# Patient Record
Sex: FEMALE | Race: WHITE | HISPANIC OR LATINO | Employment: OTHER | URBAN - METROPOLITAN AREA
[De-identification: names, ages, dates, MRNs, and addresses within clinical notes are randomized per-mention and may not be internally consistent; named-entity substitution may affect disease eponyms.]

---

## 2017-01-17 ENCOUNTER — OFFICE VISIT (OUTPATIENT)
Dept: OBSTETRICS AND GYNECOLOGY | Facility: CLINIC | Age: 82
End: 2017-01-17
Payer: MEDICARE

## 2017-01-17 VITALS
WEIGHT: 134.06 LBS | BODY MASS INDEX: 26.32 KG/M2 | DIASTOLIC BLOOD PRESSURE: 60 MMHG | HEIGHT: 60 IN | SYSTOLIC BLOOD PRESSURE: 110 MMHG

## 2017-01-17 DIAGNOSIS — R10.2 PELVIC PAIN: Primary | ICD-10-CM

## 2017-01-17 DIAGNOSIS — R10.2 PELVIC PAIN: ICD-10-CM

## 2017-01-17 PROCEDURE — 76830 TRANSVAGINAL US NON-OB: CPT | Mod: S$GLB,,, | Performed by: OBSTETRICS & GYNECOLOGY

## 2017-01-17 PROCEDURE — 1159F MED LIST DOCD IN RCRD: CPT | Mod: S$GLB,,, | Performed by: OBSTETRICS & GYNECOLOGY

## 2017-01-17 PROCEDURE — 99999 PR PBB SHADOW E&M-EST. PATIENT-LVL II: CPT | Mod: PBBFAC,,, | Performed by: OBSTETRICS & GYNECOLOGY

## 2017-01-17 PROCEDURE — 1157F ADVNC CARE PLAN IN RCRD: CPT | Mod: S$GLB,,, | Performed by: OBSTETRICS & GYNECOLOGY

## 2017-01-17 PROCEDURE — 99203 OFFICE O/P NEW LOW 30 MIN: CPT | Mod: S$GLB,,, | Performed by: OBSTETRICS & GYNECOLOGY

## 2017-01-17 PROCEDURE — 1160F RVW MEDS BY RX/DR IN RCRD: CPT | Mod: S$GLB,,, | Performed by: OBSTETRICS & GYNECOLOGY

## 2017-01-17 NOTE — PROGRESS NOTES
"82 yo Hungarian speaking postmenopausal female who presents to discuss management of "bumps on her vagina". Patient reports that bumps were present on the left side a few days ago. They caused itching. But, they are now gone.  She continues to have a little bit of itching in the vulvar area.  Patient is also concerned about pain in her lower abdomen. Seems to think that there is a problem with her ovaries/uterus.  On physical exam, normal appearing external female genitalia with slight discoloration noted on the left labia minora.  On speculum exam, cervix is normal appearing. No masses. No lesions. No vaginal discharge noted.  On bimanual exam, normal size uterus. Ovaries not palpated. No adnexal masses. No rebound. No guarding. No suprapubic pain.    AP:   Lower pelvic pain/discomfort  -urine cx sent; pelvic US ordered    If patient continues to have vulvar itching, may consider clobetasol cream    VI Pardo MD  "

## 2017-01-17 NOTE — LETTER
January 17, 2017      Kodak Beauchamp MD  [address] [facility]  [place] [address]           [place] [facility]  [address]  5th [address] [facility], [address]  [place] [address]  Phone: [phone]          Patient: Ghada Dave   MR Number: [id number]   YOB: 1935   Date of Visit: 1/17/2017       Dear Dr. Kodak Beauchamp:    Thank you for referring Ghada Flanagan to me for evaluation. Attached you will find relevant portions of my assessment and plan of care.    If you have questions, please do not hesitate to call me. I look forward to following Ghada Flanagan along with you.    Sincerely,    Tremayne Pardo MD    Enclosure  CC:  No Recipients    If you would like to receive this communication electronically, please contact [email or web address] or [phone] to request more information on ybuy Link access.    For providers and/or their staff who would like to refer a patient to [facility], please contact us through our one-stop-shop provider referral line, [facility], at [phone].    If you feel you have received this communication in error or would no longer like to receive these types of communications, please e-mail [email or web address]

## 2017-01-19 LAB — BACTERIA UR CULT: NORMAL

## 2017-01-20 ENCOUNTER — OFFICE VISIT (OUTPATIENT)
Dept: FAMILY MEDICINE | Facility: CLINIC | Age: 82
End: 2017-01-20
Payer: MEDICARE

## 2017-01-20 ENCOUNTER — LAB VISIT (OUTPATIENT)
Dept: LAB | Facility: HOSPITAL | Age: 82
End: 2017-01-20
Attending: FAMILY MEDICINE
Payer: MEDICARE

## 2017-01-20 VITALS
WEIGHT: 133.81 LBS | HEIGHT: 60 IN | OXYGEN SATURATION: 96 % | SYSTOLIC BLOOD PRESSURE: 118 MMHG | BODY MASS INDEX: 26.27 KG/M2 | DIASTOLIC BLOOD PRESSURE: 64 MMHG | HEART RATE: 82 BPM

## 2017-01-20 DIAGNOSIS — G89.29 CHRONIC PAIN OF BOTH SHOULDERS: ICD-10-CM

## 2017-01-20 DIAGNOSIS — J06.9 UPPER RESPIRATORY TRACT INFECTION, UNSPECIFIED TYPE: ICD-10-CM

## 2017-01-20 DIAGNOSIS — F41.9 ANXIETY: ICD-10-CM

## 2017-01-20 DIAGNOSIS — M25.511 CHRONIC PAIN OF BOTH SHOULDERS: ICD-10-CM

## 2017-01-20 DIAGNOSIS — M25.512 CHRONIC PAIN OF BOTH SHOULDERS: Primary | ICD-10-CM

## 2017-01-20 DIAGNOSIS — G89.29 CHRONIC PAIN OF BOTH SHOULDERS: Primary | ICD-10-CM

## 2017-01-20 DIAGNOSIS — M25.511 CHRONIC PAIN OF BOTH SHOULDERS: Primary | ICD-10-CM

## 2017-01-20 DIAGNOSIS — M25.512 CHRONIC PAIN OF BOTH SHOULDERS: ICD-10-CM

## 2017-01-20 LAB
BASOPHILS # BLD AUTO: 0.08 K/UL
BASOPHILS NFR BLD: 1.7 %
CRP SERPL-MCNC: 2.4 MG/L
DIFFERENTIAL METHOD: ABNORMAL
EOSINOPHIL # BLD AUTO: 0.3 K/UL
EOSINOPHIL NFR BLD: 6.1 %
ERYTHROCYTE [DISTWIDTH] IN BLOOD BY AUTOMATED COUNT: 14.3 %
ERYTHROCYTE [SEDIMENTATION RATE] IN BLOOD BY WESTERGREN METHOD: 34 MM/HR
HCT VFR BLD AUTO: 35.4 %
HGB BLD-MCNC: 12.2 G/DL
LYMPHOCYTES # BLD AUTO: 1.5 K/UL
LYMPHOCYTES NFR BLD: 32.2 %
MCH RBC QN AUTO: 30.1 PG
MCHC RBC AUTO-ENTMCNC: 34.5 %
MCV RBC AUTO: 87 FL
MONOCYTES # BLD AUTO: 0.4 K/UL
MONOCYTES NFR BLD: 8 %
NEUTROPHILS # BLD AUTO: 2.4 K/UL
NEUTROPHILS NFR BLD: 51.8 %
PLATELET # BLD AUTO: 297 K/UL
PMV BLD AUTO: 10.6 FL
RBC # BLD AUTO: 4.05 M/UL
WBC # BLD AUTO: 4.6 K/UL

## 2017-01-20 PROCEDURE — 1159F MED LIST DOCD IN RCRD: CPT | Mod: S$GLB,,, | Performed by: FAMILY MEDICINE

## 2017-01-20 PROCEDURE — 1157F ADVNC CARE PLAN IN RCRD: CPT | Mod: S$GLB,,, | Performed by: FAMILY MEDICINE

## 2017-01-20 PROCEDURE — 1160F RVW MEDS BY RX/DR IN RCRD: CPT | Mod: S$GLB,,, | Performed by: FAMILY MEDICINE

## 2017-01-20 PROCEDURE — 99214 OFFICE O/P EST MOD 30 MIN: CPT | Mod: S$GLB,,, | Performed by: FAMILY MEDICINE

## 2017-01-20 PROCEDURE — 99999 PR PBB SHADOW E&M-EST. PATIENT-LVL III: CPT | Mod: PBBFAC,,, | Performed by: FAMILY MEDICINE

## 2017-01-20 PROCEDURE — 85025 COMPLETE CBC W/AUTO DIFF WBC: CPT

## 2017-01-20 PROCEDURE — 36415 COLL VENOUS BLD VENIPUNCTURE: CPT | Mod: PO

## 2017-01-20 PROCEDURE — 85651 RBC SED RATE NONAUTOMATED: CPT

## 2017-01-20 PROCEDURE — 86140 C-REACTIVE PROTEIN: CPT

## 2017-01-20 PROCEDURE — 1125F AMNT PAIN NOTED PAIN PRSNT: CPT | Mod: S$GLB,,, | Performed by: FAMILY MEDICINE

## 2017-01-20 RX ORDER — ALPRAZOLAM 1 MG/1
1 TABLET ORAL NIGHTLY
COMMUNITY
End: 2017-01-20 | Stop reason: SDUPTHER

## 2017-01-20 RX ORDER — ALPRAZOLAM 0.5 MG/1
0.5 TABLET ORAL NIGHTLY PRN
Qty: 40 TABLET | Refills: 0 | Status: SHIPPED | OUTPATIENT
Start: 2017-01-20 | End: 2017-03-14

## 2017-01-20 RX ORDER — OMEPRAZOLE 20 MG/1
20 CAPSULE, DELAYED RELEASE ORAL DAILY
COMMUNITY
End: 2017-06-05

## 2017-01-20 RX ORDER — PREDNISONE 20 MG/1
20 TABLET ORAL DAILY
Qty: 7 TABLET | Refills: 0 | Status: SHIPPED | OUTPATIENT
Start: 2017-01-20 | End: 2017-01-27

## 2017-01-20 RX ORDER — MELOXICAM 7.5 MG/1
7.5 TABLET ORAL DAILY
Qty: 30 TABLET | Refills: 0 | Status: SHIPPED | OUTPATIENT
Start: 2017-01-20 | End: 2017-03-14

## 2017-01-20 RX ORDER — AZITHROMYCIN 250 MG/1
TABLET, FILM COATED ORAL
Qty: 6 TABLET | Refills: 0 | Status: SHIPPED | OUTPATIENT
Start: 2017-01-20 | End: 2017-01-25

## 2017-01-20 NOTE — MR AVS SNAPSHOT
[facility]   [facility] [place] [address]  Phone: [phone]  Fax: [phone]                  Ghada Dave   2017 11:20 AM   Office Visit    Descripción:  Female : 1935   Personal Médico:  Kodak Beauchamp MD   Departamento:  [facility]           Razón de la amaury     Shoulder Pain     Sore Throat           Diagnósticos de Esta Visita        Comentarios    Chronic pain of both shoulders    -  Primario     Upper respiratory tract infection, unspecified type         Anxiety                Lista de tareas           Citas próximas        Personal Médico Departamento Tfno del dpto    2017 1:00 PM LAB, [place] [facility] [phone]    2017 10:20 AM Kodak Beauchamp MD [facility] [phone]      Metas (5 Years of Data)     Ninguna      Recetas para recoger        Disp Refills Start End    meloxicam (MOBIC) 7.5 MG tablet 30 tablet 0 2017     Take 1 tablet (7.5 mg total) by mouth once daily. - Oral    [place] [facility] [place]  [address] [place] [address] No. de tlfo: #: [phone]       predniSONE (DELTASONE) 20 MG tablet 7 tablet 0 2017    Take 1 tablet (20 mg total) by mouth once daily. - Oral    [facility] [facility] [place]  [address] [place] [address] No. de tlfo: #: [phone]       alprazolam (XANAX) 0.5 MG tablet 40 tablet 0 2017     Take 1 tablet (0.5 mg total) by mouth nightly as needed for Anxiety. - Oral    [facility] [facility] [place]  [address] [place] [address] No. de tlfo: #: [phone]       azithromycin (Z-RICARDO) 250 MG tablet 6 tablet 0 2017    Take 2 tablets by mouth on day 1; Take 1 tablet by mouth on days 2-5    [facility]: [facility] [place] -  [address] [place] [address] No. de tlfo: #: [phone]         [facility]     [facility] [place] de Enfermeras -  Asistencia 24/7  Enfermeras registradas de [facility] pueden ayudarle a reservar trinh amaury, proveer educación para la maría, asesoría clínica, y otros servicios de asesoramiento.   Llame para randy servicio gratuito a [phone].             Medicamentos           Mensaje sobre Medicamentos     Verificar los cambios y / o adiciones a hart régimen de medicación son los mismos que discutir con hart médico. Si cualquiera de estos cambios o adiciones son incorrectos, por favor notifique a hart proveedor de atención médica.        EMPEZAR a eddy estos medicamentos NUEVOS        Refills    meloxicam (MOBIC) 7.5 MG tablet 0    Sig: Take 1 tablet (7.5 mg total) by mouth once daily.    Categoría: Normal    Vía: Oral    predniSONE (DELTASONE) 20 MG tablet 0    Sig: Take 1 tablet (20 mg total) by mouth once daily.    Categoría: Normal    Vía: Oral    alprazolam (XANAX) 0.5 MG tablet 0    Sig: Take 1 tablet (0.5 mg total) by mouth nightly as needed for Anxiety.    Categoría: Print    Vía: Oral    azithromycin (Z-RICARDO) 250 MG tablet 0    Sig: Take 2 tablets by mouth on day 1; Take 1 tablet by mouth on days 2-5    Categoría: Normal      DEJAR de eddy estos medicamentos     gabapentin (NEURONTIN) 100 MG capsule Take 100 mg by mouth 2 (two) times daily.    mometasone 0.1% (ELOCON) 0.1 % cream Apply topically once daily.    celecoxib (CELEBREX) 200 MG capsule Take 1 capsule (200 mg total) by mouth daily as needed for Pain.           Verifique que la siguiente lista de medicamentos es trinh representación exacta de los medicamentos que está tomando actualmente. Si no hay ningunos reportados, la lista puede estar en goel. Si no es correcta, por favor póngase en contacto con hart proveedor de atención médica. Lleve esta lista con usted en sid de emergencia.           Medicamentos Actuales     alprazolam (XANAX) 0.5 MG tablet Take 1 tablet (0.5 mg total) by mouth nightly as needed for Anxiety.    atorvastatin (LIPITOR) 40 MG tablet Take 1  tablet by mouth once daily.    omeprazole (PRILOSEC) 20 MG capsule Take 20 mg by mouth once daily.    azithromycin (Z-RICARDO) 250 MG tablet Take 2 tablets by mouth on day 1; Take 1 tablet by mouth on days 2-5    meloxicam (MOBIC) 7.5 MG tablet Take 1 tablet (7.5 mg total) by mouth once daily.    predniSONE (DELTASONE) 20 MG tablet Take 1 tablet (20 mg total) by mouth once daily.           Información de referencia clínica           Signos vitales - más recientes  Última actualización: 2017 11:09 AM por Lary Bolanos MA    PS [place] SpO2 BMI ([facility])    118/64 (BP Location: Left arm, Patient Position: Sitting, BP Method: Manual) 82 5' (1.524 m) 60.7 kg (133 lb 13.1 oz) 96% 26.13 kg/m2      Blood Pressure          Most Recent Value    BP  118/64      Alergias     A partir del:  2017        No Known Allergies      Vacunas     Administradas en la fecha de la visita:  2017        None      Orders Placed During Today's Visit     Exámenes/Procedimientos futuros Se espera el Vence    C-reactive protein  2017    CBC auto differential  2017    Sedimentation rate, manual  2017               Ghada Del Zenaida Flanagan   2017 11:20 AM   Office Visit    Description:  Female : 1935   Provider:  Kodak Beauchamp MD   Department:  [facility]           Reason for Visit     Shoulder Pain     Sore Throat           Diagnoses this Visit        Comments    Chronic pain of both shoulders    -  Primary     Upper respiratory tract infection, unspecified type         Anxiety                To Do List           Future Appointments        Provider Department Dept Phone    2017 1:00 PM LAB, [place] [facility] [phone]    2017 10:20 AM Kodak Beauchamp MD [facility] [phone]      Goals     None       These Medications        Disp Refills Start End    meloxicam (MOBIC) 7.5 MG  tablet 30 tablet 0 1/20/2017     Take 1 tablet (7.5 mg total) by mouth once daily. - Oral    [place] [facility] [place] [address] Ph #: [phone]       predniSONE (DELTASONE) 20 MG tablet 7 tablet 0 1/20/2017 1/27/2017    Take 1 tablet (20 mg total) by mouth once daily. - Oral    [place] [facility] [place] [address] Ph #: [phone]       alprazolam (XANAX) 0.5 MG tablet 40 tablet 0 1/20/2017     Take 1 tablet (0.5 mg total) by mouth nightly as needed for Anxiety. - Oral    [place] [facility] [place] [address] Ph #: [phone]       azithromycin (Z-RICARDO) 250 MG tablet 6 tablet 0 1/20/2017 1/25/2017    Take 2 tablets by mouth on day 1; Take 1 tablet by mouth on days 2-5    [place] [facility] [place] [address] Ph #: [phone]         [facility]     [facility] Line - 24/7 Assistance  Registered nurses in the [facility] provide clinical advisement, health education, appointment booking, and other advisory services.  Call for this free service at [phone].             Medications           Message regarding Medications     Verify the changes and/or additions to your medication regime listed below are the same as discussed with your clinician today.  If any of these changes or additions are incorrect, please notify your healthcare provider.        START taking these NEW medications        Refills    meloxicam (MOBIC) 7.5 MG tablet 0    Sig: Take 1 tablet (7.5 mg total) by mouth once daily.    Class: Normal    Route: Oral    predniSONE (DELTASONE) 20 MG tablet 0    Sig: Take 1 tablet (20 mg total) by mouth once daily.    Class: Normal    Route: Oral    alprazolam (XANAX) 0.5 MG tablet 0    Sig: Take 1 tablet (0.5 mg total) by mouth nightly as needed for Anxiety.    Class: Print    Route: Oral    azithromycin (Z-RICARDO) 250 MG tablet 0    Sig:  Take 2 tablets by mouth on day 1; Take 1 tablet by mouth on days 2-5    Class: Normal      STOP taking these medications     gabapentin (NEURONTIN) 100 MG capsule Take 100 mg by mouth 2 (two) times daily.    mometasone 0.1% (ELOCON) 0.1 % cream Apply topically once daily.    celecoxib (CELEBREX) 200 MG capsule Take 1 capsule (200 mg total) by mouth daily as needed for Pain.           Verify that the below list of medications is an accurate representation of the medications you are currently taking.  If none reported, the list may be blank. If incorrect, please contact your healthcare provider. Carry this list with you in case of emergency.           Current Medications     alprazolam (XANAX) 0.5 MG tablet Take 1 tablet (0.5 mg total) by mouth nightly as needed for Anxiety.    atorvastatin (LIPITOR) 40 MG tablet Take 1 tablet by mouth once daily.    omeprazole (PRILOSEC) 20 MG capsule Take 20 mg by mouth once daily.    azithromycin (Z-RICARDO) 250 MG tablet Take 2 tablets by mouth on day 1; Take 1 tablet by mouth on days 2-5    meloxicam (MOBIC) 7.5 MG tablet Take 1 tablet (7.5 mg total) by mouth once daily.    predniSONE (DELTASONE) 20 MG tablet Take 1 tablet (20 mg total) by mouth once daily.           Clinical Reference Information           Vital Signs - Last Recorded  Most recent update: 1/20/2017 11:09 AM by Lary Bolanos MA    BP Pulse Ht Wt SpO2 BMI    118/64 (BP Location: Left arm, Patient Position: Sitting, BP Method: Manual) 82 5' (1.524 m) 60.7 kg (133 lb 13.1 oz) 96% 26.13 kg/m2      Blood Pressure          Most Recent Value    BP  118/64      Allergies as of 1/20/2017     No Known Allergies      Immunizations Administered on Date of Encounter - 1/20/2017     None      Orders Placed During Today's Visit     Future Labs/Procedures Expected by Expires    C-reactive protein  1/20/2017 4/20/2017    CBC auto differential  1/20/2017 1/20/2018    Sedimentation rate, manual  1/20/2017 4/20/2017

## 2017-01-20 NOTE — PROGRESS NOTES
Subjective:       Patient ID: Ghada Dave is a 81 y.o. female.    Chief Complaint: Shoulder Pain and Sore Throat    HPI Comments: 81 years old female who came to the clinic with cough and congestion for the last week.  No fever or chills.  Patient also with significant anxiety and requesting alprazolam to help her for the symptoms.  She is also complaining of bilateral shoulder pain.  The pain is 6 out of 10 of intensity on and off aggravated with activity and better with rest.    Shoulder Pain    Pertinent negatives include no fever.   Sore Throat    Associated symptoms include congestion.     Review of Systems   Constitutional: Negative.  Negative for chills and fever.   HENT: Positive for congestion and sore throat.    Eyes: Negative.    Respiratory: Negative.    Cardiovascular: Negative.    Gastrointestinal: Negative.    Genitourinary: Negative.    Musculoskeletal: Negative.    Skin: Negative.    Neurological: Negative.    Psychiatric/Behavioral: The patient is nervous/anxious.        Objective:      Physical Exam   Constitutional: She is oriented to person, place, and time. She appears well-developed and well-nourished. No distress.   HENT:   Head: Normocephalic and atraumatic.   Right Ear: External ear normal.   Left Ear: External ear normal.   Nose: Rhinorrhea present.   Mouth/Throat: Posterior oropharyngeal erythema present. No oropharyngeal exudate.   Eyes: Conjunctivae and EOM are normal. Pupils are equal, round, and reactive to light. Right eye exhibits no discharge. Left eye exhibits no discharge. No scleral icterus.   Neck: Normal range of motion. Neck supple. No JVD present. No tracheal deviation present. No thyromegaly present.   Cardiovascular: Normal rate, regular rhythm, normal heart sounds and intact distal pulses.  Exam reveals no gallop and no friction rub.    No murmur heard.  Pulmonary/Chest: Effort normal and breath sounds normal. No stridor. No respiratory distress. She has  no wheezes. She has no rales. She exhibits no tenderness.   Abdominal: Soft. Bowel sounds are normal. She exhibits no distension and no mass. There is no tenderness. There is no rebound and no guarding.   Musculoskeletal: Normal range of motion. She exhibits no edema or tenderness.   Lymphadenopathy:     She has no cervical adenopathy.   Neurological: She is alert and oriented to person, place, and time. She has normal reflexes. No cranial nerve deficit. She exhibits normal muscle tone. Coordination normal.   Skin: Skin is warm and dry. No rash noted. She is not diaphoretic. No erythema. No pallor.   Psychiatric: Her behavior is normal. Judgment and thought content normal. Her mood appears anxious. Her affect is not angry, not blunt, not labile and not inappropriate. She does not exhibit a depressed mood.       Assessment:       1. Chronic pain of both shoulders    2. Upper respiratory tract infection, unspecified type    3. Anxiety        Plan:         Ghada was seen today for shoulder pain and sore throat.    Diagnoses and all orders for this visit:    Chronic pain of both shoulders  -     meloxicam (MOBIC) 7.5 MG tablet; Take 1 tablet (7.5 mg total) by mouth once daily.  -     predniSONE (DELTASONE) 20 MG tablet; Take 1 tablet (20 mg total) by mouth once daily.  -     CBC auto differential; Future  -     Sedimentation rate, manual; Future  -     C-reactive protein; Future    Upper respiratory tract infection, unspecified type  -     azithromycin (Z-RICARDO) 250 MG tablet; Take 2 tablets by mouth on day 1; Take 1 tablet by mouth on days 2-5    Anxiety  -     alprazolam (XANAX) 0.5 MG tablet; Take 1 tablet (0.5 mg total) by mouth nightly as needed for Anxiety.

## 2017-01-31 ENCOUNTER — TELEPHONE (OUTPATIENT)
Dept: FAMILY MEDICINE | Facility: CLINIC | Age: 82
End: 2017-01-31

## 2017-01-31 DIAGNOSIS — M15.9 OSTEOARTHRITIS OF MULTIPLE JOINTS, UNSPECIFIED OSTEOARTHRITIS TYPE: Primary | ICD-10-CM

## 2017-01-31 NOTE — TELEPHONE ENCOUNTER
Patient called and states that the medication that was given to her for her arthritis pain Mobic is not helping her and it is causing her to have a rash in her mouth she took it for 6 days she has stopped taking and the rash has gone away. She wants something else to be given to her the pain is still the same in her shoulders. Please advise.

## 2017-01-31 NOTE — TELEPHONE ENCOUNTER
----- Message from Day Alva sent at 1/31/2017 10:46 AM CST -----  Contact: self, [phone]  Patient called in requesting to speak with you regarding Meloxicam medication prescribed. Please advise.

## 2017-02-01 RX ORDER — TRAMADOL HYDROCHLORIDE AND ACETAMINOPHEN 37.5; 325 MG/1; MG/1
1 TABLET, FILM COATED ORAL 3 TIMES DAILY PRN
Qty: 60 TABLET | Refills: 0 | Status: SHIPPED | OUTPATIENT
Start: 2017-02-01 | End: 2017-02-11

## 2017-03-14 ENCOUNTER — HOSPITAL ENCOUNTER (EMERGENCY)
Facility: HOSPITAL | Age: 82
Discharge: HOME | End: 2017-03-14
Attending: EMERGENCY MEDICINE
Payer: MEDICARE

## 2017-03-14 VITALS
SYSTOLIC BLOOD PRESSURE: 130 MMHG | DIASTOLIC BLOOD PRESSURE: 47 MMHG | BODY MASS INDEX: 25.52 KG/M2 | HEIGHT: 60 IN | HEART RATE: 64 BPM | WEIGHT: 130 LBS | RESPIRATION RATE: 13 BRPM | TEMPERATURE: 98 F | OXYGEN SATURATION: 100 %

## 2017-03-14 DIAGNOSIS — R07.9 CHEST PAIN: ICD-10-CM

## 2017-03-14 LAB
ALBUMIN SERPL BCP-MCNC: 4 G/DL
ALP SERPL-CCNC: 61 U/L
ALT SERPL W/O P-5'-P-CCNC: 12 U/L
ANION GAP SERPL CALC-SCNC: 11 MMOL/L
AST SERPL-CCNC: 21 U/L
BASOPHILS # BLD AUTO: 0.06 K/UL
BASOPHILS NFR BLD: 1.4 %
BILIRUB SERPL-MCNC: 0.3 MG/DL
BNP SERPL-MCNC: 42 PG/ML
BUN SERPL-MCNC: 21 MG/DL
CALCIUM SERPL-MCNC: 10 MG/DL
CHLORIDE SERPL-SCNC: 107 MMOL/L
CO2 SERPL-SCNC: 23 MMOL/L
CREAT SERPL-MCNC: 0.9 MG/DL
DIFFERENTIAL METHOD: NORMAL
EOSINOPHIL # BLD AUTO: 0.2 K/UL
EOSINOPHIL NFR BLD: 5.3 %
ERYTHROCYTE [DISTWIDTH] IN BLOOD BY AUTOMATED COUNT: 14.2 %
EST. GFR  (AFRICAN AMERICAN): >60 ML/MIN/1.73 M^2
EST. GFR  (NON AFRICAN AMERICAN): >60 ML/MIN/1.73 M^2
GLUCOSE SERPL-MCNC: 94 MG/DL
HCT VFR BLD AUTO: 37.3 %
HGB BLD-MCNC: 12.8 G/DL
INR PPP: 0.9
LYMPHOCYTES # BLD AUTO: 1.7 K/UL
LYMPHOCYTES NFR BLD: 39.3 %
MCH RBC QN AUTO: 30.5 PG
MCHC RBC AUTO-ENTMCNC: 34.3 %
MCV RBC AUTO: 89 FL
MONOCYTES # BLD AUTO: 0.3 K/UL
MONOCYTES NFR BLD: 7.4 %
NEUTROPHILS # BLD AUTO: 2 K/UL
NEUTROPHILS NFR BLD: 46.4 %
PLATELET # BLD AUTO: 260 K/UL
PMV BLD AUTO: 9.8 FL
POTASSIUM SERPL-SCNC: 4.2 MMOL/L
PROT SERPL-MCNC: 8 G/DL
PROTHROMBIN TIME: 10 SEC
RBC # BLD AUTO: 4.2 M/UL
SODIUM SERPL-SCNC: 141 MMOL/L
TROPONIN I SERPL DL<=0.01 NG/ML-MCNC: <0.006 NG/ML
TROPONIN I SERPL DL<=0.01 NG/ML-MCNC: <0.006 NG/ML
WBC # BLD AUTO: 4.35 K/UL

## 2017-03-14 PROCEDURE — 80053 COMPREHEN METABOLIC PANEL: CPT

## 2017-03-14 PROCEDURE — 85025 COMPLETE CBC W/AUTO DIFF WBC: CPT

## 2017-03-14 PROCEDURE — 85610 PROTHROMBIN TIME: CPT

## 2017-03-14 PROCEDURE — 84484 ASSAY OF TROPONIN QUANT: CPT

## 2017-03-14 PROCEDURE — 25000003 PHARM REV CODE 250: Performed by: EMERGENCY MEDICINE

## 2017-03-14 PROCEDURE — 83880 ASSAY OF NATRIURETIC PEPTIDE: CPT

## 2017-03-14 PROCEDURE — 99284 EMERGENCY DEPT VISIT MOD MDM: CPT

## 2017-03-14 PROCEDURE — 93005 ELECTROCARDIOGRAM TRACING: CPT

## 2017-03-14 RX ORDER — KETOROLAC TROMETHAMINE 30 MG/ML
15 INJECTION, SOLUTION INTRAMUSCULAR; INTRAVENOUS
Status: DISCONTINUED | OUTPATIENT
Start: 2017-03-14 | End: 2017-03-14 | Stop reason: HOSPADM

## 2017-03-14 RX ORDER — KETOROLAC TROMETHAMINE 10 MG/1
10 TABLET, FILM COATED ORAL EVERY 8 HOURS
Qty: 8 TABLET | Refills: 0 | Status: SHIPPED | OUTPATIENT
Start: 2017-03-14 | End: 2017-06-05

## 2017-03-14 RX ORDER — ASPIRIN 325 MG
325 TABLET ORAL
Status: COMPLETED | OUTPATIENT
Start: 2017-03-14 | End: 2017-03-14

## 2017-03-14 RX ADMIN — ASPIRIN 325 MG ORAL TABLET 325 MG: 325 PILL ORAL at 04:03

## 2017-03-14 NOTE — ED TRIAGE NOTES
right chest pain that woke her up this morning.  Off/on since this morning.  Was sent to ED by Dr. Almanzar for evaluation.no other symptoms noted

## 2017-03-14 NOTE — ED PROVIDER NOTES
"Encounter Date: 3/14/2017       History     Chief Complaint   Patient presents with    Chest Pain     right chest pain that woke her up this morning.  Off/on since this morning.  Was sent to ED by Dr. Almanzar for evaluation.     Review of patient's allergies indicates:  No Known Allergies  HPI Comments: 82 Y/O HF PRESENTS WITH RIGHT ANTERIOR CHEST PAIN THAT HAS BEEN INTERMITTENT OCCURRING EVERY 15 MINUTES SINCE 0100 TODAY.  PT REPORTS THE PAIN IS WELL LOCALIZED TO RIGHT ANTERIOR CHEST, NON-RADIATING, AND THROBBING.  NO CHEST PRESSURE, NO PALPITATIONS, NO SHARP/STABBING CHEST PAIN.  PAIN BEGAN WHILE PT WAS LYING ON HER RIGHT SIDE IN HER BED EARLY THIS MORNING.  PT TOOK "2 PAIN PILLS" AROUND 0700 WITH NO RELIEF.  PT THEN DID A FEW ARM EXERCISES AND THE PAIN CONTINUED.  PT DENIES ANY EXACERBATING OR RELIEVING FACTORS.  PT REPORTS SHE DOES UPPER ARM EXERCISES DAILY AND COULD HAVE STRAINED A MUSCLE.  NO TRAUMA.  NO SOB, N/V, DIAPHORESIS, ABD PAIN.  PT CALLED HER PCP THIS AFTERNOON TO SCHEDULE AN OUTPATIENT APPOINTMENT AND WAS TOLD BY THE NURSE TO COME TO ED.      The history is provided by the patient and a relative. A  was used.     Past Medical History:   Diagnosis Date    Abnormal Pap smear 2013    Colon polyps     Frequent urination     Hemorrhoid     High cholesterol     Hypertension     Poor circulation     Varicose vein      Past Surgical History:   Procedure Laterality Date    COLONOSCOPY      POLYPECTOMY       History reviewed. No pertinent family history.  Social History   Substance Use Topics    Smoking status: Never Smoker    Smokeless tobacco: Never Used    Alcohol use No     Review of Systems   Constitutional: Negative for activity change, appetite change, chills, diaphoresis, fatigue and fever.   HENT: Negative for congestion, sore throat and trouble swallowing.    Eyes: Negative for pain and redness.   Respiratory: Negative for cough, chest tightness and shortness of " breath.    Cardiovascular: Positive for chest pain. Negative for palpitations and leg swelling.   Gastrointestinal: Negative for abdominal distention, abdominal pain, diarrhea, nausea and vomiting.   Endocrine: Negative for polydipsia, polyphagia and polyuria.   Genitourinary: Negative for difficulty urinating, dysuria and flank pain.   Musculoskeletal: Negative for back pain and joint swelling.   Skin: Negative for pallor and rash.   Allergic/Immunologic: Negative for immunocompromised state.   Neurological: Negative for dizziness, syncope, weakness, light-headedness, numbness and headaches.   Hematological: Does not bruise/bleed easily.   Psychiatric/Behavioral: Negative for agitation and confusion.   All other systems reviewed and are negative.      Physical Exam   Initial Vitals   BP Pulse Resp Temp SpO2   03/14/17 1516 03/14/17 1516 03/14/17 1516 03/14/17 1516 03/14/17 1516   132/94 70 18 98 °F (36.7 °C) 98 %     Physical Exam    Nursing note and vitals reviewed.  Constitutional: She appears well-developed and well-nourished. She is not diaphoretic. No distress.   HENT:   Head: Normocephalic and atraumatic.   Mouth/Throat: Oropharynx is clear and moist.   Eyes: Conjunctivae and EOM are normal. No scleral icterus.   Neck: Normal range of motion. Neck supple.   Cardiovascular: Normal rate, regular rhythm and normal heart sounds. Exam reveals no gallop and no friction rub.    No murmur heard.  Pulmonary/Chest: Breath sounds normal. No respiratory distress. She has no wheezes. She has no rhonchi. She has no rales. She exhibits tenderness. She exhibits no mass, no crepitus and no swelling.       Abdominal: Soft. Bowel sounds are normal. She exhibits no distension. There is no tenderness. There is no rebound and no guarding.   Musculoskeletal: Normal range of motion. She exhibits no edema.   Lymphadenopathy:     She has no cervical adenopathy.   Neurological: She is alert and oriented to person, place, and time. She  has normal strength.   Skin: Skin is warm and dry. No rash noted. No erythema.   Psychiatric: She has a normal mood and affect. Her behavior is normal. Judgment and thought content normal.         ED Course   Procedures  Labs Reviewed   CBC W/ AUTO DIFFERENTIAL    Narrative:     PLEASE REVIEW ORDER START TIME BEFORE MARKING SPECIMEN  COLLECTED.   COMPREHENSIVE METABOLIC PANEL    Narrative:     PLEASE REVIEW ORDER START TIME BEFORE MARKING SPECIMEN  COLLECTED.   PROTIME-INR    Narrative:     PLEASE REVIEW ORDER START TIME BEFORE MARKING SPECIMEN  COLLECTED.   TROPONIN I    Narrative:     PLEASE REVIEW ORDER START TIME BEFORE MARKING SPECIMEN  COLLECTED.   TROPONIN I    Narrative:     PLEASE REVIEW ORDER START TIME BEFORE MARKING SPECIMEN  COLLECTED.   B-TYPE NATRIURETIC PEPTIDE    Narrative:     PLEASE REVIEW ORDER START TIME BEFORE MARKING SPECIMEN  COLLECTED.     EKG Readings: (Independently Interpreted)   Initial Reading: No STEMI. Previous EKG: Compared with most recent EKG Previous EKG Date: 05/26/15. Heart Rate: 66.          Medical Decision Making:   Initial Assessment:   80 Y/O HF WITH INTERMITTENT CHEST PAIN SINCE 0100 THIS AM.  PT DENIES ANY HX OF HEART DISEASE.  PAIN IS REPRODUCIBLE ON EXAM.  NO ASSOCIATED SYMPTOMS OF DIAPHORESIS, SOB, N/V, CHEST PRESSURE, NECK OR JAW PAIN.   Differential Diagnosis:   DDX:  ACS, ARRHYTHMIA, STEMI, COSTOCHONDRITIS, CHEST WALL STRAIN  Independently Interpreted Test(s):   I have ordered and independently interpreted EKG Reading(s) - see prior notes  Clinical Tests:   Lab Tests: Ordered and Reviewed  The following lab test(s) were unremarkable: CBC, CMP and Troponin  Radiological Study: Ordered and Reviewed  Medical Tests: Ordered and Reviewed  ED Management:  80 Y/O F PRESENTS WITH C/O THROBBING CHEST PAIN THAT HAS BEEN ONGOING SINCE 0100 THIS MORNING AND IS REPRODUCIBLE ON EXAM.  PT HAS A HX OF DOING UPPER ARM EXERCISES AND THIS MOST LIKELY STRAINED HER CHEST WALL CAUSING  HER PAIN.  SHE DENIES ANY ASSOCIATED SYMPTOMS AND HAS NO ACUTE EKG CHANGES.  TROP IS NEG MAKING CARDIAC SOURCE OF PAIN EVEN LESS LIKELY.  I WILL D/C HOME WITH A FEW DOSES OF TORADOL AND HAVE HER F/U WITH PCP.  PT UNDERSTANDS THAT SHE MUST RETURN TO ED IMMEDIATELY IF SYMPTOMS WORSEN IN ANY WAY.  FAMILY MEMBER AT BEDSIDE PRESENT FOR D/C PLANNING.     Pt counseled on their diagnosis and the importance of following up with PCP.  Pt also cautioned on when to return to ED.  Pt verbalizes understanding of discharge plan and will return to ED immediately if symptoms worsen                     ED Course     Clinical Impression:   The encounter diagnosis was Chest pain.    Disposition:   Disposition: Discharged  Condition: Stable       Nuvia Navarrete MD  03/15/17 2869

## 2017-03-14 NOTE — ED NOTES
Pt here with right chest pain -started in the middle of the night(2am) and it is there and is sometimes worse, a little sharp in nature, took 2 pain pills with no relief, denies diabtees or heart history; has history of high cholesterol. Pt dneis fever,vomiting diarrhea or cough this week. Pt denies shanta other s/s with this pain.

## 2017-03-14 NOTE — ED AVS SNAPSHOT
[facility] [address]  [place] [address]               Ghada Del Zenaida Panchito   3/14/2017  4:27 PM   ED    Descripción:  Female : 1935   Departamento:  [facility]           Velazquez Cuidado fue coordinado por:     Provider Role From To    Nuvia Navarrete MD Attending Provider 17 0018 --      Razón de la amaury     Chest Pain           Diagnósticos de Esta Visita        Comentarios    Chest pain           ED Disposition     ED Disposition Condition Comment    Discharge             Lista de tareas           Información de seguimiento     Realice un seguimiento con:  Kodak Beauchamp MD    Cuándo:  3/16/2017    Especialidad:  Family Medicine    Información de contacto:     [facility]  [place] [address]  [phone]        Recetas para recoger        Disp Refills Start End    ketorolac (TORADOL) 10 mg tablet 8 tablet 0 3/14/2017     Take 1 tablet (10 mg total) by mouth every 8 (eight) hours. - Oral    Farmacia: [facility] [place], [place] [address] [place] [address] No. de tlfo: #: [phone]         [facility]     [facility]ermeras - Asistencia   Enfermeras registradas de [facility] pueden ayudarle a reservar trinh amaury, proveer educación para la maría, asesoría clínica, y otros servicios de asesoramiento.   Llame para randy servicio gratuito a [phone].             Medicamentos           Mensaje sobre Medicamentos     Verificar los cambios y / o adiciones a velazquez régimen de medicación son los mismos que discutir con velazquez médico. Si cualquiera de estos cambios o adiciones son incorrectos, por favor notifique a velazquez proveedor de atención médica.        EMPEZAR a eddy estos medicamentos NUEVOS        Refills    ketorolac (TORADOL) 10 mg tablet 0    Sig: Take 1 tablet (10 mg total) by mouth every 8 (eight) hours.    Categoría: Print    Vía: Oral      These medications were administered today         Dose Freq    aspirin tablet 325 mg 325 mg ED 1 Time    Sig: Take 1 tablet (325 mg total) by mouth ED 1 Time.    Categoría: Normal    Vía: Oral    ketorolac injection 15 mg 15 mg ED 1 Time    Sig: Inject 15 mg into the vein ED 1 Time.    Categoría: Normal    Vía: Intravenous      DEJAR de eddy estos medicamentos     alprazolam (XANAX) 0.5 MG tablet Take 1 tablet (0.5 mg total) by mouth nightly as needed for Anxiety.    meloxicam (MOBIC) 7.5 MG tablet Take 1 tablet (7.5 mg total) by mouth once daily.           Verifique que la siguiente lista de medicamentos es trinh representación exacta de los medicamentos que está tomando actualmente. Si no hay ningunos reportados, la lista puede estar en goel. Si no es correcta, por favor póngase en contacto con hart proveedor de atención médica. Lleve esta lista con usted en sid de emergencia.           Medicamentos Actuales     atorvastatin (LIPITOR) 40 MG tablet Take 1 tablet by mouth once daily.    omeprazole (PRILOSEC) 20 MG capsule Take 20 mg by mouth once daily.    ketorolac (TORADOL) 10 mg tablet Take 1 tablet (10 mg total) by mouth every 8 (eight) hours.    ketorolac injection 15 mg Inject 15 mg into the vein ED 1 Time.           Información de referencia clínica           Medina signos vitales anika     PS Pulso Temperatura Resp [place] Peso    130/47 64 98 °F (36.7 °C) (Oral) 13 5' (1.524 m) 59 kg (130 lb)    SpO2 BMI (IMC)                100% 25.39 kg/m2          Alergias     A partir del:  3/14/2017        No Known Allergies      Vacunas     Administradas en la fecha de la visita:  3/14/2017        None      ED Micro, Lab, POCT     Start Ordered       Status Ordering Provider    03/14/17 1629 03/14/17 1629  CBC auto differential  STAT      Final result     03/14/17 1629 03/14/17 1629  Comprehensive metabolic panel  STAT      Final result     03/14/17 1629 03/14/17 1629  Protime-INR  STAT      Final result     03/14/17 1629 03/14/17 1629  Troponin I  Now then every 3  hours     Comments:  PLEASE REVIEW ORDER START TIME BEFORE MARKING SPECIMEN COLLECTED.   Start Status   03/14/17 1629 Final result   03/14/17 1929 Final result       Acknowledged     03/14/17 1629 03/14/17 1629  B-Type natriuretic peptide (BNP)  STAT      Final result       ED Imaging Orders     Start Ordered       Status Ordering Provider    03/14/17 1847 03/14/17 1847  X-Ray Chest PA And Lateral  1 time imaging      Final result     03/14/17 1629 03/14/17 1629  X-Ray Chest AP Portable  1 time imaging      Final result         Instrucciones a randolph de anna         CALL YOUR PRIMARY DOCTOR TO SCHEDULE A FOLLOW UP APPOINTMENT THIS WEEK  RETURN TO ED IMMEDIATELY IF SYMPTOMS WORSEN    Dolor En El Pecho, No Cardíaco [Non-Cardiac Chest Pain]    Según hart visita de hoy, no se pudo determinar exactamente por qué siente dolor en el pecho. Hart afección no parece seria y el dolor que siente no parece provenir del corazón. Sin embargo, en ocasiones, los síntomas de un problema diana tardan más en aparecer. Por lo tanto, es importante que preste atención a las advertencias descritas a continuación.  Cuidados En [place]:  1. Descanse hoy y evite toda actividad agotadora.  2. [place] el medicamento que le hayan recetado según le hayan indicado.  Programe trinh VISITA DE CONTROL con hart médico o randy centro, según le indiquen, si no empieza a sentirse mejor en las próximas 24 horas.  Busque Prontamente Atención Médica  si algo de lo siguiente ocurre:  · Un cambio en el tipo de dolor: se siente diferente, se ha vuelto más adrianna, dura más o comienza a esparcirse al hombro, el brazo, el sruthi, la mandíbula o la espalda.  · Dificultad para respirar o más dolor al respirar.  · Tos con expulsión de esputo (flema [phlegm]) de color oscuro o con juli.  · Debilidad, mareo o desmayo.  · Fiebre de 100.4°F (38°C) o más anna, o rm le haya indicado hart proveedor de atención médica.  · Dolor, enrojecimiento o hinchazón de trinh pierna.  Date Last  Reviewed: 2014  © 2047-4030 [employer]. [address] [place] [address], [place] [address]. Todos los derechos reservados. Esta información no pretende sustituir la atención médica profesional. Sólo hart médico puede diagnosticar y tratar un problema de maría.           [facility] cumple con las leyes federales aplicables de derechos civiles y no discrimina por motivos de jumana, color, origen nacional, edad, discapacidad, o sexo.        Language Assistance Services     ATTENTION: Language assistance services are available, free of charge. Please call [phone].      ATENCIÓN: Si habla español, tiene a hart disposición servicios gratuitos de asistencia lingüística. Llame al [phone].     CHÚ Ý: N?u b?n nói Ti?ng Vi?t, có các d?ch v? h? tr? ngôn ng? mi?n phí dành cho b?n. G?i s? [phone].                      [facility]  [address] [place] [address]  [place] [address]               Ghada Castilloo Panchito   3/14/2017  4:27 PM   ED    Description:  Female : 1935   Department:  [facility]           Your Care was Coordinated By:     Provider Role From To    Nuvia Navarrete MD Attending Provider 17 0424 --      Reason for Visit     Chest Pain           Diagnoses this Visit        Comments    Chest pain           ED Disposition     ED Disposition Condition Comment    Discharge             To Do List           Follow-up Information     Follow up with Kodak Beauchamp MD In 2 days.    Specialty:  Family Medicine    Contact information:     [facility]  [place] [address] [phone]         These Medications        Disp Refills Start End    ketorolac (TORADOL) 10 mg tablet 8 tablet 0 3/14/2017     Take 1 tablet (10 mg total) by mouth every 8 (eight) hours. - Oral    Pharmacy: [facility] [place] [address] [place] [address] #: [phone]         [facility]     [facility] On Call Nurse  Care Line - 24/7 Assistance  Registered nurses in the [facility] provide clinical advisement, health education, appointment booking, and other advisory services.  Call for this free service at [phone].             Medications           Message regarding Medications     Verify the changes and/or additions to your medication regime listed below are the same as discussed with your clinician today.  If any of these changes or additions are incorrect, please notify your healthcare provider.        START taking these NEW medications        Refills    ketorolac (TORADOL) 10 mg tablet 0    Sig: Take 1 tablet (10 mg total) by mouth every 8 (eight) hours.    Class: Print    Route: Oral      These medications were administered today        Dose Freq    aspirin tablet 325 mg 325 mg ED 1 Time    Sig: Take 1 tablet (325 mg total) by mouth ED 1 Time.    Class: Normal    Route: Oral    ketorolac injection 15 mg 15 mg ED 1 Time    Sig: Inject 15 mg into the vein ED 1 Time.    Class: Normal    Route: Intravenous      STOP taking these medications     alprazolam (XANAX) 0.5 MG tablet Take 1 tablet (0.5 mg total) by mouth nightly as needed for Anxiety.    meloxicam (MOBIC) 7.5 MG tablet Take 1 tablet (7.5 mg total) by mouth once daily.           Verify that the below list of medications is an accurate representation of the medications you are currently taking.  If none reported, the list may be blank. If incorrect, please contact your healthcare provider. Carry this list with you in case of emergency.           Current Medications     atorvastatin (LIPITOR) 40 MG tablet Take 1 tablet by mouth once daily.    omeprazole (PRILOSEC) 20 MG capsule Take 20 mg by mouth once daily.    ketorolac (TORADOL) 10 mg tablet Take 1 tablet (10 mg total) by mouth every 8 (eight) hours.    ketorolac injection 15 mg Inject 15 mg into the vein ED 1 Time.           Clinical Reference Information           Your Vitals Were     BP Pulse  Temp Resp Height Weight    130/47 64 98 °F (36.7 °C) (Oral) 13 5' (1.524 m) 59 kg (130 lb)    SpO2 BMI                100% 25.39 kg/m2          Allergies as of 3/14/2017     No Known Allergies      Immunizations Administered on Date of Encounter - 3/14/2017     None      ED Micro, Lab, POCT     Start Ordered       Status Ordering Provider    03/14/17 1629 03/14/17 1629  CBC auto differential  STAT      Final result     03/14/17 1629 03/14/17 1629  Comprehensive metabolic panel  STAT      Final result     03/14/17 1629 03/14/17 1629  Protime-INR  STAT      Final result     03/14/17 1629 03/14/17 1629  Troponin I  Now then every 3 hours     Comments:  PLEASE REVIEW ORDER START TIME BEFORE MARKING SPECIMEN COLLECTED.   Start Status   03/14/17 1629 Final result   03/14/17 1929 Final result       Acknowledged     03/14/17 1629 03/14/17 1629  B-Type natriuretic peptide (BNP)  STAT      Final result       ED Imaging Orders     Start Ordered       Status Ordering Provider    03/14/17 1847 03/14/17 1847  X-Ray Chest PA And Lateral  1 time imaging      Final result     03/14/17 1629 03/14/17 1629  X-Ray Chest AP Portable  1 time imaging      Final result         Discharge Instructions         CALL YOUR PRIMARY DOCTOR TO SCHEDULE A FOLLOW UP APPOINTMENT THIS WEEK  RETURN TO ED IMMEDIATELY IF SYMPTOMS WORSEN    Dolor En El Pecho, No Cardíaco [Non-Cardiac Chest Pain]    Según hart visita de hoy, no se pudo determinar exactamente por qué siente dolor en el pecho. Hart afección no parece seria y el dolor que siente no parece provenir del corazón. Sin embargo, en ocasiones, los síntomas de un problema diana tardan más en aparecer. Por lo tanto, es importante que preste atención a las advertencias descritas a continuación.  Cuidados En [place]:  1. Descanse hoy y evite toda actividad agotadora.  2. [place] el medicamento que le hayan recetado según le hayan indicado.  Programe trinh VISITA DE CONTROL con hart médico o randy centro, según le  indiquen, si no empieza a sentirse mejor en las próximas 24 horas.  Busque Prontamente Atención Médica  si algo de lo siguiente ocurre:  · Un cambio en el tipo de dolor: se siente diferente, se ha vuelto más adrianna, dura más o comienza a esparcirse al hombro, el brazo, el sruthi, la mandíbula o la espalda.  · Dificultad para respirar o más dolor al respirar.  · Tos con expulsión de esputo (flema [phlegm]) de color oscuro o con juli.  · Debilidad, mareo o desmayo.  · Fiebre de 100.4°F (38°C) o más anna, o rm le haya indicado hart proveedor de atención médica.  · Dolor, enrojecimiento o hinchazón de trinh pierna.  Date Last Reviewed: 11/24/2014  © 1611-8234 [employer]. [address] [place] [address]. Todos los derechos reservados. Esta información no pretende sustituir la atención médica profesional. Sólo hart médico puede diagnosticar y tratar un problema de maría.           [facility] complies with applicable Federal civil rights laws and does not discriminate on the basis of race, color, national origin, age, disability, or sex.        Language Assistance Services     ATTENTION: Language assistance services are available, free of charge. Please call [phone].      ATENCIÓN: Si habla español, tiene a hart disposición servicios gratuitos de asistencia lingüística. Llame al [phone].     CHÚ Ý: N?u b?n nói Ti?ng Vi?t, có các d?ch v? h? tr? ngôn ng? mi?n phí dành cho b?n. G?i s? [phone].

## 2017-03-15 NOTE — DISCHARGE INSTRUCTIONS
CALL YOUR PRIMARY DOCTOR TO SCHEDULE A FOLLOW UP APPOINTMENT THIS WEEK  RETURN TO ED IMMEDIATELY IF SYMPTOMS WORSEN    Dolor En El Pecho, No Cardíaco [Non-Cardiac Chest Pain]    Según hart visita de theresa, no se pudo determinar exactamente por qué siente dolor en el pecho. Hart afección no parece seria y el dolor que siente no parece provenir del corazón. Sin embargo, en ocasiones, los síntomas de un problema diana tardan más en aparecer. Por lo tanto, es importante que preste atención a las advertencias descritas a continuación.  Cuidados En [place]:  1. Descanse theresa y evite toda actividad agotadora.  2. [place] el medicamento que le hayan recetado según le hayan indicado.  Programe trinh VISITA DE CONTROL con hart médico o randy centro, según le indiquen, si no empieza a sentirse mejor en las próximas 24 horas.  Busque Prontamente Atención Médica  si algo de lo siguiente ocurre:  · Un cambio en el tipo de dolor: se siente diferente, se ha vuelto más adrianna, dura más o comienza a esparcirse al hombro, el brazo, el sruthi, la mandíbula o la espalda.  · Dificultad para respirar o más dolor al respirar.  · Tos con expulsión de esputo (flema [phlegm]) de color oscuro o con juli.  · Debilidad, mareo o desmayo.  · Fiebre de 100.4°F (38°C) o más anna, o rm le haya indicado hart proveedor de atención médica.  · Dolor, enrojecimiento o hinchazón de trinh pierna.  Date Last Reviewed: 11/24/2014  © 9668-9666 [employer]. [address] [place] [address], [place] [address]. Todos los derechos reservados. Esta información no pretende sustituir la atención médica profesional. Sólo hart médico puede diagnosticar y tratar un problema de maría.

## 2017-03-29 ENCOUNTER — OFFICE VISIT (OUTPATIENT)
Dept: OPTOMETRY | Facility: CLINIC | Age: 82
End: 2017-03-29
Payer: MEDICARE

## 2017-03-29 DIAGNOSIS — Z96.1 PSEUDOPHAKIA OF BOTH EYES: ICD-10-CM

## 2017-03-29 DIAGNOSIS — H26.491 CLOUDY POSTERIOR CAPSULE, RIGHT: Primary | ICD-10-CM

## 2017-03-29 DIAGNOSIS — H04.123 DRY EYES, BILATERAL: ICD-10-CM

## 2017-03-29 DIAGNOSIS — H52.4 PRESBYOPIA OU: ICD-10-CM

## 2017-03-29 DIAGNOSIS — Z13.5 GLAUCOMA SCREENING: ICD-10-CM

## 2017-03-29 PROCEDURE — 92015 DETERMINE REFRACTIVE STATE: CPT | Mod: S$GLB,,, | Performed by: OPTOMETRIST

## 2017-03-29 PROCEDURE — 92004 COMPRE OPH EXAM NEW PT 1/>: CPT | Mod: S$GLB,,, | Performed by: OPTOMETRIST

## 2017-03-29 PROCEDURE — 99999 PR PBB SHADOW E&M-EST. PATIENT-LVL II: CPT | Mod: PBBFAC,,, | Performed by: OPTOMETRIST

## 2017-03-29 RX ORDER — DESOXIMETASONE 2.5 MG/G
OINTMENT TOPICAL
Refills: 2 | COMMUNITY
Start: 2017-01-30 | End: 2017-06-05

## 2017-03-29 NOTE — PROGRESS NOTES
HPI     CALISTA: 2 years ago   Pt states va is stable no noticeable changes   +Dry eyes   +Itching     No gtts        Last edited by Myriam Day on 3/29/2017 12:45 PM.     ROS     Positive for: Cardiovascular    Negative for: Constitutional, Gastrointestinal, Neurological, Skin,   Genitourinary, Musculoskeletal, HENT, Endocrine, Eyes, Respiratory,   Psychiatric, Allergic/Imm, Heme/Lymph    Last edited by Chong Shah, OD on 3/29/2017  1:02 PM. (History)        Assessment /Plan     For exam results, see Encounter Report.    Cloudy posterior capsule, right    Pseudophakia of both eyes    Dry eyes, bilateral    Glaucoma screening    Presbyopia OU      1. Mod PCO OD sp pciol OU/YAG OS  2. Pt wishes new spex Rx  3. NYDIA--advised SYSTANE Ats prn    PLAN:    ophth consult--YAG OD

## 2017-04-17 ENCOUNTER — OFFICE VISIT (OUTPATIENT)
Dept: OPHTHALMOLOGY | Facility: CLINIC | Age: 82
End: 2017-04-17
Payer: MEDICARE

## 2017-04-17 VITALS — HEART RATE: 75 BPM | SYSTOLIC BLOOD PRESSURE: 116 MMHG | DIASTOLIC BLOOD PRESSURE: 66 MMHG

## 2017-04-17 DIAGNOSIS — Z96.1 PSEUDOPHAKIA: ICD-10-CM

## 2017-04-17 DIAGNOSIS — H26.491 PCO (POSTERIOR CAPSULAR OPACIFICATION), RIGHT: Primary | ICD-10-CM

## 2017-04-17 PROCEDURE — 66821 AFTER CATARACT LASER SURGERY: CPT | Mod: RT,S$GLB,, | Performed by: OPHTHALMOLOGY

## 2017-04-17 PROCEDURE — 99999 PR PBB SHADOW E&M-EST. PATIENT-LVL III: CPT | Mod: PBBFAC,,, | Performed by: OPHTHALMOLOGY

## 2017-04-17 PROCEDURE — 92014 COMPRE OPH EXAM EST PT 1/>: CPT | Mod: 57,S$GLB,, | Performed by: OPHTHALMOLOGY

## 2017-04-17 NOTE — PROGRESS NOTES
Subjective:       Patient ID: Ghada Dave is a 81 y.o. female.    Chief Complaint: Laser Treatment (Yag Laser OD )    HPI  Review of Systems    Objective:      Physical Exam    Assessment:       1. PCO (posterior capsular opacification), right    2. Pseudophakia        Plan:       Visually significant  PCO OD- Pt. Wants Laser.           YAG CAP OD today. TE=   37.1 mj, Avg. 0.7mj, 53 pulses.  PF taper OD.  RTC 2 wks.

## 2017-04-17 NOTE — MR AVS SNAPSHOT
[place] [facility]   [facility]  [place] [address]  Phone: [phone]  Fax: [phone]                  Ghada Del Zenaida Flanagan   2017 1:30 PM   Office Visit    Descripción:  Female : 1935   Personal Médico:  Iain Chavez MD   Departamento:  [place] [facility]           Razón de la amaury     Laser Treatment           Diagnósticos de Esta Visita        Comentarios    PCO (posterior capsular opacification), right    -  Primario     Pseudophakia                Lista de tareas           Citas próximas        Personal Médico Departamento Tfno del dpto    2017 1:00 PM Iain Chavez MD [place] [facility] [phone]      [employer] (5 Years of Data)     Ninguna      Follow-Up and Disposition     Return in about 2 weeks (around 2017) for Post-op Right eye.      [facility]     [facility]fermeras - Asistencia   Enfermeras registradas de [facility] pueden ayudarle a reservar trinh amaury, proveer educación para la maría, asesoría clínica, y otros servicios de asesoramiento.   Llame para randy servicio gratuito a [phone].             Medicamentos           Mensaje sobre Medicamentos     Verificar los cambios y / o adiciones a hart régimen de medicación son los mismos que discutir con hart médico. Si cualquiera de estos cambios o adiciones son incorrectos, por favor notifique a hart proveedor de atención médica.             Verifique que la siguiente lista de medicamentos es trinh representación exacta de los medicamentos que está tomando actualmente. Si no hay ningunos reportados, la lista puede estar en goel. Si no es correcta, por favor póngase en contacto con hart proveedor de atención médica. Lleve esta lista con usted en sid de emergencia.           Medicamentos Actuales     atorvastatin (LIPITOR) 40 MG tablet Take 1 tablet by mouth once daily.    desoximetasone 0.25 % ointment     ketorolac (TORADOL) 10 mg tablet  Take 1 tablet (10 mg total) by mouth every 8 (eight) hours.    omeprazole (PRILOSEC) 20 MG capsule Take 20 mg by mouth once daily.           Información de referencia clínica           Medina signos vitales anika     PS Pulso                116/66 (BP Location: Right arm, Patient Position: Sitting, BP Method: Automatic) 75          Blood Pressure          Most Recent Value    BP  116/66      Alergias     A partir del:  2017        No Known Allergies      Vacunas     Administradas en la fecha de la visita:  2017        None      Language Assistance Services     ATTENTION: Language assistance services are available, free of charge. Please call [phone].      ATENCIÓN: Si habla español, tiene a hart disposición servicios gratuitos de asistencia lingüística. Llame al [phone].     CHÚ Ý: N?u b?n nói Ti?ng Vi?t, có các d?ch v? h? tr? ngôn ng? mi?n phí dành cho b?n. G?i s? [phone].         [facility] [facility] cumple con las leyes federales aplicables de derechos civiles y no discrimina por motivos de jumana, color, origen nacional, edad, discapacidad, o sexo.                 Ghada Del Zenaida Panchito   2017 1:30 PM   Office Visit    Description:  Female : 1935   Provider:  Iain Chavez MD   Department:  [facility]           Reason for Visit     Laser Treatment           Diagnoses this Visit        Comments    PCO (posterior capsular opacification), right    -  Primary     Pseudophakia                To Do List           Future Appointments        Provider Department Dept Phone    2017 1:00 PM Iain Chavez MD [facility] [phone]      Goals     None      Follow-Up and Disposition     Return in about 2 weeks (around 2017) for Post-op Right eye.      [facility]     [facility] On Call Nurse Care Line - / Assistance  Unless otherwise directed by your provider, please contact [facility], our nurse care line that is  available for 24/7 assistance.     Registered nurses in the [facility] provide: appointment scheduling, clinical advisement, health education, and other advisory services.  Call: [phone] (toll free)               Medications           Message regarding Medications     Verify the changes and/or additions to your medication regime listed below are the same as discussed with your clinician today.  If any of these changes or additions are incorrect, please notify your healthcare provider.             Verify that the below list of medications is an accurate representation of the medications you are currently taking.  If none reported, the list may be blank. If incorrect, please contact your healthcare provider. Carry this list with you in case of emergency.           Current Medications     atorvastatin (LIPITOR) 40 MG tablet Take 1 tablet by mouth once daily.    desoximetasone 0.25 % ointment     ketorolac (TORADOL) 10 mg tablet Take 1 tablet (10 mg total) by mouth every 8 (eight) hours.    omeprazole (PRILOSEC) 20 MG capsule Take 20 mg by mouth once daily.           Clinical Reference Information           Your Vitals Were     BP Pulse                116/66 (BP Location: Right arm, Patient Position: Sitting, BP Method: Automatic) 75          Blood Pressure          Most Recent Value    BP  116/66      Allergies as of 4/17/2017     No Known Allergies      Immunizations Administered on Date of Encounter - 4/17/2017     None      Language Assistance Services     ATTENTION: Language assistance services are available, free of charge. Please call [phone].      ATENCIÓN: Si habla español, tiene a hart disposición servicios gratuitos de asistencia lingüística. Llame al [phone].     CHÚ Ý: N?u b?n nói Ti?ng Vi?t, có các d?ch v? h? tr? ngôn ng? mi?n phí dành cho b?n. G?i s? [phone].         [facility] complies with applicable Federal civil rights laws and does not discriminate  on the basis of race, color, national origin, age, disability, or sex.

## 2017-05-05 ENCOUNTER — TELEPHONE (OUTPATIENT)
Dept: FAMILY MEDICINE | Facility: CLINIC | Age: 82
End: 2017-05-05

## 2017-05-05 NOTE — TELEPHONE ENCOUNTER
----- Message from Ariela Vo MA sent at 5/5/2017  9:20 AM CDT -----  Contact: [phone]/ self   Please advise that this it the first opening for a new pt. Thanks in advance.   ----- Message -----     From: Kathy Culp     Sent: 5/5/2017   8:56 AM       To: Fernando LUNA Staff    Pt of Dr Corrales wants to change to Dr King . Pt wants to know if she can be accommodated in the schedule for before 06/06/17. Please advise

## 2017-05-05 NOTE — TELEPHONE ENCOUNTER
Spoke with patient about getting an appointment with Dr King.  Patient is waiting for her new ID card with Dr King's name  in June 2017.

## 2017-05-08 ENCOUNTER — OFFICE VISIT (OUTPATIENT)
Dept: OPHTHALMOLOGY | Facility: CLINIC | Age: 82
End: 2017-05-08
Payer: MEDICARE

## 2017-05-08 DIAGNOSIS — Z98.890 POST-OPERATIVE STATE: Primary | ICD-10-CM

## 2017-05-08 DIAGNOSIS — Z96.1 PSEUDOPHAKIA: ICD-10-CM

## 2017-05-08 PROCEDURE — 99999 PR PBB SHADOW E&M-EST. PATIENT-LVL II: CPT | Mod: PBBFAC,,, | Performed by: OPHTHALMOLOGY

## 2017-05-08 PROCEDURE — 99024 POSTOP FOLLOW-UP VISIT: CPT | Mod: S$GLB,,, | Performed by: OPHTHALMOLOGY

## 2017-05-08 NOTE — MR AVS SNAPSHOT
[place] [facility]   [facility]  [place] [address]  Phone: [phone]  Fax: [phone]                  Ghada Del Zenaida Flanagan   2017 1:00 PM   Office Visit    Descripción:  Female : 1935   Personal Médico:  Iain Chavez MD   Departamento:  [place] [facility]           Razón de la amaury     Post-op Evaluation           Diagnósticos de Esta Visita        Comentarios    Post-operative state    -  Primario     Pseudophakia                Lista de tareas           Citas próximas        Personal Médico Departamento Tfno del dpto    2017 2:20 PM Lele King MD [place] [facility] [phone]      Metas (5 Years of Data)     Ninguna      Follow-Up and Disposition     Return in about 6 months (around 2017) for Dr Shah, F/U S/P YAG CAP OD..      [facility]     [facility] - Asistencia   Enfermeras registradas de [facility] pueden ayudarle a reservar trinh amaury, proveer educación para la maría, asesoría clínica, y otros servicios de asesoramiento.   Llame para randy servicio gratuito a [phone].             Medicamentos           Mensaje sobre Medicamentos     Verificar los cambios y / o adiciones a hart régimen de medicación son los mismos que discutir con hart médico. Si cualquiera de estos cambios o adiciones son incorrectos, por favor notifique a hart proveedor de atención médica.             Verifique que la siguiente lista de medicamentos es trinh representación exacta de los medicamentos que está tomando actualmente. Si no hay ningunos reportados, la lista puede estar en goel. Si no es correcta, por favor póngase en contacto con hart proveedor de atención médica. Lleve esta lista con usted en sid de emergencia.           Medicamentos Actuales     atorvastatin (LIPITOR) 40 MG tablet Take 1 tablet by mouth once daily.    desoximetasone 0.25 % ointment     ketorolac (TORADOL) 10 mg tablet  Take 1 tablet (10 mg total) by mouth every 8 (eight) hours.    omeprazole (PRILOSEC) 20 MG capsule Take 20 mg by mouth once daily.           Información de referencia clínica           Alergias     A partir del:  2017        No Known Allergies      Vacunas     Administradas en la fecha de la visita:  2017        None      Language Assistance Services     ATTENTION: Language assistance services are available, free of charge. Please call [phone].      ATENCIÓN: Si habla español, tiene a hart disposición servicios gratuitos de asistencia lingüística. Llame al [phone].     CHÚ Ý: N?u b?n nói Ti?ng Vi?t, có các d?ch v? h? tr? ngôn ng? mi?n phí dành cho b?n. G?i s? [phone].         [place] [facility] cumple con las leyes federales aplicables de derechos civiles y no discrimina por motivos de jumana, color, origen nacional, edad, discapacidad, o sexo.                 Ghada Del Zenaida Panchito   2017 1:00 PM   Office Visit    Description:  Female : 1935   Provider:  Iain Chavez MD   Department:  [place] [facility]           Reason for Visit     Post-op Evaluation           Diagnoses this Visit        Comments    Post-operative state    -  Primary     Pseudophakia                To Do List           Future Appointments        Provider Department Dept Phone    2017 2:20 PM Lele King MD [facility] [phone]      Goals     None      Follow-Up and Disposition     Return in about 6 months (around 2017) for Dr Shah, F/U S/P YAG CAP OD..      [facility]     [facility] Care [facility] - / Assistance  Unless otherwise directed by your provider, please contact [facility], our nurse care line that is available for /7 assistance.     Registered nurses in the [facility] provide: appointment scheduling, clinical advisement, health education, and other advisory services.  Call: [phone] (toll  free)               Medications           Message regarding Medications     Verify the changes and/or additions to your medication regime listed below are the same as discussed with your clinician today.  If any of these changes or additions are incorrect, please notify your healthcare provider.             Verify that the below list of medications is an accurate representation of the medications you are currently taking.  If none reported, the list may be blank. If incorrect, please contact your healthcare provider. Carry this list with you in case of emergency.           Current Medications     atorvastatin (LIPITOR) 40 MG tablet Take 1 tablet by mouth once daily.    desoximetasone 0.25 % ointment     ketorolac (TORADOL) 10 mg tablet Take 1 tablet (10 mg total) by mouth every 8 (eight) hours.    omeprazole (PRILOSEC) 20 MG capsule Take 20 mg by mouth once daily.           Clinical Reference Information           Allergies as of 5/8/2017     No Known Allergies      Immunizations Administered on Date of Encounter - 5/8/2017     None      Language Assistance Services     ATTENTION: Language assistance services are available, free of charge. Please call [phone].      ATENCIÓN: Si jocelynla rupesh, tiene a hart disposición servicios gratuitos de asistencia lingüística. Llame al [phone].     SALOME Ý: N?u b?n nói Ti?ng Vi?t, có các d?ch v? h? tr? ngôn ng? mi?n phí dành cho b?n. G?i s? [phone].         [place] [facility] complies with applicable Federal civil rights laws and does not discriminate on the basis of race, color, national origin, age, disability, or sex.

## 2017-05-08 NOTE — PROGRESS NOTES
Subjective:       Patient ID: Ghada Dave is a 81 y.o. female.    Chief Complaint: Post-op Evaluation (2 week F/U YAG CAP OD)    HPI  Review of Systems    Objective:      Physical Exam    Assessment:       1. Post-operative state    2. Pseudophakia        Plan:       S/p YAG CAP OD-Doing well.        RTC Dr Shah in 6 mos.

## 2017-06-05 PROBLEM — K62.89 ANAL OR RECTAL PAIN: Status: ACTIVE | Noted: 2017-06-05

## 2017-06-06 ENCOUNTER — OFFICE VISIT (OUTPATIENT)
Dept: INTERNAL MEDICINE | Facility: CLINIC | Age: 82
End: 2017-06-06
Payer: MEDICARE

## 2017-06-06 VITALS
SYSTOLIC BLOOD PRESSURE: 122 MMHG | DIASTOLIC BLOOD PRESSURE: 60 MMHG | HEIGHT: 60 IN | OXYGEN SATURATION: 99 % | WEIGHT: 125.69 LBS | BODY MASS INDEX: 24.68 KG/M2 | HEART RATE: 83 BPM

## 2017-06-06 DIAGNOSIS — M25.50 ARTHRALGIA, UNSPECIFIED JOINT: ICD-10-CM

## 2017-06-06 DIAGNOSIS — M15.9 PRIMARY OSTEOARTHRITIS INVOLVING MULTIPLE JOINTS: ICD-10-CM

## 2017-06-06 DIAGNOSIS — E78.5 HYPERLIPIDEMIA, UNSPECIFIED HYPERLIPIDEMIA TYPE: ICD-10-CM

## 2017-06-06 DIAGNOSIS — R07.89 ATYPICAL CHEST PAIN: ICD-10-CM

## 2017-06-06 DIAGNOSIS — G47.00 INSOMNIA, UNSPECIFIED TYPE: Primary | ICD-10-CM

## 2017-06-06 DIAGNOSIS — R94.31 EKG, ABNORMAL: ICD-10-CM

## 2017-06-06 PROCEDURE — 1125F AMNT PAIN NOTED PAIN PRSNT: CPT | Mod: S$GLB,,, | Performed by: INTERNAL MEDICINE

## 2017-06-06 PROCEDURE — 1159F MED LIST DOCD IN RCRD: CPT | Mod: S$GLB,,, | Performed by: INTERNAL MEDICINE

## 2017-06-06 PROCEDURE — 99397 PER PM REEVAL EST PAT 65+ YR: CPT | Mod: 25,S$GLB,, | Performed by: INTERNAL MEDICINE

## 2017-06-06 PROCEDURE — 99999 PR PBB SHADOW E&M-EST. PATIENT-LVL III: CPT | Mod: PBBFAC,,, | Performed by: INTERNAL MEDICINE

## 2017-06-06 PROCEDURE — 99499 UNLISTED E&M SERVICE: CPT | Mod: S$PBB,,, | Performed by: INTERNAL MEDICINE

## 2017-06-06 PROCEDURE — 1157F ADVNC CARE PLAN IN RCRD: CPT | Mod: S$GLB,,, | Performed by: INTERNAL MEDICINE

## 2017-06-06 RX ORDER — TRAZODONE HYDROCHLORIDE 50 MG/1
50 TABLET ORAL NIGHTLY PRN
Qty: 30 TABLET | Refills: 1 | Status: SHIPPED | OUTPATIENT
Start: 2017-06-06 | End: 2017-07-06 | Stop reason: SINTOL

## 2017-06-06 RX ORDER — KETOROLAC TROMETHAMINE 10 MG/1
10 TABLET, FILM COATED ORAL EVERY 8 HOURS
Qty: 8 TABLET | Refills: 0 | Status: CANCELLED | OUTPATIENT
Start: 2017-06-06

## 2017-06-06 RX ORDER — ATORVASTATIN CALCIUM 40 MG/1
40 TABLET, FILM COATED ORAL DAILY
Qty: 90 TABLET | Refills: 1 | Status: SHIPPED | OUTPATIENT
Start: 2017-06-06 | End: 2018-03-28 | Stop reason: SDUPTHER

## 2017-06-06 RX ORDER — CELECOXIB 200 MG/1
200 CAPSULE ORAL DAILY PRN
Qty: 40 CAPSULE | Refills: 1 | Status: SHIPPED | OUTPATIENT
Start: 2017-06-06 | End: 2017-08-16

## 2017-06-06 NOTE — PATIENT INSTRUCTIONS
Recomendaciones para hoy    No recomendamos que continúe la medicación alprazolam para el insomnio. Darya medicamento está restringido por el gobierno federal. No usamos darya medicamento a menos que hayamos probado muchos otros medicamentos eddie. Recomendamos que pruebes trazodone para insomnio ocasional en lugar de alprazolam. La trazodona es trinh medicación alternativa mucho más mendoza.    Si se desarrollan efectos secundarios leves en la trazodona, simplemente continúe tomando la medicación. Los efectos secundarios leves tienden a desaparecer después de eddy el medicamento giovany 14 días. Si los efectos secundarios persisten o empeoran, deje de eddy el medicamento y comuníquese con la clínica.            Trazodone tablets    ¿Qué es darya medicamento?  La TRAZODONA se utiliza para tratar la depresión.  ¿Cómo tawnya utilizar darya medicamento?  [place] darya medicamento por vía oral con un vaso de agua. Siga las instrucciones de la etiqueta del medicamento. [place] darya medicamento poco después de trinh comida o un refrigerio liviano. [place] paco dosis a intervalos regulares. No tome hart medicamento con trinh frecuencia mayor a la indicada. No deje de eddy darya medicamento repentinamente a menos que así indique hart médico. El detener darya medicamento demasiado rápido puede causar efectos secundarios graves o puede empeorar hart condición.   [place] farmacéutico le dará trinh guía del medicamento especial con cada receta y relleno. Asegúrese de leer esta información cada vez cuidadosamente.  Hable con hart pediatra para informarse acerca del uso de darya medicamento en niños. Puede requerir atención especial.    ¿Qué efectos secundarios puedo tener al utilizar adrya medicamento?  Efectos secundarios que debe informar a hart médico o a hart profesional de la maría tan pronto rm sea posible:  · reacciones alérgicas rm erupción cutánea, picazón o urticarias, hinchazón de la moses, labios o lengua  · pulso cardiaco rápido, irregular  · sensación  de desmayos o mareos, caídas  · erecciones dolorosas u otras disfunciones sexuales  · ideas suicidad u otros cambios de humor  · temblores  Efectos secundarios que, por lo general, no requieren atención médica (debe informarlos a hart médico o a hart profesional de la maría si persisten o si son molestos):  · estreñimiento  · dolor de dennis  · molestias o juan j musculares  · náuseas, vómito  · cansancio o debilidad inusual  ¿Qué puede interactuar con randy medicamento?  No tome esta medicina con ninguno de los siguientes medicamentos:  ciertos medicamentos para infecciones micóticas, tales rm fluconazol, quetoconazol, itraconazol, posaconazol, voriconazol cisapride dofetilida dronedarona linezolid IMAOs, tales rm Carbex, Eldepryl, Marplan, Nardil y Parnate mesoridazina bria de metileno (vía intravenosa) pimozida saquinavir tioridazina ziprasidona   Esta medicina también puede interactuar con los siguientes medicamentos:  alcohol medicamentos antivíricos para el VIH o SIDA aspirina o medicamentos tipo aspirina barbitúricos tales rm el fenobarbital ciertos medicamentos para la presión sanguínea, enfermedad cardiaca, pulso cardiaco irregular ciertos medicamentos para la depresión, ansiedad o trastornos psicóticos ciertos medicamentos para las migrañas, tales rm almotriptán, eletriptán, frovatriptán, naratriptán, rizatriptán, sumatriptán, zolmitriptán ciertos medicamentos para convulsiones, tales rm carbamazepina y fenitoína ciertos medicamento para conciliar el sueño ciertos medicamentos que tratan o previenen coágulos sanguíneos, rm dalteparina, enoxaparina, warfarina digoxina fentanilo litio los STEFANIA, medicamentos para el dolor o inflamación, rm ibuprofeno o naproxeno otros medicamentos que prolongan el intervalo QT (causa un ritmo cardiaco anormal) rasagilina medicamentos a base de hierbas que contienen kava kava, hierba de [place] o valeriana tramadol triptófano  ¿Qué sucede si me olvido de trinh  dosis?  Si olvida trinh dosis, tómela lo antes posible. Si es leanne la hora de la próxima dosis, tome sólo michelle dosis. No tome dosis adicionales o dobles.  ¿Dónde twanya guardar mi medicina?  Manténgala fuera del alcance de los niños.  Guárdela a temperatura ambiente, entre 15 y 30 grados C (59 y 86 grados F). Protéjala marcial. Mantenga el envase alex cerrado. Deseche los medicamentos que no haya utilizado, después de la fecha de vencimiento.  ¿Qué le tawnya informar a mi profesional de la maría antes de eddy randy medicamento?  Necesita saber si usted presenta alguno de los siguientes problemas o situaciones:  · intento de suicidio o con ideas suicidas  · trastorno bipolar  · problemas sanguíneos  · glaucoma  · enfermedad cardiaca o ataque cardiaco previo  · latidos cardiacos irregulares  · enfermedad renal o hepática  · niveles bajos de sodio en la juli  · trinh reacción alérgica o inusual a la trazodona, a otros medicamentos, alimentos, colorantes o conservantes  · si está embarazada o buscando quedar embarazada  · si está amamantando a un bebé  ¿A qué tawnya estar atento al usar randy medicamento?  Informe a hart médico si paco síntomas no mejoran o si empeoran. Visite a hart médico o a hart profesional de la maría para chequear hart evolución periódicamente. Debido que puede ser necesario eddy randy medicamento giovany varias semanas para que sea posible observar paco efectos en forma completa, es importante que sigue hart tratamiento rm recetado por hart médico.   Los pacientes y paco familias deben estar atentos si empeora la depresión o ideas suicidas. También esté atento a cambios repentinos o severos de emoción, tales rm el sentirse ansioso, agitado, lleno de pánico, irritable, hostil, agresivo, impulsivo, inquietud severa, demasiado excitado y hiperactivo o dificultad para conciliar el sueño. Si esto ocurre, especialmente al comenzar con el tratamiento o al cambiar de dosis, comuníquese con hart profesional de la  maría.  Puede experimentar somnolencia, mareos o visión borrosa. No conduzca ni utilice maquinaria, ni nataliia nada que le exija permanecer en estado de alerta hasta que sepa cómo le afecta darya medicamento. No se siente ni se ponga de pie con rapidez, especialmente si es un paciente de edad avanzada. Oblong reduce el riesgo de mareos o desmayos. El alcohol puede interferir con el efecto de darya medicamento. Evite consumir bebidas alcohólicas.  Darya medicamento puede provocar sequedad de los ojos y visión borrosa. [place] usa lentes de contacto, puede sentir algunas molestias. Las gotas lubricantes pueden ayudarle. Si el problema no desaparece o es severo, visite a hart médico de ojos.   Darya medicamento puede secarle la boca. El masticar chicle sin azúcar, chupar caramelos duros y eddy agua en abundancia le ayudarán a mantener la boca húmeda. Si el problema no desaparece o es severo, consulte a hart médico.  Date Last Reviewed:   © 7888-0692 [employer]. [address] [place] [address], [place] [address]. Todos los derechos reservados. Esta información no pretende sustituir la atención médica profesional. Sólo hart médico puede diagnosticar y tratar un problema de maría.

## 2017-06-06 NOTE — PROGRESS NOTES
"Portions of this note are generated with voice recognition software. Typographical errors may exist.     SUBJECTIVE:    This is a/an 81 y.o. female here for primary care visit for  Chief Complaint   Patient presents with    Insomnia     Patient states that she has not had recurrence of substernal symptoms that prompted her to go to the emergency room 3 months ago.  She is not sure if symptoms had something to do with anxiety reaction.  She does have problems with anxiety and insomnia chronically and has had worsening problems over the last 3 months.  Patient is fairly sedentary.  However with activities of daily living she doesn't have typical angina symptoms.  She has not followed with cardiologist in the past.  Does not know if she has coronary artery disease.  She has been out of cholesterol medication for several months.    Insomnia and anxiety.  Patient states that off and on for many years she has been prescribed benzodiazepine monotherapy to help with episodic insomnia and anxiety.  Patient has not had in depth evaluation for sleep hygiene or anxiety management.  She is not interested in pursuing counseling.  She is also reluctant to change her benzodiazepine therapy because she states "it's the only thing that ever worked."  Patient with limited insight regarding the long-term dangers of benzodiazepine therapy.  She voices no history of misuse or abuse.  She has not tried alternative medication with close follow-up such as SSRI or SNR I or atypical antidepressives and to help with episodic insomnia.    Osteoarthritis pain.  Patient continues to have difficulty managing recurring osteoarthritis symptoms.  Primary sites include the large joints bilaterally.  States that she needs refill of Celebrex      Medications Reviewed and Updated    Past medical, family, and social histories were reviewed and updated.    Review of Systems negative unless noted otherwise in history of present illness-  General ROS: " negative  Psychological ROS: negative  Respiratory ROS: negative  Cardiovascular ROS: negative  Gastrointestinal ROS: negative  Musculoskeletal ROS: negative    Allergic:  Review of patient's allergies indicates:  No Known Allergies    OBJECTIVE:  BP: 122/60 Pulse: 83    Wt Readings from Last 3 Encounters:   06/06/17 57 kg (125 lb 10.6 oz)   06/05/17 59 kg (130 lb)   03/14/17 59 kg (130 lb)    Body mass index is 24.54 kg/m².  Previous Blood Pressure Readings :   BP Readings from Last 3 Encounters:   06/06/17 122/60   06/05/17 (!) 132/55   04/17/17 116/66       GEN: No apparent distress  HEENT: sclera non-icteric, conjunctiva clear  CV: no peripheral edema.  Grade 3/6 systolic murmur.  Regular rate and rhythm  PULM: breathing non-labored  ABD: non, protuberant abdomen.  PSYCH: Labile mood.  MSK: able to rise from chair without assistance  SKIN: normal skin turgor    Pertinent Labs Reviewed       ASSESSMENT/PLAN:    Insomnia, unspecified type.  Not optimally controlled.  Etiology likely multifactorial.  Benzodiazepine monotherapy not appropriate in this elderly patient.  Detailed counseling on alternatives to benzodiazepine therapy.  Counseling on self-care measures.  Highly recommend patient follow closely to clinic to help with sleep hygiene  -     trazodone (DESYREL) 50 MG tablet; Take 1 tablet (50 mg total) by mouth nightly as needed for Insomnia.  Dispense: 30 tablet; Refill: 1    Atypical chest pain.  New EKG changes.  In light of advanced age further testing warranted to exclude coronary artery disease as contributing to symptoms in the past  -     NM Myocardial Perfusion Spect Multi Exer; Future; Expected date: 06/06/2017  -     NM Multi Study Stress Exer Card Component; Future    Arthralgia, unspecified joint.  Not optimally controlled.  Refill medication.  Plan to follow-up clinically  -     celecoxib (CELEBREX) 200 MG capsule; Take 1 capsule (200 mg total) by mouth daily as needed for Pain.  Dispense: 40  capsule; Refill: 1    Hyperlipidemia, unspecified hyperlipidemia type  -     atorvastatin (LIPITOR) 40 MG tablet; Take 1 tablet (40 mg total) by mouth once daily.  Dispense: 90 tablet; Refill: 1    EKG, abnormal  -     NM Myocardial Perfusion Spect Multi Exer; Future; Expected date: 06/06/2017  -     NM Multi Study Stress Exer Card Component; Future    Other orders  -     Cancel: ketorolac (TORADOL) 10 mg tablet; Take 1 tablet (10 mg total) by mouth every 8 (eight) hours.  Dispense: 8 tablet; Refill: 0          Future Appointments  Date Time Provider Department Center   6/14/2017 8:00 AM [facility] NM2 INJ [facility] NUCLEAR [facility]   6/14/2017 8:30 AM [facility] NM1 [facility] NUCLEAR [facility]   6/14/2017 9:00 AM CARDIOLOGY, STRESS/TILT TABLE/NASIR [facility] CARD [facility]   7/6/2017 2:20 PM Lele King MD [facility]   9/7/2017 1:30 PM Bharath Shaw MD [facility]       Lele King  6/6/2017  3:46 PM

## 2017-06-14 ENCOUNTER — HOSPITAL ENCOUNTER (OUTPATIENT)
Dept: CARDIOLOGY | Facility: HOSPITAL | Age: 82
Discharge: HOME | End: 2017-06-14
Attending: INTERNAL MEDICINE
Payer: MEDICARE

## 2017-06-14 ENCOUNTER — HOSPITAL ENCOUNTER (OUTPATIENT)
Dept: RADIOLOGY | Facility: HOSPITAL | Age: 82
Discharge: HOME | End: 2017-06-14
Attending: INTERNAL MEDICINE
Payer: MEDICARE

## 2017-06-14 DIAGNOSIS — R94.31 EKG, ABNORMAL: ICD-10-CM

## 2017-06-14 DIAGNOSIS — R07.89 ATYPICAL CHEST PAIN: ICD-10-CM

## 2017-06-14 LAB — DIASTOLIC DYSFUNCTION: NO

## 2017-06-14 PROCEDURE — 78452 HT MUSCLE IMAGE SPECT MULT: CPT | Mod: 26,,, | Performed by: RADIOLOGY

## 2017-06-14 PROCEDURE — 93016 CV STRESS TEST SUPVJ ONLY: CPT | Mod: ,,, | Performed by: INTERNAL MEDICINE

## 2017-06-14 PROCEDURE — 78452 HT MUSCLE IMAGE SPECT MULT: CPT | Mod: TC

## 2017-06-14 PROCEDURE — 93018 CV STRESS TEST I&R ONLY: CPT | Mod: ,,, | Performed by: INTERNAL MEDICINE

## 2017-06-16 ENCOUNTER — TELEPHONE (OUTPATIENT)
Dept: INTERNAL MEDICINE | Facility: CLINIC | Age: 82
End: 2017-06-16

## 2017-06-16 NOTE — TELEPHONE ENCOUNTER
----- Message from Lele King MD sent at 6/15/2017  5:23 PM CDT -----  Please contact patient.  Let her know that cardiac stress test does not reveal major coronary artery disease.  We will talk about the results in more detail at her upcoming appointment.  No cardiology referral is necessary at this time.  Based on this result chest pain in the future is unlikely to be cardiac in origin.

## 2017-06-19 DIAGNOSIS — R94.39 ABNORMAL STRESS TEST: Primary | ICD-10-CM

## 2017-06-19 DIAGNOSIS — I25.10 CORONARY ARTERY DISEASE INVOLVING NATIVE CORONARY ARTERY OF NATIVE HEART, ANGINA PRESENCE UNSPECIFIED: ICD-10-CM

## 2017-06-21 ENCOUNTER — TELEPHONE (OUTPATIENT)
Dept: INTERNAL MEDICINE | Facility: CLINIC | Age: 82
End: 2017-06-21

## 2017-06-21 NOTE — TELEPHONE ENCOUNTER
----- Message from Lele King MD sent at 6/19/2017  5:47 PM CDT -----  Received new information from cardiology. On further review they believe the stress test does absolutely need cardiology visit. We sent a message to [facility] staff to see if he can work her in. If he cannot, better to work her into first available provider.

## 2017-06-21 NOTE — TELEPHONE ENCOUNTER
Spoke with SHAILESH Sharma with Dr Escalona to rescheduled her appointment for 06/22/17 at 1 pm with NP Vicente [place].   Patient verbalized understanding.

## 2017-06-22 ENCOUNTER — OFFICE VISIT (OUTPATIENT)
Dept: CARDIOLOGY | Facility: CLINIC | Age: 82
End: 2017-06-22
Payer: MEDICARE

## 2017-06-22 VITALS
SYSTOLIC BLOOD PRESSURE: 114 MMHG | WEIGHT: 129.88 LBS | DIASTOLIC BLOOD PRESSURE: 65 MMHG | OXYGEN SATURATION: 98 % | BODY MASS INDEX: 25.5 KG/M2 | HEIGHT: 60 IN | HEART RATE: 76 BPM

## 2017-06-22 DIAGNOSIS — R94.39 ABNORMAL STRESS TEST: ICD-10-CM

## 2017-06-22 DIAGNOSIS — R07.9 CHEST PAIN, UNSPECIFIED TYPE: Primary | ICD-10-CM

## 2017-06-22 DIAGNOSIS — R94.39 ABNORMAL NUCLEAR STRESS TEST: ICD-10-CM

## 2017-06-22 PROCEDURE — 1125F AMNT PAIN NOTED PAIN PRSNT: CPT | Mod: S$GLB,,, | Performed by: NURSE PRACTITIONER

## 2017-06-22 PROCEDURE — 99999 PR PBB SHADOW E&M-EST. PATIENT-LVL III: CPT | Mod: PBBFAC,,, | Performed by: NURSE PRACTITIONER

## 2017-06-22 PROCEDURE — 99499 UNLISTED E&M SERVICE: CPT | Mod: S$PBB,,, | Performed by: NURSE PRACTITIONER

## 2017-06-22 PROCEDURE — 1157F ADVNC CARE PLAN IN RCRD: CPT | Mod: S$GLB,,, | Performed by: NURSE PRACTITIONER

## 2017-06-22 PROCEDURE — 93000 ELECTROCARDIOGRAM COMPLETE: CPT | Mod: S$GLB,,, | Performed by: INTERNAL MEDICINE

## 2017-06-22 PROCEDURE — 1159F MED LIST DOCD IN RCRD: CPT | Mod: S$GLB,,, | Performed by: NURSE PRACTITIONER

## 2017-06-22 PROCEDURE — 99214 OFFICE O/P EST MOD 30 MIN: CPT | Mod: S$GLB,,, | Performed by: NURSE PRACTITIONER

## 2017-06-22 RX ORDER — NITROGLYCERIN 0.4 MG/1
0.4 TABLET SUBLINGUAL EVERY 5 MIN PRN
Qty: 25 TABLET | Refills: 0 | Status: SHIPPED | OUTPATIENT
Start: 2017-06-22 | End: 2017-07-06

## 2017-06-22 RX ORDER — ASPIRIN 81 MG/1
81 TABLET ORAL DAILY
Refills: 0 | COMMUNITY
Start: 2017-06-22 | End: 2019-04-08

## 2017-06-22 NOTE — PROGRESS NOTES
Subjective:       Patient ID: Ghada Dave is a 81 y.o. female with HLD here for evaluation following abnormal stress test    Chief Complaint: Abnormal ECG (referred by pcp and was sent to ER)     Patient is an 81 y.o. F without previous known Cardiovascular disease who was referred for abnormal stress test. Patient had an episode of atypical chest pain in March that was localized to the right anterior chest that was intermittent occurring q 15 min for several hours. Pain did not radiate, no chest pressure, no palps, no diaphoresis. Was not associated with SOB or activity.  Pain began at rest. She was evaluated in the ED and had serial troponins which were negative and EKG without notation of acute ischemic changes. EKG review from 05/2015 noted NSR without any T wave abnormalities and EKG from 03/2017 noted changes of septal infarct; EKG repeated in office today noting septal infarct as well.   Patient was discharged from the ED without recurrence until last week. She has not followed with cardiologist in the past.  Patient recently changed PCPs given her advanced age a NM stress was ordered.   Peak heart rate of 98 bpm, which is 72% of the age predicted maximum heart rate  At peak infusion, EKG was normal at a maximum heart rate of 98 bpm.   1. The EKG portion of this study is negative for ischemia at a peak heart rate of 98 bpm (72% of predicted).   2. Blood pressure remained stable throughout the protocol  (Presenting BP: 129/59 Peak BP: 133/59).   3. No significant arrhythmias were present.   4. There were no symptoms of chest discomfort or significant dyspnea throughout the protocol  Imaging portion findings:   There is homogeneous perfusion at rest and stress without evidence for infarct or reversible ischemia.    There is stress-induced dilatation of the left ventricle with ESV of 39 mL at rest and 49 mL with stress (TID).  Normal wall motion.    Stress LV ejection fraction of 49%, mildly  "diminished.  Since her stress test she has noted chest pressure without radiation with strenuous activity and mild DANIEL. Occasional palpitations are present as well. She denies any diaphoresis, NV, dizziness. She reports herself as very active climbing stairs and is "always on the move". Her symptoms have not limited her activities and do resolve with rest. She has HLD and is on Lipitor. No HTN, DM, nonsmoker no etoh   Patient is Kyrgyz speaking only and clinical  present throughout visit.        Review of Systems   Constitutional: Negative for diaphoresis.   HENT: Negative.    Eyes: Negative.    Respiratory: Positive for chest tightness and shortness of breath.    Cardiovascular: Positive for chest pain and palpitations. Negative for leg swelling.   Gastrointestinal: Negative.    Endocrine: Negative.    Genitourinary: Negative.    Musculoskeletal: Negative.    Skin: Negative.    Allergic/Immunologic: Negative.    Neurological: Negative.  Negative for dizziness, syncope and light-headedness.   Hematological: Negative.    Psychiatric/Behavioral: The patient is nervous/anxious.        Objective:      Physical Exam   Constitutional: She is oriented to person, place, and time. No distress.   HENT:   Head: Normocephalic and atraumatic.   Eyes: Right eye exhibits no discharge. Left eye exhibits no discharge.   Neck: No JVD present.   Cardiovascular: Normal rate, regular rhythm and intact distal pulses.    No murmur heard.  Pulmonary/Chest: Effort normal and breath sounds normal.   Abdominal: Soft. Bowel sounds are normal.   Musculoskeletal: She exhibits no edema.   Neurological: She is alert and oriented to person, place, and time.   Skin: Skin is warm and dry. She is not diaphoretic.   Psychiatric: She has a normal mood and affect. Her behavior is normal. Judgment and thought content normal.       Assessment:       1. Chest pain, unspecified type    2. Abnormal nuclear stress test        Plan:       Ghada was " seen today for abnormal ecg.    Diagnoses and all orders for this visit:    Chest pain, unspecified type  -     EKG 12-lead    Abnormal nuclear stress test    Other orders  -     nitroGLYCERIN (NITROSTAT) 0.4 MG SL tablet; Place 1 tablet (0.4 mg total) under the tongue every 5 (five) minutes as needed for Chest pain.  -     aspirin (ECOTRIN) 81 MG EC tablet; Take 1 tablet (81 mg total) by mouth once daily.      Vitals:    06/22/17 1309   BP: 114/65   BP Location: Left arm   Patient Position: Sitting   BP Method: Automatic   Pulse: 76   SpO2: 98%   Weight: 58.9 kg (129 lb 14.4 oz)   Height: 5' (1.524 m)     Stress induced dilatation of LV major predictor for MV CAD; patient will undergo LHC on Monday with Dr Perez.  Risks and benefits of LHC explained to patient as well as conservative treatment options for presumed CAD  Patient opted to proceed with LHC.     Start asa 81 mg daily  SL NTG sent to pharmacy  Continue statin  Hold Celebrex for now   Repeat FLP on Monday; most recent on file 2015   Further recs to follow angiogram    06/14/2017  NM stress test:  At peak infusion, EKG was normal at a maximum heart rate of 98 bpm.   1. The EKG portion of this study is negative for ischemia at a peak heart rate of 98 bpm (72% of predicted).   2. Blood pressure remained stable throughout the protocol  (Presenting BP: 129/59 Peak BP: 133/59).   3. No significant arrhythmias were present.   4. There were no symptoms of chest discomfort or significant dyspnea throughout the protocol  Imaging portion findings:   There is homogeneous perfusion at rest and stress without evidence for infarct or reversible ischemia.    There is stress-induced dilatation of the left ventricle with ESV of 39 mL at rest and 49 mL with stress (TID).  Normal wall motion.    Stress LV ejection fraction of 49%, mildly diminished.      2015 lipid panel- most recent   Cholesterol 120 - 199 mg/dL 213    Comments: The National Cholesterol Education Program  (NCEP) has set the   following guidelines (reference ranges) for Cholesterol:   Optimal.....................<200 mg/dL   Borderline High.............200-239 mg/dL   High........................> or = 240 mg/dL    Triglycerides 30 - 150 mg/dL 115   Comments: The National Cholesterol Education Program (NCEP) has set the   following guidelines (reference values) for triglycerides:   Normal......................<150 mg/dL   Borderline High.............150-199 mg/dL   High........................200-499 mg/dL    HDL 40 - 75 mg/dL 58   Comments: The National Cholesterol Education Program (NCEP) has set the   following guidelines (reference values) for HDL Cholesterol:   Low...............<40 mg/dL   Optimal...........>60 mg/dL    LDL Cholesterol 63.0 - 159.0 mg/dL 132.0   Comments: The National Cholesterol Education Program (NCEP) has set the   following guidelines (reference values) for LDL Cholesterol:   Optimal.......................<130 mg/dL   Borderline High...............130-159 mg/dL   High..........................160-189 mg/dL   Very High.....................>190 mg/dL    HDL/Chol Ratio 20.0 - 50.0 % 27.2   Total Cholesterol/HDL Ratio 2.0 - 5.0 3.7   Non-HDL Cholesterol mg/dL 155   Comments: Risk category and Non-HDL cholesterol goals:   Coronary heart disease (CHD)or equivalent (10-year risk of CHD >20%):   Non-HDL cholesterol goal     <130 mg/dL   Two or more CHD risk factors and 10-year risk of CHD <= 20%:   Non-HDL cholesterol goal     <160 mg/dL   0 to 1 CHD risk factor:   Non-HDL cholesterol goal     <190 mg/dL      Current Outpatient Prescriptions on File Prior to Visit   Medication Sig Dispense Refill    atorvastatin (LIPITOR) 40 MG tablet Take 1 tablet (40 mg total) by mouth once daily. 90 tablet 1    celecoxib (CELEBREX) 200 MG capsule Take 1 capsule (200 mg total) by mouth daily as needed for Pain. 40 capsule 1    hydrocortisone 2.5 % cream Apply topically 2 (two) times daily. 28 g 1    trazodone  (DESYREL) 50 MG tablet Take 1 tablet (50 mg total) by mouth nightly as needed for Insomnia. 30 tablet 1     No current facility-administered medications on file prior to visit.

## 2017-06-22 NOTE — LETTER
June 22, 2017      Lele King MD  [address] [place] [address]           [facility] Cardiology  [address], [address]  [place] [address]  Phone: [phone]          Patient: Ghada Dave   MR Number: [id number]   YOB: 1935   Date of Visit: 6/22/2017       Dear Dr. Lele King:    Thank you for referring Ghada Flanagan to me for evaluation. Attached you will find relevant portions of my assessment and plan of care.    If you have questions, please do not hesitate to call me. I look forward to following Ghada Flanagan along with you.    Sincerely,    [place], EBENEZER    Enclosure  CC:  No Recipients    If you would like to receive this communication electronically, please contact [email or web address] or [phone] to request more information on [employer] access.    For providers and/or their staff who would like to refer a patient to [facility], please contact us through our one-stop-shop provider referral line, [facility] , at [phone].    If you feel you have received this communication in error or would no longer like to receive these types of communications, please e-mail [email or web address]

## 2017-06-22 NOTE — PATIENT INSTRUCTIONS
Nothing to eat or drink Monday morning after 05:00  Take main elevator to 2nd floor  Check in Monday 06/26/2017 at 11:00 on 2nd floor surgery/cath lab  Your procedure will be in the afternoon   Ok to take your medications on Monday morning     No puede eddy algo oh comer el lunes en la manana depues de la 5:00 am.  Puede eddy los elevadoras en el medio para el emelia piso.  Cheque el lunes 06/26/2017 a las 11:00 am en el emelia piso de suregia/labatorio cath  Hart procedimiento va ser en la tarde.  Esta alex eddy hart medicamentos normalmente el lunes en la manana.    La angiografía coronaria     El catéter se puede colocar en la rosalie, brazo o la haim.   La angiografía es un tipo especial de radiografía que permite observar y registrar imágenes de las arterias coronarias para saber si los vasos sanguíneos al corazón están obstruidos.  Antes del procedimiento  · Dígale al médico qué medicamentos está tomando y si tiene alguna alergia conocida.  · No coma ni nadeem nada a partir de la medianoche anterior al día de la prueba.  Giovany el procedimiento  · Se introduce un tubo dipti y melendez (catéter) por trinh arteria en la rosalie o el brazo, y se guía hasta el corazón.  · A través del catéter se inyecta un tinte de contraste en los vasos sanguíneos o en las cavidades del corazón.  · Se marcio radiografías para mostrar imágenes claras del interior del corazón y de las arterias coronarias.  Después del procedimiento    · Hart médico o enfermera le dirá cuánto tiempo debe permanecer acostado y mantener inmóvil el lugar de la inserción.  · Si el catéter se introdujo por la rosalie, es posible que deba permanecer acostado y sin  la pierna giovany varias horas.  · Trinh enfermera le revisará la presión arterial y el lugar de la inserción.  · Tenzin vez le pidan que nadeem abundante líquido para facilitar la eliminación del medio de contraste.  · Cuando lo den de anna, pida a un familiar o amigo que lo lleve a casa.  · Es normal que  quede trinh pequeña contusión o abultamiento en el sitio de la inserción. Estos efectos secundarios generalmente desaparecen en pocas semanas.  Llame al médico si tiene alguno de estos síntomas:  · Angina de pecho (dolor en el pecho).  · Dolor, inflamación, enrojecimiento, sangrado o secreción de líquido en el sitio de la inserción.  · Dolor intenso, frío o un color azulado en la pierna o en el brazo por el cual se introdujo el catéter.  · Bobby en la orina, excrementos negros o de aspecto alquitranado, o cualquier otro tipo de sangrado.  · Fiebre superior a 101.0°F.   Date Last Reviewed: 6/1/2016  © 6308-2469 [employer]. [address] [place] [id number]. Todos los derechos reservados. Esta información no pretende sustituir la atención médica profesional. Sólo hart médico puede diagnosticar y tratar un problema de maría.

## 2017-06-26 ENCOUNTER — TELEPHONE (OUTPATIENT)
Dept: CARDIOLOGY | Facility: CLINIC | Age: 82
End: 2017-06-26

## 2017-06-26 ENCOUNTER — HOSPITAL ENCOUNTER (OUTPATIENT)
Facility: HOSPITAL | Age: 82
Discharge: HOME | End: 2017-06-26
Attending: INTERNAL MEDICINE | Admitting: INTERNAL MEDICINE
Payer: MEDICARE

## 2017-06-26 ENCOUNTER — SURGERY (OUTPATIENT)
Age: 82
End: 2017-06-26

## 2017-06-26 VITALS
BODY MASS INDEX: 23.56 KG/M2 | HEIGHT: 60 IN | RESPIRATION RATE: 20 BRPM | HEART RATE: 64 BPM | WEIGHT: 120 LBS | SYSTOLIC BLOOD PRESSURE: 126 MMHG | DIASTOLIC BLOOD PRESSURE: 61 MMHG | TEMPERATURE: 98 F | OXYGEN SATURATION: 97 %

## 2017-06-26 DIAGNOSIS — R07.9 CHEST PAIN, UNSPECIFIED TYPE: ICD-10-CM

## 2017-06-26 DIAGNOSIS — R94.39 ABNORMAL STRESS TEST: ICD-10-CM

## 2017-06-26 DIAGNOSIS — Z01.818 PRE-OP EXAM: ICD-10-CM

## 2017-06-26 LAB
ANION GAP SERPL CALC-SCNC: 6 MMOL/L
BASOPHILS # BLD AUTO: 0.08 K/UL
BASOPHILS NFR BLD: 1.7 %
BUN SERPL-MCNC: 18 MG/DL
CALCIUM SERPL-MCNC: 9.5 MG/DL
CHLORIDE SERPL-SCNC: 108 MMOL/L
CO2 SERPL-SCNC: 27 MMOL/L
CREAT SERPL-MCNC: 0.9 MG/DL
DIFFERENTIAL METHOD: ABNORMAL
EOSINOPHIL # BLD AUTO: 0.2 K/UL
EOSINOPHIL NFR BLD: 3.2 %
ERYTHROCYTE [DISTWIDTH] IN BLOOD BY AUTOMATED COUNT: 14.7 %
EST. GFR  (AFRICAN AMERICAN): >60 ML/MIN/1.73 M^2
EST. GFR  (NON AFRICAN AMERICAN): >60 ML/MIN/1.73 M^2
GLUCOSE SERPL-MCNC: 95 MG/DL
HCT VFR BLD AUTO: 36.3 %
HGB BLD-MCNC: 12.4 G/DL
INR PPP: 1
LYMPHOCYTES # BLD AUTO: 1.8 K/UL
LYMPHOCYTES NFR BLD: 37.2 %
MCH RBC QN AUTO: 29.9 PG
MCHC RBC AUTO-ENTMCNC: 34.2 %
MCV RBC AUTO: 88 FL
MONOCYTES # BLD AUTO: 0.5 K/UL
MONOCYTES NFR BLD: 9.6 %
NEUTROPHILS # BLD AUTO: 2.3 K/UL
NEUTROPHILS NFR BLD: 48.3 %
PLATELET # BLD AUTO: 266 K/UL
PMV BLD AUTO: 9.1 FL
POTASSIUM SERPL-SCNC: 4.3 MMOL/L
PROTHROMBIN TIME: 10.3 SEC
RBC # BLD AUTO: 4.15 M/UL
SODIUM SERPL-SCNC: 141 MMOL/L
WBC # BLD AUTO: 4.71 K/UL

## 2017-06-26 PROCEDURE — 25500020 PHARM REV CODE 255

## 2017-06-26 PROCEDURE — 93005 ELECTROCARDIOGRAM TRACING: CPT

## 2017-06-26 PROCEDURE — 25000003 PHARM REV CODE 250: Performed by: INTERNAL MEDICINE

## 2017-06-26 PROCEDURE — 93010 ELECTROCARDIOGRAM REPORT: CPT | Mod: S$GLB,,, | Performed by: INTERNAL MEDICINE

## 2017-06-26 PROCEDURE — 80048 BASIC METABOLIC PNL TOTAL CA: CPT

## 2017-06-26 PROCEDURE — 63600175 PHARM REV CODE 636 W HCPCS

## 2017-06-26 PROCEDURE — 25000003 PHARM REV CODE 250

## 2017-06-26 PROCEDURE — 99152 MOD SED SAME PHYS/QHP 5/>YRS: CPT | Mod: ,,, | Performed by: INTERNAL MEDICINE

## 2017-06-26 PROCEDURE — 85610 PROTHROMBIN TIME: CPT

## 2017-06-26 PROCEDURE — 93010 ELECTROCARDIOGRAM REPORT: CPT | Mod: 77,,, | Performed by: INTERNAL MEDICINE

## 2017-06-26 PROCEDURE — 93572 IV DOP VEL&/PRESS C FLO EA: CPT | Mod: 26,52,, | Performed by: INTERNAL MEDICINE

## 2017-06-26 PROCEDURE — 36415 COLL VENOUS BLD VENIPUNCTURE: CPT

## 2017-06-26 PROCEDURE — 93571 IV DOP VEL&/PRESS C FLO 1ST: CPT | Mod: 26,,, | Performed by: INTERNAL MEDICINE

## 2017-06-26 PROCEDURE — 93458 L HRT ARTERY/VENTRICLE ANGIO: CPT | Mod: 26,,, | Performed by: INTERNAL MEDICINE

## 2017-06-26 PROCEDURE — 85025 COMPLETE CBC W/AUTO DIFF WBC: CPT

## 2017-06-26 PROCEDURE — C1769 GUIDE WIRE: HCPCS

## 2017-06-26 PROCEDURE — 93572 IV DOP VEL&/PRESS C FLO EA: CPT | Mod: 52

## 2017-06-26 RX ORDER — OXYCODONE HYDROCHLORIDE 5 MG/1
5 TABLET ORAL EVERY 4 HOURS PRN
Status: DISCONTINUED | OUTPATIENT
Start: 2017-06-26 | End: 2017-06-26 | Stop reason: HOSPADM

## 2017-06-26 RX ORDER — ONDANSETRON 2 MG/ML
4 INJECTION INTRAMUSCULAR; INTRAVENOUS EVERY 12 HOURS PRN
Status: DISCONTINUED | OUTPATIENT
Start: 2017-06-26 | End: 2017-06-26 | Stop reason: HOSPADM

## 2017-06-26 RX ORDER — SODIUM CHLORIDE 9 MG/ML
5 INJECTION, SOLUTION INTRAVENOUS CONTINUOUS
Status: DISCONTINUED | OUTPATIENT
Start: 2017-06-26 | End: 2017-06-26 | Stop reason: HOSPADM

## 2017-06-26 RX ADMIN — SODIUM CHLORIDE 5 ML/KG/HR: 0.9 INJECTION, SOLUTION INTRAVENOUS at 02:06

## 2017-06-26 NOTE — NURSING
Right radial vascband removed per protocol with no swelling, no bleeding, no hematoma. Site is WNL and vitals stable and DC instructions reviewed with family/friend by Dr. Perez and to patient by Shannon SANCHEZ. Patient transported to car by RN

## 2017-06-26 NOTE — INTERVAL H&P NOTE
The patient has been examined and the H&P has been reviewed:    I concur with the findings and no changes have occurred since H&P was written.    Anesthesia/Surgery risks, benefits and alternative options discussed and understood by patient/family.          Active Hospital Problems    Diagnosis  POA    Chest pain [R07.9]  Yes      Resolved Hospital Problems    Diagnosis Date Resolved POA   No resolved problems to display.         Arash Perez MD, FACC, CCDS  Interventional Cardiology  [facility]

## 2017-06-26 NOTE — NURSING
3cc air removed from right radial vascband and site WNL as well as all vitals WNL. Will continue to monitor.

## 2017-06-26 NOTE — H&P (VIEW-ONLY)
Subjective:       Patient ID: Ghada Dave is a 81 y.o. female with HLD here for evaluation following abnormal stress test    Chief Complaint: Abnormal ECG (referred by pcp and was sent to ER)     Patient is an 81 y.o. F without previous known Cardiovascular disease who was referred for abnormal stress test. Patient had an episode of atypical chest pain in March that was localized to the right anterior chest that was intermittent occurring q 15 min for several hours. Pain did not radiate, no chest pressure, no palps, no diaphoresis. Was not associated with SOB or activity.  Pain began at rest. She was evaluated in the ED and had serial troponins which were negative and EKG without notation of acute ischemic changes. EKG review from 05/2015 noted NSR without any T wave abnormalities and EKG from 03/2017 noted changes of septal infarct; EKG repeated in office today noting septal infarct as well.   Patient was discharged from the ED without recurrence until last week. She has not followed with cardiologist in the past.  Patient recently changed PCPs given her advanced age a NM stress was ordered.   Peak heart rate of 98 bpm, which is 72% of the age predicted maximum heart rate  At peak infusion, EKG was normal at a maximum heart rate of 98 bpm.   1. The EKG portion of this study is negative for ischemia at a peak heart rate of 98 bpm (72% of predicted).   2. Blood pressure remained stable throughout the protocol  (Presenting BP: 129/59 Peak BP: 133/59).   3. No significant arrhythmias were present.   4. There were no symptoms of chest discomfort or significant dyspnea throughout the protocol  Imaging portion findings:   There is homogeneous perfusion at rest and stress without evidence for infarct or reversible ischemia.    There is stress-induced dilatation of the left ventricle with ESV of 39 mL at rest and 49 mL with stress (TID).  Normal wall motion.    Stress LV ejection fraction of 49%, mildly  "diminished.  Since her stress test she has noted chest pressure without radiation with strenuous activity and mild DANIEL. Occasional palpitations are present as well. She denies any diaphoresis, NV, dizziness. She reports herself as very active climbing stairs and is "always on the move". Her symptoms have not limited her activities and do resolve with rest. She has HLD and is on Lipitor. No HTN, DM, nonsmoker no etoh   Patient is Syrian speaking only and clinical  present throughout visit.        Review of Systems   Constitutional: Negative for diaphoresis.   HENT: Negative.    Eyes: Negative.    Respiratory: Positive for chest tightness and shortness of breath.    Cardiovascular: Positive for chest pain and palpitations. Negative for leg swelling.   Gastrointestinal: Negative.    Endocrine: Negative.    Genitourinary: Negative.    Musculoskeletal: Negative.    Skin: Negative.    Allergic/Immunologic: Negative.    Neurological: Negative.  Negative for dizziness, syncope and light-headedness.   Hematological: Negative.    Psychiatric/Behavioral: The patient is nervous/anxious.        Objective:      Physical Exam   Constitutional: She is oriented to person, place, and time. No distress.   HENT:   Head: Normocephalic and atraumatic.   Eyes: Right eye exhibits no discharge. Left eye exhibits no discharge.   Neck: No JVD present.   Cardiovascular: Normal rate, regular rhythm and intact distal pulses.    No murmur heard.  Pulmonary/Chest: Effort normal and breath sounds normal.   Abdominal: Soft. Bowel sounds are normal.   Musculoskeletal: She exhibits no edema.   Neurological: She is alert and oriented to person, place, and time.   Skin: Skin is warm and dry. She is not diaphoretic.   Psychiatric: She has a normal mood and affect. Her behavior is normal. Judgment and thought content normal.       Assessment:       1. Chest pain, unspecified type    2. Abnormal nuclear stress test        Plan:       Ghada was " seen today for abnormal ecg.    Diagnoses and all orders for this visit:    Chest pain, unspecified type  -     EKG 12-lead    Abnormal nuclear stress test    Other orders  -     nitroGLYCERIN (NITROSTAT) 0.4 MG SL tablet; Place 1 tablet (0.4 mg total) under the tongue every 5 (five) minutes as needed for Chest pain.  -     aspirin (ECOTRIN) 81 MG EC tablet; Take 1 tablet (81 mg total) by mouth once daily.      Vitals:    06/22/17 1309   BP: 114/65   BP Location: Left arm   Patient Position: Sitting   BP Method: Automatic   Pulse: 76   SpO2: 98%   Weight: 58.9 kg (129 lb 14.4 oz)   Height: 5' (1.524 m)     Stress induced dilatation of LV major predictor for MV CAD; patient will undergo LHC on Monday with Dr Perez.  Risks and benefits of LHC explained to patient as well as conservative treatment options for presumed CAD  Patient opted to proceed with LHC.     Start asa 81 mg daily  SL NTG sent to pharmacy  Continue statin  Hold Celebrex for now   Repeat FLP on Monday; most recent on file 2015   Further recs to follow angiogram    06/14/2017  NM stress test:  At peak infusion, EKG was normal at a maximum heart rate of 98 bpm.   1. The EKG portion of this study is negative for ischemia at a peak heart rate of 98 bpm (72% of predicted).   2. Blood pressure remained stable throughout the protocol  (Presenting BP: 129/59 Peak BP: 133/59).   3. No significant arrhythmias were present.   4. There were no symptoms of chest discomfort or significant dyspnea throughout the protocol  Imaging portion findings:   There is homogeneous perfusion at rest and stress without evidence for infarct or reversible ischemia.    There is stress-induced dilatation of the left ventricle with ESV of 39 mL at rest and 49 mL with stress (TID).  Normal wall motion.    Stress LV ejection fraction of 49%, mildly diminished.      2015 lipid panel- most recent   Cholesterol 120 - 199 mg/dL 213    Comments: The National Cholesterol Education Program  (NCEP) has set the   following guidelines (reference ranges) for Cholesterol:   Optimal.....................<200 mg/dL   Borderline High.............200-239 mg/dL   High........................> or = 240 mg/dL    Triglycerides 30 - 150 mg/dL 115   Comments: The National Cholesterol Education Program (NCEP) has set the   following guidelines (reference values) for triglycerides:   Normal......................<150 mg/dL   Borderline High.............150-199 mg/dL   High........................200-499 mg/dL    HDL 40 - 75 mg/dL 58   Comments: The National Cholesterol Education Program (NCEP) has set the   following guidelines (reference values) for HDL Cholesterol:   Low...............<40 mg/dL   Optimal...........>60 mg/dL    LDL Cholesterol 63.0 - 159.0 mg/dL 132.0   Comments: The National Cholesterol Education Program (NCEP) has set the   following guidelines (reference values) for LDL Cholesterol:   Optimal.......................<130 mg/dL   Borderline High...............130-159 mg/dL   High..........................160-189 mg/dL   Very High.....................>190 mg/dL    HDL/Chol Ratio 20.0 - 50.0 % 27.2   Total Cholesterol/HDL Ratio 2.0 - 5.0 3.7   Non-HDL Cholesterol mg/dL 155   Comments: Risk category and Non-HDL cholesterol goals:   Coronary heart disease (CHD)or equivalent (10-year risk of CHD >20%):   Non-HDL cholesterol goal     <130 mg/dL   Two or more CHD risk factors and 10-year risk of CHD <= 20%:   Non-HDL cholesterol goal     <160 mg/dL   0 to 1 CHD risk factor:   Non-HDL cholesterol goal     <190 mg/dL      Current Outpatient Prescriptions on File Prior to Visit   Medication Sig Dispense Refill    atorvastatin (LIPITOR) 40 MG tablet Take 1 tablet (40 mg total) by mouth once daily. 90 tablet 1    celecoxib (CELEBREX) 200 MG capsule Take 1 capsule (200 mg total) by mouth daily as needed for Pain. 40 capsule 1    hydrocortisone 2.5 % cream Apply topically 2 (two) times daily. 28 g 1    trazodone  (DESYREL) 50 MG tablet Take 1 tablet (50 mg total) by mouth nightly as needed for Insomnia. 30 tablet 1     No current facility-administered medications on file prior to visit.

## 2017-06-26 NOTE — OR NURSING
5cc air removed per protocol from right radial armband and site WNL. As well as vitals WNL. Will continue to monitor.

## 2017-06-26 NOTE — DISCHARGE SUMMARY
[facility]  Cardiology  Discharge Summary      Patient Name: Ghada Dave  MRN: [id number]  Admission Date: 6/26/2017  Hospital Length of Stay: 0 days  Discharge Date and Time:  06/26/2017 2:21 PM  Attending Physician: Arash Perez MD  Discharging Provider: Arash Perez MD  Primary Care Physician: Lele King MD    HPI: 81 year-old female referred for elective cardiac cath for high risk findings on stress test.      Procedure(s) (LRB):  HEART CATH-LEFT (N/A)     Indwelling Lines/Drains at time of discharge:  Lines/Drains/Airways          No matching active lines, drains, or airways          Hospital Course (synopsis of major diagnoses, care, treatment, and services provided during the course of the hospital stay): Transradial cardiac cath performed.  iFR of LAD and RCA performed confirmed nonobstructive disease.  Anticipate discharge this afternoon following recovery.    Consults: None    Significant Diagnostic Studies: Labs:   CMP   Recent Labs  Lab 06/26/17  0853      K 4.3      CO2 27   GLU 95   BUN 18   CREATININE 0.9   CALCIUM 9.5   ANIONGAP 6*   ESTGFRAFRICA >60   EGFRNONAA >60   , CBC   Recent Labs  Lab 06/26/17  0853   WBC 4.71   HGB 12.4   HCT 36.3*       and INR   Lab Results   Component Value Date    INR 1.0 06/26/2017    INR 0.9 03/14/2017       Pending Diagnostic Studies:     Procedure Component Value Units Date/Time    EKG 12-lead [[id number]]     Order Status:  Sent Lab Status:  No result           Final Active Diagnoses:    Diagnosis Date Noted POA    Chest pain [R07.9] 06/22/2017 Yes      Problems Resolved During this Admission:    Diagnosis Date Noted Date Resolved POA       Discharged Condition: good    Follow Up:    Patient Instructions:   No discharge procedures on file.  Medications:  Reconciled Home Medications:   Current Discharge Medication List      CONTINUE these medications which have NOT CHANGED    Details    aspirin (ECOTRIN) 81 MG EC tablet Take 1 tablet (81 mg total) by mouth once daily.  Refills: 0      atorvastatin (LIPITOR) 40 MG tablet Take 1 tablet (40 mg total) by mouth once daily.  Qty: 90 tablet, Refills: 1    Associated Diagnoses: Hyperlipidemia, unspecified hyperlipidemia type      celecoxib (CELEBREX) 200 MG capsule Take 1 capsule (200 mg total) by mouth daily as needed for Pain.  Qty: 40 capsule, Refills: 1    Associated Diagnoses: Arthralgia, unspecified joint      hydrocortisone 2.5 % cream Apply topically 2 (two) times daily.  Qty: 28 g, Refills: 1      nitroGLYCERIN (NITROSTAT) 0.4 MG SL tablet Place 1 tablet (0.4 mg total) under the tongue every 5 (five) minutes as needed for Chest pain.  Qty: 25 tablet, Refills: 0      trazodone (DESYREL) 50 MG tablet Take 1 tablet (50 mg total) by mouth nightly as needed for Insomnia.  Qty: 30 tablet, Refills: 1    Associated Diagnoses: Insomnia, unspecified type             Time spent on the discharge of patient: 15 minutes    Arash Perez MD  Cardiology  [facility]

## 2017-06-27 LAB
POC ACTIVATED CLOTTING TIME K: 202 SEC (ref 74–137)
SAMPLE: ABNORMAL

## 2017-06-28 DIAGNOSIS — R07.9 CHEST PAIN: Primary | ICD-10-CM

## 2017-07-01 ENCOUNTER — HOSPITAL ENCOUNTER (EMERGENCY)
Facility: HOSPITAL | Age: 82
Discharge: HOME | End: 2017-07-02
Attending: EMERGENCY MEDICINE
Payer: MEDICARE

## 2017-07-01 VITALS
SYSTOLIC BLOOD PRESSURE: 165 MMHG | DIASTOLIC BLOOD PRESSURE: 95 MMHG | HEIGHT: 61 IN | HEART RATE: 64 BPM | TEMPERATURE: 98 F | RESPIRATION RATE: 19 BRPM | OXYGEN SATURATION: 97 % | BODY MASS INDEX: 24.55 KG/M2 | WEIGHT: 130 LBS

## 2017-07-01 DIAGNOSIS — T63.431A: Primary | ICD-10-CM

## 2017-07-01 DIAGNOSIS — R07.9 CHEST PAIN: ICD-10-CM

## 2017-07-01 PROCEDURE — 93005 ELECTROCARDIOGRAM TRACING: CPT

## 2017-07-01 PROCEDURE — 96372 THER/PROPH/DIAG INJ SC/IM: CPT

## 2017-07-01 PROCEDURE — 93010 ELECTROCARDIOGRAM REPORT: CPT | Mod: S$GLB,,, | Performed by: INTERNAL MEDICINE

## 2017-07-01 PROCEDURE — 99283 EMERGENCY DEPT VISIT LOW MDM: CPT | Mod: 25

## 2017-07-02 PROCEDURE — 25000003 PHARM REV CODE 250: Performed by: EMERGENCY MEDICINE

## 2017-07-02 PROCEDURE — 63600175 PHARM REV CODE 636 W HCPCS: Performed by: EMERGENCY MEDICINE

## 2017-07-02 RX ORDER — MORPHINE SULFATE 2 MG/ML
4 INJECTION, SOLUTION INTRAMUSCULAR; INTRAVENOUS
Status: COMPLETED | OUTPATIENT
Start: 2017-07-02 | End: 2017-07-02

## 2017-07-02 RX ORDER — ONDANSETRON 4 MG/1
4 TABLET, ORALLY DISINTEGRATING ORAL
Status: COMPLETED | OUTPATIENT
Start: 2017-07-02 | End: 2017-07-02

## 2017-07-02 RX ORDER — DIPHENHYDRAMINE HCL 25 MG
25 CAPSULE ORAL
Status: COMPLETED | OUTPATIENT
Start: 2017-07-02 | End: 2017-07-02

## 2017-07-02 RX ORDER — HYDROCODONE BITARTRATE AND ACETAMINOPHEN 5; 325 MG/1; MG/1
0.5 TABLET ORAL EVERY 4 HOURS PRN
Qty: 10 TABLET | Refills: 0 | Status: SHIPPED | OUTPATIENT
Start: 2017-07-02 | End: 2017-08-16

## 2017-07-02 RX ADMIN — ONDANSETRON 4 MG: 4 TABLET, ORALLY DISINTEGRATING ORAL at 12:07

## 2017-07-02 RX ADMIN — MORPHINE SULFATE 4 MG: 2 INJECTION, SOLUTION INTRAMUSCULAR; INTRAVENOUS at 12:07

## 2017-07-02 RX ADMIN — DIPHENHYDRAMINE HYDROCHLORIDE 25 MG: 25 CAPSULE ORAL at 12:07

## 2017-07-02 NOTE — ED TRIAGE NOTES
Patient presents to the ED with reports of having been stung by to the 5th finger on the right hand while trimming her anna garden this afternoon. Patient states she was using large sheers and picking up the debris when the pain started suddenly. States pain radiates from the hand, up the arm and to the chest. Patient also states having recently had a cardiac cath procedure from the right wrist by Dr. Ana wynn 6 days ago. Denies any shortness of breath at this time.     Review of patient's allergies indicates:  No Known Allergies     Patient has verified the spelling of their name and  on armband.   APPEARANCE: Patient is alert, oriented x 4, and is restless in appearance.  SKIN: Skin is normal for race, warm, and dry. Normal skin turgor and mucous membranes moist. Insect bite noted to the 5th digit area with redness and swelling.  CARDIAC: Normal rate and no murmur heard.   RESPIRATORY:Normal rate and effort. Breath sounds clear bilaterally throughout chest. Respirations are equal and unlabored.    GASTRO: Bowel sounds normal, abdomen is soft, no tenderness, and no abdominal distention.   MUSCLE: Full ROM. Tenderness to the 5th digit on the right hand. Pain that radiates up the arm and to the chest.   PERIPHERAL VASCULAR: peripheral pulses present. Normal cap refill. No edema. Warm to touch.  NEURO: 5/5 strength major flexors/extensors bilaterally. Sensory intact to light touch bilaterally. No neurological abnormalities.   MENTAL STATUS: awake, alert and aware of environment.

## 2017-07-02 NOTE — ED PROVIDER NOTES
Encounter Date: 7/1/2017       History     Chief Complaint   Patient presents with    Hand Pain     redness, swelling and tenderness to pinky finger on right hand after working in the garden.     Patient is an 81-year-old female with past medical history of hypertension hyperlipidemia poor circulation who recently underwent a cardiac angiogram via the right radial artery presents to emergency department for evaluation of right fifth finger pain.  The patient was trimming anna bushes and breaking up leaves when she decided to  polys and felt a stinging sensation over the right fifth finger earlier today but is progressively gotten worse with pain radiating upwards towards her right armpit.  The pain radiates of the medial border of her forearm and arm up to the armpit and feels sharp and stinging in nature.  She denies any weakness or numbness in most her pain is over the dorsal surface of the mid phalangeal the fifth finger.  There is a small rash in the area.  She did not see any type of insects including no catepillars.  She has no pain over the radial artery or pain over any other finger on the hand.          Review of patient's allergies indicates:  No Known Allergies  Past Medical History:   Diagnosis Date    Abnormal Pap smear 2013    Cataract     Colon polyps     Frequent urination     Hemorrhoid     High cholesterol     Hypertension     Poor circulation     Varicose vein      Past Surgical History:   Procedure Laterality Date    CATARACT EXTRACTION W/  INTRAOCULAR LENS IMPLANT Bilateral     COLONOSCOPY      POLYPECTOMY      YAG Laser Capsulotomy Right 04/17/2017    Dr. Chavez     Family History   Problem Relation Age of Onset    Glaucoma Neg Hx     Macular degeneration Neg Hx     Strabismus Neg Hx     Retinal detachment Neg Hx     Amblyopia Neg Hx     Blindness Neg Hx     Cataracts Neg Hx      Social History   Substance Use Topics    Smoking status: Never Smoker    Smokeless  tobacco: Never Used    Alcohol use No     Review of Systems   Constitutional: Negative for chills, diaphoresis and fever.   Respiratory: Negative for cough and shortness of breath.    Cardiovascular: Negative for chest pain, palpitations and leg swelling.   Gastrointestinal: Negative for nausea and vomiting.   Genitourinary: Negative for dysuria, flank pain and frequency.   Musculoskeletal: Positive for myalgias.   Skin: Positive for color change (right fifth finger is slightly erythematous) and rash. Negative for pallor and wound.   Neurological: Negative for weakness, numbness and headaches.   Psychiatric/Behavioral: Negative for agitation and confusion.       Physical Exam     Initial Vitals [07/01/17 2240]   BP Pulse Resp Temp SpO2   (!) 165/95 64 19 97.6 °F (36.4 °C) 97 %      MAP       118.33         Physical Exam    Nursing note and vitals reviewed.  Constitutional: She appears well-developed and well-nourished. She appears distressed (appears to be in pain).   HENT:   Head: Normocephalic and atraumatic.   Mouth/Throat: Oropharynx is clear and moist.   Eyes: Conjunctivae and EOM are normal. Pupils are equal, round, and reactive to light.   Neck: Normal range of motion. Neck supple.   Cardiovascular: Normal rate, regular rhythm, normal heart sounds and intact distal pulses.   No aneurysm of the radial artery appreciated     Pulmonary/Chest: Breath sounds normal. No respiratory distress. She has no wheezes. She has no rales.   Abdominal: Soft. There is no tenderness. There is no rebound.   Musculoskeletal: She exhibits tenderness (most ttp over the medial side of the forearm and arm to the armpit.  There is no overlying erythema, swelling, or warmth on the forearm or ar).   Neurological: She is alert and oriented to person, place, and time. She has normal strength. No sensory deficit.   Skin:   Several small erythematous non blanching lesions on the dorsal surface of the right fifth finger mid phalange.     No  hemorragic lesions on the tip of the finger or under the nailbed.  No cyanosis of the finger or coolnesss    Psychiatric: She has a normal mood and affect.         ED Course   Procedures  Labs Reviewed - No data to display          Medical Decision Making:   I think the patient has a caterpillar sting to the fifth finger that is causing radiating pain up the ulnar nerve.  I do not believe this is cardiac in nature.  The patient's EKG is unchanged from prior.  The patient has a gridlike erythematous pattern over the dorsal surface of her right fifth finger which is consistent with a caterpillar sting especially with her history where she was picking up leaves and putting them in a bag when she felt a stinging.  She did not witness or see the insect.  I gave her an injection of morphine here secondary to pain we'll prescribe her short course of opiates given that she is in tears.                   ED Course     Clinical Impression:   The primary encounter diagnosis was Toxic effect of venom of caterpillars, unintentional, initial encounter. A diagnosis of Chest pain was also pertinent to this visit.                           Gavino Roberts MD  07/02/17 0031

## 2017-07-06 ENCOUNTER — OFFICE VISIT (OUTPATIENT)
Dept: INTERNAL MEDICINE | Facility: CLINIC | Age: 82
End: 2017-07-06
Payer: MEDICARE

## 2017-07-06 VITALS
HEIGHT: 60 IN | DIASTOLIC BLOOD PRESSURE: 60 MMHG | BODY MASS INDEX: 24.97 KG/M2 | OXYGEN SATURATION: 97 % | WEIGHT: 127.19 LBS | SYSTOLIC BLOOD PRESSURE: 120 MMHG | HEART RATE: 88 BPM

## 2017-07-06 DIAGNOSIS — R33.9 URINARY RETENTION WITH INCOMPLETE BLADDER EMPTYING: Primary | ICD-10-CM

## 2017-07-06 DIAGNOSIS — M25.511 SUBSCAPULAR PAIN, RIGHT: ICD-10-CM

## 2017-07-06 DIAGNOSIS — F51.04 PSYCHOPHYSIOLOGICAL INSOMNIA: ICD-10-CM

## 2017-07-06 DIAGNOSIS — N39.490 OVERFLOW INCONTINENCE: ICD-10-CM

## 2017-07-06 DIAGNOSIS — M41.9 SCOLIOSIS OF THORACOLUMBAR SPINE, UNSPECIFIED SCOLIOSIS TYPE: ICD-10-CM

## 2017-07-06 DIAGNOSIS — M54.50 CHRONIC MIDLINE LOW BACK PAIN WITHOUT SCIATICA: ICD-10-CM

## 2017-07-06 DIAGNOSIS — L40.9 PSORIASIS: ICD-10-CM

## 2017-07-06 DIAGNOSIS — G89.29 CHRONIC MIDLINE LOW BACK PAIN WITHOUT SCIATICA: ICD-10-CM

## 2017-07-06 PROBLEM — R07.89 NON-CARDIAC CHEST PAIN: Status: ACTIVE | Noted: 2017-06-22

## 2017-07-06 PROBLEM — R94.39 ABNORMAL NUCLEAR STRESS TEST: Status: RESOLVED | Noted: 2017-06-22 | Resolved: 2017-07-06

## 2017-07-06 PROBLEM — I25.10 NON-OCCLUSIVE CORONARY ARTERY DISEASE: Status: ACTIVE | Noted: 2017-07-06

## 2017-07-06 PROBLEM — Z98.890 POST-OPERATIVE STATE: Status: RESOLVED | Noted: 2017-05-08 | Resolved: 2017-07-06

## 2017-07-06 PROCEDURE — 1126F AMNT PAIN NOTED NONE PRSNT: CPT | Mod: S$GLB,,, | Performed by: INTERNAL MEDICINE

## 2017-07-06 PROCEDURE — 99999 PR PBB SHADOW E&M-EST. PATIENT-LVL V: CPT | Mod: PBBFAC,,, | Performed by: INTERNAL MEDICINE

## 2017-07-06 PROCEDURE — 99215 OFFICE O/P EST HI 40 MIN: CPT | Mod: S$GLB,,, | Performed by: INTERNAL MEDICINE

## 2017-07-06 PROCEDURE — 1159F MED LIST DOCD IN RCRD: CPT | Mod: S$GLB,,, | Performed by: INTERNAL MEDICINE

## 2017-07-06 PROCEDURE — 1157F ADVNC CARE PLAN IN RCRD: CPT | Mod: S$GLB,,, | Performed by: INTERNAL MEDICINE

## 2017-07-06 RX ORDER — MOMETASONE FUROATE 1 MG/G
CREAM TOPICAL DAILY PRN
Qty: 50 G | Refills: 1 | Status: SHIPPED | OUTPATIENT
Start: 2017-07-06 | End: 2017-07-06 | Stop reason: SDUPTHER

## 2017-07-06 RX ORDER — MOMETASONE FUROATE 1 MG/G
CREAM TOPICAL DAILY PRN
Qty: 50 G | Refills: 1 | Status: SHIPPED | OUTPATIENT
Start: 2017-07-06 | End: 2017-08-16

## 2017-07-06 NOTE — PROGRESS NOTES
Portions of this note are generated with voice recognition software. Typographical errors may exist.     SUBJECTIVE:    This is a/an 81 y.o. female here for primary care visit for  Chief Complaint   Patient presents with    Follow-up     ED 07/01/17    Insect Bite     Caterpillar     Patient states that she continues to have problems with recurring musculoskeletal pain.  Areas of most significant pain include lumbosacral paraspinal region.  Worsened by sitting and inadequate posture.    Patient states that she has followed traditionally with outside dermatologist and told that chronic pruritic rash at the ankles is related to either eczema or psoriasis.  States that she has had biopsies in the past and told that rash is benign.  States that topical corticosteroid therapy helps to control symptoms but she has frequent relapses.  Unclear if the patient has been evaluated for having psoriatic arthritis.      Patient has problems with nocturia.  Severe.  This has been chronic.  States that she has 5-6 episodes of nocturia.  Endorses inadequate bladder emptying.  10 years ago she was evaluated by urology which confirmed that she had overflow incontinence.  States that she has not brought this to medical attention because she does not feel that there our treatments for her.  Denies dysuria or hematuria.    Insomnia.  Patient states that does not want to continue with trazodone because she had dizziness during frequent episodes to urinate at nighttime.  States that she didn't have dizziness with Ambien and bargains to have Ambien again or xanax    Medications Reviewed and Updated    Past medical, family, and social histories were reviewed and updated.    Review of Systems negative unless otherwise noted in history of present illness-   General ROS: negative  Psychological ROS: negative  ENT ROS: negative  Allergy and Immunology ROS: negative  Cardiovascular ROS: negative  Gastrointestinal ROS: negative  Genito-Urinary  ROS: negative  Musculoskeletal ROS: negative  Neurological ROS: negative  Dermatological ROS: negative      Allergic:  Review of patient's allergies indicates:  No Known Allergies    OBJECTIVE:  BP: 120/60 Pulse: 88    Wt Readings from Last 3 Encounters:   07/06/17 57.7 kg (127 lb 3.3 oz)   07/01/17 59 kg (130 lb)   06/26/17 54.4 kg (120 lb)    Body mass index is 24.84 kg/m².  Previous Blood Pressure Readings :   BP Readings from Last 3 Encounters:   07/06/17 120/60   07/01/17 (!) 165/95   06/26/17 126/61       GEN: No apparent distress  HEENT: sclera non-icteric, conjunctiva clear  CV: no peripheral edema  PULM: breathing non-labored  ABD: non, protuberant abdomen.  PSYCH: appropriate affect  MSK: able to rise from chair without assistance.  Point tenderness subscapular region right side.  Mild lumbo-thoracic scoliosis.  SKIN: normal skin turgor.  Bilateral ankle rash    Pertinent Labs Reviewed       ASSESSMENT/PLAN:    Urinary retention with incomplete bladder emptying..Condition not optimally controlled.  Urodynamic studies recommended Detailed counseling on self care measures. Plan to monitor clinically in addition to plan below.   -     Ambulatory referral to Urology    Overflow incontinence.  Not optimally controlled plan as above  -     Ambulatory referral to Urology    Psychophysiological insomnia.  Not optimally controlled.  Recommend against Ambien.  Optimize nocturia first and then proceed to treat with pharmacotherapy again    Subscapular pain, right.  Not optimally controlled.  If fails to improve rheumatology consult warranted to exclude psoriatic arthritis  -     Ambulatory Referral to Physical/Occupational Therapy    Scoliosis of thoracolumbar spine, unspecified scoliosis type.Condition not optimally controlled. Detailed counseling on self care measures. Plan to monitor clinically in addition to plan below.   -     Ambulatory Referral to Physical/Occupational Therapy    Chronic midline low back pain  without sciatica.Condition not optimally controlled. Detailed counseling on self care measures. Plan to monitor clinically in addition to plan below.   -     Ambulatory Referral to Physical/Occupational Therapy    Psoriasis.Condition not optimally controlled. Detailed counseling on self care measures. Plan to monitor clinically in addition to plan below.   -     mometasone 0.1% (ELOCON) 0.1 % cream; Apply topically daily as needed.  Dispense: 50 g; Refill: 1          Future Appointments  Date Time Provider Department Center   7/20/2017 2:30 PM Olaf Carney MD [facility] UROLOGY [facility]   8/2/2017 11:20 AM Lele King MD [facility]   9/7/2017 1:30 PM Bharath Shaw MD [facility]       Lele King  7/6/2017  2:34 PM

## 2017-07-06 NOTE — PATIENT INSTRUCTIONS
Recomendaciones para hoy    Recomendamos encarecidamente que asista a sesiones con terapia física para ayudar con el dolor de espalda recurrente.    Usted puede eddy Celebrex según sea necesario para ayudar con dolor de espalda. Combinar Celebrex con Tylenol puede ayudar a fortalecer el efecto de Celebrex.    Después de completar 2-3 sesiones de terapia física, si los síntomas no mejoran, comuníquese con mi clínica para recibir instrucciones adicionales.        Cómo aliviar el dolor de espalda  El dolor de espalda es un problema común. Los músculos de la espalda se pueden tensionar si usted levanta demasiado peso o simplemente si hace un mal movimiento. Al estar tensionados pueden molestar, e incluso doler, y demorarse semanas en sanar. Para sentirse mejor y evitar que se vuelvan a tensionar, pruebe estas sugerencias:  Nota importante: No les dé aspirina a los niños ni a los adolescentes.        ? Hielo  El hielo disminuye el dolor y la hinchazón. Es más efectivo giovany las primeras 24 a 48 horas después de la lesión.  · Envuelva trinh bolsa de hielo o de verduras congeladas en trinh toalla. (Nunca coloque el hielo directamente sobre la piel.)  · Coloque el hielo sobre la parte de la espalda que más le duele.  · No se ponga hielo giovany más de 20 minutos cada vez.  · Deberá usar hielo varias veces al día.  ? Medicamentos  Los calmantes del dolor que se venden sin receta rm la aspirina, el acetaminofeno y el ibuprofeno, pueden ayudarle a sentir menos molestias y algunos pueden disminuir la hinchazón.  · Dígale a hart médico qué medicamentos está tomando.  · [place] paco medicamentos solamente rm le indiquen.  ? Calor  Después de las primeras 48 horas, el calor puede relajar los músculos adoloridos y mejorar el flujo de juli.  · Pruebe a bañarse o ducharse con agua tibia. También puede usar trinh almohadilla térmica graduada en bajo. Para evitar quemarse, mantenga un paño entre hart piel y la almohadilla térmica.  · No use  trinh almohadilla térmica giovany más de 15 minutos cada vez y nunca duerma sobre travis.  Date Last Reviewed: 9/1/2015  © 4265-9390 [employer], [facility]. [address] [place] [address], [place] [address]. Todos los derechos reservados. Esta información no pretende sustituir la atención médica profesional. Sólo hart médico puede diagnosticar y tratar un problema de maría.

## 2017-07-12 PROBLEM — L28.0 LICHEN SIMPLEX CHRONICUS: Status: ACTIVE | Noted: 2017-07-12

## 2017-07-20 ENCOUNTER — OFFICE VISIT (OUTPATIENT)
Dept: UROLOGY | Facility: CLINIC | Age: 82
End: 2017-07-20
Payer: MEDICARE

## 2017-07-20 VITALS
HEART RATE: 77 BPM | WEIGHT: 127 LBS | HEIGHT: 60 IN | BODY MASS INDEX: 24.94 KG/M2 | SYSTOLIC BLOOD PRESSURE: 123 MMHG | DIASTOLIC BLOOD PRESSURE: 58 MMHG

## 2017-07-20 DIAGNOSIS — R82.90 ABNORMAL RESULT ON SCREENING URINE TEST: ICD-10-CM

## 2017-07-20 DIAGNOSIS — R30.0 DYSURIA: ICD-10-CM

## 2017-07-20 DIAGNOSIS — N32.81 OAB (OVERACTIVE BLADDER): Primary | ICD-10-CM

## 2017-07-20 LAB
BACTERIA #/AREA URNS AUTO: ABNORMAL /HPF
BILIRUB SERPL-MCNC: ABNORMAL MG/DL
BLOOD URINE, POC: 250
COLOR, POC UA: YELLOW
GLUCOSE UR QL STRIP: ABNORMAL
KETONES UR QL STRIP: ABNORMAL
LEUKOCYTE ESTERASE URINE, POC: ABNORMAL
MICROSCOPIC COMMENT: ABNORMAL
NITRITE, POC UA: ABNORMAL
PH, POC UA: ABNORMAL
PROTEIN, POC: ABNORMAL
RBC #/AREA URNS AUTO: 4 /HPF (ref 0–4)
SPECIFIC GRAVITY, POC UA: 1.01
SQUAMOUS #/AREA URNS AUTO: 0 /HPF
UROBILINOGEN, POC UA: ABNORMAL
WBC #/AREA URNS AUTO: 6 /HPF (ref 0–5)

## 2017-07-20 PROCEDURE — 1126F AMNT PAIN NOTED NONE PRSNT: CPT | Mod: S$GLB,,, | Performed by: UROLOGY

## 2017-07-20 PROCEDURE — 81001 URINALYSIS AUTO W/SCOPE: CPT | Mod: S$GLB,,, | Performed by: UROLOGY

## 2017-07-20 PROCEDURE — 99204 OFFICE O/P NEW MOD 45 MIN: CPT | Mod: 25,S$GLB,, | Performed by: UROLOGY

## 2017-07-20 PROCEDURE — 81001 URINALYSIS AUTO W/SCOPE: CPT

## 2017-07-20 PROCEDURE — 87086 URINE CULTURE/COLONY COUNT: CPT

## 2017-07-20 PROCEDURE — 99999 PR PBB SHADOW E&M-EST. PATIENT-LVL III: CPT | Mod: PBBFAC,,, | Performed by: UROLOGY

## 2017-07-20 PROCEDURE — 1157F ADVNC CARE PLAN IN RCRD: CPT | Mod: S$GLB,,, | Performed by: UROLOGY

## 2017-07-20 PROCEDURE — 1159F MED LIST DOCD IN RCRD: CPT | Mod: S$GLB,,, | Performed by: UROLOGY

## 2017-07-20 NOTE — PROGRESS NOTES
HPI:  Ghada Dave is a 81 y.o. year old female that  presents with No chief complaint on file.  .  This patient referred by her PCP for overflow incontinence and urinary retention.    The patient speaks no English and communication is done with the help of her friend Carina, who translates.  Patient's chief complaint is nocturia ×6-7.  She is not sure that she fully empties her bladder.  She has occasional dysuria.  She has no gross hematuria.  Patient is straight catheter under sterile technique in the office and there is approximately 100 cc postvoid residual which is within normal limits.  No evidence of urinary retention and overflow incontinence.    Patient is been seen by [facility] urology in 2014.  She had urodynamics for voiding symptoms.  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  Urine showed normal capacity normal sensation and bladder neck not opening during voiding.  Patient is also had negative workup for microhematuria in 2013.    Further chart review shows the note from her PCP from this month showing nocturia.  GFR last month was greater than 60 and urine culture in January of this year was negative.  CT scan in April of last year showed normal kidneys.  This is reviewed by me and I agree with that her kidneys are normal.      Past Medical History:   Diagnosis Date    Abnormal Pap smear 2013    Cataract     Colon polyps     Frequent urination     Hemorrhoid     High cholesterol     Hypertension     Poor circulation     Varicose vein      Social History     Social History    Marital status: Single     Spouse name: N/A    Number of children: N/A    Years of education: N/A     Occupational History    Not on file.     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 Chavez     Family History   Problem Relation Age of Onset    Glaucoma Neg Hx     Macular degeneration Neg Hx     Strabismus Neg Hx     Retinal detachment Neg Hx     Amblyopia Neg Hx     Blindness Neg Hx     Cataracts Neg Hx     Prostate cancer Neg Hx     Kidney disease Neg Hx            Review of Systems  The patient has no chest pains.  The patient has no shortness of breath  Patient wears        glasses.  All other review of systems are negative.      Physical Exam:  BP (!) 123/58   Pulse 77   Ht 5' (1.524 m)   Wt 57.6 kg (127 lb)   BMI 24.80 kg/m²   General appearance: alert, cooperative, no distress  Constitutional:Oriented to person, place, and time.appears well-developed and well-nourished.   HEENT: Normocephalic, atraumatic, neck symmetrical, no nasal discharge   Eyes: conjunctivae/corneas clear, PERRL, EOM's intact  Lungs: clear to auscultation bilaterally, no dullness to percussion bilaterally  Heart: regular rate and rhythm without rub; no displacement of the PMI   Abdomen: soft, non-tender; bowel sounds normoactive; no organomegaly  : Urethral meatus without lesion, no urethral masses noted, bladder nontender /nondistended, vagina normal appearing,      no      cystocele, anus and perineum without lesions, no adnexal or uterine masses noted.  Extremities: extremities symmetric; no clubbing, cyanosis, or edema  Integument: Skin color, texture, turgor normal; no rashes; hair distrubution normal  Neurologic: Alert and oriented X 3, normal strength, normal coordination and gait  Psychiatric: no pressured speech; normal affect; no evidence of impaired cognition      LABS:    Complete Blood Count  Lab Results   Component Value Date    RBC 4.15 06/26/2017    HGB 12.4 06/26/2017    HCT 36.3 (L) 06/26/2017    MCV 88 06/26/2017    MCH 29.9 06/26/2017    MCHC 34.2 06/26/2017    RDW 14.7 (H) 06/26/2017     06/26/2017    MPV 9.1 (L) 06/26/2017    GRAN 2.3 06/26/2017    GRAN 48.3 06/26/2017    LYMPH 1.8 06/26/2017    LYMPH 37.2 06/26/2017    MONO 0.5 06/26/2017    MONO 9.6 06/26/2017    EOS 0.2 06/26/2017    BASO 0.08 06/26/2017    EOSINOPHIL 3.2 06/26/2017    BASOPHIL 1.7 06/26/2017    DIFFMETHOD Automated 06/26/2017       Comprehensive Metabolic Panel  Lab Results   Component Value Date    GLU 95 06/26/2017    BUN 18 06/26/2017    CREATININE 0.9 06/26/2017     06/26/2017    K 4.3 06/26/2017     06/26/2017    PROT 8.0 03/14/2017    ALBUMIN 4.0 03/14/2017    BILITOT 0.3 03/14/2017    AST 21 03/14/2017    ALKPHOS 61 03/14/2017    CO2 27 06/26/2017    ALT 12 03/14/2017    ANIONGAP 6 (L) 06/26/2017    EGFRNONAA >60 06/26/2017    ESTGFRAFRICA >60 06/26/2017       PSA  No results found for: PSA      Assessment:    ICD-10-CM ICD-9-CM    1. OAB (overactive bladder) N32.81 596.51    2. Abnormal result on screening urine test R82.90 791.9 Urinalysis Microscopic      Urine culture      POCT urinalysis, dipstick or tablet reag   3. Dysuria R30.0 788.1      The primary encounter diagnosis was OAB (overactive bladder). Diagnoses of Abnormal result on screening urine test and Dysuria were also pertinent to this visit.      Plan: 1.  Overactive bladder.  Plan.  I discussed risks and benefits of anticholinergic therapy.  Patient does not want anticholinergic therapy.  I further discussed urodynamic finding from 2014 and offered referral back to  for possible Botox injection.  Patient does not want Botox injection.    #2.    Dip urine positive for blood.  I discussed with the patient that the dip urine test has a high false positive result for blood.  We will send the  patient's urine for microscopic analysis and contact the patient if in fact significant blood is present and if evaluation is indicated.  Patient is artery had negative workup for microhematuria in the past    #3.  Dysuria, occasional.Patient's urine will be cultured and once results are available ,we will contact the patient if antibiotics are indicated.    At this point follow-up on an as-needed basis as discussed above  Orders Placed This Encounter   Procedures    Urine culture    Urinalysis Microscopic    POCT urinalysis, dipstick or tablet reag           Olaf Carney MD    PLEASE NOTE:  Please be advised that portions of this note were dictated using voice recognition software and may contain dictation related errors in spelling/grammar/appropriate pronouns/syntax or other errors that might have not been found and or corrected on text review.

## 2017-07-20 NOTE — LETTER
July 20, 2017      Lele King MD  [address]  [place] [address]           [place] [facility]  [address] [facility] [address] [facility]  [place] [address]  Phone: [phone]          Patient: Ghada Dave   MR Number: [id number]   YOB: 1935   Date of Visit: 7/20/2017       Dear Dr. Lele King:    Thank you for referring Ghada Flanagan to me for evaluation. Attached you will find relevant portions of my assessment and plan of care.    If you have questions, please do not hesitate to call me. I look forward to following Ghada Flanagan along with you.    Sincerely,    Olaf Carney MD    Enclosure  CC:  No Recipients    If you would like to receive this communication electronically, please contact [email or web address] or [phone] to request more information on [facility] Link access.    For providers and/or their staff who would like to refer a patient to [facility], please contact us through our one-stop-shop provider referral line, [facility], at [phone].    If you feel you have received this communication in error or would no longer like to receive these types of communications, please e-mail [email or web address]

## 2017-07-22 LAB — BACTERIA UR CULT: NO GROWTH

## 2017-08-02 ENCOUNTER — LAB VISIT (OUTPATIENT)
Dept: LAB | Facility: HOSPITAL | Age: 82
End: 2017-08-02
Attending: INTERNAL MEDICINE
Payer: MEDICARE

## 2017-08-02 ENCOUNTER — OFFICE VISIT (OUTPATIENT)
Dept: INTERNAL MEDICINE | Facility: CLINIC | Age: 82
End: 2017-08-02
Payer: MEDICARE

## 2017-08-02 VITALS
SYSTOLIC BLOOD PRESSURE: 124 MMHG | OXYGEN SATURATION: 97 % | WEIGHT: 131.19 LBS | HEIGHT: 60 IN | DIASTOLIC BLOOD PRESSURE: 60 MMHG | BODY MASS INDEX: 25.76 KG/M2 | HEART RATE: 69 BPM

## 2017-08-02 DIAGNOSIS — N39.41 URGE INCONTINENCE: Primary | ICD-10-CM

## 2017-08-02 DIAGNOSIS — G47.00 INSOMNIA, UNSPECIFIED TYPE: ICD-10-CM

## 2017-08-02 DIAGNOSIS — E78.5 HYPERLIPIDEMIA, UNSPECIFIED HYPERLIPIDEMIA TYPE: ICD-10-CM

## 2017-08-02 DIAGNOSIS — L30.9 ECZEMA, UNSPECIFIED TYPE: ICD-10-CM

## 2017-08-02 LAB
CHOLEST/HDLC SERPL: 2.6 {RATIO}
HDL/CHOLESTEROL RATIO: 38.8 %
HDLC SERPL-MCNC: 170 MG/DL
HDLC SERPL-MCNC: 66 MG/DL
LDLC SERPL CALC-MCNC: 86.2 MG/DL
NONHDLC SERPL-MCNC: 104 MG/DL
TRIGL SERPL-MCNC: 89 MG/DL
TSH SERPL DL<=0.005 MIU/L-ACNC: 1.44 UIU/ML

## 2017-08-02 PROCEDURE — 99999 PR PBB SHADOW E&M-EST. PATIENT-LVL III: CPT | Mod: PBBFAC,,, | Performed by: INTERNAL MEDICINE

## 2017-08-02 PROCEDURE — 99215 OFFICE O/P EST HI 40 MIN: CPT | Mod: S$GLB,,, | Performed by: INTERNAL MEDICINE

## 2017-08-02 PROCEDURE — 80061 LIPID PANEL: CPT

## 2017-08-02 PROCEDURE — 84443 ASSAY THYROID STIM HORMONE: CPT

## 2017-08-02 PROCEDURE — 1159F MED LIST DOCD IN RCRD: CPT | Mod: S$GLB,,, | Performed by: INTERNAL MEDICINE

## 2017-08-02 PROCEDURE — 99499 UNLISTED E&M SERVICE: CPT | Mod: S$PBB,,, | Performed by: INTERNAL MEDICINE

## 2017-08-02 PROCEDURE — 1157F ADVNC CARE PLAN IN RCRD: CPT | Mod: S$GLB,,, | Performed by: INTERNAL MEDICINE

## 2017-08-02 PROCEDURE — 3008F BODY MASS INDEX DOCD: CPT | Mod: S$GLB,,, | Performed by: INTERNAL MEDICINE

## 2017-08-02 PROCEDURE — 36415 COLL VENOUS BLD VENIPUNCTURE: CPT | Mod: PO

## 2017-08-02 PROCEDURE — 1126F AMNT PAIN NOTED NONE PRSNT: CPT | Mod: S$GLB,,, | Performed by: INTERNAL MEDICINE

## 2017-08-02 RX ORDER — FLUOCINONIDE 0.5 MG/G
CREAM TOPICAL 2 TIMES DAILY
Qty: 120 G | Refills: 0 | Status: SHIPPED | OUTPATIENT
Start: 2017-08-02 | End: 2018-03-28 | Stop reason: SDUPTHER

## 2017-08-02 NOTE — PROGRESS NOTES
Portions of this note are generated with voice recognition software. Typographical errors may exist.     SUBJECTIVE:    This is a/an 81 y.o. female here for primary care visit for  Chief Complaint   Patient presents with    Follow-up     1 mo    Heartburn    Psoriasis     Patient is satisfied with conclusions in communication at urology appointment.  States that the urologist told her that nothing is wrong despite clear documentation that this was not what was communicated.  Patient states that she does not want to try anti-spasmodic medication because she chronically has problems with dry mouth.  At the same time she has not begun Kegel exercise.  She is ambivalent about this because she doesn't think it will work.  States that she has self-efficacy and thinks she can do it.    Patient complains of vaginal dryness and occasional burning with urination.  She is frustrated because she did not receive information about urine culture.  Provider discussed negative results today.  Patient unclear whether topical estrogen therapy in the past helped with symptoms like this.  She is not interested to resume this therapy lump a get together with multiple topical therapies in the past but never work.    States that she had modest improvement in pruritus with hydrocortisone but states that it wasn't sufficient to induce remission of plaques on the heels bilaterally.  States that she is very frustrated with previous treatments and topical therapies and wants to take corticosteroid tablets like before to help with symptoms.  Patient has limited insight regarding potency of steroids and considers them all to be the same.  Patient is worried about cost of corticosteroid cream.  States that right now she is not interested to see more specialists.        Medications Reviewed and Updated    Past medical, family, and social histories were reviewed and updated.    Review of Systems negative unless otherwise noted in history of  present illness-   General ROS: negative  Psychological ROS: negative  ENT ROS: negative  Allergy and Immunology ROS: negative  Cardiovascular ROS: negative  Gastrointestinal ROS: negative  Genito-Urinary ROS: negative  Musculoskeletal ROS: negative  Neurological ROS: negative      Allergic:  Review of patient's allergies indicates:  No Known Allergies    OBJECTIVE:  BP: 124/60 Pulse: 69    Wt Readings from Last 3 Encounters:   08/02/17 59.5 kg (131 lb 2.8 oz)   07/20/17 57.6 kg (127 lb)   07/06/17 57.7 kg (127 lb 3.3 oz)    Body mass index is 25.62 kg/m².  Previous Blood Pressure Readings :   BP Readings from Last 3 Encounters:   08/02/17 124/60   07/20/17 (!) 123/58   07/06/17 120/60       GEN: No apparent distress  HEENT: sclera non-icteric, conjunctiva clear  CV: no peripheral edema  PULM: breathing non-labored  ABD: Obese, protuberant abdomen.  PSYCH: appropriate affect  MSK: able to rise from chair without assistance  SKIN: normal skin turgor    Pertinent Labs Reviewed       ASSESSMENT/PLAN:    Urge incontinence    Eczema, unspecified type  -     fluocinonide 0.05% (LIDEX) 0.05 % cream; Apply topically 2 (two) times daily.  Dispense: 120 g; Refill: 0    Hyperlipidemia, unspecified hyperlipidemia type  -     Lipid panel; Future; Expected date: 08/02/2017    Insomnia, unspecified type  -     TSH; Future; Expected date: 08/02/2017      Specifically reviewed results of urology appointment.  Patient states that she did not Communication about results.    Patient is not satisfied with care plan today.  Patient expecting that she will get corticosteroid tablet to help with itching because this is what she has gotten in the past.  Offered patient explicit instructions to discuss with clinic if care plan recommended by this provider does not work.  Patient voicing dissatisfaction and not getting exactly what she wanted with regard to pharmacotherapy today.    Multiple modes of communication with this office explained  to this patient.  Encouraged patient to communicate closely with us to maintain the best possible outcome for her many chronic medical conditions.      Future Appointments  Date Time Provider Department Center   8/2/2017 1:30 PM LAB, [facility]   8/3/2017 1:00 PM Jennifer Garcia, PT [facility]   8/16/2017 9:00 AM Bertram Myers MD [facility]   9/7/2017 10:40 AM Lele King MD [facility] [place]   9/7/2017 1:30 PM Bharath Shaw MD [facility]       Lele King  8/2/2017  11:59 AM

## 2017-08-03 ENCOUNTER — CLINICAL SUPPORT (OUTPATIENT)
Dept: REHABILITATION | Facility: HOSPITAL | Age: 82
End: 2017-08-03
Attending: INTERNAL MEDICINE
Payer: MEDICARE

## 2017-08-03 DIAGNOSIS — M54.9 CHRONIC RIGHT-SIDED BACK PAIN, UNSPECIFIED BACK LOCATION: ICD-10-CM

## 2017-08-03 DIAGNOSIS — R53.1 DECREASED STRENGTH: ICD-10-CM

## 2017-08-03 DIAGNOSIS — R26.89 DECREASED FUNCTIONAL MOBILITY: ICD-10-CM

## 2017-08-03 DIAGNOSIS — G89.29 CHRONIC RIGHT-SIDED BACK PAIN, UNSPECIFIED BACK LOCATION: ICD-10-CM

## 2017-08-03 PROCEDURE — G8979 MOBILITY GOAL STATUS: HCPCS | Mod: CK,PN

## 2017-08-03 PROCEDURE — G8978 MOBILITY CURRENT STATUS: HCPCS | Mod: CK,PN

## 2017-08-03 PROCEDURE — 97110 THERAPEUTIC EXERCISES: CPT | Mod: PN

## 2017-08-03 PROCEDURE — 97161 PT EVAL LOW COMPLEX 20 MIN: CPT | Mod: PN

## 2017-08-03 NOTE — PLAN OF CARE
"  TIME RECORD    Date: 08/03/2017    Start Time:  1210  Stop Time:  1255    PROCEDURES:    TIMED  Procedure Min.   TE 10                     UNTIMED  Procedure Min.   IE 35         Total Timed Minutes:  10  Total Timed Units:  1  Total Untimed Units:  1  Charges Billed/# of units:  2 (LCE-1, TE-1)    OUTPATIENT PHYSICAL THERAPY   PATIENT EVALUATION  Onset Date: Chronic  Primary Diagnosis:   1. Chronic right-sided back pain, unspecified back location     2. Decreased strength     3. Decreased functional mobility       Treatment Diagnosis: back pain, decreased strength, poor posture  Past Medical History:   Diagnosis Date    Abnormal Pap smear 2013    Cataract     Colon polyps     Frequent urination     Hemorrhoid     High cholesterol     Hypertension     Poor circulation     Varicose vein      Precautions: HTN  Prior Therapy: No  Medications: Ghada Dave has a current medication list which includes the following prescription(s): aspirin, atorvastatin, celecoxib, fluocinonide 0.05%, hydrocodone-acetaminophen 5-325mg, hydrocortisone, and mometasone 0.1%.  Nutrition:  Overweight  History of Present Illness: Chronic back pain > 1 year  Prior Level of Function: Independent  Social History: lives alone  Place of Residence (Steps/Adaptations): lives in apartment complex and has to climb stairs every day  Functional Deficits Leading to Referral/Nature of Injury: difficulty sitting and walking for long periods  Patient Therapy Goals: decrease pain, "feel good"     Subjective     Ghada Dave states she feels like she is leaning to one side more than the other. Sitting causes the most pain. Has pain in mid-lower back. Has had pain in her back for "years." Wear a back brace that helps her pain.    Pain:  Location: back   Description: Aching  Activities Which Increase Pain: Sitting and Walking  Activities Which Decrease Pain: ibuprofen  Pain Scale: 1-2/10 at best 3-4/10 now  8/10 at " worst    Objective     Posture: sitting: slumped with forward head and rounded shoulders; L lateral trunk shift with R lateral trunk lean  Palpation: +TTP R rhomboids, middle trap, lower trap, thoracic and lumbar paraspinals  Sensation: B UE/LE intact to light touch    Range of Motion/Strength:      AROM:   Cervical: min decreased flex/ext; mod decreased B side-bending/rotation  Shoulder: WFL  Elbow: WFL    MMT:  Shoulder: grossly 4+/5  Elbow: grossly 4+/5      Lumbar AROM Pain/Dysfunction with movement   Flexion WFL    Extension WFL    R Side Bending WFL    L Side Bending WFL    R rotation  Mod loss with pain in R infrascapular area   L rotation  Mod loss     Hip  Right   Left  Pain/Dysfunction with Movement    AROM PROM MMT AROM PROM MMT    Flexion Limited WFL 4+/5 Limited WFL 4+/5    Extension WFL NT 4+/5 WFL NT 4+/5    Abduction WFL NT 4+/5 WFL NT 4+/5       Knee Right  Left  Pain/Dysfunction with Movement    AROM MMT AROM MMT    Flexion WFL 5/5 WFL 5/5    Extension WFL 5/5 WFL 5/5      Ankle Right  Left  Pain/Dysfunction with Movement    AROM MMT AROM MMT    Plantarflexion WFL 5/5 WFL 5/5    Dorsiflexion WFL 5/5 WFL 5/5        Flexibility: min decreased hamstring flexibility; mod decreased pec facundo   Gait: Without AD  Analysis: Pt ambulated with forward trunk lean, and trendelenburg gait  Bed Mobility:Independent  Transfers: Independent    Other: Functional Limitation Reports: G codes  Tool: FOTO Lumbar SpineSURVEY  Category: Mobility ()  Limitation: 54%  Current: CK 40%<60%  Goal: CK 40%<60%    Treatment: Treatment to consist of manual therapy including STM/MFR thoracic/lumbar paraspinals; therapeutic exercise including LE strengthening/stretching, core strengthening/stabilization, postural education, balance and gait training, and modalities prn. Gave pt HEP consisting of UT and levator scap stretches, scap retraction, pec corner str and HSS. Pt demo understanding by performing exercises x 10'. Discussed  POC with pt and pt verbalized agreement.      Assessment     History  Co-morbidities and personal factors that may impact the plan of care Examination  Body Structures and Functions, activity limitations and participation restrictions that may impact the plan of care Clinical Presentation   Decision Making/ Complexity Score   Co-morbidities:   - Arthritis, Back pain, Gastrointestinal Disease, Heart Attack, Osteoporosis    Personal Factors:     high Body Regions: head, neck, UE, trunk, back, LE    Body Systems: musculoskeletal - posture, ROM, strength; neuromuscular - sensation, balance, gait      Activity limitations: sitting      Participation Restrictions:     high     FOTO Lumbar Spine Survey: 54% limitation    moderate   low       Initial Assessment (Pertinent finding, problem list and factors affecting outcome): Pt is a 80 yo female presenting to PT with pain, decreased cervical/lumbar ROM, decreased B hip ROM, decreased strength, poor posture, impaired balance and gait, and functional deficits with sitting. Pt would benefit from skilled PT consisting of manual therapy including STM/MFR thoracic/lumbar paraspinals; therapeutic exercise including LE strengthening/stretching, core strengthening/stabilization, postural education, balance and gait training, and modalities prn to address limitations and increase functional mobility.    Rehab Potiential: good    Short Term Goals = Long Term Goals (8 Weeks): 8/3/17  1. Pt will be independent with HEP.  2. Pt will improve cervical and lumbar ROM to min loss all directions for improved spinal mobility  3. Pt will increase B UE/LE strength to grossly 5/5  4. Pt will sit >/= 20' without pain as evidence of improved function  5. Pt will report </= 41% on the FOTO Lumbar Spine Survey placing the patient in the 40%<60% impaired, limited, or restricted category indicating increased functional mobility.    Plan     Certification Period: 8/3/17 to 10/3/17  Recommended Treatment  Plan: 2 times per week for 8 weeks: Electrical Stimulation IFC, Gait Training, Group Therapy, Locomotor Training, Manual Therapy, Moist Heat/ Ice, Neuromuscular Re-ed, Patient Education, Therapeutic Activites and Therapeutic Exercise  Other Recommendations: modalities prn, ASTYM prn, kinesiotape prn, Functional Dry Needling prn       Therapist: Jennifer Garcia, PT    I CERTIFY THE NEED FOR THESE SERVICES FURNISHED UNDER THIS PLAN OF TREATMENT AND WHILE UNDER MY CARE    Physician's comments: ________________________________________________________________________________________________________________________________________________      Physician's Name: ___________________________________

## 2017-08-04 ENCOUNTER — CLINICAL SUPPORT (OUTPATIENT)
Dept: REHABILITATION | Facility: HOSPITAL | Age: 82
End: 2017-08-04
Attending: INTERNAL MEDICINE
Payer: MEDICARE

## 2017-08-04 DIAGNOSIS — M54.9 CHRONIC RIGHT-SIDED BACK PAIN, UNSPECIFIED BACK LOCATION: ICD-10-CM

## 2017-08-04 DIAGNOSIS — G89.29 CHRONIC RIGHT-SIDED BACK PAIN, UNSPECIFIED BACK LOCATION: ICD-10-CM

## 2017-08-04 DIAGNOSIS — R53.1 DECREASED STRENGTH: ICD-10-CM

## 2017-08-04 DIAGNOSIS — R26.89 DECREASED FUNCTIONAL MOBILITY: ICD-10-CM

## 2017-08-04 PROCEDURE — 97110 THERAPEUTIC EXERCISES: CPT | Mod: PN

## 2017-08-04 PROCEDURE — 97140 MANUAL THERAPY 1/> REGIONS: CPT | Mod: PN

## 2017-08-04 RX ORDER — ALPRAZOLAM 1 MG/1
TABLET ORAL
Qty: 30 TABLET | OUTPATIENT
Start: 2017-08-04

## 2017-08-04 NOTE — PROGRESS NOTES
"TIME RECORD    Date:  08/04/2017    Start Time:  1255  Stop Time:  145    PROCEDURES:    TIMED  Procedure Min.   MT 20   TE 25                 UNTIMED  Procedure Min.             Total Timed Minutes:  45  Total Timed Units:  3  Total Untimed Units:  0  Charges Billed/# of units:  1 MT, 2 TE      Progress/Current Status    Subjective:     Patient ID: Ghada Dave is a 81 y.o. female.  Diagnosis:   1. Chronic right-sided back pain, unspecified back location     2. Decreased strength     3. Decreased functional mobility       Pain:  Patient reports she has been performing HEP.     Objective:   Patient received MT consisting of STM/MFR to R thoracic  paraspinals, R lateral rib cage, R QL and R iliac crest, B HS stretches x 20 minutes.  Patient then instructed in and performed therex 1:1 with PTA  as outlined per log x 25 minutes with Luisa as .      Date 8/4/17   Visit  10/3/17 2 of 12   POC 10/3/17     Gcode 2/10   FOTO 2/5   Cap visit  Cap total 93.82  169.42   MHP *   MT 20   Stretches MT   Supine:    TAs 5"x10   LTR 5"x10 B   March 2x10   Bug *   Bridge 2x10   Clams SL 2x10 B   SLR 2x10   Prone:    Alt LE **   Alt LE/UE **   Seated:    TAs **   March **   LAQs **   Lats **   Rows **   UBE **   Leg Press **     Initials CS 1/6         Assessment:     Patient reported areas of pain after MT initiated.    Patient Education/Response:     Continue with HEP.    Plans and Goals:   Short Term Goals = Long Term Goals (8 Weeks): 8/3/17  1. Pt will be independent with HEP.  2. Pt will improve cervical and lumbar ROM to min loss all directions for improved spinal mobility  3. Pt will increase B UE/LE strength to grossly 5/5  4. Pt will sit >/= 20' without pain as evidence of improved function  5. Pt will report </= 41% on the FOTO Lumbar Spine Survey placing the patient in the 40%<60% impaired, limited, or restricted category indicating increased functional mobility.         "

## 2017-08-07 ENCOUNTER — TELEPHONE (OUTPATIENT)
Dept: FAMILY MEDICINE | Facility: CLINIC | Age: 82
End: 2017-08-07

## 2017-08-07 DIAGNOSIS — T78.40XA ALLERGIC REACTION, INITIAL ENCOUNTER: ICD-10-CM

## 2017-08-07 NOTE — TELEPHONE ENCOUNTER
----- Message from Ariela Vo MA sent at 8/5/2017 10:16 AM CDT -----  Please call pt to inform of Dr. King's recommendations. Thank in advance.   ----- Message -----  From: Lele King MD  Sent: 8/4/2017   4:05 PM  To: Fernando LUNA Staff    Patient contacting office to have Xanax refill.  I have told her that I do not prescribe Xanax or Ambien in elderly patients until they have exhausted all other options.  We have not exhausted all other options.  If she is dissatisfied with this conclusion she may need to  schedule an appointment with a different provider.  On the other hand if she is willing to try something different schedule her for an appointment so we can focus on this one topic.  When she came to the appointment she never prioritized insomnia medication and instead focused on several other topics.  When she comes back if this is a priority then she needs to focus on this.  I cannot be expected to recommend a new medication over the telephone.

## 2017-08-07 NOTE — TELEPHONE ENCOUNTER
Spoke with patient about medications.  patient states she KNOW that Dr King will not given her Xanax and Ambien.  Patient will like a refill on Zantac.  Patient verbalized understanding

## 2017-08-11 ENCOUNTER — CLINICAL SUPPORT (OUTPATIENT)
Dept: REHABILITATION | Facility: HOSPITAL | Age: 82
End: 2017-08-11
Attending: INTERNAL MEDICINE
Payer: MEDICARE

## 2017-08-11 DIAGNOSIS — M54.9 CHRONIC RIGHT-SIDED BACK PAIN, UNSPECIFIED BACK LOCATION: ICD-10-CM

## 2017-08-11 DIAGNOSIS — R26.89 DECREASED FUNCTIONAL MOBILITY: ICD-10-CM

## 2017-08-11 DIAGNOSIS — G89.29 CHRONIC RIGHT-SIDED BACK PAIN, UNSPECIFIED BACK LOCATION: ICD-10-CM

## 2017-08-11 DIAGNOSIS — R53.1 DECREASED STRENGTH: ICD-10-CM

## 2017-08-11 PROCEDURE — 97110 THERAPEUTIC EXERCISES: CPT | Mod: PN

## 2017-08-11 PROCEDURE — 97140 MANUAL THERAPY 1/> REGIONS: CPT | Mod: PN

## 2017-08-11 NOTE — PROGRESS NOTES
"TIME RECORD    Date:  08/11/2017    Start Time:  1040   Stop Time:  1140    PROCEDURES:    TIMED  Procedure Min.   MT 15   TE 17   TE Supervised 28 NC             UNTIMED  Procedure Min.             Total Timed Minutes:  32  Total Timed Units:  2  Total Untimed Units:  0  Charges Billed/# of units:  2 (MT-1, TE-1)      Progress/Current Status    Subjective:     Patient ID: Ghada Dave is a 81 y.o. female.  Diagnosis:   1. Chronic right-sided back pain, unspecified back location     2. Decreased strength     3. Decreased functional mobility       Pain:  Patient reports she has been performing HEP.     Objective:     Pt with certified  for session. Pt initiated treatment with supervised TE x 10' TE 1:1 with PT per log x 17' f/b MT consisting of STM/MFR to B thoracic  paraspinals, B lateral rib cage, B QL and B QL str x 15' f/b f/b supervised TE x 18'    Date 8/11/17 8/4/17   Visit  10/3/17 3 of 12 2 of 12   POC 10/3/17      Gcode 3/10 2/10   FOTO 3/5 2/5   Cap visit  Cap total 61.84  231.26 93.82  169.42   MHP  *   MT 15' 20   Stretches self MT   HSS 3x30" B    DKTC 3x30" B    Supine:     TAs 15x5" 5"x10   LTR 15x5" 5"x10 B   Open book 10x10" B    March 2x20 2x10   Bug  *   Bridge 2x10 2x10   Clams SL 2x10 B 2x10 B   SLR 2x10 2x10   Prone:     Alt LE  **   Alt LE/UE  **   Seated:     TAs  **   March  **   LAQs  **   Lats 2x10  **   Rows 2x10  **   UBE  **   Leg Press  **     Initials KV CS 1/6         Assessment:     Pt reported decreased pain at the end of the treatment session. PT noted soft tissue restrictions in thoracic paraspinals that decreased with MT.    Patient Education/Response:     Cont with HEP.    Plans and Goals:     Short Term Goals = Long Term Goals (8 Weeks): 8/3/17  1. Pt will be independent with HEP.  2. Pt will improve cervical and lumbar ROM to min loss all directions for improved spinal mobility  3. Pt will increase B UE/LE strength to grossly 5/5  4. Pt will sit >/= " 20' without pain as evidence of improved function  5. Pt will report </= 41% on the FOTO Lumbar Spine Survey placing the patient in the 40%<60% impaired, limited, or restricted category indicating increased functional mobility.

## 2017-08-14 ENCOUNTER — TELEPHONE (OUTPATIENT)
Dept: REHABILITATION | Facility: HOSPITAL | Age: 82
End: 2017-08-14

## 2017-08-14 DIAGNOSIS — R26.89 DECREASED FUNCTIONAL MOBILITY: ICD-10-CM

## 2017-08-14 DIAGNOSIS — R53.1 DECREASED STRENGTH: ICD-10-CM

## 2017-08-14 DIAGNOSIS — M54.9 CHRONIC RIGHT-SIDED BACK PAIN, UNSPECIFIED BACK LOCATION: ICD-10-CM

## 2017-08-14 DIAGNOSIS — G89.29 CHRONIC RIGHT-SIDED BACK PAIN, UNSPECIFIED BACK LOCATION: ICD-10-CM

## 2017-08-16 ENCOUNTER — OFFICE VISIT (OUTPATIENT)
Dept: NEUROLOGY | Facility: HOSPITAL | Age: 82
End: 2017-08-16
Attending: INTERNAL MEDICINE
Payer: MEDICARE

## 2017-08-16 VITALS
RESPIRATION RATE: 18 BRPM | HEART RATE: 72 BPM | BODY MASS INDEX: 24.55 KG/M2 | HEIGHT: 61 IN | TEMPERATURE: 98 F | WEIGHT: 130 LBS | SYSTOLIC BLOOD PRESSURE: 148 MMHG | DIASTOLIC BLOOD PRESSURE: 62 MMHG

## 2017-08-16 DIAGNOSIS — K62.89 RECTAL PAIN: Primary | ICD-10-CM

## 2017-08-16 PROCEDURE — 99214 OFFICE O/P EST MOD 30 MIN: CPT | Performed by: INTERNAL MEDICINE

## 2017-08-16 RX ORDER — VITAMIN B COMPLEX
1 CAPSULE ORAL DAILY
COMMUNITY

## 2017-08-16 RX ORDER — POLYETHYLENE GLYCOL 3350, SODIUM SULFATE ANHYDROUS, SODIUM BICARBONATE, SODIUM CHLORIDE, POTASSIUM CHLORIDE 236; 22.74; 6.74; 5.86; 2.97 G/4L; G/4L; G/4L; G/4L; G/4L
4 POWDER, FOR SOLUTION ORAL ONCE
Qty: 4000 ML | Refills: 0 | Status: SHIPPED | OUTPATIENT
Start: 2017-08-16 | End: 2017-08-16

## 2017-08-16 RX ORDER — OMEPRAZOLE 20 MG/1
20 CAPSULE, DELAYED RELEASE ORAL DAILY
COMMUNITY
End: 2017-09-15

## 2017-08-16 NOTE — PROGRESS NOTES
"[facility]    CC: rectal pain    HPI 81 y.o. 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  She occassionally has intermittent constipation for which she takes herbal remedies which relieves her pain.  She recently had a rectal exam with palpation of a nodule by Dr. Shaw and she was referred for further evaluation.    Collateral information obtained from the     Records reviewed and summarized above.      Past Medical History:   Diagnosis Date    Abnormal Pap smear 2013    Cataract     Colon polyps     Frequent urination     Hemorrhoid     High cholesterol     Hypertension     Poor circulation     Varicose vein        Family History  No family history of colon cancer    Review of Systems  General ROS: negative for chills, fever or weight loss  Respiratory ROS: no cough, shortness of breath, or wheezing  Cardiovascular ROS: no chest pain or dyspnea on exertion  Gastrointestinal ROS: n change in bowel habits, or black/ bloody stools.  Positive for rectal pain and mild cramping      Physical Examination  BP (!) 148/62   Pulse 72   Temp 97.7 °F (36.5 °C) (Oral)   Resp 18   Ht 5' 1" (1.549 m)   Wt 59 kg (130 lb)   BMI 24.56 kg/m²   General appearance: alert, cooperative, no distress  HENT: Normocephalic, atraumatic, neck symmetrical, no nasal discharge   Eyes: conjunctivae/corneas clear, PERRL, EOM's intact  Lungs: clear to auscultation bilaterally, no dullness to percussion bilaterally  Heart: regular rate and rhythm without rub; no displacement of the PMI   Abdomen: soft, non-tender; bowel sounds normoactive; no organomegaly  Extremities: extremities symmetric; no clubbing, cyanosis, or edema    Labs:  H/H " 12.4/36.3  Plt 266    Imaging: Xray chest 3/14/17 Clear lung fields bilaterally    Independently reviewed.    Assessment: The patient is an 80 yo female with past medical history of anal condyloma which was removed in 2015 with path showing AIN I and II who presents today with rectal pain and a nodule palpated on rectal exam. Patient referred by Dr. Shaw     Plan:   Diagnostic colonoscopy scheduled for Aug 28, 2017 to further evaluate for a source of her rectal pain and to look for a lesion which was palpated on rectal exam.   Reinterview at next visit     Bertram Myers MD   [address], [address]   [place] [address] [phone]

## 2017-08-16 NOTE — PATIENT INSTRUCTIONS
Nulytely Colonoscopy Prep Instructions    [facility] [place]   [address], [place] [address]  [phone]      PATIENT NAME:Ghada Flanagan              PROCEDURE DAY: Monday, August 28, 2017    *Your procedure time many change.  The hospital will contact you the day prior to   the procedure to confirm your procedure time and arrival.       CLEAR LIQUID DIET (START THE DAY BEFORE PROCEDURE): Sunday, August 27, 2017      Clear Liquid Diet means any liquid from the list below that is not red or purple in color:   Gatorade, Ramses-Aid, Lemonade (Yellow ONLY)-Gatorade is the preferred liquid   Tea (no milk or dairy)   Carbonated beverages (soft drink), regular or diet   Apple juice, white grape juice, white cranberry juice   Jell-O (orange, lemon, or lime flavors ONLY)   Clear, fat-free, beef or chicken broth   Bouillon, clear consommé   Snowball, Popsicles (NOT red or purple)  * No Solid Food or Alcohol     ITEMS TO BE PURCHASED FOR PREP (Nu-Lytely requires a prescription):         Nu-Lytely preparation solution.          Gas tablets (Gas-X, Mylanta Gas, Simethicone)    BOWEL PREP INSTRUCTIONS THE DAY BEFORE THE EXAM: Sunday, August 27, 2017  1. Drink only clear liquids (see the above diet) all day. Gatorade is the best liquid.   Drink an extra 8 ounces of clear liquid every hour from 11am to 5pm.         2.   At 6pm, mix Nu-Lytely powder according to the directions on the container and               drink 8 ounces of solution every 10 minutes until about half of the solution is                consumed. Place the remainder of the solution in the refrigerator.         3.   At 9pm, take two gas tablets with 8 ounces of clear liquid.        4.   At 10pm, take two gas tablets with 8 ounces of clear liquid.      THE DAY OF THE EXAM: Monday, August 28, 2017        1.  Beginning 5 hours before your procedure time, drink the remaining half of              Nu-Lytely solution. Drink 8 ounces of  solution every 10 minutes until the solution              is gone.              *If your procedure is scheduled for the early morning, you will need to get up in               the middle of the night to take this dose of preparation. The correct timing of this               dose is essential to an effective preparation. If you do not take this dose, your               exam may be incomplete and need to be repeated.         2.  Have nothing to eat or drink for 3 hours before the procedure.         3.  Take your morning medications, if any, with a small sip of water.         4.  Bring someone to drive you home (you should not drive for 12 hrs after the exam)        5.  Report to Admitting, 1st floor hospital entrance 2 hours before procedure time.       If you are diabetic, do not take insulin or oral medications the morning of the procedure. Take only a half dose of insulin the day before your procedure. Do not take your diabetic pills the day of your procedure               Colonoscopy is used to view the inside of your lower digestive tract (colon and rectum). It can help screen for colon cancer and can also help find the source of abdominal pain, bleeding, and changes in bowel habits. The test is usually done in the hospital on an outpatient basis. During the exam, the doctor can remove a small tissue sample ( a biopsy) for testing. Small growths, such as polyps, may also be removed during colonoscopy.     A camera attached to a flexible tube with a viewing lens is used to take video pictures.    Getting Ready    Be sure to tell your doctor about any medications you take. Also tell your doctor about any health conditions you may have.   Discuss the risks of the test with your doctor. These include bleeding and bowel puncture.   Your rectum and colon must be empty for the test. So be sure to follow the diet and bowel prep instructions exactly. If you dont, the test may need to be rescheduled.   You will need  to have a friend or family member prepared to drive you home after the test.     Colonoscopy provides an inside view of the entire colon.    During the Test    You are given sedating (relaxing) medication through an IV line. You may be drowsy or completely asleep.   The procedure takes 30 minutes or longer.   The doctor performs a digital rectal exam to check for anal and rectal problems. The rectum is lubricated and the scope inserted.   If you are awake, you may have a feeling similar to needing to have a bowel movement. You may also feel pressure as air is pumped into the colon. Its okay to pass gas during the procedure.  After the Test    You may discuss the results with your doctor right away or at a future visit.   Try to pass all the gas right after the test to help prevent bloating and cramping.   After the test, you can go back to your normal eating and other activities.  Risks and Possible Complications Include:   Bleeding  A puncture or tear in the colon  Risks of anesthesia

## 2017-08-18 ENCOUNTER — CLINICAL SUPPORT (OUTPATIENT)
Dept: REHABILITATION | Facility: HOSPITAL | Age: 82
End: 2017-08-18
Attending: INTERNAL MEDICINE
Payer: MEDICARE

## 2017-08-18 DIAGNOSIS — R26.89 DECREASED FUNCTIONAL MOBILITY: ICD-10-CM

## 2017-08-18 DIAGNOSIS — R53.1 DECREASED STRENGTH: ICD-10-CM

## 2017-08-18 DIAGNOSIS — G89.29 CHRONIC RIGHT-SIDED BACK PAIN, UNSPECIFIED BACK LOCATION: ICD-10-CM

## 2017-08-18 DIAGNOSIS — M54.9 CHRONIC RIGHT-SIDED BACK PAIN, UNSPECIFIED BACK LOCATION: ICD-10-CM

## 2017-08-18 PROCEDURE — 97140 MANUAL THERAPY 1/> REGIONS: CPT | Mod: PN

## 2017-08-18 PROCEDURE — 97110 THERAPEUTIC EXERCISES: CPT | Mod: PN

## 2017-08-18 NOTE — PROGRESS NOTES
"TIME RECORD    Date:  08/18/2017    Start Time:  1:00  Stop Time:  2:00    PROCEDURES:    TIMED  Procedure Min.   MT 20   TE 10                 UNTIMED  Procedure Min.             Total Timed Minutes:  30  Total Timed Units:  2  Total Untimed Units:  0  Charges Billed/# of units:  2 (MT-1, TE-1)      Progress/Current Status    Subjective:     Patient ID: Ghada Dave is a 82 y.o. female.  Diagnosis:   1. Chronic right-sided back pain, unspecified back location     2. Decreased strength     3. Decreased functional mobility       Pain:  Patient reports she has been having difficulty sleeping because she " hurts all over". Pt agreeable to PT session.   Objective:    Pt initiated treatment with Manual Therapy consisting of STM/MFR to B thoracic  paraspinals, B lateral rib cage, B QL and B QL str x 20'. Pt then completed therex as per logged below 1;1 w/ PTA x 10 min then supervised TE x 20 min.    pDate 8/18/17 8/11/17 8/4/17   Visit  10/3/17 4 of 12 3 of 12 2 of 12   POC 10/3/17       Gcode 4/10 3/10 2/10   FOTO 4/5 3/5 2/5   Cap visit  Cap total 61.84  293.10 61.84  231.26 93.82  169.42   MHP   *   MT 20 15' 20   Stretches self self MT   HSS 30"x3 B 3x30" B    DKTC 30"x3 B 3x30" B    Supine:      TAs 15x5" 15x5" 5"x10   LTR 15x5" 15x5" 5"x10 B   Open book 10"X10 10x10" B    March 2x20 B 2x20 2x10   Bug   *   Bridge 2x10  2x10 2x10   Clams SL 2x10 B 2x10 B 2x10 B   SLR 2x10 B 2x10 2x10   Prone:      Alt LE   **   Alt LE/UE   **   Seated:      TAs   **   March   **   LAQs   **   Lats YTB 2x10 2x10  **   Rows YTB 2x10 2x10  **   UBE   **   Leg Press   **     Initials JA 1/6 KV CS 1/6         Assessment:     Pt able to complete todays session with no reports of increased pain Mid thoracic back/ spine today.     Patient Education/Response:     Cont with HEP.    Plans and Goals:   Cont to advance PT as per POC.   Short Term Goals = Long Term Goals (8 Weeks): 8/3/17  1. Pt will be independent with HEP.  2. Pt " will improve cervical and lumbar ROM to min loss all directions for improved spinal mobility  3. Pt will increase B UE/LE strength to grossly 5/5  4. Pt will sit >/= 20' without pain as evidence of improved function  5. Pt will report </= 41% on the FOTO Lumbar Spine Survey placing the patient in the 40%<60% impaired, limited, or restricted category indicating increased functional mobility.

## 2017-08-24 ENCOUNTER — CLINICAL SUPPORT (OUTPATIENT)
Dept: REHABILITATION | Facility: HOSPITAL | Age: 82
End: 2017-08-24
Attending: INTERNAL MEDICINE
Payer: MEDICARE

## 2017-08-24 DIAGNOSIS — M54.9 CHRONIC RIGHT-SIDED BACK PAIN, UNSPECIFIED BACK LOCATION: ICD-10-CM

## 2017-08-24 DIAGNOSIS — R53.1 DECREASED STRENGTH: ICD-10-CM

## 2017-08-24 DIAGNOSIS — R26.89 DECREASED FUNCTIONAL MOBILITY: ICD-10-CM

## 2017-08-24 DIAGNOSIS — G89.29 CHRONIC RIGHT-SIDED BACK PAIN, UNSPECIFIED BACK LOCATION: ICD-10-CM

## 2017-08-24 PROCEDURE — 97140 MANUAL THERAPY 1/> REGIONS: CPT | Mod: PN

## 2017-08-24 PROCEDURE — 97110 THERAPEUTIC EXERCISES: CPT | Mod: PN

## 2017-08-24 NOTE — PROGRESS NOTES
"TIME RECORD    Date:  08/24/2017    Start Time:  1:00  Stop Time:  2:00    PROCEDURES:    TIMED  Procedure Min.   MT 20   TE 10                 UNTIMED  Procedure Min.             Total Timed Minutes:  30  Total Timed Units:  2  Total Untimed Units:  0  Charges Billed/# of units:  2 (MT-1, TE-1)      Progress/Current Status    Subjective:     Patient ID: Ghada Dave is a 82 y.o. female.  Diagnosis:   1. Chronic right-sided back pain, unspecified back location     2. Decreased strength     3. Decreased functional mobility       Pain:  Patient reports she has been walking "too much" due not owning a vehicle. She declined Hamstring / LE stretching today.   Objective:    Pt initiated treatment with Manual Therapy consisting of STM/MFR to B thoracic  paraspinals, B lateral rib cage, B QL and B QL str x 20'. Pt then completed therex as per logged below 1;1 w/ PTA x 10 min then supervised TE x 20 min.    pDate 8/24/17 8/18/17 8/11/17 8/4/17   Visit  10/3/17 5 /12 4 of 12 3 of 12 2 of 12   POC 10/3/17          Gcode 5/10 4/10 3/10 2/10   FOTO 5/5 4/5 3/5 2/5   Cap visit  Cap total 61.84  354.94 61.84  293.10 61.84  231.26 93.82  169.42   MHP    *   MT 20 20 15' 20   Stretches decline self self MT   HSS decline 30"x3 B 3x30" B    DKTC decline 30"x3 B 3x30" B    Supine:       TAs 15x5" 15x5" 15x5" 5"x10   LTR 15x5 15x5" 15x5" 5"x10 B   Open book 10"x10 10"X10 10x10" B    March 1' w/ TA 2x20 B 2x20 2x10   Bug    *   Bridge 2x10 B 2x10  2x10 2x10   Clams SL 2x10 B 2x10 B 2x10 B 2x10 B   SLR 2x10 B 2x10 B 2x10 2x10   Prone:       Alt LE    **   Alt LE/UE    **   Seated:       TAs    **   March    **   LAQs    **   Lats YTB 2x10 YTB 2x10 2x10  **   Rows YTB 2x10 YTB 2x10 2x10  **   UBE    **   Leg Press    **     Initials JA 2/6 JA 1/6 KV CS 1/6         Assessment:     Pt able to complete therex with no reports of increased mid lowerback and B hip pain.     Patient Education/Response:     Cont with HEP.    Plans " and Goals:   Cont to advance PT as per POC.   Short Term Goals = Long Term Goals (8 Weeks): 8/3/17  1. Pt will be independent with HEP.  2. Pt will improve cervical and lumbar ROM to min loss all directions for improved spinal mobility  3. Pt will increase B UE/LE strength to grossly 5/5  4. Pt will sit >/= 20' without pain as evidence of improved function  5. Pt will report </= 41% on the FOTO Lumbar Spine Survey placing the patient in the 40%<60% impaired, limited, or restricted category indicating increased functional mobility.

## 2017-08-25 ENCOUNTER — TELEPHONE (OUTPATIENT)
Dept: NEUROLOGY | Facility: HOSPITAL | Age: 82
End: 2017-08-25

## 2017-08-25 NOTE — TELEPHONE ENCOUNTER
Pt arrived in clinic requesting prep instructions in Nepalese. Nulytely instructions printed in Nepalese and given to pt.  Pt still did not understand instructions.  Translation service used via telephone system.  Per , pt did not know how to mix solution.  Complete nulytely instructions interpreted to pt and with this nurse.  Pt acknowledged understanding through interpretor.

## 2017-08-28 ENCOUNTER — ANESTHESIA (OUTPATIENT)
Dept: ENDOSCOPY | Facility: HOSPITAL | Age: 82
End: 2017-08-28
Payer: MEDICARE

## 2017-08-28 ENCOUNTER — HOSPITAL ENCOUNTER (OUTPATIENT)
Facility: HOSPITAL | Age: 82
Discharge: HOME | End: 2017-08-28
Attending: INTERNAL MEDICINE | Admitting: INTERNAL MEDICINE
Payer: MEDICARE

## 2017-08-28 ENCOUNTER — TELEPHONE (OUTPATIENT)
Dept: REHABILITATION | Facility: HOSPITAL | Age: 82
End: 2017-08-28

## 2017-08-28 ENCOUNTER — ANESTHESIA EVENT (OUTPATIENT)
Dept: ENDOSCOPY | Facility: HOSPITAL | Age: 82
End: 2017-08-28
Payer: MEDICARE

## 2017-08-28 ENCOUNTER — SURGERY (OUTPATIENT)
Age: 82
End: 2017-08-28

## 2017-08-28 DIAGNOSIS — K62.1 RECTAL POLYP: ICD-10-CM

## 2017-08-28 DIAGNOSIS — K62.89 RECTAL PAIN: ICD-10-CM

## 2017-08-28 DIAGNOSIS — K62.89 ANAL OR RECTAL PAIN: Primary | ICD-10-CM

## 2017-08-28 PROCEDURE — 45380 COLONOSCOPY AND BIOPSY: CPT | Performed by: INTERNAL MEDICINE

## 2017-08-28 PROCEDURE — 37000008 HC ANESTHESIA 1ST 15 MINUTES: Performed by: INTERNAL MEDICINE

## 2017-08-28 PROCEDURE — 27201012 HC FORCEPS, HOT/COLD, DISP: Performed by: INTERNAL MEDICINE

## 2017-08-28 PROCEDURE — 37000009 HC ANESTHESIA EA ADD 15 MINS: Performed by: INTERNAL MEDICINE

## 2017-08-28 PROCEDURE — 88305 TISSUE EXAM BY PATHOLOGIST: CPT | Mod: 26,,, | Performed by: PATHOLOGY

## 2017-08-28 PROCEDURE — 88305 TISSUE EXAM BY PATHOLOGIST: CPT | Performed by: PATHOLOGY

## 2017-08-28 PROCEDURE — 25000003 PHARM REV CODE 250: Performed by: INTERNAL MEDICINE

## 2017-08-28 PROCEDURE — 63600175 PHARM REV CODE 636 W HCPCS: Performed by: NURSE ANESTHETIST, CERTIFIED REGISTERED

## 2017-08-28 RX ORDER — LIDOCAINE HCL/PF 100 MG/5ML
SYRINGE (ML) INTRAVENOUS
Status: DISCONTINUED | OUTPATIENT
Start: 2017-08-28 | End: 2017-08-28

## 2017-08-28 RX ORDER — PROPOFOL 10 MG/ML
VIAL (ML) INTRAVENOUS
Status: DISCONTINUED | OUTPATIENT
Start: 2017-08-28 | End: 2017-08-28

## 2017-08-28 RX ORDER — SODIUM CHLORIDE 9 MG/ML
INJECTION, SOLUTION INTRAVENOUS CONTINUOUS
Status: DISCONTINUED | OUTPATIENT
Start: 2017-08-28 | End: 2017-08-28 | Stop reason: HOSPADM

## 2017-08-28 RX ORDER — PROPOFOL 10 MG/ML
VIAL (ML) INTRAVENOUS CONTINUOUS PRN
Status: DISCONTINUED | OUTPATIENT
Start: 2017-08-28 | End: 2017-08-28

## 2017-08-28 RX ADMIN — PROPOFOL 50 MG: 10 INJECTION, EMULSION INTRAVENOUS at 09:08

## 2017-08-28 RX ADMIN — LIDOCAINE HYDROCHLORIDE 100 MG: 20 INJECTION, SOLUTION INTRAVENOUS at 09:08

## 2017-08-28 RX ADMIN — SODIUM CHLORIDE: 0.9 INJECTION, SOLUTION INTRAVENOUS at 08:08

## 2017-08-28 RX ADMIN — PROPOFOL 150 MCG/KG/MIN: 10 INJECTION, EMULSION INTRAVENOUS at 09:08

## 2017-08-28 NOTE — TRANSFER OF CARE
Anesthesia Transfer of Care Note    Patient: Ghada Dave    Procedure(s) Performed: Procedure(s) (LRB):  COLONOSCOPY (N/A)    Patient location: GI    Anesthesia Type: MAC    Transport from OR: Transported from OR on room air with adequate spontaneous ventilation    Post pain: adequate analgesia    Post assessment: no apparent anesthetic complications and tolerated procedure well    Post vital signs: stable    Level of consciousness: alert, awake and oriented    Nausea/Vomiting: no nausea/vomiting    Complications: none    Transfer of care protocol was followed      Last vitals:   Visit Vitals  /87 (BP Location: Right arm, Patient Position: Lying)   Pulse 79   Temp 36.5 °C (97.7 °F) (Oral)   Resp 18   Ht 5' (1.524 m)   Wt 59 kg (130 lb)   SpO2 96%   Breastfeeding? No   BMI 25.39 kg/m²

## 2017-08-28 NOTE — ANESTHESIA PREPROCEDURE EVALUATION
08/28/2017  Ghada Dave is a 82 y.o., female w hemnorrhoids for colonoscopy.    PRIOR ANES (in Epic)   2015-06-11 Anal_Fulguration GA   [fent 100, prop 150 =->80]   [sevo, phenyleph 300, ephed 20]   [easy mask vent; ETT 7, #2, Grade I]  2015-05-12 Colonoscopy MAC   prop 150...75 mcg/kg/ min VSS NAAC  Other cases in [facility]    ANES-RELATED MED/SURG  Patient Active Problem List   Diagnosis    Urge incontinence    Ureteral stone    Diverticulosis    Heartburn    Post herpetic neuralgia    Leukopenia    Personal history of colonic polyps    Left lower quadrant pain    Pseudophakia    Right posterior capsular opacification    Anal or rectal pain    Non-cardiac chest pain    Non-occlusive coronary artery disease    Psoriasis    Chronic midline low back pain without sciatica    Scoliosis of thoracolumbar spine    Right subscapular pain    Psychophysiological insomnia    Overflow incontinence    Urinary retention with incomplete bladder emptying    Lichen simplex chronicus of the heel    Chronic right-sided back pain    Decreased strength    Decreased functional mobility    Rectal pain     Past Medical History:   Diagnosis Date    Abnormal Pap smear 2013    Cataract     Colon polyps     Frequent urination     Hemorrhoid     High cholesterol     Hypertension     Poor circulation     Varicose vein      Past Surgical History:   Procedure Laterality Date    CATARACT EXTRACTION W/  INTRAOCULAR LENS IMPLANT Bilateral     COLONOSCOPY      POLYPECTOMY      YAG Laser Capsulotomy Right 04/17/2017    Dr. Chavez     ALLERGIES  Review of patient's allergies indicates:  No Known Allergies    ANES-RELATED HOME Rx  ASA-81 omeprazole  atorvastatin    Pre-op Assessment      I have reviewed the Medications.     Review of Systems  Anesthesia Hx:  No problems with previous  Anesthesia  History of prior surgery of interest to airway management or planning: Denies Family Hx of Anesthesia complications.   Denies Personal Hx of Anesthesia complications.   Social:  Non-Smoker    Hematology/Oncology:  Hematology Normal   Oncology Normal     EENT/Dental:EENT/Dental Normal   Cardiovascular:   Exercise tolerance: good Hypertension CAD      Pulmonary:  Pulmonary Normal    Renal/:  Renal/ Normal     Hepatic/GI:  Hepatic/GI Normal    Musculoskeletal:  Musculoskeletal Normal    Neurological:  Neurology Normal    Endocrine:  Endocrine Normal    Dermatological:  Skin Normal    Psych:  Psychiatric Normal         Wt Readings from Last 1 Encounters:   08/28/17 59 kg (130 lb)     Temp Readings from Last 1 Encounters:   08/28/17 36.5 °C (97.7 °F) (Oral)     BP Readings from Last 1 Encounters:   08/28/17 132/87     Pulse Readings from Last 1 Encounters:   08/28/17 79     SpO2 Readings from Last 1 Encounters:   08/28/17 96%       Physical Exam  General:  Well nourished    Airway/Jaw/Neck:  Airway Findings: Mouth Opening: Normal Tongue: Normal  General Airway Assessment: Adult  Mallampati: I  Improves to I with phonation.  TM Distance: Normal, at least 6 cm  Jaw/Neck Findings:  Neck ROM: Normal ROM       Chest/Lungs:  Chest/Lungs Clear    Heart/Vascular:  Heart Findings: Rate: Normal  Rhythm: Regular Rhythm           Lab Results   Component Value Date    WBC 4.71 06/26/2017    HGB 12.4 06/26/2017    HCT 36.3 (L) 06/26/2017    MCV 88 06/26/2017     06/26/2017       Chemistry        Component Value Date/Time     06/26/2017 0853    K 4.3 06/26/2017 0853     06/26/2017 0853    CO2 27 06/26/2017 0853    BUN 18 06/26/2017 0853    CREATININE 0.9 06/26/2017 0853    GLU 95 06/26/2017 0853        Component Value Date/Time    CALCIUM 9.5 06/26/2017 0853    ALKPHOS 61 03/14/2017 1701    AST 21 03/14/2017 1701    ALT 12 03/14/2017 1701    BILITOT 0.3 03/14/2017 1701    ESTGFRAFRICA >60 06/26/2017  0853    EGFRNONAA >60 06/26/2017 0853            Lab Results   Component Value Date    ALBUMIN 4.0 03/14/2017      Lab Results   Component Value Date    TSH 1.442 08/02/2017       CXR      EKG      ECHO      Anesthesia Plan  Type of Anesthesia, risks & benefits discussed:  Anesthesia Type:  MAC  Patient's Preference: gen  Intra-op Monitoring Plan:   Intra-op Monitoring Plan Comments:   Post Op Pain Control Plan:   Post Op Pain Control Plan Comments: Iv>po  Induction:   IV  Beta Blocker:         Informed Consent: Patient understands risks and agrees with Anesthesia plan.  Questions answered. Anesthesia consent signed with patient.  ASA Score: 2     Day of Surgery Review of History & Physical:            Ready For Surgery From Anesthesia Perspective.

## 2017-08-28 NOTE — ANESTHESIA POSTPROCEDURE EVALUATION
Anesthesia Post Evaluation    Patient: Ghada Dave    Procedure(s) Performed: Procedure(s) (LRB):  COLONOSCOPY (N/A)    Final Anesthesia Type: MAC  Patient location during evaluation: GI PACU  Patient participation: Yes- Able to Participate  Level of consciousness: awake and alert and oriented  Post-procedure vital signs: reviewed and stable  Pain management: adequate  Airway patency: patent  PONV status at discharge: No PONV  Anesthetic complications: no      Cardiovascular status: blood pressure returned to baseline, hemodynamically stable and stable  Respiratory status: unassisted, spontaneous ventilation and room air  Hydration status: euvolemic  Follow-up not needed.        Visit Vitals  /87 (BP Location: Right arm, Patient Position: Lying)   Pulse 79   Temp 36.5 °C (97.7 °F) (Oral)   Resp 18   Ht 5' (1.524 m)   Wt 59 kg (130 lb)   SpO2 96%   Breastfeeding? No   BMI 25.39 kg/m²       Pain/Erin Score: Pain Assessment Performed: Yes (8/28/2017  8:23 AM)  Presence of Pain: denies (8/28/2017  8:23 AM)

## 2017-08-28 NOTE — H&P
"[facility]     CC: rectal pain     HPI 81 y.o. 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  She occassionally has intermittent constipation for which she takes herbal remedies which relieves her pain.  She recently had a rectal exam with palpation of a nodule by Dr. Shaw and she was referred for further evaluation.     Collateral information obtained from the      Records reviewed and summarized above.             Past Medical History:   Diagnosis Date    Abnormal Pap smear 2013    Cataract      Colon polyps      Frequent urination      Hemorrhoid      High cholesterol      Hypertension      Poor circulation      Varicose vein           Family History  No family history of colon cancer     Review of Systems  General ROS: negative for chills, fever or weight loss  Respiratory ROS: no cough, shortness of breath, or wheezing  Cardiovascular ROS: no chest pain or dyspnea on exertion  Gastrointestinal ROS: n change in bowel habits, or black/ bloody stools.  Positive for rectal pain and mild cramping        Physical Examination  BP (!) 148/62   Pulse 72   Temp 97.7 °F (36.5 °C) (Oral)   Resp 18   Ht 5' 1" (1.549 m)   Wt 59 kg (130 lb)   BMI 24.56 kg/m²   General appearance: alert, cooperative, no distress  HENT: Normocephalic, atraumatic, neck symmetrical, no nasal discharge   Eyes: conjunctivae/corneas clear, PERRL, EOM's intact  Lungs: clear to auscultation bilaterally, no dullness to percussion bilaterally  Heart: regular rate and rhythm without rub; no displacement of the PMI   Abdomen: soft, non-tender; bowel sounds normoactive; no organomegaly  Extremities: extremities symmetric; no clubbing, cyanosis, " or edema     Labs:  H/H 12.4/36.3  Plt 266     Imaging: Xray chest 3/14/17 Clear lung fields bilaterally     Independently reviewed.     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 Patient referred by Dr. Shaw      Plan:   Diagnostic colonoscopy scheduled for Aug 28, 2017 to further evaluate for a source of her rectal pain and to look for a lesion which was palpated on rectal exam.   Reinterview at next visit      Bertram Myers MD   [address], [address]   [place] [address] [phone]

## 2017-08-31 VITALS
RESPIRATION RATE: 18 BRPM | DIASTOLIC BLOOD PRESSURE: 60 MMHG | OXYGEN SATURATION: 99 % | HEIGHT: 60 IN | SYSTOLIC BLOOD PRESSURE: 122 MMHG | TEMPERATURE: 98 F | BODY MASS INDEX: 25.52 KG/M2 | WEIGHT: 130 LBS | HEART RATE: 58 BPM

## 2017-09-05 ENCOUNTER — DOCUMENTATION ONLY (OUTPATIENT)
Dept: REHABILITATION | Facility: HOSPITAL | Age: 82
End: 2017-09-05

## 2017-09-05 DIAGNOSIS — M54.9 CHRONIC RIGHT-SIDED BACK PAIN, UNSPECIFIED BACK LOCATION: ICD-10-CM

## 2017-09-05 DIAGNOSIS — R53.1 DECREASED STRENGTH: ICD-10-CM

## 2017-09-05 DIAGNOSIS — R26.89 DECREASED FUNCTIONAL MOBILITY: ICD-10-CM

## 2017-09-05 DIAGNOSIS — G89.29 CHRONIC RIGHT-SIDED BACK PAIN, UNSPECIFIED BACK LOCATION: ICD-10-CM

## 2017-09-05 NOTE — PROGRESS NOTES
"Pt arrived to clinic 8/31/17 in am to state that she was not going to be at her appointment at 1pm and that she did not want to continue with therapy. Pt refused measurements for d/c. Pt stated that she "feels better" and does not want to attend therapy. Pt will be d/c'd at this time.  "

## 2017-09-07 ENCOUNTER — TELEPHONE (OUTPATIENT)
Dept: INTERNAL MEDICINE | Facility: CLINIC | Age: 82
End: 2017-09-07

## 2017-09-07 ENCOUNTER — LAB VISIT (OUTPATIENT)
Dept: LAB | Facility: HOSPITAL | Age: 82
End: 2017-09-07
Attending: INTERNAL MEDICINE
Payer: MEDICARE

## 2017-09-07 ENCOUNTER — OFFICE VISIT (OUTPATIENT)
Dept: INTERNAL MEDICINE | Facility: CLINIC | Age: 82
End: 2017-09-07
Payer: MEDICARE

## 2017-09-07 ENCOUNTER — OFFICE VISIT (OUTPATIENT)
Dept: URGENT CARE | Facility: CLINIC | Age: 82
End: 2017-09-07
Payer: MEDICARE

## 2017-09-07 ENCOUNTER — HOSPITAL ENCOUNTER (OUTPATIENT)
Dept: RADIOLOGY | Facility: HOSPITAL | Age: 82
Discharge: HOME | End: 2017-09-07
Attending: INTERNAL MEDICINE
Payer: MEDICARE

## 2017-09-07 VITALS
DIASTOLIC BLOOD PRESSURE: 57 MMHG | HEIGHT: 64 IN | OXYGEN SATURATION: 100 % | WEIGHT: 130 LBS | SYSTOLIC BLOOD PRESSURE: 112 MMHG | TEMPERATURE: 98 F | BODY MASS INDEX: 22.2 KG/M2 | RESPIRATION RATE: 18 BRPM | HEART RATE: 80 BPM

## 2017-09-07 VITALS
WEIGHT: 130.06 LBS | SYSTOLIC BLOOD PRESSURE: 124 MMHG | DIASTOLIC BLOOD PRESSURE: 48 MMHG | HEIGHT: 60 IN | OXYGEN SATURATION: 98 % | BODY MASS INDEX: 25.53 KG/M2 | HEART RATE: 60 BPM

## 2017-09-07 DIAGNOSIS — R09.89 BRUIT OF LEFT CAROTID ARTERY: ICD-10-CM

## 2017-09-07 DIAGNOSIS — L60.0 INGROWN RIGHT BIG TOENAIL: Primary | ICD-10-CM

## 2017-09-07 DIAGNOSIS — L40.9 PSORIASIS: ICD-10-CM

## 2017-09-07 DIAGNOSIS — R42 DIZZY SPELLS: ICD-10-CM

## 2017-09-07 DIAGNOSIS — R42 DIZZY SPELLS: Primary | ICD-10-CM

## 2017-09-07 LAB
CREAT SERPL-MCNC: 0.8 MG/DL
EST. GFR  (AFRICAN AMERICAN): >60 ML/MIN/1.73 M^2
EST. GFR  (NON AFRICAN AMERICAN): >60 ML/MIN/1.73 M^2

## 2017-09-07 PROCEDURE — 99499 UNLISTED E&M SERVICE: CPT | Mod: S$PBB,,, | Performed by: INTERNAL MEDICINE

## 2017-09-07 PROCEDURE — 1157F ADVNC CARE PLAN IN RCRD: CPT | Mod: S$GLB,,, | Performed by: FAMILY MEDICINE

## 2017-09-07 PROCEDURE — 1126F AMNT PAIN NOTED NONE PRSNT: CPT | Mod: S$GLB,,, | Performed by: INTERNAL MEDICINE

## 2017-09-07 PROCEDURE — 1159F MED LIST DOCD IN RCRD: CPT | Mod: S$GLB,,, | Performed by: INTERNAL MEDICINE

## 2017-09-07 PROCEDURE — 70498 CT ANGIOGRAPHY NECK: CPT | Mod: 26,,, | Performed by: RADIOLOGY

## 2017-09-07 PROCEDURE — 99214 OFFICE O/P EST MOD 30 MIN: CPT | Mod: S$GLB,,, | Performed by: FAMILY MEDICINE

## 2017-09-07 PROCEDURE — 99999 PR PBB SHADOW E&M-EST. PATIENT-LVL III: CPT | Mod: PBBFAC,,, | Performed by: INTERNAL MEDICINE

## 2017-09-07 PROCEDURE — 3008F BODY MASS INDEX DOCD: CPT | Mod: S$GLB,,, | Performed by: FAMILY MEDICINE

## 2017-09-07 PROCEDURE — 70498 CT ANGIOGRAPHY NECK: CPT | Mod: TC

## 2017-09-07 PROCEDURE — 36415 COLL VENOUS BLD VENIPUNCTURE: CPT | Mod: PO

## 2017-09-07 PROCEDURE — 82565 ASSAY OF CREATININE: CPT

## 2017-09-07 PROCEDURE — 1159F MED LIST DOCD IN RCRD: CPT | Mod: S$GLB,,, | Performed by: FAMILY MEDICINE

## 2017-09-07 PROCEDURE — 99215 OFFICE O/P EST HI 40 MIN: CPT | Mod: S$GLB,,, | Performed by: INTERNAL MEDICINE

## 2017-09-07 PROCEDURE — 3008F BODY MASS INDEX DOCD: CPT | Mod: S$GLB,,, | Performed by: INTERNAL MEDICINE

## 2017-09-07 PROCEDURE — 25500020 PHARM REV CODE 255: Performed by: INTERNAL MEDICINE

## 2017-09-07 PROCEDURE — 1157F ADVNC CARE PLAN IN RCRD: CPT | Mod: S$GLB,,, | Performed by: INTERNAL MEDICINE

## 2017-09-07 RX ORDER — CEPHALEXIN 500 MG/1
500 TABLET ORAL EVERY 8 HOURS
Qty: 15 TABLET | Refills: 0 | Status: SHIPPED | OUTPATIENT
Start: 2017-09-07 | End: 2017-09-12

## 2017-09-07 RX ADMIN — IOHEXOL 100 ML: 350 INJECTION, SOLUTION INTRAVENOUS at 02:09

## 2017-09-07 NOTE — TELEPHONE ENCOUNTER
----- Message from Lele King MD sent at 9/7/2017 12:16 PM CDT -----  Contact: Haylee/ [phone]  That is ok so long as it is not delayed more than 7 days.     ----- Message -----  From: Dina Paredes  Sent: 9/7/2017  12:07 PM  To: Fernando Leach would like to speak with you about the CTA not being approved for today. Please advise.

## 2017-09-07 NOTE — PROGRESS NOTES
"Subjective:       Patient ID: Ghada Dave is a 82 y.o. female.    Vitals:  height is 5' 4" (1.626 m) and weight is 59 kg (130 lb). Her temperature is 98 °F (36.7 °C). Her blood pressure is 112/57 (abnormal) and her pulse is 80. Her respiration is 18 and oxygen saturation is 100%.     Chief Complaint: Toe Pain (rt great toe)    Toe Pain    The incident occurred 12 to 24 hours ago. The incident occurred at home. The injury mechanism is unknown. The pain is at a severity of 5/10. The pain is mild. She reports no foreign bodies present.     Review of Systems   Constitution: Negative for chills and fever.   HENT: Negative for sore throat.    Eyes: Negative for blurred vision.   Cardiovascular: Negative for chest pain.   Respiratory: Negative for shortness of breath.    Skin: Positive for poor wound healing. Negative for rash.   Musculoskeletal: Positive for stiffness. Negative for back pain and joint pain.   Gastrointestinal: Negative for abdominal pain, diarrhea, nausea and vomiting.   Neurological: Negative for headaches.   Psychiatric/Behavioral: The patient is not nervous/anxious.        Objective:      Physical Exam   Constitutional: She is oriented to person, place, and time. She appears well-developed and well-nourished.   HENT:   Head: Normocephalic and atraumatic. Head is without abrasion, without contusion and without laceration.   Right Ear: External ear normal.   Left Ear: External ear normal.   Nose: Nose normal.   Mouth/Throat: Oropharynx is clear and moist.   Eyes: Conjunctivae, EOM and lids are normal. Pupils are equal, round, and reactive to light.   Neck: Trachea normal, full passive range of motion without pain and phonation normal. Neck supple.   Cardiovascular: Normal rate, regular rhythm and normal heart sounds.    Pulmonary/Chest: Effort normal and breath sounds normal. No stridor. No respiratory distress.   Musculoskeletal: Normal range of motion.   Neurological: She is alert and " oriented to person, place, and time.   Skin: Skin is warm, dry and intact. Capillary refill takes less than 2 seconds. No abrasion, no bruising, no burn, no ecchymosis, no laceration, no lesion and no rash noted. No erythema.   Right big toenail erythematous mildly swollen nail growing under skin no odor    Psychiatric: She has a normal mood and affect. Her speech is normal and behavior is normal. Judgment and thought content normal. Cognition and memory are normal.   Nursing note and vitals reviewed.      Assessment:       1. Ingrown right big toenail        Plan:         Ingrown right big toenail  -     Nail Removal    Other orders  -     cephalexin (KEFTAB) 500 mg tablet; Take 1 tablet (500 mg total) by mouth every 8 (eight) hours.  Dispense: 15 tablet; Refill: 0        Follow up with PCP/Specialist if not any better as discussed.   To ER of [facility] for any worsening of symptoms.  Patient/Guardian voiced understanding and agreement.

## 2017-09-07 NOTE — PROCEDURES
Nail Removal  Date/Time: 9/7/2017 3:41 PM  Performed by: ISAIAH AL  Authorized by: ISAIAH AL     Consent Done?:  Yes (Verbal)    Location:  Right foot  Anesthesia:  Local infiltration  Local anesthetic: lidocaine 2% without epinephrine  Anesthetic total (ml):  3  Preparation:  Skin prepped with alcohol, skin prepped with Betadine and sterile field established    Amount removed:  Partial  Wedge excision of skin of nail fold: No    Nail bed sutured?: No    Nail matrix removed:  None  Removed nail replaced and anchored: No    Dressing applied:  Antibiotic ointment and dressing applied  Patient tolerance:  Patient tolerated the procedure well with no immediate complications

## 2017-09-07 NOTE — PATIENT INSTRUCTIONS
Se recomienda completar 8-12 contracciones lentas de los músculos pélvicos y mantener la contracción giovany 6-8 segundos. Usted debe repetir esto 3 veces por día. Usted debe completar esto por lo menos 4 veces por semana. Si continúa esta rutina, la impresión máxima

## 2017-09-07 NOTE — PATIENT INSTRUCTIONS
Monitor for signs of infection. Return for re-check or go to ER for any severe signs of infection as discussed.

## 2017-09-07 NOTE — TELEPHONE ENCOUNTER
Left detailed message for Haylee Hamm to inform that the doctor has finished the notes in the pt's chart so pt can get CT of the neck within the 7 days. Haylee was informed to call the clinic or send a [employer] message if anything else is needed.

## 2017-09-07 NOTE — PROGRESS NOTES
Portions of this note are generated with voice recognition software. Typographical errors may exist.     SUBJECTIVE:    This is a/an 82 y.o. female here for primary care visit for  Chief Complaint   Patient presents with    Urinary Incontinence     1 month follow up     Patient states that she had a significant episode of dizziness on Sunday.  Started suddenly after drinking coffee in the morning.  Lasted several minutes.  States that she may have had some weakness on the left side.  States that it subsided and has not recurred.  This is the first time she has had symptoms like this before.  Associated symptoms included headache for several hours in the frontal region.  Not severe and eventually subsiding.  No associated medication effect.  The patient is not on other prescription medication other than what is listed on her profile.  States that she has never been evaluated for carotid artery stenosis.      Medications Reviewed and Updated    Past medical, family, and social histories were reviewed and updated.    Review of Systems negative unless otherwise noted in history of present illness-  ROS    General ROS: negative  Psychological ROS: negative  ENT ROS: negative  Allergy and Immunology ROS: negative  Cardiovascular ROS: negative  Gastrointestinal ROS: negative  Genito-Urinary ROS: negative  Musculoskeletal ROS: negative  Neurological ROS: negative  Dermatological ROS: negative        Allergic:  Review of patient's allergies indicates:  No Known Allergies    OBJECTIVE:  BP: (!) 124/48 Pulse: 60    Wt Readings from Last 3 Encounters:   09/07/17 59 kg (130 lb 1.1 oz)   08/28/17 59 kg (130 lb)   08/16/17 59 kg (130 lb)    Body mass index is 25.4 kg/m².  Previous Blood Pressure Readings :   BP Readings from Last 3 Encounters:   09/07/17 (!) 124/48   08/28/17 122/60   08/16/17 (!) 148/62       Physical Exam    GEN: No apparent distress  HEENT: sclera non-icteric, conjunctiva clear  CV: no peripheral edema.   Regular rate and rhythm.  Carotid murmur heard at the bifurcation LEFT side  PULM: breathing non-labored  ABD: non, protuberant abdomen.  PSYCH: appropriate affect  MSK: able to rise from chair without assistance  Neurologic: Cranial nerves II through XII grossly intact.  Normal gait.  Normal strength all 4 extremities.  SKIN: normal skin turgor    Pertinent Labs Reviewed       ASSESSMENT/PLAN:    Dizzy spells.  Etiology unclear.  In this patient with advanced age TIA and non-possibility.  Further evaluation warranted.  -     CTA Neck; Future; Expected date: 09/07/2017  -     Creatinine, serum; Future; Expected date: 09/07/2017    Bruit of left carotid artery.  Possible symptomatic carotid atherosclerosis.  Bruit noted on the left side however radiologically this does not have the concerning lesion.  Patient or marking that she had some instability favoring the left side.  Uncertain if this meets criteria for symptomatic disease given that ipsilateral lesion is not the one of concern.  Right-sided lesion needs additional correlation. Patient with radiologic evidence of either pseudoaneurysm or irregular plaque.  LDL close to target on current statin therapy.  Patient tolerating aspirin.  In light of recent findings in the lower gastrointestinal system of possible neoplastic lesion, upgrading antiplatelets therapy to clopidogrel is of uncertain risk.  Recommend patient continue current aspirin therapy.  Plan to complete consultation with neurovascular specialist regarding the need for surgical intervention.   -     CTA Neck; Future; Expected date: 09/07/2017  -     Creatinine, serum; Future; Expected date: 09/07/2017    Psoriasis.Condition not optimally controlled. Detailed counseling on self care measures. Plan to monitor clinically in addition to plan below.   - We'll get second opinion from dermatology        Future Appointments  Date Time Provider Department Center   9/7/2017 1:30 PM MD KANE Dean  [facility]   9/7/2017 2:00 PM Jennifer Garcia, PT [facility] [place]   9/7/2017 3:00 PM [facility] CT1 LIMIT 400 LBS [facility] CT SCAN [facility]   9/11/2017 2:00 PM Micki Foster, PTA [facility] [place] [facility]   9/14/2017 2:00 PM Jennifer Garcia, PT [facility] [place] [facility]   9/18/2017 2:00 PM Micki Foster, PTA [facility] [place] [facility]   9/21/2017 2:00 PM Jennifer Garcia, PT [facility] [place] [facility]   9/25/2017 2:00 PM Micki Foster, PTA [facility]   9/28/2017 2:00 PM Jennifer Garcia, PT [facility]   11/13/2017 11:20 AM Lele King MD \[facility]\"" [place]       Lele King  9/7/2017  1:13 PM

## 2017-09-08 ENCOUNTER — TELEPHONE (OUTPATIENT)
Dept: INTERNAL MEDICINE | Facility: CLINIC | Age: 82
End: 2017-09-08

## 2017-09-08 NOTE — TELEPHONE ENCOUNTER
After talking to Dr. Javed Tellez, neurovascular specialist his interpretation of the CTA is that ICA abnormality does not look compatible with ulcerated plaque or pseudoaneurysm. Instead believes it is just tortuous variant.     Informed patient of results.  States that she still feels a little disequilibrium but nothing like the event on Sunday.  States that she is taking multiple supplements and is unable to name them in addition to statin and aspirin medication.  Denies taking any sleeping medications or anti-histamine medications or any over-the-counter medications other than herbal supplements.  This provider recommends that she suspend supplements to avoid unnecessary interactions with statin medication.  Patient voiced understanding.  Emergency room precautions indicated regarding danger symptoms should they occur.

## 2017-09-10 ENCOUNTER — TELEPHONE (OUTPATIENT)
Dept: URGENT CARE | Facility: CLINIC | Age: 82
End: 2017-09-10

## 2017-09-15 ENCOUNTER — TELEPHONE (OUTPATIENT)
Dept: INTERNAL MEDICINE | Facility: CLINIC | Age: 82
End: 2017-09-15

## 2017-09-15 ENCOUNTER — HOSPITAL ENCOUNTER (OUTPATIENT)
Dept: PREADMISSION TESTING | Facility: HOSPITAL | Age: 82
Discharge: HOME | End: 2017-09-15
Attending: SURGERY
Payer: MEDICARE

## 2017-09-15 ENCOUNTER — ANESTHESIA EVENT (OUTPATIENT)
Dept: SURGERY | Facility: HOSPITAL | Age: 82
End: 2017-09-15
Payer: MEDICARE

## 2017-09-15 ENCOUNTER — DOCUMENTATION ONLY (OUTPATIENT)
Dept: INTERNAL MEDICINE | Facility: CLINIC | Age: 82
End: 2017-09-15

## 2017-09-15 DIAGNOSIS — I25.10 CORONARY ARTERY DISEASE WITHOUT ANGINA PECTORIS, UNSPECIFIED VESSEL OR LESION TYPE, UNSPECIFIED WHETHER NATIVE OR TRANSPLANTED HEART: ICD-10-CM

## 2017-09-15 RX ORDER — LIDOCAINE HYDROCHLORIDE 10 MG/ML
1 INJECTION, SOLUTION EPIDURAL; INFILTRATION; INTRACAUDAL; PERINEURAL ONCE
Status: CANCELLED | OUTPATIENT
Start: 2017-09-15 | End: 2017-09-15

## 2017-09-15 RX ORDER — SODIUM CHLORIDE, SODIUM LACTATE, POTASSIUM CHLORIDE, CALCIUM CHLORIDE 600; 310; 30; 20 MG/100ML; MG/100ML; MG/100ML; MG/100ML
INJECTION, SOLUTION INTRAVENOUS CONTINUOUS
Status: CANCELLED | OUTPATIENT
Start: 2017-09-15

## 2017-09-15 NOTE — ANESTHESIA PREPROCEDURE EVALUATION
09/15/2017  Ghada Dave is a 82 y.o., female is scheduled for proctoscopy with polypectomy under GETA on 9/19/2017.     Pt is Pashto speaking only;   present during entire visit.    PCP H&P and clearance with risk stratification in [facility] on 9/15/2017.        Past Surgical History:   Procedure Laterality Date    CATARACT EXTRACTION W/  INTRAOCULAR LENS IMPLANT Bilateral     COLONOSCOPY      COLONOSCOPY N/A 8/28/2017    Procedure: COLONOSCOPY;  Surgeon: Bertram Myers MD;  Location: [facility];  Service: Endoscopy;  Laterality: N/A;    POLYPECTOMY      YAG Laser Capsulotomy Right 04/17/2017    Dr. Chavez      Anesthesia Evaluation    I have reviewed the Patient Summary Reports.    I have reviewed the Nursing Notes.   I have reviewed the Medications.     Review of Systems  Anesthesia Hx:  No problems with previous Anesthesia  History of prior surgery of interest to airway management or planning: Previous anesthesia: General, MAC   Procedure performed at an [facility]. Denies Family Hx of Anesthesia complications.   Denies Personal Hx of Anesthesia complications.   Social:  Non-Smoker, No Alcohol Use    Hematology/Oncology:  Hematology Normal        EENT/Dental:EENT/Dental Normal   Cardiovascular:   Exercise tolerance: good Hypertension, well controlled CAD (CTA Neck 9/2017 with R ICA abnormality; pt refered to Neurovasc for further eval)   Denies Dysrhythmias.   Denies Angina.         ECG has been reviewed.    Pulmonary:   Denies Shortness of breath.    Renal/:  Renal/ Normal     Hepatic/GI:   GERD, well controlled Denies any bowel symptoms   Musculoskeletal:  Musculoskeletal Normal    Neurological:  Neuro Symptoms of dizziness   Endocrine:  Endocrine Normal    Dermatological:  Skin Normal    Psych:  Psychiatric Normal             Physical Exam  General:  Well nourished  "   Airway/Jaw/Neck:  Airway Findings: Mouth Opening: Normal Tongue: Normal  General Airway Assessment: Adult  Mallampati: I  Improves to I with phonation.  TM Distance: Normal, at least 6 cm        Eyes/Ears/Nose:  EYES/EARS/NOSE FINDINGS: Normal    Chest/Lungs:  Chest/Lungs Clear    Heart/Vascular:  Heart Findings: Normal Heart murmur: negative    Abdomen:  Abdomen Findings: Normal      Mental Status:  Mental Status Findings: Normal         EKG 7/01/2017  Normal sinus rhythm  Nonspecific T wave abnormality  Abnormal ECG  No previous ECGs available  Confirmed by Ana FREDERICK, Arash GRIGSG ([id number]) on 7/3/2017 10:49:45 AM     NM Stress Test 6/2014  EKG Conclusions:  1. The EKG portion of this study is negative for ischemia at a peak heart rate of 98 bpm (72% of predicted).   2. Blood pressure remained stable throughout the protocol  (Presenting BP: 129/59 Peak BP: 133/59).   3. No significant arrhythmias were present.   4. There were no symptoms of chest discomfort or significant dyspnea throughout the protocol.   5. Nuclear portion of this study will be reported seperately.  This document has been electronically    SIGNED BY: Yakelin Huizar MD On: 06/14/2017 11:24     Coronary Angio [facility] 6/2017   iFR of LAD and RCA performed confirmed nonobstructive disease    CTA Neck 9/2017  Per medical record/CTA report:   ICA abnormality does not look compatible with ulcerated plaque or pseudoaneurysm. Per Dr. King report, Neurovasc reports may be  "tortuous variant"      Anesthesia Plan  Type of Anesthesia, risks & benefits discussed:  Anesthesia Type:  general  Patient's Preference:    Intra-op Monitoring Plan:   Intra-op Monitoring Plan Comments:   Post Op Pain Control Plan:   Post Op Pain Control Plan Comments:   Induction:   IV  Beta Blocker:  Patient is not currently on a Beta-Blocker (No further documentation required).       Informed Consent: Patient understands risks and agrees with Anesthesia plan.  Questions answered. "   ASA Score: 3     Day of Surgery Review of History & Physical:    H&P update referred to the surgeon.     Anesthesia Plan Notes:     PCP H&P and clearance with risk stratification in [facility] on 9/15/2017.    Pt will require .        Ready For Surgery From Anesthesia Perspective.

## 2017-09-15 NOTE — TELEPHONE ENCOUNTER
----- Message from Dina Paredes sent at 9/15/2017 10:50 AM CDT -----  Contact: (Sienna)Friend/[phone]  Sienna would like to speak with you about getting the clearance for surgery and put it in EPIC that patient can go under anesthesia . Please advise.

## 2017-09-15 NOTE — PROGRESS NOTES
Cardiac Evaluation  - RCRI: 1 for known CAD. Non-obstructive    - Cardiac Dysrhythmia: no  - Angina Symptoms or Equivalent: no  - Other Pertinent CVD History: no   - Recent CTA negative for significant cerebrovascular disease.    Pulmonary Evaluation    Definite Risk Factors   - Age > 50: yes  - COPD: no  - Poor Overall Functional Status: no  - LAURIE: no  - Pulmonary HTN: no  - Oxygen dependent: no  - Serum Albumin < 3.5: no  - Surgery lasting > 3 hours: no    Probable Risk Factors   - Cigarette Use Last 8 Weeks: no  - Abnormal Pulm Exam / CXR: no  - Recent URTI: no      Procedure Specific Assessment   · Procedure would be considered non-elective.   · Anticipated procedure with general anesthesia.  · Overall there is no clear indication to delay or postpone this surgery    Cardiac Recommendations  · Overall Risk for Cardiac Event: low to moderate   · Cardiac issues are optimized  · Perioperative beta blocker not indicated.   · NM-ST cardiac testing not indicated.   · CTA to exclude significant cerebrovascular disease is not indicated.     Pulmonary Recommendations  · Overall Risk for Pulmonary Event: low  · Pulmonary issues are  optimized    Specific Medication Recommendations  · Anticoagulation: defer to cardiology  · VTE prophylaxis: defer to cardiology

## 2017-09-15 NOTE — DISCHARGE INSTRUCTIONS
Instrucciones para el Paciente:     Por favor presentarse el 9/19/17 a las 8:30am   Reportarse en el emelia piso Procedural Check In Room.   No coma ni nadeem nada después de medianoche (12:00 AM) la noche antes de hart cirugía.   Por favor dejar todas paco joyas y objetos de valor en casa   Use ropa floja y cómoda    No podrá manejar después de hart procedimiento. Por favor nataliia arreglos de transportación para que la traigan y lleven a casa.   Puede bañarse la noche anterior y por la mañana el día de hart procedimiento. (Puede usar jabón antimicrobial o el jabón Dial amarillo.    No use cremas humectantes, perfumes o talcos.   No tome Aspirina, Ibuoprofin, Advil, Motrin, Aleve, Nuprin, o Naprosyn 3-5 días antes de hart procedimiento. Puede eddy Tylenol o Acetaminofen para el dolor.    Puede llamar al [phone] si tiene alguna pregunta.          Anestesia: Anestesia general     Estás vimos continuamente giovany el procedimiento por el proveedor de la anestesia.     Usted tiene programada trinh fecha para trinh operación quirúrgica. Giovany la operación, le administrarán un medicamento anestésico (llamado también anestesia) para que esté cómodo y sin dolor. Hart cirujano le administrará anestesia general. En esta hoja se da más información sobre randy tipo de anestesia.  ¿En qué consiste la anestesia general?  Con la anestesia general usted estará profundamente dormido. La anestesia general se administra en la juli (anestesia intravenosa), en los pulmones (anestesia con gas), o de ambas formas. Usted no sentirá nada giovany la operación, y tampoco la recordará. El anestesista vigila hart estado y monitoriza hart frecuencia y ritmo cardíaco, hart presión arterial y hart nivel de oxígeno en la juli giovany todo el procedimiento.  · Anestesia intravenosa, La anestesia intravenosa se administra mediante trinh sonda intravenosa en el brazo, y suele darse eddie para que el paciente ya esté dormido cuando le administren la  anestesia con gas. Algunos tipos de anestesia intravenosa alivian el dolor, mientras que otros producen relajación. Hart médico decidirá qué tipo de anestesia es más adecuado para hart sid.  · Anestesia con gas. La anestesia con gas se administra en los pulmones por inhalación y suele usarse para mantener al paciente dormido después de recibir anestesia intravenosa. Puede administrarse a través de trinh máscara facial, un tubo endotraqueal o trinh máscara laríngea.  ¨ Si tiene trinh máscara facial, hart anestesista se la colocará sobre la nariz y la boca, probablemente mientras usted todavía está despierto. Usted inhalará oxígeno mientras le inician la anestesia intravenosa, y luego puede añadirse anestesia a través de la máscara.  ¨ Si se usa un tubo endotraqueal o trinh máscara laríngea, se los colocarán en la garganta trinh vez que se haya dormido.  Medicamentos e instrumentos de anestesia  Probablemente tendrá:  · Anestesia intravenosa administrada directamente en la juli mediante tirnh sonda intravenosa.  · Anestesia con gas administrada en los pulmones, desde los cuales pasa a la juli.  · Un pulsioxímetro colocado en el extremo de un dedo para medir el nivel de oxígeno en la juli.  · Electrodos de electrocardiografía para registrar la frecuencia y el ritmo cardíacos.  · Un manguito (esfigmomanómetro) para medir la presión arterial.  Riesgos y posibles complicaciones  La anestesia general tiene ciertos riesgos, entre los cuales se encuentran los siguientes:  · Problemas de la respiración  · Náuseas y vómito  · Garganta irritada o ronquera  · Reacción alérgica a la anestesia  · Persistencia de la insensibilidad en la lenin anestesiada (poco frecuente)  · Ritmo cardíaco irregular (en casos muy poco frecuentes)  · Paro cardíaco (en casos muy poco frecuentes)   Medidas de seguridad relacionadas con la anestesia  · Siga todas las instrucciones que le hayan dado respecto al tiempo que debe pasar sin comer ni beber antes de la  operación.  · Asegúrese de informar a hart médico de todos los medicamentos que esté tomando, incluyendo también los se adquieren sin receta, las hierbas medicinales y los suplementos alimenticios.  · Póngase de acuerdo con un familiar o amigo adulto para que lo lleve a hart casa después de la operación.  · Marci las primeras 24 horas después de la operación:  ¨ No maneje automóviles ni maquinaria o herramientas pesadas.  ¨ No tome decisiones importantes ni firme ningún documento.  ¨ Evite el alcohol.  ¨ De ser posible, consiga a alguien que se quede con usted para ayudarlo a mantenerse fuera de peligro en sid de que surja algún problema.  Date Last Reviewed: 10/16/2014  © 5591-2268 [employer]. [address] [place] [id number]. Todos los derechos reservados. Esta información no pretende sustituir la atención médica profesional. Sólo hart médico puede diagnosticar y tratar un problema de maría.

## 2017-09-18 NOTE — TELEPHONE ENCOUNTER
Spoke with patient's caregiver Mrs El (friend) about clearance for Colonoscopy.  Mrs El was given ER advised.

## 2017-09-19 ENCOUNTER — ANESTHESIA (OUTPATIENT)
Dept: SURGERY | Facility: HOSPITAL | Age: 82
End: 2017-09-19
Payer: MEDICARE

## 2017-09-19 ENCOUNTER — TELEPHONE (OUTPATIENT)
Dept: INTERNAL MEDICINE | Facility: CLINIC | Age: 82
End: 2017-09-19

## 2017-09-19 ENCOUNTER — HOSPITAL ENCOUNTER (OUTPATIENT)
Facility: HOSPITAL | Age: 82
Discharge: HOME | End: 2017-09-19
Attending: SURGERY | Admitting: SURGERY
Payer: MEDICARE

## 2017-09-19 ENCOUNTER — SURGERY (OUTPATIENT)
Age: 82
End: 2017-09-19

## 2017-09-19 VITALS
WEIGHT: 130 LBS | TEMPERATURE: 98 F | BODY MASS INDEX: 23.92 KG/M2 | SYSTOLIC BLOOD PRESSURE: 115 MMHG | HEIGHT: 62 IN | RESPIRATION RATE: 20 BRPM | HEART RATE: 62 BPM | DIASTOLIC BLOOD PRESSURE: 63 MMHG | OXYGEN SATURATION: 96 %

## 2017-09-19 DIAGNOSIS — K57.90 DIVERTICULOSIS OF INTESTINE WITHOUT BLEEDING, UNSPECIFIED INTESTINAL TRACT LOCATION: Primary | ICD-10-CM

## 2017-09-19 DIAGNOSIS — K62.1 RECTAL POLYP: ICD-10-CM

## 2017-09-19 DIAGNOSIS — Z86.010 PERSONAL HISTORY OF COLONIC POLYPS: ICD-10-CM

## 2017-09-19 DIAGNOSIS — K62.89 ANAL OR RECTAL PAIN: ICD-10-CM

## 2017-09-19 DIAGNOSIS — M54.50 CHRONIC MIDLINE LOW BACK PAIN WITHOUT SCIATICA: ICD-10-CM

## 2017-09-19 DIAGNOSIS — G89.29 CHRONIC MIDLINE LOW BACK PAIN WITHOUT SCIATICA: ICD-10-CM

## 2017-09-19 PROCEDURE — 25000003 PHARM REV CODE 250: Performed by: SURGERY

## 2017-09-19 PROCEDURE — 63600175 PHARM REV CODE 636 W HCPCS: Performed by: NURSE ANESTHETIST, CERTIFIED REGISTERED

## 2017-09-19 PROCEDURE — 71000016 HC POSTOP RECOV ADDL HR: Performed by: SURGERY

## 2017-09-19 PROCEDURE — 36000706: Performed by: SURGERY

## 2017-09-19 PROCEDURE — 88305 TISSUE EXAM BY PATHOLOGIST: CPT | Mod: 26,,, | Performed by: PATHOLOGY

## 2017-09-19 PROCEDURE — 71000033 HC RECOVERY, INTIAL HOUR: Performed by: SURGERY

## 2017-09-19 PROCEDURE — 25000003 PHARM REV CODE 250: Performed by: NURSE ANESTHETIST, CERTIFIED REGISTERED

## 2017-09-19 PROCEDURE — 25000003 PHARM REV CODE 250: Performed by: NURSE PRACTITIONER

## 2017-09-19 PROCEDURE — 36000707: Performed by: SURGERY

## 2017-09-19 PROCEDURE — 88305 TISSUE EXAM BY PATHOLOGIST: CPT | Performed by: PATHOLOGY

## 2017-09-19 PROCEDURE — 37000009 HC ANESTHESIA EA ADD 15 MINS: Performed by: SURGERY

## 2017-09-19 PROCEDURE — 71000015 HC POSTOP RECOV 1ST HR: Performed by: SURGERY

## 2017-09-19 PROCEDURE — 37000008 HC ANESTHESIA 1ST 15 MINUTES: Performed by: SURGERY

## 2017-09-19 PROCEDURE — S0077 INJECTION, CLINDAMYCIN PHOSP: HCPCS | Performed by: SURGERY

## 2017-09-19 RX ORDER — SUCCINYLCHOLINE CHLORIDE 20 MG/ML
INJECTION INTRAMUSCULAR; INTRAVENOUS
Status: DISCONTINUED | OUTPATIENT
Start: 2017-09-19 | End: 2017-09-19

## 2017-09-19 RX ORDER — SODIUM CHLORIDE, SODIUM LACTATE, POTASSIUM CHLORIDE, CALCIUM CHLORIDE 600; 310; 30; 20 MG/100ML; MG/100ML; MG/100ML; MG/100ML
INJECTION, SOLUTION INTRAVENOUS CONTINUOUS
Status: DISCONTINUED | OUTPATIENT
Start: 2017-09-19 | End: 2017-09-19 | Stop reason: HOSPADM

## 2017-09-19 RX ORDER — LIDOCAINE HCL/PF 100 MG/5ML
SYRINGE (ML) INTRAVENOUS
Status: DISCONTINUED | OUTPATIENT
Start: 2017-09-19 | End: 2017-09-19

## 2017-09-19 RX ORDER — MIDAZOLAM HYDROCHLORIDE 1 MG/ML
INJECTION, SOLUTION INTRAMUSCULAR; INTRAVENOUS
Status: DISCONTINUED | OUTPATIENT
Start: 2017-09-19 | End: 2017-09-19

## 2017-09-19 RX ORDER — FENTANYL CITRATE 50 UG/ML
INJECTION, SOLUTION INTRAMUSCULAR; INTRAVENOUS
Status: DISCONTINUED | OUTPATIENT
Start: 2017-09-19 | End: 2017-09-19

## 2017-09-19 RX ORDER — LIDOCAINE HYDROCHLORIDE 20 MG/ML
JELLY TOPICAL
Status: DISCONTINUED | OUTPATIENT
Start: 2017-09-19 | End: 2017-09-19 | Stop reason: HOSPADM

## 2017-09-19 RX ORDER — LIDOCAINE HYDROCHLORIDE 10 MG/ML
1 INJECTION, SOLUTION EPIDURAL; INFILTRATION; INTRACAUDAL; PERINEURAL ONCE
Status: DISCONTINUED | OUTPATIENT
Start: 2017-09-19 | End: 2017-09-19 | Stop reason: HOSPADM

## 2017-09-19 RX ORDER — BACITRACIN 50000 [IU]/1
INJECTION, POWDER, FOR SOLUTION INTRAMUSCULAR
Status: DISCONTINUED | OUTPATIENT
Start: 2017-09-19 | End: 2017-09-19 | Stop reason: HOSPADM

## 2017-09-19 RX ORDER — GLYCOPYRROLATE 0.2 MG/ML
INJECTION INTRAMUSCULAR; INTRAVENOUS
Status: DISCONTINUED | OUTPATIENT
Start: 2017-09-19 | End: 2017-09-19

## 2017-09-19 RX ORDER — SODIUM CHLORIDE 9 MG/ML
INJECTION, SOLUTION INTRAVENOUS CONTINUOUS
Status: DISCONTINUED | OUTPATIENT
Start: 2017-09-19 | End: 2017-09-19 | Stop reason: HOSPADM

## 2017-09-19 RX ORDER — OMEPRAZOLE 20 MG/1
20 CAPSULE, DELAYED RELEASE ORAL DAILY
Qty: 30 CAPSULE | Refills: 11 | Status: SHIPPED | OUTPATIENT
Start: 2017-09-19 | End: 2018-01-30

## 2017-09-19 RX ORDER — ACETAMINOPHEN 10 MG/ML
INJECTION, SOLUTION INTRAVENOUS
Status: DISCONTINUED | OUTPATIENT
Start: 2017-09-19 | End: 2017-09-19

## 2017-09-19 RX ORDER — ROCURONIUM BROMIDE 10 MG/ML
INJECTION, SOLUTION INTRAVENOUS
Status: DISCONTINUED | OUTPATIENT
Start: 2017-09-19 | End: 2017-09-19

## 2017-09-19 RX ORDER — HYDROCODONE BITARTRATE AND ACETAMINOPHEN 5; 325 MG/1; MG/1
1 TABLET ORAL EVERY 4 HOURS PRN
Status: DISCONTINUED | OUTPATIENT
Start: 2017-09-19 | End: 2017-09-19 | Stop reason: HOSPADM

## 2017-09-19 RX ORDER — CLINDAMYCIN PHOSPHATE 600 MG/50ML
600 INJECTION, SOLUTION INTRAVENOUS
Status: DISCONTINUED | OUTPATIENT
Start: 2017-09-19 | End: 2017-09-19 | Stop reason: HOSPADM

## 2017-09-19 RX ORDER — ONDANSETRON HYDROCHLORIDE 2 MG/ML
INJECTION, SOLUTION INTRAMUSCULAR; INTRAVENOUS
Status: DISCONTINUED | OUTPATIENT
Start: 2017-09-19 | End: 2017-09-19

## 2017-09-19 RX ORDER — KETOROLAC TROMETHAMINE 30 MG/ML
INJECTION, SOLUTION INTRAMUSCULAR; INTRAVENOUS
Status: DISCONTINUED | OUTPATIENT
Start: 2017-09-19 | End: 2017-09-19

## 2017-09-19 RX ORDER — HYDROCODONE BITARTRATE AND ACETAMINOPHEN 5; 325 MG/1; MG/1
1 TABLET ORAL EVERY 6 HOURS PRN
Qty: 24 TABLET | Refills: 0 | Status: SHIPPED | OUTPATIENT
Start: 2017-09-19 | End: 2018-01-30

## 2017-09-19 RX ORDER — PROPOFOL 10 MG/ML
VIAL (ML) INTRAVENOUS
Status: DISCONTINUED | OUTPATIENT
Start: 2017-09-19 | End: 2017-09-19

## 2017-09-19 RX ORDER — CEFAZOLIN SODIUM 2 G/50ML
2 SOLUTION INTRAVENOUS
Status: DISCONTINUED | OUTPATIENT
Start: 2017-09-19 | End: 2017-09-19

## 2017-09-19 RX ORDER — DEXAMETHASONE SODIUM PHOSPHATE 4 MG/ML
INJECTION, SOLUTION INTRA-ARTICULAR; INTRALESIONAL; INTRAMUSCULAR; INTRAVENOUS; SOFT TISSUE
Status: DISCONTINUED | OUTPATIENT
Start: 2017-09-19 | End: 2017-09-19

## 2017-09-19 RX ADMIN — SUCCINYLCHOLINE CHLORIDE 120 MG: 20 INJECTION, SOLUTION INTRAMUSCULAR; INTRAVENOUS at 09:09

## 2017-09-19 RX ADMIN — SODIUM CHLORIDE, SODIUM LACTATE, POTASSIUM CHLORIDE, AND CALCIUM CHLORIDE: 600; 310; 30; 20 INJECTION, SOLUTION INTRAVENOUS at 07:09

## 2017-09-19 RX ADMIN — HYDROCODONE BITARTRATE AND ACETAMINOPHEN 1 TABLET: 5; 325 TABLET ORAL at 12:09

## 2017-09-19 RX ADMIN — FENTANYL CITRATE 25 MCG: 50 INJECTION, SOLUTION INTRAMUSCULAR; INTRAVENOUS at 09:09

## 2017-09-19 RX ADMIN — KETOROLAC TROMETHAMINE 15 MG: 30 INJECTION, SOLUTION INTRAMUSCULAR; INTRAVENOUS at 10:09

## 2017-09-19 RX ADMIN — GELATIN ABSORBABLE SPONGE 12-7 MM 1 EACH: 12-7 MISC at 10:09

## 2017-09-19 RX ADMIN — MIDAZOLAM 0.5 MG: 1 INJECTION INTRAMUSCULAR; INTRAVENOUS at 09:09

## 2017-09-19 RX ADMIN — EPHEDRINE SULFATE 5 MG: 50 INJECTION, SOLUTION INTRAMUSCULAR; INTRAVENOUS; SUBCUTANEOUS at 09:09

## 2017-09-19 RX ADMIN — SODIUM CHLORIDE: 0.9 INJECTION, SOLUTION INTRAVENOUS at 07:09

## 2017-09-19 RX ADMIN — GLYCOPYRROLATE 0.2 MG: 0.2 INJECTION, SOLUTION INTRAMUSCULAR; INTRAVENOUS at 09:09

## 2017-09-19 RX ADMIN — BACITRACIN 50000 UNITS: 5000 INJECTION, POWDER, FOR SOLUTION INTRAMUSCULAR at 09:09

## 2017-09-19 RX ADMIN — CLINDAMYCIN PHOSPHATE 600 MG: 12 INJECTION, SOLUTION INTRAVENOUS at 09:09

## 2017-09-19 RX ADMIN — LIDOCAINE HYDROCHLORIDE 5 ML: 20 JELLY TOPICAL at 10:09

## 2017-09-19 RX ADMIN — ACETAMINOPHEN 1000 MG: 10 INJECTION, SOLUTION INTRAVENOUS at 09:09

## 2017-09-19 RX ADMIN — LIDOCAINE HYDROCHLORIDE 60 MG: 20 INJECTION, SOLUTION INTRAVENOUS at 09:09

## 2017-09-19 RX ADMIN — FENTANYL CITRATE 50 MCG: 50 INJECTION, SOLUTION INTRAMUSCULAR; INTRAVENOUS at 09:09

## 2017-09-19 RX ADMIN — ROCURONIUM BROMIDE 5 MG: 10 INJECTION, SOLUTION INTRAVENOUS at 09:09

## 2017-09-19 RX ADMIN — PROPOFOL 40 MG: 10 INJECTION, EMULSION INTRAVENOUS at 09:09

## 2017-09-19 RX ADMIN — ONDANSETRON 4 MG: 2 INJECTION, SOLUTION INTRAMUSCULAR; INTRAVENOUS at 09:09

## 2017-09-19 RX ADMIN — PROPOFOL 100 MG: 10 INJECTION, EMULSION INTRAVENOUS at 09:09

## 2017-09-19 RX ADMIN — DEXAMETHASONE SODIUM PHOSPHATE 4 MG: 4 INJECTION, SOLUTION INTRAMUSCULAR; INTRAVENOUS at 09:09

## 2017-09-19 NOTE — TELEPHONE ENCOUNTER
----- Message from Ewelina Howe sent at 9/19/2017 12:49 PM CDT -----  Contact: Self [phone]  Calling to talk to nurse concerning questions on patient medication. Please advice

## 2017-09-19 NOTE — H&P
History of Present Illness:  Patient is a 82 y.o. female presents with            Chief Complaint   Patient presents with    Results       pt had colonoscopy done on 8/28/17; pt c/o rectal pain and swelling    Diverticulosis       3 mos F/u for abd pain in LLQ and IBS          Review of patient's allergies indicates:  No Known Allergies     Current Medications          Current Outpatient Prescriptions   Medication Sig Dispense Refill    aspirin (ECOTRIN) 81 MG EC tablet Take 1 tablet (81 mg total) by mouth once daily.   0    atorvastatin (LIPITOR) 40 MG tablet Take 1 tablet (40 mg total) by mouth once daily. 90 tablet 1    b complex vitamins capsule Take 1 capsule by mouth once daily.        fluocinonide 0.05% (LIDEX) 0.05 % cream Apply topically 2 (two) times daily. 120 g 0    multivitamin capsule Take 1 capsule by mouth once daily.        omega-3 fatty acids-vitamin E (FISH OIL) 1,000 mg Cap Take 1 capsule by mouth.        omeprazole (PRILOSEC) 20 MG capsule Take 20 mg by mouth once daily.          No current facility-administered medications for this visit.                  Past Medical History:   Diagnosis Date    Abnormal Pap smear 2013    Cataract      Colon polyps      Frequent urination      Hemorrhoid      High cholesterol      Hypertension      Poor circulation      Varicose vein              Past Surgical History:   Procedure Laterality Date    CATARACT EXTRACTION W/  INTRAOCULAR LENS IMPLANT Bilateral      COLONOSCOPY        COLONOSCOPY N/A 8/28/2017     Procedure: COLONOSCOPY;  Surgeon: Bertram Myers MD;  Location: [facility];  Service: Endoscopy;  Laterality: N/A;    POLYPECTOMY        YAG Laser Capsulotomy Right 04/17/2017     Dr. Chavez            Family History   Problem Relation Age of Onset    Glaucoma Neg Hx      Macular degeneration Neg Hx      Strabismus Neg Hx      Retinal detachment Neg Hx      Amblyopia Neg Hx      Blindness Neg Hx      Cataracts Neg Hx       Prostate cancer Neg Hx      Kidney disease Neg Hx             Social History   Substance Use Topics    Smoking status: Never Smoker    Smokeless tobacco: Never Used    Alcohol use No         Review of Systems:     Review of Systems   Constitutional: Negative.    HENT: Negative.    Eyes: Negative.    Respiratory: Negative.    Cardiovascular: Negative.    Gastrointestinal: Positive for anal bleeding and rectal pain.   Genitourinary: Negative.    Musculoskeletal: Negative.    Skin: Negative.    Neurological: Negative.    Psychiatric/Behavioral: Negative.          OBJECTIVE:      Vital Signs (Most Recent)  Pulse: 81 (09/07/17 1332)  Resp: 16 (09/07/17 1332)  BP: (!) 116/51 (09/07/17 1332)  5' (1.524 m)  59 kg (130 lb 1.1 oz)      Physical Exam:     Physical Exam   Constitutional: She is oriented to person, place, and time. She appears well-developed and well-nourished.   HENT:   Head: Normocephalic.   Eyes: Conjunctivae and EOM are normal. Pupils are equal, round, and reactive to light.   Neck: Normal range of motion. Neck supple.   Cardiovascular: Normal rate, regular rhythm, normal heart sounds and intact distal pulses.    Pulmonary/Chest: Effort normal and breath sounds normal.   Abdominal: Soft. Bowel sounds are normal.   Musculoskeletal: Normal range of motion.   Neurological: She is alert and oriented to person, place, and time. She has normal reflexes.   Skin: Skin is warm and dry.         Laboratory        Diagnostic Results:        ASSESSMENT/PLAN:         Rectal polyp  Rectal pain  PLAN:Plan    Proctoscopy with excision of rectal polyp

## 2017-09-19 NOTE — DISCHARGE INSTRUCTIONS
Dieta:Resuma hart dieta normal, a menos que tenga nausea : tome bastante liquidos y coma comida blandas.    Actividades:  Si usted recibio sedantes o anestesia, usted se sentira con sueno por varias horas.Gradualmente resuma paco actividades normales.    Medicinas: Medicamento para dolor tomeselo cuando lo necesite y a rm se lo recetaron. Si le recetaron antibioticos,tomese todo el medicamento.    No maneje hart automovil, tome productos alcoholicos,o firme documentos legales por las siguiente 24 horas o mientra randy tomando medicamento para dolor.    LLAME A HART MEDICO:    Si comienza a sangrar ( CIERTA JULI SECA ES NORMAL).      Si la herida esta rosa maria,inflamada,le empieza a supurar, o fiebre mas de101.   Si comienza a toser,escupir juli,dolor de pecho. . Si la piernas de abajo de la rodillas le duelen, se sienten caliente, o se le inflaman .   Si tiene dolor o nausea/vomito persistente.    SI USTED TIENE CUALQUIER PROBLEMA O DIFFICULTADES MEDICA, POR FAVOR LLAME A HART MEDICO. SI USTED NO SE COMUNICA CON HART MEDICO RENATA SIGE TENIENDO SIMPTOMAS QUE REQUERIEN ASSISTENCIA MEDICA, POR FAVOR REGRESE AL DEPARTAMENTO DE EMERGENCIA MAS CERCANO.

## 2017-09-19 NOTE — ANESTHESIA POSTPROCEDURE EVALUATION
"Anesthesia Post Evaluation    Patient: Ghada Dave    Procedure(s) Performed: Procedure(s) (LRB):  PROCTOSCOPY WITH POLYPECTOMY (N/A)    Final Anesthesia Type: general  Patient location during evaluation: PACU  Patient participation: Yes- Able to Participate  Level of consciousness: awake and alert  Post-procedure vital signs: reviewed and stable  Pain management: adequate  Airway patency: patent  PONV status at discharge: No PONV  Anesthetic complications: no      Cardiovascular status: blood pressure returned to baseline  Respiratory status: unassisted  Hydration status: euvolemic  Follow-up not needed.        Visit Vitals  BP (!) 100/51   Pulse 82   Temp 36.2 °C (97.2 °F) (Skin)   Resp 18   Ht 5' 2" (1.575 m)   Wt 59 kg (130 lb)   SpO2 100%   Breastfeeding? No   BMI 23.78 kg/m²       Pain/Erin Score: Pain Assessment Performed: Yes (9/19/2017  7:15 AM)  Presence of Pain: denies (9/19/2017 10:20 AM)  Presence of Pain: denies (9/19/2017 10:20 AM)  Erin Score: 8 (9/19/2017 10:20 AM)      "

## 2017-09-19 NOTE — OP NOTE
DATE OF PROCEDURE:  09/19/2017    PREOPERATIVE DIAGNOSIS:  Rectal polyp.    POSTOPERATIVE DIAGNOSIS:  Rectal polyp.    OPERATION:  Proctosigmoidoscopy and excision of rectal polyp.    SURGEON:  Bharath Shaw M.D.    ANESTHESIA:  General.    PROCEDURE IN DETAIL:  After satisfactory general anesthesia, the patient in   supine position in lithotomy, area was prepped and draped in normal sterile   manner using Betadine solution.  Proctosigmoidoscopy was performed up to 21 cm   using a rigid scope.  No other abnormality was detected except the finding of   the rectal polyp at the dentate line.  The scope was removed and the anoscope   was then introduced.  The polyp was then grasped with the help of an Allis   clamp.  The base was treated with [facility] clamps, polyp was excised.  The area   was then oversewn using running 2-0 chromic catgut.  Hemostasis satisfactorily   maintained at a small area where polyps were cauterized using electrocautery.    Hemostasis was satisfactorily maintained.  The area was dressed using Gelfoam   with lidocaine.  Sterile gauze dressing was applied.  The patient tolerated   well, was sent to the Recovery Room in stable condition.  Estimated blood loss   was 20 mL.  Specimen removed was rectal polyp.          /laila [id number] blank(s)        MS/HN  dd: 09/19/2017 10:23:06 (CDT)  td: 09/19/2017 11:02:27 (CDT)  Doc ID   #[id number]  Job ID #[id number]    CC:     [id number]

## 2017-09-19 NOTE — TRANSFER OF CARE
"Anesthesia Transfer of Care Note    Patient: Ghada Dave    Procedure(s) Performed: Procedure(s) (LRB):  PROCTOSCOPY WITH POLYPECTOMY (N/A)    Patient location: PACU    Anesthesia Type: general    Transport from OR: Transported from OR on 6-10 L/min O2 by face mask with adequate spontaneous ventilation    Post pain: adequate analgesia    Post assessment: no apparent anesthetic complications    Post vital signs: stable    Level of consciousness: sedated    Nausea/Vomiting: no nausea/vomiting    Complications: none    Transfer of care protocol was followed      Last vitals:   Visit Vitals  /60 (BP Location: Left arm, Patient Position: Lying)   Pulse 67   Temp 36.7 °C (98.1 °F) (Oral)   Resp 20   Ht 5' 2" (1.575 m)   Wt 59 kg (130 lb)   SpO2 96%   Breastfeeding? No   BMI 23.78 kg/m²     "

## 2017-09-19 NOTE — BRIEF OP NOTE
Operative Note       Surgery Date: 9/19/2017     Surgeon(s) and Role:     * Bharath Shaw MD - Primary    Pre-op Diagnosis:  Anal or rectal pain [K62.89]  Rectal polyp [K62.1]  Chronic midline low back pain without sciatica [M54.5, G89.29]    Post-op Diagnosis: Post-Op Diagnosis Codes:     * Anal or rectal pain [K62.89]     * Rectal polyp [K62.1]     * Chronic midline low back pain without sciatica [M54.5, G89.29]    Procedure(s) (LRB):  PROCTOSCOPY WITH POLYPECTOMY (N/A)  proctosigmoidoscopy  Anesthesia: General    Procedure in Detail/Findings:  dictated    Estimated Blood Loss: 20  cc         Specimens     Start     Ordered    09/19/17 0952  Specimen to Pathology - Surgery  Once     Rectal polyp 09/19/17 0951        Implants: * No implants in log *           Disposition: PACU - hemodynamically stable.           Condition: Good    Attestation:  I performed the procedure.           Discharge Note    Admit Date: 9/19/2017    Attending Physician: Bharath Shaw MD     Discharge Physician: Bharath Shaw MD    Final Diagnosis: Post-Op Diagnosis Codes:     * Anal or rectal pain [K62.89]     * Rectal polyp [K62.1]     * Chronic midline low back pain without sciatica [M54.5, G89.29]    Disposition: Home or Self Care    Patient Instructions:   Current Discharge Medication List      START taking these medications    Details   hydrocodone-acetaminophen 5-325mg (NORCO) 5-325 mg per tablet Take 1 tablet by mouth every 6 (six) hours as needed for Pain.  Qty: 24 tablet, Refills: 0         CONTINUE these medications which have NOT CHANGED    Details   fluocinonide 0.05% (LIDEX) 0.05 % cream Apply topically 2 (two) times daily.  Qty: 120 g, Refills: 0    Associated Diagnoses: Eczema, unspecified type      aspirin (ECOTRIN) 81 MG EC tablet Take 1 tablet (81 mg total) by mouth once daily.  Refills: 0      atorvastatin (LIPITOR) 40 MG tablet Take 1 tablet (40 mg total) by mouth once daily.  Qty: 90 tablet, Refills:  1    Associated Diagnoses: Hyperlipidemia, unspecified hyperlipidemia type      b complex vitamins capsule Take 1 capsule by mouth once daily.      multivitamin capsule Take 1 capsule by mouth once daily.      omega-3 fatty acids-vitamin E (FISH OIL) 1,000 mg Cap Take 1 capsule by mouth.             Discharge Procedure Orders (must include Diet, Follow-up, Activity)    Discharge Procedure Orders (must include Diet, Follow-up, Activity)  Diet general     Activity as tolerated     Keep surgical extremity elevated     Lifting restrictions     Call MD for:  temperature >100.4     Call MD for:  persistent nausea and vomiting     Call MD for:  severe uncontrolled pain     Call MD for:  redness, tenderness, or signs of infection (pain, swelling, redness, odor or green/yellow discharge around incision site)     Leave dressing on - Keep it clean, dry, and intact until clinic visit          Discharge Date: 9/19/2017

## 2017-09-19 NOTE — PLAN OF CARE
vss nad  Inquired about pain and discomfort and sob and all denies in [place]ic. Will go to ops soon

## 2017-09-19 NOTE — PLAN OF CARE
Discharge criteria met,voicing desire to go home. Discharge instructions given to patient & friend; verbalized understanding. Discharge home via wheelchair in care of friend.

## 2017-09-22 ENCOUNTER — HOSPITAL ENCOUNTER (EMERGENCY)
Facility: HOSPITAL | Age: 82
Discharge: HOME | End: 2017-09-22
Attending: EMERGENCY MEDICINE
Payer: MEDICARE

## 2017-09-22 VITALS
DIASTOLIC BLOOD PRESSURE: 86 MMHG | BODY MASS INDEX: 26.43 KG/M2 | RESPIRATION RATE: 18 BRPM | WEIGHT: 140 LBS | OXYGEN SATURATION: 97 % | SYSTOLIC BLOOD PRESSURE: 137 MMHG | HEART RATE: 69 BPM | HEIGHT: 61 IN | TEMPERATURE: 98 F

## 2017-09-22 DIAGNOSIS — K59.00 CONSTIPATION, UNSPECIFIED CONSTIPATION TYPE: ICD-10-CM

## 2017-09-22 DIAGNOSIS — R11.10 VOMITING: ICD-10-CM

## 2017-09-22 DIAGNOSIS — R10.84 GENERALIZED ABDOMINAL PAIN: Primary | ICD-10-CM

## 2017-09-22 LAB
ALBUMIN SERPL BCP-MCNC: 3.9 G/DL
ALP SERPL-CCNC: 70 U/L
ALT SERPL W/O P-5'-P-CCNC: 14 U/L
ANION GAP SERPL CALC-SCNC: 14 MMOL/L
AST SERPL-CCNC: 26 U/L
BACTERIA #/AREA URNS HPF: ABNORMAL /HPF
BASOPHILS # BLD AUTO: 0.02 K/UL
BASOPHILS NFR BLD: 0.2 %
BILIRUB SERPL-MCNC: 0.5 MG/DL
BILIRUB UR QL STRIP: NEGATIVE
BUN SERPL-MCNC: 15 MG/DL
CALCIUM SERPL-MCNC: 9.5 MG/DL
CHLORIDE SERPL-SCNC: 102 MMOL/L
CLARITY UR: CLEAR
CO2 SERPL-SCNC: 21 MMOL/L
COLOR UR: YELLOW
CREAT SERPL-MCNC: 0.9 MG/DL
DIFFERENTIAL METHOD: ABNORMAL
EOSINOPHIL # BLD AUTO: 0 K/UL
EOSINOPHIL NFR BLD: 0.1 %
ERYTHROCYTE [DISTWIDTH] IN BLOOD BY AUTOMATED COUNT: 14.2 %
EST. GFR  (AFRICAN AMERICAN): >60 ML/MIN/1.73 M^2
EST. GFR  (NON AFRICAN AMERICAN): >60 ML/MIN/1.73 M^2
FLUAV AG SPEC QL IA: NEGATIVE
FLUBV AG SPEC QL IA: NEGATIVE
GLUCOSE SERPL-MCNC: 153 MG/DL
GLUCOSE UR QL STRIP: NEGATIVE
HCT VFR BLD AUTO: 36.6 %
HGB BLD-MCNC: 12.5 G/DL
HGB UR QL STRIP: ABNORMAL
KETONES UR QL STRIP: NEGATIVE
LEUKOCYTE ESTERASE UR QL STRIP: ABNORMAL
LIPASE SERPL-CCNC: 20 U/L
LYMPHOCYTES # BLD AUTO: 0.6 K/UL
LYMPHOCYTES NFR BLD: 5.5 %
MCH RBC QN AUTO: 29.3 PG
MCHC RBC AUTO-ENTMCNC: 34.2 G/DL
MCV RBC AUTO: 86 FL
MICROSCOPIC COMMENT: ABNORMAL
MONOCYTES # BLD AUTO: 0.5 K/UL
MONOCYTES NFR BLD: 4.8 %
NEUTROPHILS # BLD AUTO: 9.2 K/UL
NEUTROPHILS NFR BLD: 89.1 %
NITRITE UR QL STRIP: NEGATIVE
PH UR STRIP: 7 [PH] (ref 5–8)
PLATELET # BLD AUTO: 254 K/UL
PMV BLD AUTO: 9.2 FL
POTASSIUM SERPL-SCNC: 4 MMOL/L
PROT SERPL-MCNC: 7.6 G/DL
PROT UR QL STRIP: ABNORMAL
RBC # BLD AUTO: 4.26 M/UL
RBC #/AREA URNS HPF: 6 /HPF (ref 0–4)
SODIUM SERPL-SCNC: 137 MMOL/L
SP GR UR STRIP: 1.01 (ref 1–1.03)
SPECIMEN SOURCE: NORMAL
SQUAMOUS #/AREA URNS HPF: ABNORMAL /HPF
TROPONIN I SERPL DL<=0.01 NG/ML-MCNC: <0.006 NG/ML
URN SPEC COLLECT METH UR: ABNORMAL
UROBILINOGEN UR STRIP-ACNC: NEGATIVE EU/DL
WBC # BLD AUTO: 10.37 K/UL
WBC #/AREA URNS HPF: 10 /HPF (ref 0–5)

## 2017-09-22 PROCEDURE — 87400 INFLUENZA A/B EACH AG IA: CPT

## 2017-09-22 PROCEDURE — 25000003 PHARM REV CODE 250: Performed by: EMERGENCY MEDICINE

## 2017-09-22 PROCEDURE — 84484 ASSAY OF TROPONIN QUANT: CPT

## 2017-09-22 PROCEDURE — 81000 URINALYSIS NONAUTO W/SCOPE: CPT

## 2017-09-22 PROCEDURE — 99285 EMERGENCY DEPT VISIT HI MDM: CPT | Mod: 25

## 2017-09-22 PROCEDURE — 85025 COMPLETE CBC W/AUTO DIFF WBC: CPT

## 2017-09-22 PROCEDURE — 93010 ELECTROCARDIOGRAM REPORT: CPT | Mod: ,,, | Performed by: INTERNAL MEDICINE

## 2017-09-22 PROCEDURE — 96374 THER/PROPH/DIAG INJ IV PUSH: CPT

## 2017-09-22 PROCEDURE — 80053 COMPREHEN METABOLIC PANEL: CPT

## 2017-09-22 PROCEDURE — 96361 HYDRATE IV INFUSION ADD-ON: CPT

## 2017-09-22 PROCEDURE — 93005 ELECTROCARDIOGRAM TRACING: CPT

## 2017-09-22 PROCEDURE — 83690 ASSAY OF LIPASE: CPT

## 2017-09-22 PROCEDURE — 25500020 PHARM REV CODE 255: Performed by: EMERGENCY MEDICINE

## 2017-09-22 PROCEDURE — 96375 TX/PRO/DX INJ NEW DRUG ADDON: CPT

## 2017-09-22 PROCEDURE — 63600175 PHARM REV CODE 636 W HCPCS: Performed by: EMERGENCY MEDICINE

## 2017-09-22 RX ORDER — ONDANSETRON 2 MG/ML
4 INJECTION INTRAMUSCULAR; INTRAVENOUS
Status: COMPLETED | OUTPATIENT
Start: 2017-09-22 | End: 2017-09-22

## 2017-09-22 RX ORDER — DOCUSATE SODIUM 100 MG/1
100 CAPSULE, LIQUID FILLED ORAL 2 TIMES DAILY
Qty: 6 CAPSULE | Refills: 0 | Status: SHIPPED | OUTPATIENT
Start: 2017-09-22 | End: 2018-01-30

## 2017-09-22 RX ORDER — SYRING-NEEDL,DISP,INSUL,0.3 ML 29 G X1/2"
300 SYRINGE, EMPTY DISPOSABLE MISCELLANEOUS
Status: COMPLETED | OUTPATIENT
Start: 2017-09-22 | End: 2017-09-22

## 2017-09-22 RX ORDER — PSEUDOEPHEDRINE/ACETAMINOPHEN 30MG-500MG
100 TABLET ORAL
Status: COMPLETED | OUTPATIENT
Start: 2017-09-22 | End: 2017-09-22

## 2017-09-22 RX ORDER — MORPHINE SULFATE 2 MG/ML
4 INJECTION, SOLUTION INTRAMUSCULAR; INTRAVENOUS
Status: COMPLETED | OUTPATIENT
Start: 2017-09-22 | End: 2017-09-22

## 2017-09-22 RX ORDER — DEXTROMETHORPHAN POLISTIREX 30 MG/5 ML
1 SUSPENSION, EXTENDED RELEASE 12 HR ORAL ONCE
Qty: 1 ENEMA | Refills: 0 | Status: SHIPPED | OUTPATIENT
Start: 2017-09-22 | End: 2017-09-22

## 2017-09-22 RX ADMIN — Medication 100 ML: at 09:09

## 2017-09-22 RX ADMIN — MAGNESIUM CITRATE 300 ML: 1.75 LIQUID ORAL at 09:09

## 2017-09-22 RX ADMIN — ONDANSETRON 4 MG: 2 INJECTION INTRAMUSCULAR; INTRAVENOUS at 06:09

## 2017-09-22 RX ADMIN — IOHEXOL 75 ML: 350 INJECTION, SOLUTION INTRAVENOUS at 08:09

## 2017-09-22 RX ADMIN — SODIUM CHLORIDE 1000 ML: 0.9 INJECTION, SOLUTION INTRAVENOUS at 06:09

## 2017-09-22 RX ADMIN — SODIUM CHLORIDE 500 ML: 0.9 INJECTION, SOLUTION INTRAVENOUS at 09:09

## 2017-09-22 RX ADMIN — MORPHINE SULFATE 4 MG: 2 INJECTION, SOLUTION INTRAMUSCULAR; INTRAVENOUS at 06:09

## 2017-09-22 NOTE — ED NOTES
APPEARANCE: Alert, oriented and in no acute distress.  CARDIAC: Normal rate and rhythm, no murmur heard.   PERIPHERAL VASCULAR: peripheral pulses present. Normal cap refill. No edema. Warm to touch.    RESPIRATORY:Normal rate and effort, breath sounds clear bilaterally throughout chest. Respirations are equal and unlabored no obvious signs of distress.  MUSC: Full ROM. No bony tenderness or soft tissue tenderness. No obvious deformity.  SKIN: Skin is warm and dry, normal skin turgor, mucous membranes moist.  NEURO: 5/5 strength major flexors/extensors bilaterally. Sensory intact to light touch bilaterally. Elma coma scale: eyes open spontaneously-4, oriented & converses-5, obeys commands-6. No neurological abnormalities.   MENTAL STATUS: awake, alert and aware of environment.  EYE: PERRL, both eyes: pupils brisk and reactive to light. Normal size.  ENT: EARS: no obvious drainage. NOSE: no active bleeding.

## 2017-09-22 NOTE — ED NOTES
82 year old female presents to ed cc of abdominal pain/ emesis. Patient reports history through use of language line states colonoscopy performed Tuesday has had no bm before that and has not since colonoscopy. Patient tried to force bm by taking 5 laxative pills. Has had episodes of emesis denies fever/ chils

## 2017-09-23 NOTE — ED PROVIDER NOTES
Encounter Date: 9/22/2017       History     Chief Complaint   Patient presents with    Abdominal Pain     pt had rectal polyp removed tuesday, no bm for 2 days, + lower abd pain with vomiting. Dr Grewal did surgery    Vomiting     Patient is an 82-year-old female who presents today complaining of sudden onset lower abdominal cramping.  She recently had a colonoscopy performed 9/19/17 by Dr. Shaw.  States that she has not had BM since prior to procedure.  Pain does not radiate.  Nothing makes pain better or worse.  She has also had 3 episodes of vomiting today.  Nonbloody nonbilious.  She took 5 doses of Metamucil today in attempts to have a bowel movement but was unsuccessful.  Denies any fever, chills, hematuria, dysuria, vaginal symptoms.          Review of patient's allergies indicates:   Allergen Reactions    Keflex [cephalexin] Rash     Past Medical History:   Diagnosis Date    Abnormal Pap smear 2013    CAD (coronary artery disease) 06/2017     iFR of LAD and RCA performed confirmed nonobstructive disease.    Cataract     Colon polyps     Frequent urination     Hemorrhoid     High cholesterol     Hypertension     Poor circulation     Psoriasis     Varicose vein      Past Surgical History:   Procedure Laterality Date    CARDIAC CATHETERIZATION  06/2017     iFR of LAD and RCA performed confirmed nonobstructive disease.    CATARACT EXTRACTION W/  INTRAOCULAR LENS IMPLANT Bilateral     COLONOSCOPY N/A 8/28/2017    Procedure: COLONOSCOPY;  Surgeon: Bertram Myers MD;  Location: [facility];  Service: Endoscopy;  Laterality: N/A;    POLYPECTOMY      YAG Laser Capsulotomy Right 04/17/2017    Dr. Chavez     Family History   Problem Relation Age of Onset    Glaucoma Neg Hx     Macular degeneration Neg Hx     Strabismus Neg Hx     Retinal detachment Neg Hx     Amblyopia Neg Hx     Blindness Neg Hx     Cataracts Neg Hx     Prostate cancer Neg Hx     Kidney disease Neg Hx      Social  History   Substance Use Topics    Smoking status: Never Smoker    Smokeless tobacco: Never Used    Alcohol use No     Review of Systems   Constitutional: Negative for chills and fever.   HENT: Negative for ear pain, rhinorrhea and sore throat.    Eyes: Negative for photophobia and visual disturbance.   Respiratory: Negative for shortness of breath and wheezing.    Cardiovascular: Negative for chest pain and palpitations.   Gastrointestinal: Positive for abdominal pain, constipation, nausea and vomiting. Negative for diarrhea.   Genitourinary: Negative for dysuria, flank pain, hematuria, vaginal bleeding and vaginal discharge.   Musculoskeletal: Negative for back pain, joint swelling, myalgias and neck stiffness.   Skin: Negative for rash.   Neurological: Negative for dizziness, syncope, weakness and numbness.   Hematological: Does not bruise/bleed easily.   Psychiatric/Behavioral: Negative for confusion.       Physical Exam     Initial Vitals [09/22/17 1800]   BP Pulse Resp Temp SpO2   (!) 140/65 73 18 97.8 °F (36.6 °C) 99 %      MAP       90         Physical Exam    Nursing note and vitals reviewed.  Constitutional: She appears well-developed and well-nourished. She is not diaphoretic. She appears distressed.   Appears uncomfortable, writhing in pain on bed.    HENT:   Head: Normocephalic and atraumatic.   Nose: Nose normal.   Eyes: EOM are normal. Right eye exhibits no discharge. Left eye exhibits no discharge.   Neck: Normal range of motion. Neck supple.   Cardiovascular: Normal rate, regular rhythm, normal heart sounds and intact distal pulses. Exam reveals no gallop and no friction rub.    No murmur heard.  Pulmonary/Chest: Breath sounds normal. No respiratory distress. She has no wheezes. She has no rhonchi. She has no rales. She exhibits no tenderness.   Abdominal: Soft. Bowel sounds are normal. She exhibits no distension and no mass. There is tenderness. There is guarding. There is no rebound. No hernia.    + Exquisite tenderness and bilateral lower quadrants of abdomen with some voluntary guarding.   Musculoskeletal: Normal range of motion. She exhibits no tenderness.   Neurological: She is alert and oriented to person, place, and time. No cranial nerve deficit or sensory deficit.   Skin: Skin is warm and dry. Capillary refill takes less than 2 seconds.   Psychiatric: She has a normal mood and affect. Her behavior is normal. Thought content normal.         ED Course   Procedures  Labs Reviewed   CBC W/ AUTO DIFFERENTIAL - Abnormal; Notable for the following:        Result Value    Hematocrit 36.6 (*)     Gran # 9.2 (*)     Lymph # 0.6 (*)     Gran% 89.1 (*)     Lymph% 5.5 (*)     All other components within normal limits   TROPONIN I   COMPREHENSIVE METABOLIC PANEL   LIPASE   URINALYSIS     EKG Readings: (Independently Interpreted)   Initial Reading: No STEMI. Rhythm: Normal Sinus Rhythm. Heart Rate: 62. Ectopy: No Ectopy. ST Segments: Normal ST Segments. Other Impression: nonspecific T wave changes          Medical Decision Making:   Initial Assessment:   81 y/o female with abdominal pain after recent colonscopy.  Differential Diagnosis:   Bowel perforation, SBO, gastroenteritis, diverticulitis, constipation  Clinical Tests:   Lab Tests: Ordered and Reviewed  The following lab test(s) were unremarkable: CBC, CMP, Urinalysis and Troponin  Radiological Study: Ordered and Reviewed  Medical Tests: Ordered and Reviewed  ED Management:  CT shows constipation.  Patient given enemas in ED    Has had multiple BM  With some relief.  Discussed return precautions and recommendations to follow up with surgeon.   Patient understands and agrees.            Imaging Results          CT Abdomen Pelvis With Contrast (Final result)  Result time 09/22/17 20:17:36    Final result by Kanu Andres MD (09/22/17 20:17:36)                 Impression:      1.  Large amount of stool within the distal large bowel, proximal to which there  is extensive liquid stool, findings may reflect sequela of constipation, or related to diarrheal illness/enteritis as there are several fluid-filled nondilated small bowel loops proximally.  Correlation recommended.    2.  Large hiatal hernia.    3.  Sigmoid colonic diverticulosis without diverticulitis.    4.  Several additional stable findings above.      Electronically signed by: CAROLINA ROUSSEAU MD  Date:     09/22/17  Time:    20:17              Narrative:    CT abdomen and pelvis with contrast    History: Abdominal pain    Comparison: 4/28/2016    Technique:  Axial images of the abdomen and pelvis were obtained at 5 mm intervals following administration of 75 cc Omni 350 IV contrast.  Coronal, sagittal, and delayed images were reviewed.    Findings:  Images of the lower thorax are remarkable for bilateral dependent atelectasis/scarring.  There is a large hiatal hernia.    There are at least 3 subcentimeter low attenuating lesions within the left and right hepatic lobes, too small to characterize lesion.  A low attenuating lesion within the posterior aspect of the inferior right hepatic lobe measures approximately 3.7 cm, attenuation of which is most compatible with a cyst, and similar in appearance to the previous exam.  The spleen, pancreas and adrenal glands are grossly unremarkable noting pancreatic atrophy.  The gallbladder is mildly distended without wall thickening or intrasinus extrahepatic biliary ductal dilation.  No filling defect appreciated within the common duct.  The portal vein, splenic vein, SMV, celiac axis and SMA are patent.  Scattered shotty periaortic and paracaval lymph nodes are noted.  There is atherosclerotic calcification of the aorta and its branches.    The kidneys enhance symmetrically and excrete contrast appropriately noting ptotic positioning of the right kidney.  No nephrolithiasis.  There are right parapelvic cysts, without ronda right hydronephrosis.  There is a punctate low  attenuating lesion within the interpolar region of the left kidney versus scarring, too small for characterization.  The bilateral ureters cannot be followed in their entirety to the urinary bladder however no definite calculi along their visualized courses and no secondary signs of obstructive uropathy.  The urinary bladder is unremarkable.  The uterus and adnexa are grossly unremarkable.  No significant free fluid in the pelvis.    There is moderate stool in the colon.  There is extensive diverticulosis of the sigmoid colon primarily without inflammation or wall thickening.  There is primarily liquid stool within the ascending colon, transverse colon, descending colon, and cecum concerning for diarrheal illness or possibly as sequela of constipation noting fecal content within the terminal ileum.  The distal small bowel is decompressed.  There are several fluid-filled nondilated small bowel loops proximally.  The appendix is unremarkable.  No significant inguinal lymphadenopathy.    There is osteopenia.  Degenerative changes are noted of the sacroiliac joints and lower lumbar spine without focal osseous destructive process.    There is grade 1 anterolisthesis of L1 on T12.                             X-Ray Abdomen Flat And Erect (Final result)  Result time 09/22/17 19:12:55    Final result by Hadley Tobar Jr., MD (09/22/17 19:12:55)                 Impression:     See above      Electronically signed by: HADLEY OTBAR MD  Date:     09/22/17  Time:    19:12              Narrative:    Flat and erect of the abdomen.  There is scattered bowel gas in both small and large bowel.  Significant scoliosis with degenerative changes in the spine.  Air-fluid levels in the right upper quadrant more likely within the colon.                                       ED Course as of Sep 22 2215   Fri Sep 22, 2017   1849 BP: (!) 140/65 [LD]   1850 Temp: 97.8 °F (36.6 °C) [LD]   1850 Pulse: 73 [LD]   1850 Resp: 18 [LD]   1850 SpO2: 99  % [LD]   1952 Patient much more comfortable.  States that pain has decreased.  [LD]   2031 Patient states that pain is still present.  Repeat abdominal exam patient still has some mild TTP.   [LD]   2033 Dr Shaw recommends mineral oil enema.  [LD]   2202 Patient has had multiple BMs in ED    She appears much more comfortable.  Tolerating  PO well  [LD]      ED Course User Index  [LD] Fanta Jensen MD     Clinical Impression:   The primary encounter diagnosis was Generalized abdominal pain. Diagnoses of Vomiting and Constipation, unspecified constipation type were also pertinent to this visit.                           Fanta Jensen MD  09/22/17 [id number]

## 2017-09-23 NOTE — ED NOTES
Pt. wheeled to ED restroom for urine specimen with verbal instructions. Pt. verbalizes understanding. Pt. Unable to urinate at this time.

## 2017-09-23 NOTE — ED NOTES
at bedside for translation. Bedside commode placed in room. Informed pt of enema. Pt. and daughter verbalize understanding.

## 2017-10-24 ENCOUNTER — OFFICE VISIT (OUTPATIENT)
Dept: INTERNAL MEDICINE | Facility: CLINIC | Age: 82
End: 2017-10-24
Payer: MEDICARE

## 2017-10-24 ENCOUNTER — TELEPHONE (OUTPATIENT)
Dept: INTERNAL MEDICINE | Facility: CLINIC | Age: 82
End: 2017-10-24

## 2017-10-24 ENCOUNTER — HOSPITAL ENCOUNTER (OUTPATIENT)
Dept: RADIOLOGY | Facility: HOSPITAL | Age: 82
Discharge: HOME | End: 2017-10-24
Attending: INTERNAL MEDICINE
Payer: MEDICARE

## 2017-10-24 VITALS
BODY MASS INDEX: 24.52 KG/M2 | OXYGEN SATURATION: 97 % | SYSTOLIC BLOOD PRESSURE: 126 MMHG | HEIGHT: 61 IN | DIASTOLIC BLOOD PRESSURE: 60 MMHG | WEIGHT: 129.88 LBS | HEART RATE: 62 BPM

## 2017-10-24 DIAGNOSIS — S09.93XA INJURY OF MANDIBLE, INITIAL ENCOUNTER: ICD-10-CM

## 2017-10-24 DIAGNOSIS — C21.0 ANAL CANCER: Primary | ICD-10-CM

## 2017-10-24 PROCEDURE — 70100 X-RAY EXAM OF JAW <4VIEWS: CPT | Mod: TC,PO

## 2017-10-24 PROCEDURE — 99214 OFFICE O/P EST MOD 30 MIN: CPT | Mod: S$GLB,,, | Performed by: INTERNAL MEDICINE

## 2017-10-24 PROCEDURE — 99999 PR PBB SHADOW E&M-EST. PATIENT-LVL III: CPT | Mod: PBBFAC,,, | Performed by: INTERNAL MEDICINE

## 2017-10-24 PROCEDURE — 70100 X-RAY EXAM OF JAW <4VIEWS: CPT | Mod: 26,,, | Performed by: RADIOLOGY

## 2017-10-24 PROCEDURE — 99499 UNLISTED E&M SERVICE: CPT | Mod: S$PBB,,, | Performed by: INTERNAL MEDICINE

## 2017-10-24 NOTE — TELEPHONE ENCOUNTER
----- Message from Jacob Sims MD sent at 10/24/2017  2:41 PM CDT -----  I'll present her for tumor board discussion this Friday. Based on the discussion I can decide the next best step. Will keep you posted.  Thanks, Jacob    ----- Message -----  From: Lele King MD  Sent: 10/24/2017  12:09 PM  To: Jacob Sims MD    Patient follows in primary care clinic with me.  She had rectal polypectomy revealing neoplastic changes.  Not sure if the patient needs more urgent follow-up.  Pathology report indicates that margin may not have been entirely clear.  Wondering if you can review pathology and determine what might be the best next step or if you need to see the patient sooner than what was scheduled today.

## 2017-10-24 NOTE — PROGRESS NOTES
Portions of this note are generated with voice recognition software. Typographical errors may exist.     SUBJECTIVE:    This is a/an 82 y.o. female here for primary care visit for  Chief Complaint   Patient presents with    Fall     10/20/17 in the street    Pain     generalized     Patient states that she had a ground-level fall due to mechanical reasons last Friday.  She was walking on the sidewalk after nightfall.  States that many places in the sidewalk were broken and uneven and this is the reason that she had a fall.  Fall was not high impact.  Areas of impact included outstretched hands and bilateral knees and the right portion of her face.  States that she had bruising and pain afterward but this was not severe.  Because symptoms were not severe and she had no change in vision or neurologic symptoms she went along her way.  Today she states that the areas of lingering tenderness include her chin.  Otherwise symptoms are getting much better.    States that she had abdominal and rectal pain following biopsy but states that these symptoms resolved completely.  Denies ongoing problems with constipation or painful bowel movements.    States that she expected to receive communication from surgeon office regarding the results of her pathology.  States that she has not received communication about the results.  She is worried about what next steps she needs to take.    Medications Reviewed and Updated    Past medical, family, and social histories were reviewed and updated.    Review of Systems negative unless otherwise noted in history of present illness-  ROS    General ROS: negative  Psychological ROS: negative  ENT ROS: negative  Allergy and Immunology ROS: negative  Gastrointestinal ROS: negative  Genito-Urinary ROS: negative  Musculoskeletal ROS: negative  Neurological ROS: negative  Dermatological ROS: negative         Allergic:    Review of patient's allergies indicates:   Allergen Reactions    Keflex  [cephalexin] Rash       OBJECTIVE:  BP: 126/60 Pulse: 62    Wt Readings from Last 3 Encounters:   10/24/17 58.9 kg (129 lb 13.6 oz)   09/22/17 63.5 kg (140 lb)   09/19/17 59 kg (130 lb)    Body mass index is 24.54 kg/m².  Previous Blood Pressure Readings :   BP Readings from Last 3 Encounters:   10/24/17 126/60   09/22/17 137/86   09/19/17 115/63       Physical Exam    GEN: No apparent distress  HEENT: sclera non-icteric, conjunctiva clear  CV: no peripheral edema  PULM: breathing non-labored  ABD: non, protuberant abdomen.  PSYCH: appropriate affect  MSK: able to rise from chair without assistance.  Resolving ecchymosis along the jaw line anteriorly  SKIN: normal skin turgor.  Areas of mild bruising bilateral knees.  Resolving.    Pertinent Labs Reviewed       ASSESSMENT/PLAN:    Anal cancer.Condition not optimally controlled.  Recommend prompt oncologic evaluation.  Patient voices understanding.  -     Ambulatory referral to Hematology / Oncology    Injury of mandible, initial encounter.Condition stable.  Counseling on self-care measures. Plan to monitor clinically. Continue current medical plan.   -     X-Ray Mandible Less Than 4 Views; Future; Expected date: 10/24/2017          Future Appointments  Date Time Provider Department Center   11/29/2017 2:30 PM Jacob Sims MD [facility] HEM ONC [facility]   2/22/2018 1:00 PM Lele King MD [facility]  10/24/2017  12:34 PM

## 2017-10-24 NOTE — TELEPHONE ENCOUNTER
----- Message from Bharath Shaw MD sent at 10/24/2017  9:20 AM CDT -----  She has squamous intraepithelial dysplasia, I am planning to sent her to oncologist . Thanks for your refferal.    Colin  ----- Message -----  From: Lele King MD  Sent: 10/24/2017   8:11 AM  To: Bharath Shaw MD    Thanks for seeing my patient and performing polypectomy. I am not sure if your follow up on the pathology will appear in the [facility] EPIC record. Can you please update me on the next steps that have been taken since her pathology came back about a month ago? Patient often has trouble understanding medical information due to cultural and language barriers and often in my office because we are fluent in [place] we work to reassure this patient so she is more engaged in care.

## 2017-10-27 ENCOUNTER — DOCUMENTATION ONLY (OUTPATIENT)
Dept: HEMATOLOGY/ONCOLOGY | Facility: CLINIC | Age: 82
End: 2017-10-27

## 2017-10-27 NOTE — TREATMENT PLAN
[facility]  MULTIDISCIPLINARY GENERAL TUMOR BOARD   10/27/2017    ATTENDEES:   Surgery - REBECA Whitehead MD & David Heath MD  Interventional Radiology - not present  Pathology - Nita Verde MD  Oncology - Jacob Sims MD  Gastroenterology - not present  Research - Tiffany Mcclure MS    PRESENTER:   Jacob Sims MD    REASON FOR PRESENTATION:  Pathology Review    PATIENT STATUS:  New Patient    TUMOR SITE (Primary & Mets):  Rectum    PATIENT SUMMARY:  Past Medical History:   Diagnosis Date    Abnormal Pap smear 2013    CAD (coronary artery disease) 06/2017     iFR of LAD and RCA performed confirmed nonobstructive disease.    Cataract     Colon polyps     Frequent urination     Hemorrhoid     High cholesterol     Hypertension     Poor circulation     Psoriasis     Varicose vein        Past Surgical History:   Procedure Laterality Date    CARDIAC CATHETERIZATION  06/2017     iFR of LAD and RCA performed confirmed nonobstructive disease.    CATARACT EXTRACTION W/  INTRAOCULAR LENS IMPLANT Bilateral     COLONOSCOPY N/A 8/28/2017    Procedure: COLONOSCOPY;  Surgeon: Bertram Myers MD;  Location: [facility];  Service: Endoscopy;  Laterality: N/A;    POLYPECTOMY      YAG Laser Capsulotomy Right 04/17/2017    Dr. Chavez     ________________________________________________________________    DISCUSSION:  Per Dr. Verde, low grade dysplasia close to the inked margin but no high grade dysplasia. Discussed options for observation.       BOARD RECOMMENDATIONS:   1. Follow up in 3 months with Dr. hSaw and repeat proctoscopy or high res anoscopy

## 2017-10-30 ENCOUNTER — TELEPHONE (OUTPATIENT)
Dept: INTERNAL MEDICINE | Facility: CLINIC | Age: 82
End: 2017-10-30

## 2017-11-02 ENCOUNTER — INITIAL CONSULT (OUTPATIENT)
Dept: HEMATOLOGY/ONCOLOGY | Facility: CLINIC | Age: 82
End: 2017-11-02
Payer: MEDICARE

## 2017-11-02 VITALS
WEIGHT: 129.88 LBS | DIASTOLIC BLOOD PRESSURE: 65 MMHG | HEART RATE: 82 BPM | HEIGHT: 60 IN | BODY MASS INDEX: 25.5 KG/M2 | TEMPERATURE: 98 F | OXYGEN SATURATION: 97 % | SYSTOLIC BLOOD PRESSURE: 127 MMHG

## 2017-11-02 DIAGNOSIS — K62.89 ANAL OR RECTAL PAIN: Primary | ICD-10-CM

## 2017-11-02 DIAGNOSIS — K62.82: ICD-10-CM

## 2017-11-02 PROCEDURE — 99215 OFFICE O/P EST HI 40 MIN: CPT | Mod: S$GLB,,, | Performed by: INTERNAL MEDICINE

## 2017-11-02 PROCEDURE — 99999 PR PBB SHADOW E&M-EST. PATIENT-LVL III: CPT | Mod: PBBFAC,,, | Performed by: INTERNAL MEDICINE

## 2017-11-02 NOTE — PROGRESS NOTES
Subjective:       Patient ID: Ghada Dave is a 82 y.o. female.    Chief Complaint: No chief complaint on file.    HPI This is a pleasant 82-year-old English-speaking female referred by Dr. King for dysplasia involving an anal polyp.    She has had anal warts for a few years now and has been undergoing regular procedure.  She saw Dr. Pisano in the past.  Then she saw Dr. Myers and underwent a colonoscopy on 08/28/2017 that revealed a polyp at the dentate line.  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  So she saw Dr. Shaw and underwent a proctoscopy with polypectomy on 09/19/2017 that revealed a high-grade squamous intraepithelial lesion and an anal intraepithelial lesion-1 that is present and focally approaching the inked margin.  She is hence referred here for further evaluation.    She speaks English.  Ms. Goldman is our  and she is available to interpret the conversation between me and the patient.    She has been having this chronic anal discomfort and goes to the bathroom 4-5 times a day for a bowel movement and it is accompanied occasionally by a blood-tinged bowel movement.    Review of Systems   Constitutional: Negative for appetite change, fatigue, fever and unexpected weight change.   HENT: Negative for facial swelling and nosebleeds.    Eyes: Negative for photophobia and pain.   Respiratory: Negative for cough and shortness of breath.    Cardiovascular: Negative for chest pain and leg swelling.   Gastrointestinal: Positive for anal bleeding and rectal pain. Negative for abdominal pain, blood in stool and nausea.   Genitourinary: Negative for dysuria and hematuria.   Musculoskeletal: Positive for arthralgias.   Skin: Negative for color change and rash.   Neurological: Negative for seizures, weakness and headaches.   Hematological: Negative  for adenopathy. Does not bruise/bleed easily.   All other systems reviewed and are negative.        Objective:      Physical Exam   Constitutional: She is oriented to person, place, and time. She appears well-developed and well-nourished. No distress.   HENT:   Head: Normocephalic and atraumatic.   Right Ear: Tympanic membrane, external ear and ear canal normal.   Left Ear: Tympanic membrane, external ear and ear canal normal.   Nose: Nose normal. No mucosal edema, sinus tenderness, septal deviation or nasal septal hematoma. No epistaxis. Right sinus exhibits no maxillary sinus tenderness and no frontal sinus tenderness. Left sinus exhibits no maxillary sinus tenderness and no frontal sinus tenderness.   Mouth/Throat: Oropharynx is clear and moist and mucous membranes are normal. Mucous membranes are not cyanotic. No oral lesions. No dental caries. No oropharyngeal exudate.   Hard and soft palate, tongue and posterior pharyngeal wall unremarkable   Eyes: Conjunctivae and lids are normal. No scleral icterus.   Neck: Trachea normal. Neck supple. No tracheal tenderness present. No tracheal deviation present. No thyroid mass (thyroid is non-tender) present.   Cardiovascular: Normal rate, regular rhythm, normal heart sounds, intact distal pulses and normal pulses.  Exam reveals no gallop.    No murmur heard.  No edema   Pulmonary/Chest: Effort normal and breath sounds normal. No accessory muscle usage or stridor. No respiratory distress. She has no decreased breath sounds. She has no wheezes. She has no rhonchi. She has no rales.   Abdominal: Soft. Bowel sounds are normal. She exhibits no distension and no mass. There is no hepatosplenomegaly, splenomegaly or hepatomegaly. There is no tenderness.   Musculoskeletal: She exhibits no edema or tenderness.   Lymphadenopathy:        Head (right side): No submental and no submandibular adenopathy present.        Head (left side): No submental and no submandibular adenopathy  present.     She has no cervical adenopathy.     She has no axillary adenopathy.        Right: No supraclavicular adenopathy present.        Left: No supraclavicular adenopathy present.   Neurological: She is alert and oriented to person, place, and time. She has normal strength. She is not disoriented. No cranial nerve deficit or sensory deficit.   Skin: Skin is warm and intact. No petechiae and no rash noted. She is not diaphoretic. No cyanosis. No pallor. Nails show no clubbing.   Psychiatric: She has a normal mood and affect. Her speech is normal and behavior is normal. Judgment and thought content normal. Her mood appears not anxious. She expresses no homicidal and no suicidal ideation.         Assessment:       1. Anal or rectal pain    2. 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    Discussed with the Pathology, Dr. Verde.  We also presented her case at the Multidisciplinary Tumor Board this past Friday.    The high-grade squamous intraepithelial lesion AIN-2 is in the center of the polyp and AIN-1 is present at the periphery and approaching the inked margins, but the margins were negative and hence at this point she does not require a reexcision.  This was the consensus of the tumor conference.  The recommendation is to repeat an anoscopy in a couple of months, which would be sometime in January and depending on the findings on anoscopy, further surveillance can be done.    I explained all these aspects to her in detail through Ms. Goldman.  I told her she does not have cancer.  I told her she has a premalignant lesion that has been excised.  I recommend she follow up with Dr. Shaw in January for a repeat proctoscopic examination and evaluation.    I answered all her questions.  Return to clinic as needed.

## 2017-12-07 ENCOUNTER — OFFICE VISIT (OUTPATIENT)
Dept: OPTOMETRY | Facility: CLINIC | Age: 82
End: 2017-12-07
Payer: MEDICARE

## 2017-12-07 DIAGNOSIS — Z96.1 PSEUDOPHAKIA OF BOTH EYES: Primary | ICD-10-CM

## 2017-12-07 DIAGNOSIS — Z13.5 GLAUCOMA SCREENING: ICD-10-CM

## 2017-12-07 DIAGNOSIS — H04.123 DRY EYES, BILATERAL: ICD-10-CM

## 2017-12-07 PROCEDURE — 92014 COMPRE OPH EXAM EST PT 1/>: CPT | Mod: S$GLB,,, | Performed by: OPTOMETRIST

## 2017-12-07 PROCEDURE — 99999 PR PBB SHADOW E&M-EST. PATIENT-LVL II: CPT | Mod: PBBFAC,,, | Performed by: OPTOMETRIST

## 2017-12-07 NOTE — PROGRESS NOTES
HPI     DLS: 5/8/2017 with Dr. Chavez  6 month post yag od   Pt states va is stable. Denies f/f  +Itching- drops does not help  Pt states she has used Refresh when eye is itching but doesn't last long.     Sp PC IOL OU/ YAG OU--last YAG 6 months ago    Last edited by Chong Shah, OD on 12/7/2017  3:22 PM. (History)        ROS     Positive for: Eyes (cat surgery/YAG OU)    Negative for: Constitutional, Gastrointestinal, Neurological, Skin,   Genitourinary, Musculoskeletal, HENT, Endocrine, Cardiovascular,   Respiratory, Psychiatric, Allergic/Imm, Heme/Lymph    Last edited by Chong Shah, OD on 12/7/2017  3:22 PM. (History)        Assessment /Plan     For exam results, see Encounter Report.    Pseudophakia of both eyes    Dry eyes, bilateral    Glaucoma screening    ESL pt--had  present      1. Sp pciol/YAG OU.  spex 6 months old--pt happy  2. Dry eye sx--needs to use ATs QID OU    PLAN:    rtc 1 yr

## 2017-12-14 ENCOUNTER — OFFICE VISIT (OUTPATIENT)
Dept: INTERNAL MEDICINE | Facility: CLINIC | Age: 82
End: 2017-12-14
Payer: MEDICARE

## 2017-12-14 VITALS
SYSTOLIC BLOOD PRESSURE: 124 MMHG | HEIGHT: 60 IN | WEIGHT: 129.44 LBS | TEMPERATURE: 98 F | BODY MASS INDEX: 25.41 KG/M2 | DIASTOLIC BLOOD PRESSURE: 60 MMHG | OXYGEN SATURATION: 97 % | HEART RATE: 75 BPM

## 2017-12-14 DIAGNOSIS — J01.90 ACUTE BACTERIAL RHINOSINUSITIS: Primary | ICD-10-CM

## 2017-12-14 DIAGNOSIS — B96.89 ACUTE BACTERIAL RHINOSINUSITIS: Primary | ICD-10-CM

## 2017-12-14 DIAGNOSIS — R30.0 DYSURIA: ICD-10-CM

## 2017-12-14 DIAGNOSIS — F41.9 INSOMNIA SECONDARY TO ANXIETY: ICD-10-CM

## 2017-12-14 DIAGNOSIS — F51.05 INSOMNIA SECONDARY TO ANXIETY: ICD-10-CM

## 2017-12-14 PROCEDURE — 99999 PR PBB SHADOW E&M-EST. PATIENT-LVL III: CPT | Mod: PBBFAC,,, | Performed by: INTERNAL MEDICINE

## 2017-12-14 PROCEDURE — 87086 URINE CULTURE/COLONY COUNT: CPT

## 2017-12-14 PROCEDURE — 99213 OFFICE O/P EST LOW 20 MIN: CPT | Mod: S$GLB,,, | Performed by: INTERNAL MEDICINE

## 2017-12-14 RX ORDER — FLUTICASONE PROPIONATE 50 MCG
2 SPRAY, SUSPENSION (ML) NASAL 2 TIMES DAILY
Qty: 16 G | Refills: 0 | Status: SHIPPED | OUTPATIENT
Start: 2017-12-14 | End: 2018-01-04

## 2017-12-14 RX ORDER — AZITHROMYCIN 250 MG/1
TABLET, FILM COATED ORAL
Qty: 6 TABLET | Refills: 0 | Status: SHIPPED | OUTPATIENT
Start: 2017-12-14 | End: 2017-12-19

## 2017-12-14 NOTE — PATIENT INSTRUCTIONS
Si los síntomas de sinusitis no mejoraron en los siguientes 4-5 días a pesar de usar Flonase dos veces al día y completar el antibiótico, comuníquese con la clínica para recibir recomendaciones adicionales.        Rinosinusitis bacteriana aguda    La rinosinusitis bacteriana aguda (ABRS, por paco siglas en inglés) es trinh infección de la cavidad nasal y los senos paranasales. Es causada por bacterias. Aguda significa que ha tenido síntomas por menos de 12 semanas.  ¿Qué son los senos paranasales?  La cavidad nasal es el espacio brittney lleno de aire que se encuentra detrás de hart nariz. Los senos paranasales son un sirisha de espacios formados por los huesos de hart moses. Se conectan con hart cavidad nasal. La rinosinusitis bacteriana aguda hace que el tejido que recubre esos espacios se inflame. Es posible que la mucosidad no pueda drenar con normalidad. Eso provoca dolor en la moses y otros síntomas.  ¿Cuáles son las causas de la rinosinusitis bacteriana aguda?  La rinosinusitis bacteriana aguda suele aparecer después de trinh infección respiratoria superior provocada por un virus. Luego, las bacterias infectan el recubrimiento de hart cavidad nasal y paco senos paranasales. Graciela también puede tener rinosinusitis bacteriana aguda si tiene:  · Alergias nasales  · Inflamación y congestión nasal de dipti plazo no provocada por alergias  · Bloqueo en la nariz  Síntomas de la rinosinusitis bacteriana aguda  Los síntomas de la rinosinusitis bacteriana aguda pueden ser diferentes para cada persona, y pueden incluir los siguientes:  · Congestión nasal  · Goteo nasal  · Líquido que va desde la nariz hacia la garganta (goteo nasal posterior)  · Dolor de dennis  · Tos  · Dolor en los senos paranasales  · Líquido espeso y de color que sale de la nariz (mucosidad)  · Fiebre  Diagnóstico de la rinosinusitis bacteriana aguda  Pueden diagnosticarle rinosinusitis bacteriana aguda si tuvo trinh infección respiratoria superior, rm un resfriado, y  tos por más de entre 10 y 14 días. Hart proveedor de atención médica le preguntará sobre paco síntomas y hart historia clínica. El proveedor revisará paco signos vitales, incluida hart temperatura. Le harán un examen físico. Hart proveedor de atención médica le revisará los oídos, la nariz y la garganta. Probablemente, no necesitará que le ivana ninguna prueba. Si la rinosinusitis bacteriana aguda regresa, puede que le ivana un cultivo u otras pruebas o análisis.  Tratamiento de la rinosinusitis bacteriana aguda  El tratamiento puede incluir lo siguiente:  · Medicamentos antibióticos. Para síntomas que billy al menos entre 10 y 14 días.  · Medicamentos corticosteroides nasales. Gotas o espray para la nariz para aliviar la inflamación y la congestión.  · Analgésicos (medicamentos para aliviar el dolor) de venta noe. Para aliviar el dolor y la presión en los senos paranasales.  · Medicamentos descongestivos nasales. Espray o gotas que pueden ayudar a aliviar la congestión. No los use más que unos pocos días.  · Lavado con sal (irrigación salina). [facility] puede ayudar a aflojar la mucosidad.  Posibles complicaciones de la rinosinusitis bacteriana aguda  La rinosinusitis bacteriana aguda puede regresar o convertirse en trinh afección de dipti plazo (crónica).  En algunos casos raros, la rinosinusitis bacteriana aguda puede causar complicaciones, tales rm las siguientes:  · Tejido inflamado alrededor del cerebro y la médula lagos (meningitis)  · Tejido inflamado alrededor de los ojos (celulitis orbitaria)  · Huesos inflamados alrededor de los senos paranasales (osteítis)  Es posible que estos problemas deban tratarse en el hospital con un medicamento antibiótico intravenoso (IV) o con cirugía.     Cuándo debe llamar a hart proveedor de atención médica  Llame a hart proveedor de atención médica si le sucede cualquiera de las siguientes situaciones:  · Los síntomas no mejoran, o empeoran  · Los síntomas no mejoran después de entre alex  y wendi días de antibióticos  · Dificultades para skyler  · Inflamación alrededor de los ojos  · Confusión o dificultades para permanecer despierto   Date Last Reviewed: 3/3/2015  © 3139-8538 [employer]. [address] [place] [id number]. All rights reserved. This information is not intended as a substitute for professional medical care. Always follow your healthcare professional's instructions.

## 2017-12-14 NOTE — PROGRESS NOTES
"Portions of this note are generated with voice recognition software. Typographical errors may exist.     SUBJECTIVE:    This is a/an 82 y.o. female here for primary care visit for  Chief Complaint   Patient presents with    Cough    Sinus Problem       Patient states that she wants a new PCP because current PCP will not prescribe her xanax when "all her previous doctors gave this to me." patient voices that trazodone 50 mg was too sedating and she felt "drugged out." Patient understands that this medication is not recommend for elderly patients as first line therapy for chronic insomnia unless all other alternatives have been tried and have failed and the patient has not tried much in terms of alternative therapies. Patient is unwilling to try 1/2 tablet of 50 mg trazodone to see if this addresses her concern of excessive sedation. Patient is unwilling to try other medicine that is less sedating and not habit forming. Based on this the patient wants to seek care elsewhere.     Patient states that she's also been having sinusitis symptoms for more than 7 days.  States that congestion is worsening and she started to have significant sinus headaches.  No constitutional symptoms.  Mucus is still thick and purulent.  Self-care measures have been minimal.  No recent antibiotics.  No history of C. Difficile    Patient also states that she's been having some suprapubic discomfort and urinary frequency going on for the last couple days.  Denies any abnormal vaginal discharge.    Medications Reviewed and Updated    Past medical, family, and social histories were reviewed and updated.    Review of Systems negative unless otherwise noted in history of present illness-  ROS    General ROS: negative  Psychological ROS: negative  ENT ROS: negative  Allergy and Immunology ROS: negative  Gastrointestinal ROS: negative  Musculoskeletal ROS: negative  Neurological ROS: negative        Allergic:    Review of patient's allergies " indicates:   Allergen Reactions    Keflex [cephalexin] Rash       OBJECTIVE:  BP: 124/60 Pulse: 75 Temp: 98.2 °F (36.8 °C)  Wt Readings from Last 3 Encounters:   12/14/17 58.7 kg (129 lb 6.6 oz)   11/02/17 58.9 kg (129 lb 13.6 oz)   10/30/17 59.4 kg (131 lb)    Body mass index is 25.27 kg/m².  Previous Blood Pressure Readings :   BP Readings from Last 3 Encounters:   12/14/17 124/60   11/02/17 127/65   10/30/17 (!) 119/55       Physical Exam    GEN: No apparent distress  HEENT: sclera non-icteric, conjunctiva clear sinus pain on palpation bilateral frontal sinuses.  Purulent nasal discharge.  Edematous turbinates.  CV: no peripheral edema  PULM: breathing non-labored  ABD: non, protuberant abdomen.  PSYCH: anxious affect  MSK: able to rise from chair without assistance  SKIN: normal skin turgor    Pertinent Labs Reviewed       ASSESSMENT/PLAN:    Acute bacterial rhinosinusitis.Condition not optimally controlled. Detailed counseling on self care measures. Plan to monitor clinically in addition to plan below.   -     azithromycin (Z-RICARDO) 250 MG tablet; Take 2 tablets by mouth on day 1; Take 1 tablet by mouth on days 2-5  Dispense: 6 tablet; Refill: 0  -     fluticasone (FLONASE) 50 mcg/actuation nasal spray; 2 sprays by Each Nare route 2 (two) times daily.  Dispense: 16 g; Refill: 0    Dysuria.Further evaluation warranted.  Recommendations as below.  -     Urine culture  -     URINALYSIS    Insomnia secondary to anxiety.Condition not optimally controlled.  Patient declines definitive treatment plan.  Plan to readdress at every clinic visit.    Future Appointments  Date Time Provider Department Center   1/30/2018 2:00 PM Bharath Shaw MD [facility]   2/22/2018 1:00 PM Lele King MD [facility]       Lele King  12/14/2017  9:24 AM

## 2017-12-15 LAB — BACTERIA UR CULT: NORMAL

## 2018-01-16 ENCOUNTER — TELEPHONE (OUTPATIENT)
Dept: FAMILY MEDICINE | Facility: CLINIC | Age: 83
End: 2018-01-16

## 2018-01-16 NOTE — TELEPHONE ENCOUNTER
----- Message from Day Alva sent at 1/15/2018  3:50 PM CST -----  Contact: self, [phone]  Patient requests to establish care with Dr. Corrales and requests to be seen as soon as possible due to needing an x-ray exam done. Please advise.

## 2018-01-16 NOTE — TELEPHONE ENCOUNTER
Spoke with pt inform her no new pt appt is available until end of feb and med march. Pt refuses to wait till than. Pt hang the phone no further adice can be giving.

## 2018-01-23 ENCOUNTER — HOSPITAL ENCOUNTER (EMERGENCY)
Facility: HOSPITAL | Age: 83
Discharge: HOME | End: 2018-01-23
Attending: EMERGENCY MEDICINE
Payer: MEDICARE

## 2018-01-23 VITALS
RESPIRATION RATE: 16 BRPM | OXYGEN SATURATION: 98 % | HEIGHT: 60 IN | WEIGHT: 126 LBS | DIASTOLIC BLOOD PRESSURE: 72 MMHG | BODY MASS INDEX: 24.74 KG/M2 | HEART RATE: 89 BPM | SYSTOLIC BLOOD PRESSURE: 132 MMHG | TEMPERATURE: 98 F

## 2018-01-23 DIAGNOSIS — M25.561 RIGHT KNEE PAIN: ICD-10-CM

## 2018-01-23 PROCEDURE — 99284 EMERGENCY DEPT VISIT MOD MDM: CPT

## 2018-01-23 RX ORDER — MELOXICAM 7.5 MG/1
7.5 TABLET ORAL DAILY
Qty: 30 TABLET | Refills: 0 | Status: SHIPPED | OUTPATIENT
Start: 2018-01-23 | End: 2018-01-30

## 2018-01-23 NOTE — ED PROVIDER NOTES
Encounter Date: 1/23/2018    SCRIBE #1 NOTE: I, Gavino Rodrigez, am scribing for, and in the presence of,  Gloria bradford NP. I have scribed the entire note.       History     Chief Complaint   Patient presents with    Knee Pain     right  knee pain and intermittent swelling x1 month     Time patient was seen by the provider: 2:13 PM      The patient is a 82 y.o. female with co-morbidities including: CAD, HTN, HLD,  who presents to the ED with a complaint of right knee pain. She reports the knee is swollen and inflamed with exacerbation by ambulation. She staes that about 1 month ago her leg turned inward with swelling immediately after. She has been trying warm compresses but this has not helped much. It is no longer swollen but she experiences acute exacerbation with going up stairs. She has been trying tylenol and ibuprofen for pain without improvement.         The history is provided by the patient. The history is limited by a language barrier. A  was used.     Review of patient's allergies indicates:   Allergen Reactions    Keflex [cephalexin] Rash     Past Medical History:   Diagnosis Date    Abnormal Pap smear 2013    CAD (coronary artery disease) 06/2017     iFR of LAD and RCA performed confirmed nonobstructive disease.    Cataract     Colon polyps     Frequent urination     Hemorrhoid     High cholesterol     Hypertension     Poor circulation     Psoriasis     Varicose vein      Past Surgical History:   Procedure Laterality Date    CARDIAC CATHETERIZATION  06/2017     iFR of LAD and RCA performed confirmed nonobstructive disease.    CATARACT EXTRACTION W/  INTRAOCULAR LENS IMPLANT Bilateral     COLONOSCOPY N/A 8/28/2017    Procedure: COLONOSCOPY;  Surgeon: Bertram Myres MD;  Location: [facility];  Service: Endoscopy;  Laterality: N/A;    POLYPECTOMY      YAG Laser Capsulotomy Right 04/17/2017    Dr. Chavez     Family History   Problem Relation Age of Onset     Glaucoma Neg Hx     Macular degeneration Neg Hx     Strabismus Neg Hx     Retinal detachment Neg Hx     Amblyopia Neg Hx     Blindness Neg Hx     Cataracts Neg Hx     Prostate cancer Neg Hx     Kidney disease Neg Hx      Social History   Substance Use Topics    Smoking status: Never Smoker    Smokeless tobacco: Never Used    Alcohol use No     Review of Systems   Constitutional: Negative for fever.   HENT: Negative for sore throat.    Respiratory: Negative for cough and shortness of breath.    Cardiovascular: Negative for chest pain.   Gastrointestinal: Negative for nausea.   Genitourinary: Negative for dysuria.   Musculoskeletal: Positive for arthralgias and joint swelling (resolved ). Negative for back pain.   Skin: Negative for rash.   Neurological: Negative for weakness.   Hematological: Does not bruise/bleed easily.   All other systems reviewed and are negative.      Physical Exam     Initial Vitals [01/23/18 1303]   BP Pulse Resp Temp SpO2   130/73 99 20 98.2 °F (36.8 °C) 96 %      MAP       92         Physical Exam    Nursing note and vitals reviewed.  Constitutional: She appears well-developed.   HENT:   Head: Normocephalic and atraumatic.   Right Ear: External ear normal.   Left Ear: External ear normal.   Mouth/Throat: Oropharynx is clear and moist.   Eyes: Conjunctivae are normal.   Neck: Neck supple.   Cardiovascular: Normal rate, regular rhythm, normal heart sounds and intact distal pulses. Exam reveals no gallop and no friction rub.    No murmur heard.  Pulmonary/Chest: Breath sounds normal. She has no wheezes. She has no rhonchi. She has no rales.   Abdominal: Soft. She exhibits no distension. There is no tenderness.   Musculoskeletal:   Pain with active ROM, full passive ROM, negative varus/valgus laxity, negative Lachman's negative mc Resendiz's, no effusion,   Neurological: She is alert and oriented to person, place, and time.   Skin: No rash noted. No erythema.   Psychiatric: She has a  normal mood and affect.         ED Course   Procedures  Labs Reviewed - No data to display     Imaging Results          X-Ray Knee 3 View Right (Final result)  Result time 01/23/18 15:27:19    Final result by Dixon Nguyen MD (01/23/18 15:27:19)                 Impression:        1. Soft tissue swelling.  2. No acute fractures.      Electronically signed by: DIXON NGUYEN MD  Date:     01/23/18  Time:    15:27              Narrative:    History: Right knee pain.    Procedure: Right knee 3 views    Findings:    There are no acute fractures or dislocations or joint effusions. No osteoblastic or lytic lesions. Minimal soft tissue swelling on the medial aspect of the knee joint.                                 Medical Decision Making:   Initial Assessment:   Knee pain ×1 month  Differential Diagnosis:   Strain, sprain, arthritis patient presents to ED in NAD, afebrile and nontoxic.  She has point tenderness to lateral collateral ligament with no evidence of effusion.  X-ray is mild tissue swelling with no acute fractures.  Ace wrap was applied and patient was instructed on and RICE protocol.  Instructed to follow up with PCP for further evaluation including possible MRI.  Strict return precautions given and patient verbalized understanding.      Clinical Tests:   Radiological Study: Ordered and Reviewed                   ED Course      Clinical Impression:     1. Right knee pain                                 Gloria Mckee PA-C  01/23/18 [id number]

## 2018-01-23 NOTE — ED NOTES
Inner aspect of lateral right knee after twisting it a month ago.  Denies falling or trauma.  Denies calf pain.  Gait steady.  No swelling or redness noted.

## 2018-01-23 NOTE — ED NOTES
Gloria Mckee at bedside with Cat .  Pt has personal ace wrap in place to right knee.  Additional ace wrap given

## 2018-02-01 ENCOUNTER — ANESTHESIA EVENT (OUTPATIENT)
Dept: SURGERY | Facility: HOSPITAL | Age: 83
End: 2018-02-01
Payer: MEDICARE

## 2018-02-01 ENCOUNTER — HOSPITAL ENCOUNTER (OUTPATIENT)
Dept: PREADMISSION TESTING | Facility: HOSPITAL | Age: 83
Discharge: HOME | End: 2018-02-01
Attending: SURGERY
Payer: MEDICARE

## 2018-02-01 DIAGNOSIS — Z01.818 PRE-OP TESTING: Primary | ICD-10-CM

## 2018-02-01 PROBLEM — K62.89 RECTAL PAIN: Status: RESOLVED | Noted: 2017-08-28 | Resolved: 2018-02-01

## 2018-02-01 PROCEDURE — 93005 ELECTROCARDIOGRAM TRACING: CPT

## 2018-02-01 RX ORDER — SODIUM CHLORIDE, SODIUM LACTATE, POTASSIUM CHLORIDE, CALCIUM CHLORIDE 600; 310; 30; 20 MG/100ML; MG/100ML; MG/100ML; MG/100ML
INJECTION, SOLUTION INTRAVENOUS CONTINUOUS
Status: CANCELLED | OUTPATIENT
Start: 2018-02-01

## 2018-02-01 RX ORDER — LIDOCAINE HYDROCHLORIDE 10 MG/ML
1 INJECTION, SOLUTION EPIDURAL; INFILTRATION; INTRACAUDAL; PERINEURAL ONCE
Status: CANCELLED | OUTPATIENT
Start: 2018-02-01 | End: 2018-02-01

## 2018-02-01 NOTE — ANESTHESIA PREPROCEDURE EVALUATION
02/01/2018  Ghada Dave is a 82 y.o., female is scheduled for proctoscopy with polypectomy under GETA on 2/05/2018.     **Divehi speaking**    PRIOR ANES (in Epic)   [id number] Procto_Polypectomy GA   [fent 50, prop 100; ->80]   [sevo, fent 25] NAAC   [easy mask; ETT 7.0, #2, Grade II]  [id number] Colonoscopy MAC   prop 50 -> 150 mcg/kg/min VSS NAAC  [id number] Fulguration_Condlomata GA   [fent 100, prop 150; ->80]   [sevo, phenyleph 300] NAAC   [easy mask; ETT 7.0, #2, Grade I]  other cases in [facility]  ANES-RELATED MED/SURG  Patient Active Problem List   Diagnosis    Urge incontinence    Rectal polyp    Ureteral stone    Diverticulosis    Heartburn    Post herpetic neuralgia    Leukopenia    Personal history of colonic polyps    Left lower quadrant pain    Pseudophakia    Right posterior capsular opacification    Anal or rectal pain    Non-cardiac chest pain    Non-occlusive coronary artery disease    Psoriasis    Chronic midline low back pain without sciatica    Scoliosis of thoracolumbar spine    Right subscapular pain    Psychophysiological insomnia    Overflow incontinence    Urinary retention with incomplete bladder emptying    Lichen simplex chronicus of the heel    CAD (coronary artery disease)    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    Cataract     Colon polyps     Frequent urination     Hemorrhoid     High cholesterol     Hypertension     Poor circulation     Psoriasis     Varicose vein      Past Surgical History:   Procedure Laterality Date    CARDIAC CATHETERIZATION  06/2017     iFR of LAD and RCA performed confirmed  nonobstructive disease.    CATARACT EXTRACTION W/  INTRAOCULAR LENS IMPLANT Bilateral     COLONOSCOPY N/A 8/28/2017    Procedure: COLONOSCOPY;  Surgeon: Bertram Myers MD;  Location: [facility];  Service: Endoscopy;  Laterality: N/A;    POLYPECTOMY      YAG Laser Capsulotomy Right 04/17/2017    Dr. Chavez     ALLERGIES  Review of patient's allergies indicates:   Allergen Reactions    Keflex [cephalexin] Rash     ANES-RELATED HOME Rx [id number]  ASA-81  Atorvastatin     Anesthesia Evaluation      I have reviewed the Medications.     Review of Systems  Anesthesia Hx:  No problems with previous Anesthesia History of prior surgery of interest to airway management or planning: Previous anesthesia: General, MAC  Procedure performed at an [facility].   Procedure performed at an [facility]. Denies Family Hx of Anesthesia complications.   Denies Personal Hx of Anesthesia complications.   Social:  Non-Smoker, No Alcohol Use    Hematology/Oncology:  Hematology Normal      Hematology Comments: Not currently on ASA   EENT/Dental:EENT/Dental Normal   Cardiovascular:   Exercise tolerance: good Hypertension, well controlled CAD (CTA Neck 9/2017 with R ICA abnormality; pt refered to [facility] for further eval)   Denies Dysrhythmias.   Denies Angina.     hyperlipidemia     ECG has been reviewed.    Pulmonary:   Denies Shortness of breath.    Renal/:  Renal/ Normal     Hepatic/GI:   GERD, well controlled Denies any bowel symptoms   Musculoskeletal:  Musculoskeletal Normal    Neurological:  Neuro Symptoms of dizziness   Endocrine:  Endocrine Normal    Dermatological:  Skin Normal    Psych:  Psychiatric Normal         Wt Readings from Last 1 Encounters:   01/30/18 57.2 kg (126 lb)     Temp Readings from Last 1 Encounters:   01/23/18 36.8 °C (98.2 °F) (Oral)     BP Readings from Last 1 Encounters:   01/30/18 133/61     Pulse Readings from Last 1 Encounters:   01/30/18 80     SpO2 Readings from Last 1 Encounters:    01/23/18 98%       Physical Exam  General:  Well nourished    Airway/Jaw/Neck:  Airway Findings: Mouth Opening: Normal Tongue: Normal  General Airway Assessment: Adult  Mallampati: I  Improves to I with phonation.  TM Distance: Normal, at least 6 cm        Eyes/Ears/Nose:  EYES/EARS/NOSE FINDINGS: Normal   Dental:  Dental Findings: In tact, Periodontal disease, Mild   Chest/Lungs:  Chest/Lungs Clear    Heart/Vascular:  Heart Findings: Normal Heart murmur: negative    Abdomen:  Abdomen Findings: Normal      Mental Status:  Mental Status Findings: Normal      Lab Results   Component Value Date    WBC 10.37 09/22/2017    HGB 12.5 09/22/2017    HCT 36.6 (L) 09/22/2017    MCV 86 09/22/2017     09/22/2017       Chemistry        Component Value Date/Time     02/01/2018 1450    K 4.5 02/01/2018 1450     02/01/2018 1450    CO2 24 02/01/2018 1450    BUN 19 02/01/2018 1450    CREATININE 0.8 02/01/2018 1450    GLU 89 02/01/2018 1450        Component Value Date/Time    CALCIUM 9.6 02/01/2018 1450    ALKPHOS 70 09/22/2017 1851    AST 26 09/22/2017 1851    ALT 14 09/22/2017 1851    BILITOT 0.5 09/22/2017 1851    ESTGFRAFRICA >60 02/01/2018 1450    EGFRNONAA >60 02/01/2018 1450          Lab Results   Component Value Date    ALBUMIN 3.9 09/22/2017      Lab Results   Component Value Date    TSH 1.442 08/02/2017       CXR [id number]  No acute findings in the chest.  Retrocardiac hiatal hernia similar to prior.    EKG [id number]  Normal sinus rhythm  Normal ECG  No previous ECGs available  Unconfirmed    NM Stress Test 6/2014  EKG Conclusions:  1. The EKG portion of this study is negative for ischemia at a peak heart rate of 98 bpm (72% of predicted).   2. Blood pressure remained stable throughout the protocol  (Presenting BP: 129/59 Peak BP: 133/59).   3. No significant arrhythmias were present.   4. There were no symptoms of chest discomfort or significant dyspnea throughout the protocol.   5. Nuclear portion of  "this study will be reported seperately.  This document has been electronically    SIGNED BY: Yakelin Huizar MD On: 06/14/2017 11:24     [id number] LEXISCAN MYOCARDIAL PERFUSION SPECT SCAN  1.  Homogeneous perfusion at stress and rest.  2.  Stress-induced dilatation of the left ventricle (TID).  3.  Mildly diminished ejection fraction of 49%.    Coronary Angio [facility] 6/2017   iFR of LAD and RCA performed confirmed nonobstructive disease    CTA Neck 9/2017  Per medical record/CTA report:   ICA abnormality does not look compatible with ulcerated plaque or pseudoaneurysm. Per Dr. King report, Neuro[facility] reports may be  "tortuous variant"      Anesthesia Plan  Type of Anesthesia, risks & benefits discussed:  Anesthesia Type:  general  Patient's Preference:    Intra-op Monitoring Plan:   Intra-op Monitoring Plan Comments:   Post Op Pain Control Plan:   Post Op Pain Control Plan Comments:   Induction:   IV  Beta Blocker:  Patient is not currently on a Beta-Blocker (No further documentation required).       Informed Consent: Patient understands risks and agrees with Anesthesia plan.  Questions answered.   ASA Score: 3     Day of Surgery Review of History & Physical:        Anesthesia Plan Notes: Pt will require .  [id number]   - false teeth are in        Ready For Surgery From Anesthesia Perspective.       "

## 2018-02-01 NOTE — DISCHARGE INSTRUCTIONS
Instrucciones para el Paciente:     Por favor presentarse el 2/5/18 a las 7am   Reportarse en el emelia piso Procedural Check In Room.   No coma ni nadeem nada después de medianoche (12:00 AM) la noche antes de hart cirugía.   Por favor dejar todas paco joyas y objetos de valor en casa   Use ropa floja y cómoda    No podrá manejar después de hart procedimiento. Por favor nataliia arreglos de transportación para que la traigan y lleven a casa.   No use cremas humectantes, perfumes o talcos.   No tome Aspirina, Ibuoprofin, Advil, Motrin, Aleve, Nuprin, o Naprosyn 3-5 días antes de hart procedimiento. Puede eddy Tylenol o Acetaminofen para el dolor.    Puede llamar al [phone] si tiene alguna pregunta.      La endoscopia de la parte inferior del aparato gastrointestinal     Giovany la endoscopia se introduce un tubo dipti y flexible para skyler el interior de la parte baja del tracto gastrointestinal.     La endoscopia de la parte inferior del aparato gastrointestinal permite que hart proveedor de atención médica pueda skyler esta parte del organismo. Se puede examinar todo el colon y el recto (colonoscopia), o solo el recto y el colon sigmoide (sigmoidoscopia).  Antes de la prueba  Siga estas instrucciones y otras que le den antes de la endoscopia. Si no sigue las instrucciones del proveedor de atención médica al pie de la letra, podrían tener que cancelar la prueba o repetirla.  · Para hacerle trinh colonoscopia, podrían indicarle que no coma nada y que solo nadeem líquidos akhil benjamin o dos días antes de la prueba. Por lo general se hace dieta de líquidos akhil giovany un día y algunas veces otros cambios en la alimentación, según lo que recomiende hart proveedor de atención médica.  · [place] los laxantes que le hayan recetado. Es posible que también le receten un lavativo.  · Si va a estar sedado (muy relajado por efecto de los medicamentos) pida que alguien lo lleve a casa después de la prueba.  · Asegúrese de informar al  proveedor de atención médica de todos los medicamentos, vitaminas y suplementos que leopoldo, así rm de los problemas de maría que tenga. Hay ciertos medicamentos, rm los anticoagulantes, que quizás deba dejar de eddy trinh semana antes de la prueba.  · Discuta las posibles alternativas a randy procedimiento y paco riesgos con hart proveedor de atención médica.  Cuándo llamar al proveedor de atención médica  Si nota cualquiera de estas cosas después de la prueba, llame a hart proveedor de atención médica:  · Dolor en el estómago  · Fiebre  · Sangrado por el recto   El procedimiento  · La colonoscopia puede eddy 30 minutos o más. La sigmoidoscopia normalmente dura unos 20 minutos. La duración del procedimiento depende depende en mucha parte de qué tan limpios estén paco intestinos. el motivo por el que se hace el procedimiento y qué tratamientos deban hacerse.  · Usted estará acostado sobre hart lado adri en trinh bambi o trinh cama.  · Para la colonoscopia le darán sedantes (medicamentos para relajarse) a través de trinh sonda intravenosa (IV). La sigmoidoscopia normalmente no requiere sedación.  · Se introduce un endoscopio en el recto. Puede sentir algo de presión y retortijones. Si siente dolor, dígaselo al proveedor de atención médica o al personal de enfermería. Le podrían randolph más sedación, que incluye medicamentos para calmar el dolor y la ansiedad.  · Con la endoscopia, se proyectan imágenes de hart colon en un monitor de video. Estas imágenes podrían imprimirse para tener un registro de la prueba.  · Pueden hacerse biopsias (muestras de tejido), remoción de pólipos u otros tratamientos.  · Trinh vez acabado el procedimiento, usted descansa por un rato. Es posible que sienta molestias inmediatamente después de la prueba debido al aire atrapado. Puede aliviar esas molestias si cambia de posición y lalitha salir el aire. Si le hamilton dado sedantes, un adulto debería llevarlo a casa.  Date Last Reviewed: 3/30/2014  © 9818-3213  [employer]. [address] [place] [address], [place] [address]. Todos los derechos reservados. Esta información no pretende sustituir la atención médica profesional. Sólo hart médico puede diagnosticar y tratar un problema de maría.

## 2018-02-01 NOTE — PRE-PROCEDURE INSTRUCTIONS
Car[facility] - [phone]    Allergies, medical, surgical, family and psychosocial histories reviewed with patient. Periop plan of care reviewed. Preop instructions given, including medications to take and to hold. Time allotted for questions to be addressed.  Patient verbalized understanding.

## 2018-02-05 ENCOUNTER — ANESTHESIA (OUTPATIENT)
Dept: SURGERY | Facility: HOSPITAL | Age: 83
End: 2018-02-05
Payer: MEDICARE

## 2018-02-05 ENCOUNTER — SURGERY (OUTPATIENT)
Age: 83
End: 2018-02-05

## 2018-02-05 ENCOUNTER — HOSPITAL ENCOUNTER (OUTPATIENT)
Facility: HOSPITAL | Age: 83
Discharge: HOME | End: 2018-02-05
Attending: SURGERY | Admitting: SURGERY
Payer: MEDICARE

## 2018-02-05 VITALS
SYSTOLIC BLOOD PRESSURE: 121 MMHG | HEART RATE: 74 BPM | DIASTOLIC BLOOD PRESSURE: 57 MMHG | HEIGHT: 60 IN | BODY MASS INDEX: 24.74 KG/M2 | RESPIRATION RATE: 17 BRPM | OXYGEN SATURATION: 95 % | TEMPERATURE: 98 F | WEIGHT: 126 LBS

## 2018-02-05 DIAGNOSIS — K62.1 RECTAL POLYP: ICD-10-CM

## 2018-02-05 DIAGNOSIS — R10.32 LEFT LOWER QUADRANT PAIN: ICD-10-CM

## 2018-02-05 DIAGNOSIS — K62.82: Primary | ICD-10-CM

## 2018-02-05 DIAGNOSIS — F51.04 PSYCHOPHYSIOLOGICAL INSOMNIA: ICD-10-CM

## 2018-02-05 DIAGNOSIS — K62.89 ANAL OR RECTAL PAIN: ICD-10-CM

## 2018-02-05 DIAGNOSIS — K57.90 DIVERTICULOSIS OF INTESTINE WITHOUT BLEEDING, UNSPECIFIED INTESTINAL TRACT LOCATION: ICD-10-CM

## 2018-02-05 DIAGNOSIS — I25.10 CORONARY ARTERY DISEASE WITHOUT ANGINA PECTORIS, UNSPECIFIED VESSEL OR LESION TYPE, UNSPECIFIED WHETHER NATIVE OR TRANSPLANTED HEART: ICD-10-CM

## 2018-02-05 PROCEDURE — 71000033 HC RECOVERY, INTIAL HOUR: Performed by: SURGERY

## 2018-02-05 PROCEDURE — 25000003 PHARM REV CODE 250: Performed by: NURSE ANESTHETIST, CERTIFIED REGISTERED

## 2018-02-05 PROCEDURE — 63600175 PHARM REV CODE 636 W HCPCS: Performed by: NURSE ANESTHETIST, CERTIFIED REGISTERED

## 2018-02-05 PROCEDURE — 88305 TISSUE EXAM BY PATHOLOGIST: CPT | Performed by: PATHOLOGY

## 2018-02-05 PROCEDURE — S0020 INJECTION, BUPIVICAINE HYDRO: HCPCS | Performed by: SURGERY

## 2018-02-05 PROCEDURE — 25000003 PHARM REV CODE 250: Performed by: SURGERY

## 2018-02-05 PROCEDURE — 25000003 PHARM REV CODE 250: Performed by: NURSE PRACTITIONER

## 2018-02-05 PROCEDURE — 37000008 HC ANESTHESIA 1ST 15 MINUTES: Performed by: SURGERY

## 2018-02-05 PROCEDURE — 36000707: Performed by: SURGERY

## 2018-02-05 PROCEDURE — S0077 INJECTION, CLINDAMYCIN PHOSP: HCPCS | Performed by: SURGERY

## 2018-02-05 PROCEDURE — 37000009 HC ANESTHESIA EA ADD 15 MINS: Performed by: SURGERY

## 2018-02-05 PROCEDURE — 36000706: Performed by: SURGERY

## 2018-02-05 PROCEDURE — 88305 TISSUE EXAM BY PATHOLOGIST: CPT | Mod: 26,,, | Performed by: PATHOLOGY

## 2018-02-05 PROCEDURE — 71000016 HC POSTOP RECOV ADDL HR: Performed by: SURGERY

## 2018-02-05 PROCEDURE — 71000015 HC POSTOP RECOV 1ST HR: Performed by: SURGERY

## 2018-02-05 RX ORDER — ROCURONIUM BROMIDE 10 MG/ML
INJECTION, SOLUTION INTRAVENOUS
Status: DISCONTINUED | OUTPATIENT
Start: 2018-02-05 | End: 2018-02-05

## 2018-02-05 RX ORDER — LIDOCAINE HCL/PF 100 MG/5ML
SYRINGE (ML) INTRAVENOUS
Status: DISCONTINUED | OUTPATIENT
Start: 2018-02-05 | End: 2018-02-05

## 2018-02-05 RX ORDER — SODIUM CHLORIDE 0.9 % (FLUSH) 0.9 %
3 SYRINGE (ML) INJECTION
Status: DISCONTINUED | OUTPATIENT
Start: 2018-02-05 | End: 2018-02-05 | Stop reason: HOSPADM

## 2018-02-05 RX ORDER — HYDROMORPHONE HYDROCHLORIDE 2 MG/ML
0.2 INJECTION, SOLUTION INTRAMUSCULAR; INTRAVENOUS; SUBCUTANEOUS EVERY 5 MIN PRN
Status: ACTIVE | OUTPATIENT
Start: 2018-02-05 | End: 2018-02-05

## 2018-02-05 RX ORDER — HYDROCODONE BITARTRATE AND ACETAMINOPHEN 5; 325 MG/1; MG/1
1 TABLET ORAL EVERY 4 HOURS PRN
Status: DISCONTINUED | OUTPATIENT
Start: 2018-02-05 | End: 2018-02-05 | Stop reason: HOSPADM

## 2018-02-05 RX ORDER — SODIUM CHLORIDE, SODIUM LACTATE, POTASSIUM CHLORIDE, CALCIUM CHLORIDE 600; 310; 30; 20 MG/100ML; MG/100ML; MG/100ML; MG/100ML
INJECTION, SOLUTION INTRAVENOUS CONTINUOUS
Status: DISCONTINUED | OUTPATIENT
Start: 2018-02-05 | End: 2018-02-05 | Stop reason: HOSPADM

## 2018-02-05 RX ORDER — PROPOFOL 10 MG/ML
VIAL (ML) INTRAVENOUS
Status: DISCONTINUED | OUTPATIENT
Start: 2018-02-05 | End: 2018-02-05

## 2018-02-05 RX ORDER — HYDROCODONE BITARTRATE AND ACETAMINOPHEN 5; 325 MG/1; MG/1
1 TABLET ORAL EVERY 6 HOURS PRN
Qty: 24 TABLET | Refills: 0 | Status: SHIPPED | OUTPATIENT
Start: 2018-02-05 | End: 2018-03-01

## 2018-02-05 RX ORDER — SUCCINYLCHOLINE CHLORIDE 20 MG/ML
INJECTION INTRAMUSCULAR; INTRAVENOUS
Status: DISCONTINUED | OUTPATIENT
Start: 2018-02-05 | End: 2018-02-05

## 2018-02-05 RX ORDER — SODIUM CHLORIDE 0.9 % (FLUSH) 0.9 %
3 SYRINGE (ML) INJECTION EVERY 8 HOURS
Status: DISCONTINUED | OUTPATIENT
Start: 2018-02-05 | End: 2018-02-05 | Stop reason: HOSPADM

## 2018-02-05 RX ORDER — SODIUM CHLORIDE 9 MG/ML
INJECTION, SOLUTION INTRAVENOUS CONTINUOUS
Status: DISCONTINUED | OUTPATIENT
Start: 2018-02-05 | End: 2018-02-05 | Stop reason: HOSPADM

## 2018-02-05 RX ORDER — CLINDAMYCIN PHOSPHATE 900 MG/50ML
900 INJECTION, SOLUTION INTRAVENOUS
Status: COMPLETED | OUTPATIENT
Start: 2018-02-05 | End: 2018-02-05

## 2018-02-05 RX ORDER — BUPIVACAINE HYDROCHLORIDE 5 MG/ML
INJECTION, SOLUTION EPIDURAL; INTRACAUDAL
Status: DISCONTINUED | OUTPATIENT
Start: 2018-02-05 | End: 2018-02-05 | Stop reason: HOSPADM

## 2018-02-05 RX ORDER — BACITRACIN 50000 [IU]/1
INJECTION, POWDER, FOR SOLUTION INTRAMUSCULAR
Status: DISCONTINUED | OUTPATIENT
Start: 2018-02-05 | End: 2018-02-05 | Stop reason: HOSPADM

## 2018-02-05 RX ORDER — FENTANYL CITRATE 50 UG/ML
INJECTION, SOLUTION INTRAMUSCULAR; INTRAVENOUS
Status: DISCONTINUED | OUTPATIENT
Start: 2018-02-05 | End: 2018-02-05

## 2018-02-05 RX ORDER — HYDROMORPHONE HYDROCHLORIDE 2 MG/ML
0.4 INJECTION, SOLUTION INTRAMUSCULAR; INTRAVENOUS; SUBCUTANEOUS EVERY 5 MIN PRN
Status: ACTIVE | OUTPATIENT
Start: 2018-02-05 | End: 2018-02-05

## 2018-02-05 RX ORDER — ONDANSETRON 2 MG/ML
INJECTION INTRAMUSCULAR; INTRAVENOUS
Status: DISCONTINUED | OUTPATIENT
Start: 2018-02-05 | End: 2018-02-05

## 2018-02-05 RX ORDER — LIDOCAINE HYDROCHLORIDE 10 MG/ML
1 INJECTION, SOLUTION EPIDURAL; INFILTRATION; INTRACAUDAL; PERINEURAL ONCE
Status: DISCONTINUED | OUTPATIENT
Start: 2018-02-05 | End: 2018-02-05 | Stop reason: HOSPADM

## 2018-02-05 RX ADMIN — SUCCINYLCHOLINE CHLORIDE 100 MG: 20 INJECTION, SOLUTION INTRAMUSCULAR; INTRAVENOUS at 09:02

## 2018-02-05 RX ADMIN — BUPIVACAINE HYDROCHLORIDE 30 ML: 5 INJECTION, SOLUTION EPIDURAL; INTRACAUDAL; PERINEURAL at 10:02

## 2018-02-05 RX ADMIN — GELATIN ABSORBABLE SPONGE 12-7 MM 1 EACH: 12-7 MISC at 10:02

## 2018-02-05 RX ADMIN — FENTANYL CITRATE 50 MCG: 50 INJECTION, SOLUTION INTRAMUSCULAR; INTRAVENOUS at 09:02

## 2018-02-05 RX ADMIN — SODIUM CHLORIDE, SODIUM LACTATE, POTASSIUM CHLORIDE, AND CALCIUM CHLORIDE: .6; .31; .03; .02 INJECTION, SOLUTION INTRAVENOUS at 07:02

## 2018-02-05 RX ADMIN — PROPOFOL 100 MG: 10 INJECTION, EMULSION INTRAVENOUS at 09:02

## 2018-02-05 RX ADMIN — LIDOCAINE HYDROCHLORIDE 50 MG: 20 INJECTION, SOLUTION INTRAVENOUS at 09:02

## 2018-02-05 RX ADMIN — BACITRACIN 50000 UNITS: 5000 INJECTION, POWDER, FOR SOLUTION INTRAMUSCULAR at 09:02

## 2018-02-05 RX ADMIN — FENTANYL CITRATE 25 MCG: 50 INJECTION, SOLUTION INTRAMUSCULAR; INTRAVENOUS at 09:02

## 2018-02-05 RX ADMIN — CLINDAMYCIN PHOSPHATE 900 MG: 18 INJECTION, SOLUTION INTRAVENOUS at 09:02

## 2018-02-05 RX ADMIN — ONDANSETRON 4 MG: 2 INJECTION, SOLUTION INTRAMUSCULAR; INTRAVENOUS at 10:02

## 2018-02-05 RX ADMIN — HYDROCODONE BITARTRATE AND ACETAMINOPHEN 1 TABLET: 5; 325 TABLET ORAL at 11:02

## 2018-02-05 RX ADMIN — ROCURONIUM BROMIDE 5 MG: 10 INJECTION, SOLUTION INTRAVENOUS at 09:02

## 2018-02-05 RX ADMIN — EPHEDRINE SULFATE 15 MG: 50 INJECTION, SOLUTION INTRAMUSCULAR; INTRAVENOUS; SUBCUTANEOUS at 09:02

## 2018-02-05 NOTE — ANESTHESIA POSTPROCEDURE EVALUATION
Anesthesia Post Evaluation    Patient: Ghada Dave    Procedure(s) Performed: Procedure(s) (LRB):  PROCTOSCOPY WITH POLYPECTOMY (N/A)    Final Anesthesia Type: general  Patient location during evaluation: PACU  Patient participation: Yes- Able to Participate  Level of consciousness: awake  Post-procedure vital signs: reviewed and stable  Pain management: adequate  Airway patency: patent  PONV status at discharge: No PONV  Anesthetic complications: no      Cardiovascular status: stable  Respiratory status: unassisted, spontaneous ventilation and room air  Hydration status: euvolemic  Follow-up needed         Visit Vitals  BP (!) 128/58   Pulse 70   Temp 36.3 °C (97.3 °F) (Skin)   Resp 17   Ht 5' (1.524 m)   Wt 57.2 kg (126 lb)   SpO2 96%   BMI 24.61 kg/m²       Pain/Erin Score: Pain Assessment Performed: Yes (2/5/2018 11:02 AM)  Presence of Pain: denies (2/5/2018 11:02 AM)  Erin Score: 10 (2/5/2018 11:02 AM)  Modified Erin Score: 20 (2/5/2018 11:02 AM)

## 2018-02-05 NOTE — OP NOTE
DATE OF PROCEDURE:  02/05/2018    PREOPERATIVE DIAGNOSES:  Rectal pain, itching mass.    POSTOPERATIVE DIAGNOSES:  Rectal pain, itching mass.    OPERATION:  Proctosigmoidoscopy and excision of anal canal rectal polyp, size 1   x 2 cm.    SURGEON:  Bharath Shaw M.D.    ANESTHESIA:  General.    PROCEDURE IN DETAIL:  After satisfactory general anesthesia and the patient in   supine position in lithotomy, area was prepped and draped in normal sterile   manner using Betadine scrub solution.  Digital exam was performed.  The patient   was found to have a palpable abnormality at the 6 o'clock position.  The   proctosigmoidoscopy was performed up to 21 cm.  No other abnormality was   detected.  The scope was removed.  A smaller proctoscope was placed.  Area of   the abnormality was then grasped with Allis clamp and completely excised into   the deep tissues.  Wound was cauterized using electrocautery.  The area was then   closed using a 2-0 chromic catgut in a running fashion.  Hemostasis   satisfactorily maintained.  Wound was infiltrated using 0.25% Marcaine solution.    Sterile gauze dressing was applied.  The instrument count, sponge count, and   needle count was correct.  The patient tolerated it well.  Estimated blood loss   was 20 mL.  Specimen removed was rectal polyp.      MS/IN  dd: 02/05/2018 10:34:17 (CST)  td: 02/05/2018 15:05:01 (CST)  Doc ID   #[id number]  Job ID #[id number]    CC:

## 2018-02-05 NOTE — DISCHARGE INSTRUCTIONS
Anestesia: Anestesia general     Estás vimos continuamente giovany el procedimiento por el proveedor de la anestesia.     Usted tiene programada trinh fecha para trinh operación quirúrgica. Giovany la operación, le administrarán un medicamento anestésico (llamado también anestesia) para que esté cómodo y sin dolor. Hart cirujano le administrará anestesia general. En esta hoja se da más información sobre randy tipo de anestesia.  ¿En qué consiste la anestesia general?  Con la anestesia general usted estará profundamente dormido. La anestesia general se administra en la juli (anestesia intravenosa), en los pulmones (anestesia con gas), o de ambas formas. Usted no sentirá nada giovany la operación, y tampoco la recordará. El anestesista vigila hart estado y monitoriza hart frecuencia y ritmo cardíaco, hart presión arterial y hart nivel de oxígeno en la juli giovany todo el procedimiento.  · Anestesia intravenosa, La anestesia intravenosa se administra mediante trinh sonda intravenosa en el brazo, y suele darse eddie para que el paciente ya esté dormido cuando le administren la anestesia con gas. Algunos tipos de anestesia intravenosa alivian el dolor, mientras que otros producen relajación. Hart médico decidirá qué tipo de anestesia es más adecuado para hart sid.  · Anestesia con gas. La anestesia con gas se administra en los pulmones por inhalación y suele usarse para mantener al paciente dormido después de recibir anestesia intravenosa. Puede administrarse a través de trinh máscara facial, un tubo endotraqueal o trinh máscara laríngea.  ¨ Si tiene trinh máscara facial, hart anestesista se la colocará sobre la nariz y la boca, probablemente mientras usted todavía está despierto. Usted inhalará oxígeno mientras le inician la anestesia intravenosa, y luego puede añadirse anestesia a través de la máscara.  ¨ Si se usa un tubo endotraqueal o trinh máscara laríngea, se los colocarán en la garganta trinh vez que se haya dormido.  Medicamentos e  instrumentos de anestesia  Probablemente tendrá:  · Anestesia intravenosa administrada directamente en la juli mediante tirnh sonda intravenosa.  · Anestesia con gas administrada en los pulmones, desde los cuales pasa a la juli.  · Un pulsioxímetro colocado en el extremo de un dedo para medir el nivel de oxígeno en la juli.  · Electrodos de electrocardiografía para registrar la frecuencia y el ritmo cardíacos.  · Un manguito (esfigmomanómetro) para medir la presión arterial.  Riesgos y posibles complicaciones  La anestesia general tiene ciertos riesgos, entre los cuales se encuentran los siguientes:  · Problemas de la respiración  · Náuseas y vómito  · Garganta irritada o ronquera  · Reacción alérgica a la anestesia  · Persistencia de la insensibilidad en la lenin anestesiada (poco frecuente)  · Ritmo cardíaco irregular (en casos muy poco frecuentes)  · Paro cardíaco (en casos muy poco frecuentes)   Medidas de seguridad relacionadas con la anestesia  · Siga todas las instrucciones que le hayan dado respecto al tiempo que debe pasar sin comer ni beber antes de la operación.  · Asegúrese de informar a hart médico de todos los medicamentos que esté tomando, incluyendo también los se adquieren sin receta, las hierbas medicinales y los suplementos alimenticios.  · Póngase de acuerdo con un familiar o amigo adulto para que lo lleve a hart casa después de la operación.  · Marci las primeras 24 horas después de la operación:  ¨ No maneje automóviles ni maquinaria o herramientas pesadas.  ¨ No tome decisiones importantes ni firme ningún documento.  ¨ Evite el alcohol.  ¨ De ser posible, consiga a alguien que se quede con usted para ayudarlo a mantenerse fuera de peligro en sid de que surja algún problema.  Date Last Reviewed: 10/16/2014  © 4549-3594 [employer]. [address] [place] [id number]. Todos los derechos reservados. Esta información no pretende sustituir la atención médica profesional. Sólo  hart médico puede diagnosticar y tratar un problema de maría.    Anestesia: después de hart operación     Cumpla el cronograma de paco medicamentos.   Usted acaba de hacerse trinh operación. Marci la cirugía, le administraron un tipo de medicamento llamado anestesia para que estuviera cómodo y sin dolor. Después de la cirugía, es posible que tenga un poco de dolor o náuseas. [place] es común. Los siguientes consejos le ayudarán a sentirse mejor y recuperarse después de la cirugía.  El regreso a casa  Hart médico o enfermera le indicará cómo cuidarse trinh vez que haya regresado a casa y responderá a las preguntas que usted tenga. Asegúrese de que un familiar o amigo adulto le lleve de regreso a casa. Marci las primeras 24 horas después de la cirugía:  · No conduzca ni utilice maquinaria pesada.  · No tome decisiones importantes ni firme documentos.  · Evite las bebidas alcohólicas.  · Si lo necesita, pida que alguien se quede con usted. Esta persona vigilará cualquier problema que se le pueda presentar y le ayudará a permanecer seguro.  No olvide acudir a todas las citas de control con hart médico, y repose después de la cirugía todo el tiempo que le indique hart médico.  Cómo lidiar con el dolor  Si usted tiene dolor después de la operación, los medicamentos contra el dolor (analgésicos) le ayudarán a sentirse mejor. [place] estos medicamentos según le hayan indicado antes de que el dolor empeore. Asimismo, pregunte a hart médico o farmacéutico acerca de otras formas de controlar el dolor, rm la aplicación de calor o hielo, y la relajación. Siga también todas las demás instrucciones que le haya dado hart cirujano o enfermera.  Consejos para eddy medicamentos contra el dolor  Para obtener el mejor alivio posible, recuerde lo siguiente:  · Los medicamentos contra el dolor pueden crearle trastornos de estómago. Puede ser mejor tomarlos con un poco de comida.  · La mayor parte de los medicamentos contra el dolor que se marcio por vía  "oral (por la boca) tardan rm mínimo 20-30 minutos en surtir efecto.  · Sammy los medicamentos siguiendo un horario fijo puede ayudarle a acordarse de tomarlos. Trate de sammy los medicamentos antes del comienzo de trinh actividad rm vestirse, salir a caminar o sentarse a la curry para cenar.  · El estreñimiento es un efecto secundario común de los medicamentos contra el dolor. Consulte con hart médico antes de sammy cualquier tipo de medicación rm laxantes o ablandadores de materia fecal para ayudar a aliviar el estreñimiento. También consulte sobre las restricciones alimentarias, ya que sammy mucho líquido, comer frutas y verduras con alto contenido de fibra también puede serle útil. Recuerde no sammy laxantes a menos que hart cirujano se los haya recetado.  · Mezclar el alcohol con los medicamentos contra el dolor puede provocar mareo y reducir la frecuencia respiratoria; incluso puede causar la muerte. No nadeem alcohol mientras esté tomando medicamentos contra el dolor.  · Los medicamentos contra el dolor pueden reducir paco reflejos. No maneje automóviles ni maquinaria mientras esté tomando randy tipo de medicamentos.  Si hart proveedor de atención médica le aconseja sammy paracetamol (acetaminofén) para ayudar a aliviar hart dolor, pregúntele cuánto puede sammy por día. (Paracetamol o acetaminofén es el nombre genérico del Tylenol y otras marcas de analgésicos). El paracetamol y otros analgésicos pueden tener reacciones cruzadas con otros medicamentos recetados o de venta noe ("OTC", por paco siglas en inglés). Algunos medicamentos recetados incluyen paracetamol junto con otros ingredientes activos. Usar al mismo tiempo un medicamento recetado y paracetamol de venta noe para aliviar el dolor podría causarle trinh sobredosis. La Administración de Alimentos y Medicamentos de Estados Unidos ("FDA", por paco siglas en inglés) recomienda que usted judie atentamente las etiquetas de los medicamentos de venta noe. [place] le " ayudará a comprender claramente la lista de ingredientes activos, las indicaciones sobre las dosis y las precauciones que debe eddy. También puede ayudarle a evitar eddy demasiado paracetamol. Si tiene preguntas o no comprende la información, pídale a hart farmacéutico o proveedor de atención médica que se la explique antes de eddy medicamentos de venta noe.  Cómo manejar las náuseas  Algunas personas tienen trastornos estomacales después de la cirugía. [place] suele ser consecuencia de la anestesia, el dolor, los medicamentos contra el dolor o el estrés por la cirugía. Los siguientes consejos le ayudarán a controlar las náuseas y obtener trinh buena nutrición mientras se recupera. Si usted estaba siguiendo trinh dieta especial antes de la cirugía, pregunte a hart médico si debe continuarla giovany la recuperación. Los siguientes consejos pueden serle útiles:  · No se fuerce a comer. Hart cuerpo le dirá lo que debe comer y cuándo.  · Comience con líquidos akhil y sopas, ya que son más fáciles de digerir.  · Vaya introduciendo gradualmente alimentos blandos o semisólidos (puré de sae, puré de manzana y gelatina) en hart dieta a medida que se vaya sintiendo listo.  · Aumente lentamente a alimentos sólidos. No coma alimentos grasos, empalagosos ni picantes al principio.  · No se fuerce a comer alex comidas grandes al día. Coma cantidades más pequeñas más a menudo.  · [place] los medicamentos contra el dolor con pequeñas cantidades de comida sólida rm galletas saladas o pan kay.    Llame a hart cirujano si:  · Todavía tiene dolor al cabo de trinh hora después de ish tomado los medicamentos (es posible que no mary suficientemente aníbal).  · Se siente demasiado adormilado, mareado o aturdido (es posible que los medicamentos mary demasiado aníbal).  · Tiene efectos secundarios rm náuseas, vómitos o cambios en la piel (erupción cutánea, comezón o urticaria).    Date Last Reviewed: 10/16/2014  © 5039-4459 [employer]. [address] [place] [id number]. Todos los derechos reservados. Esta información no pretende sustituir la atención médica profesional. Sólo hart médico puede diagnosticar y tratar un problema de maría.    Acetaminophen; Hydrocodone tablets or capsules  ¿Qué es randy medicamento?  ACETAMINOFENO; HIDROCODONA es un analgésico. Se utiliza para tratar los juan j moderados a severos.  ¿Cómo tawnya utilizar randy medicamento?  [place] randy medicamento por vía oral. Ingiéralo con un vaso lleno de agua. Siga las instrucciones de la etiqueta del medicamento. Si el medicamento le produce malestar estomacal, tómelo con alimentos o con leche. No tome hart medicamento con trinh frecuencia mayor que la indicada.  Hable con hart pediatra para informarse acerca del uso de randy medicamento en niños. Randy medicamento no está aprobado para uso en niños.  Los pacientes mayores de 65 años de edad pueden presentar reacciones más aníbal y necesitar dosis menores.  ¿Qué efectos secundarios puedo tener al utilizar randy medicamento?  Efectos secundarios que debe informar a hart médico o a hart profesional de la maría tan pronto rm sea posible:  · reacciones alérgicas rm erupción cutánea, picazón o urticarias, hinchazón de la moses, labios o lengua  · problemas respiratorios  · confusión  · sensación de desmayos o mareos, caídas  · dolor de estómago  · color amarillento de los ojos o la piel  Efectos secundarios que, por lo general, no requieren atención médica (debe informarlos a hart médico o a hart profesional de la maría si persisten o si son molestos):  · náuseas, vómitos  · malestar estomacal  ¿Qué puede interactuar con randy medicamento?  · alcohol  · antihistamínicos  · isoniazida  · medicamentos para la depresión, ansiedad u trastornos psicóticos  · medicamentos para dormir  · relajantes musculares  · naltrexona  · medicamentos narcóticos (opiáceos) para el dolor  · fenobarbital  · ritonavir  · tramadol  ¿Qué sucede si me olvido  de trinh dosis?  Si olvida trinh dosis, tómela lo antes posible. Si es leanne la hora de la próxima dosis, tome sólo michelle dosis. No tome dosis adicionales o dobles.  ¿Dónde tawnya guardar mi medicina?  [place] fuera del alcance de los niños. Randy medicamento puede ser abusado. Mantenga hart medicamento en un lugar seguro para protegerlo contra robos. No comparta randy medicamento con nadie. Es peligroso  o ceder randy medicamento y está prohibido por la mirna.  [place] a temperatura ambiente, entre 15 y 30 grados C (59 y 86 grados F). Protéjalo marcial. Mantenga el envase alex cerrado.   Deseche todo el medicamento que no haya utilizado, después de la fecha de vencimiento. Deseche los medicamentos sin utilizar y los envases utilizados con cuidado. Los niños y las mascotas pueden ser dañados si encuentran los envases usados o perdidos.  ¿Qué le tawnya informar a mi profesional de la maría antes de eddy randy medicamento?  Necesita saber si usted presenta alguno de los siguientes problemas o situaciones:  · tumor cerebral  · enfermedad de [place], enfermedad intestinal inflamatoria o colitis ulcerativa  · abuso de drogas o drogadicción  · lesión de la dennis  · problemas cardiacos o circulatorios  · si consume alcohol con frecuencia  · enfermedad renal o problemas al orinar  · enfermedad hepática  · enfermedad pulmonar, asma o dificultades al respirar  · trinh reacción alérgica o inusual al acetaminofeno, a la hidrocodona, a otros analgésicos opiáceos, a otros medicamentos, alimentos, colorantes o conservantes  · si está embarazada o buscando quedar embarazada  · si está amamantando a un bebé  ¿A qué tawnya estar atento al usar randy medicamento?  Si el dolor no desaparece, si empeora o si experimenta un dolor nuevo o de tipo diferente, consulte a hart médico o a hart profesional de la maría. Usted puede desarrollar tolerancia al medicamento. La tolerancia significa que necesitará trinh dosis más anna para aliviar el dolor. Tolerancia  es normal y esperada cuando esté tomando randy medicamento por un dipti período de tiempo.  No suspenda el uso de hart medicamento repentinamente debido a que puede desarrollar trinh reacción severa. Hart cuerpo se acostumbra a randy medicamento. [place] NO significa que sea adicto. La adicción es un comportamiento que hace referencia a la obtención y utilización de un medicamento con fines que no son médicos. Si tiene dolor, existe trinh razón médica para que usted tome un analgésico. Hart médico le indicará la cantidad de medicamento que necesitará eddy. Si hart médico desea que pare el tratamiento, la dosis será reducida gradualmente para evitar efectos secudarios.  Puede experimentar somnolencia o mareos al comenzar con el medicamento o al cambiar de dosis. No conduzca ni utilice maquinaria ni nataliia nada que sea peligroso hasta que sepa cómo le afecta randy medicamento. Siéntese y póngase de pie lentamente.  Hay distintos tipos de medicamentos narcóticos (opiáceos) para el dolor. Si usted leopoldo más que un tipo a la misma vez, podrá tener más efectos secundarios. [place] a hart proveedor de atención medica trinh lista de todos los medicamentos que usted usa. Hart médico le informará la cantidad de medicamento que necesita eddy. No tome más medicamento que lo indicado. Comuníquese con emergencia para ayuda si tiene problemas para respirar.  Randy medicamento causará estreñimiento. Trate de evacuar los intestinos al menos cada 2 ó 3 días. Si no evacua los intestinos giovany 3 días, comuníquese con hart médico o con hart profesional de la maría.  Tomando mucho acetaminofeno puede ser muy peligroso. No tome Tylenol (acetaminofeno) u otros medicamentos que contienen acetaminofeno con randy medicamento. Muchos medicamentos de venta noe contienen acetaminofeno. [place] siempre las etiquetas cuidadosamente.  © 2903-5256 [employer] [address] [place] [address], [place] [address]. Todos los derechos reservados. Esta información no pretende  sustituir la atención médica profesional. Sólo hart médico puede diagnosticar y tratar un problema de maría.

## 2018-02-05 NOTE — H&P
Patient ID: Ghada Dave is a 82 y.o. female.     Chief Complaint: Follow-up ( 3 mos F/u for anal polyp excision)     F/u for rectal polyp excision , c/o burning on moving bowels     Review of Systems   Constitutional: Negative.    HENT: Negative.    Eyes: Negative.    Respiratory: Negative.    Cardiovascular: Negative.    Gastrointestinal: Positive for rectal pain. Negative for anal bleeding.   Genitourinary: Negative.    Musculoskeletal: Negative.    Skin: Negative.    Neurological: Negative.    Psychiatric/Behavioral: Negative.        Objective:   Physical Exam   Constitutional: She is oriented to person, place, and time. She appears well-developed and well-nourished.   HENT:   Head: Normocephalic.   Eyes: Conjunctivae and EOM are normal. Pupils are equal, round, and reactive to light.   Neck: Normal range of motion. Neck supple.   Cardiovascular: Normal rate, regular rhythm, normal heart sounds and intact distal pulses.    Pulmonary/Chest: Effort normal and breath sounds normal.   Abdominal: Soft. Bowel sounds are normal.   Musculoskeletal: Normal range of motion.   Neurological: She is alert and oriented to person, place, and time. She has normal reflexes.   Skin: Skin is warm and dry.       Assessment:       1. Psychophysiological insomnia    2. Anal or rectal pain    3. Diverticulosis of intestine without bleeding, unspecified intestinal tract location    4. Left lower quadrant pain    5. Coronary artery disease without angina pectoris, unspecified vessel or lesion type, unspecified whether native or transplanted heart        Plan:        proctoscopy and excision polyp on Tuesday next week

## 2018-02-05 NOTE — ANESTHESIA RELEASE NOTE
Anesthesia Release from PACU Note    Patient: Ghada Dave    Procedure(s) Performed: Procedure(s) (LRB):  PROCTOSCOPY WITH POLYPECTOMY (N/A)    Anesthesia type: general    Post pain: Adequate analgesia    Post assessment: no apparent anesthetic complications    Last Vitals:   Visit Vitals  BP (!) 128/58   Pulse 70   Temp 36.3 °C (97.3 °F) (Skin)   Resp 17   Ht 5' (1.524 m)   Wt 57.2 kg (126 lb)   SpO2 96%   BMI 24.61 kg/m²       Post vital signs: stable    Level of consciousness: awake and oriented    Nausea/Vomiting: no nausea/no vomiting    Complications: none    Airway Patency: patent    Respiratory: unassisted    Cardiovascular: stable    Hydration: euvolemic

## 2018-02-05 NOTE — DISCHARGE SUMMARY
Discharge Summary/Operative Note       Surgery Date: 2/5/2018     Surgeon(s) and Role:     * Bharath Shaw MD - Primary    Pre-op Diagnosis:  Anal or rectal pain [K62.89]  Left lower quadrant pain [R10.32]  Psychophysiological insomnia [F51.04]  Diverticulosis of intestine without bleeding, unspecified intestinal tract location [K57.90]  Coronary artery disease without angina pectoris, unspecified vessel or lesion type, unspecified whether native or transplanted heart [I25.10]    Post-op Diagnosis: Post-Op Diagnosis Codes:     * Anal or rectal pain [K62.89]     * Left lower quadrant pain [R10.32]     * Psychophysiological insomnia [F51.04]     * Diverticulosis of intestine without bleeding, unspecified intestinal tract location [K57.90]     * Coronary artery disease without angina pectoris, unspecified vessel or lesion type, unspecified whether native or transplanted heart [I25.10]    Procedure(s) (LRB):  PROCTOSCOPY WITH POLYPECTOMY (N/A)    Anesthesia: General    Procedure in Detail/Findings:  dictated    Estimated Blood Loss: 20 cc           Specimens     Start     Ordered    02/05/18 1001  Specimen to Pathology - Surgery  Once     Poly anal 02/05/18 1001        Implants: * No implants in log *           Disposition: PACU - hemodynamically stable.           Condition: Good    Attestation:  I performed the procedure.           Discharge Note    Admit Date: 2/5/2018    Attending Physician: Bharath Shaw MD     Discharge Physician: Bharath Shaw MD    Final Diagnosis: Post-Op Diagnosis Codes:     * Anal or rectal pain [K62.89]     * Left lower quadrant pain [R10.32]     * Psychophysiological insomnia [F51.04]     * Diverticulosis of intestine without bleeding, unspecified intestinal tract location [K57.90]     * Coronary artery disease without angina pectoris, unspecified vessel or lesion type, unspecified whether native or transplanted heart [I25.10]    Disposition: Home or Self Care    Patient  Instructions:   Current Discharge Medication List      CONTINUE these medications which have NOT CHANGED    Details   aspirin (ECOTRIN) 81 MG EC tablet Take 1 tablet (81 mg total) by mouth once daily.  Refills: 0      atorvastatin (LIPITOR) 40 MG tablet Take 1 tablet (40 mg total) by mouth once daily.  Qty: 90 tablet, Refills: 1    Associated Diagnoses: Hyperlipidemia, unspecified hyperlipidemia type      b complex vitamins capsule Take 1 capsule by mouth once daily.      mometasone 0.1% (ELOCON) 0.1 % cream       multivitamin capsule Take 1 capsule by mouth once daily.      omega-3 fatty acids-vitamin E (FISH OIL) 1,000 mg Cap Take 1 capsule by mouth.      fluocinonide 0.05% (LIDEX) 0.05 % cream Apply topically 2 (two) times daily.  Qty: 120 g, Refills: 0    Associated Diagnoses: Eczema, unspecified type         regular diet , no heavy lifting , norco for pain, return to office one week.      Patient tolerated procedure well and was snet to recovery room in stable condition.

## 2018-02-07 ENCOUNTER — TELEPHONE (OUTPATIENT)
Dept: FAMILY MEDICINE | Facility: CLINIC | Age: 83
End: 2018-02-07

## 2018-02-07 NOTE — TELEPHONE ENCOUNTER
Patient requesting a sooner appointment for Dr Corrales because she will like to get order for ortho for her pain.

## 2018-02-07 NOTE — TELEPHONE ENCOUNTER
----- Message from Cinthya Harris MA sent at 2/7/2018  1:20 PM CST -----  Contact: self/[phone]      ----- Message -----  From: Joe Chu  Sent: 2/7/2018  12:37 PM  To: Shaheed Glass Staff ()    Patient would like to be seen for a sooner appointment due to she needs an Ortho referral from her primary care doctor.      Please call and advise.

## 2018-03-01 ENCOUNTER — INITIAL CONSULT (OUTPATIENT)
Dept: HEMATOLOGY/ONCOLOGY | Facility: CLINIC | Age: 83
End: 2018-03-01
Payer: MEDICARE

## 2018-03-01 VITALS — WEIGHT: 127.63 LBS | BODY MASS INDEX: 24.1 KG/M2 | HEIGHT: 61 IN

## 2018-03-01 DIAGNOSIS — K62.89 ANAL OR RECTAL PAIN: ICD-10-CM

## 2018-03-01 DIAGNOSIS — K62.82: Primary | ICD-10-CM

## 2018-03-01 PROCEDURE — 99214 OFFICE O/P EST MOD 30 MIN: CPT | Mod: S$GLB,,, | Performed by: INTERNAL MEDICINE

## 2018-03-01 PROCEDURE — 99999 PR PBB SHADOW E&M-EST. PATIENT-LVL I: CPT | Mod: PBBFAC,,, | Performed by: INTERNAL MEDICINE

## 2018-03-01 NOTE — LETTER
March 1, 2018      Bharath Shaw MD  [address]  [address]  [facility] [address]           [facility]  [address] [facility] [address]  Phone: [phone]          Patient: Ghada Dave   MR Number: [id number]   YOB: 1935   Date of Visit: 3/1/2018       Dear Dr. Bharath Shaw:    Thank you for referring Ghada Flanagan to me for evaluation. Attached you will find relevant portions of my assessment and plan of care.    If you have questions, please do not hesitate to call me. I look forward to following Ghada Flanagan along with you.    Sincerely,    Jacob Sims MD    Enclosure  CC:  No Recipients    If you would like to receive this communication electronically, please contact [email or web address] or [phone] to request more information on [employer] access.    For providers and/or their staff who would like to refer a patient to [facility], please contact us through our one-stop-shop provider referral line, [facility], at [phone].    If you feel you have received this communication in error or would no longer like to receive these types of communications, please e-mail [email or web address]

## 2018-03-01 NOTE — PROGRESS NOTES
Subjective:       Patient ID: Ghada Dave is a 82 y.o. female.    Chief Complaint: No chief complaint on file.    HPI This is a pleasant 82-year-old Romanian-speaking female here for f/u of dysplasia involving an anal polyp.    PCP -     She has had anal warts for a few years now and has been undergoing regular procedure.  She saw Dr. Pisano in the past.  Then she saw Dr. Myers and underwent a colonoscopy on 08/28/2017 that revealed a polyp at the dentate line.  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  So she saw Dr. Shaw and underwent a proctoscopy with polypectomy on 09/19/2017 that revealed a high-grade squamous intraepithelial lesion and an anal intraepithelial lesion-1 that is present and focally approaching the inked margin.      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  Margins were positive.    She speaks Romanian.  She is accompanied by her friend, Ms. Lim.  Her friend is interpreting for her.    Patient continues to have some burning sensation and discomfort in the anal area.  She has tenesmus.  Denies bloody bowel movements.    Review of Systems   Constitutional: Negative for appetite change, fatigue, fever and unexpected weight change.   HENT: Negative for facial swelling and nosebleeds.    Eyes: Negative for photophobia and pain.   Respiratory: Negative for cough and shortness of breath.    Cardiovascular: Negative for chest pain and leg swelling.   Gastrointestinal: Positive for rectal pain. Negative for abdominal pain, anal bleeding, blood in stool and nausea.   Genitourinary: Negative for dysuria and hematuria.   Musculoskeletal: Positive for arthralgias.    Skin: Negative for color change and rash.   Neurological: Negative for seizures, weakness and headaches.   Hematological: Negative for adenopathy. Does not bruise/bleed easily.   All other systems reviewed and are negative.        Objective:      Physical Exam   Constitutional: She is oriented to person, place, and time.  Non-toxic appearance. No distress.   HENT:   Mouth/Throat: No oropharyngeal exudate.   Eyes: No scleral icterus.   Neck: Neck supple. No thyroid mass and no thyromegaly present.   Cardiovascular: Normal rate, regular rhythm, S1 normal and normal heart sounds.    Pulmonary/Chest: Effort normal and breath sounds normal. No accessory muscle usage. No respiratory distress. She has no wheezes. She has no rales.   Abdominal: Soft. She exhibits no ascites and no mass. There is no hepatosplenomegaly. There is no tenderness.   Musculoskeletal: She exhibits no edema.   Lymphadenopathy:     She has no cervical adenopathy.     She has no axillary adenopathy.   Neurological: She is alert and oriented to person, place, and time. She has normal strength. Gait normal.   Skin: No bruising, no petechiae and no rash noted. She is not diaphoretic.   Psychiatric: Her behavior is normal. Cognition and memory are normal.   Vitals reviewed.        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 Anal or rectal pain        Plan:   A few weeks ago she had reexcision/polypectomy at the time a proctoscopy.  High-grade squamous intraepithelial lesion was noted.  There was no invasive component.  Based on the pathology report the excision is not complete.    I reviewed the results with her in extensive detail.    Will refer to colorectal surgery for further evaluation.  She saw Dr. Pisano in the past.    Answered all her questions to the best of my ability.

## 2018-03-05 ENCOUNTER — OFFICE VISIT (OUTPATIENT)
Dept: INTERNAL MEDICINE | Facility: CLINIC | Age: 83
End: 2018-03-05
Payer: MEDICARE

## 2018-03-05 ENCOUNTER — TELEPHONE (OUTPATIENT)
Dept: FAMILY MEDICINE | Facility: CLINIC | Age: 83
End: 2018-03-05

## 2018-03-05 VITALS
HEART RATE: 66 BPM | WEIGHT: 126.56 LBS | HEIGHT: 60 IN | BODY MASS INDEX: 24.85 KG/M2 | DIASTOLIC BLOOD PRESSURE: 62 MMHG | OXYGEN SATURATION: 95 % | SYSTOLIC BLOOD PRESSURE: 145 MMHG

## 2018-03-05 DIAGNOSIS — I10 HYPERTENSION, UNSPECIFIED TYPE: ICD-10-CM

## 2018-03-05 DIAGNOSIS — K62.82: ICD-10-CM

## 2018-03-05 DIAGNOSIS — H61.23 CERUMEN DEBRIS ON TYMPANIC MEMBRANE OF BOTH EARS: ICD-10-CM

## 2018-03-05 DIAGNOSIS — R11.0 NAUSEA: ICD-10-CM

## 2018-03-05 DIAGNOSIS — I65.29 STENOSIS OF CAROTID ARTERY, UNSPECIFIED LATERALITY: ICD-10-CM

## 2018-03-05 DIAGNOSIS — R42 DIZZINESS: Primary | ICD-10-CM

## 2018-03-05 PROCEDURE — 99214 OFFICE O/P EST MOD 30 MIN: CPT | Mod: S$GLB,,, | Performed by: INTERNAL MEDICINE

## 2018-03-05 PROCEDURE — 99999 PR PBB SHADOW E&M-EST. PATIENT-LVL III: CPT | Mod: PBBFAC,,, | Performed by: INTERNAL MEDICINE

## 2018-03-05 PROCEDURE — 93005 ELECTROCARDIOGRAM TRACING: CPT | Mod: S$GLB,,, | Performed by: INTERNAL MEDICINE

## 2018-03-05 PROCEDURE — 3077F SYST BP >= 140 MM HG: CPT | Mod: S$GLB,,, | Performed by: INTERNAL MEDICINE

## 2018-03-05 PROCEDURE — 93010 ELECTROCARDIOGRAM REPORT: CPT | Mod: S$GLB,,, | Performed by: INTERNAL MEDICINE

## 2018-03-05 PROCEDURE — 3078F DIAST BP <80 MM HG: CPT | Mod: S$GLB,,, | Performed by: INTERNAL MEDICINE

## 2018-03-05 RX ORDER — MECLIZINE HYDROCHLORIDE 25 MG/1
25 TABLET ORAL 2 TIMES DAILY PRN
Qty: 30 TABLET | Refills: 0 | Status: SHIPPED | OUTPATIENT
Start: 2018-03-05 | End: 2018-03-29

## 2018-03-05 RX ORDER — LISINOPRIL 5 MG/1
TABLET ORAL
Qty: 90 TABLET | Refills: 0 | Status: SHIPPED | OUTPATIENT
Start: 2018-03-05 | End: 2018-03-29

## 2018-03-05 RX ORDER — MECLIZINE HYDROCHLORIDE 25 MG/1
25 TABLET ORAL 2 TIMES DAILY PRN
Qty: 30 TABLET | Refills: 0 | Status: SHIPPED | OUTPATIENT
Start: 2018-03-05 | End: 2018-03-05 | Stop reason: SDUPTHER

## 2018-03-05 RX ORDER — LISINOPRIL 5 MG/1
5 TABLET ORAL DAILY
Qty: 30 TABLET | Refills: 1 | Status: SHIPPED | OUTPATIENT
Start: 2018-03-05 | End: 2018-03-05 | Stop reason: SDUPTHER

## 2018-03-05 NOTE — TELEPHONE ENCOUNTER
Spoke with patient to set an urgent care appointment for nausea and vomiting with Dr Hummel today at 1 pm.  ED precautions was given.  Patient verbalized understanding on instruction given.

## 2018-03-05 NOTE — TELEPHONE ENCOUNTER
----- Message from Kathy Culp sent at 3/5/2018  7:05 AM CST -----  Contact: (Mauritian only)  [phone]/ self   Pt its requesting an appointment for today with Dr Corrales , states she only want to see Dr Corrales . Pt its very upset because every time she calls at 7 am every morning she can never get an appointment . Pt also states that every time she gets a call from the nurse she doesn't try to help her. Pt doesn't like the way she is being treated as a person and its very upset . Please advise

## 2018-03-05 NOTE — PROGRESS NOTES
Pt. ID: Ghada Dave is a 82 y.o. female      Chief complaint:   Chief Complaint   Patient presents with    Nausea     x 1 wk    Dizziness       HPI: Pt. Here for nausea and dizziness for past 2 weeks; Anjana CASH, translates for pt.; she denies stomach complaints; dizziness is intermittent; symptoms are absent when sitting or lying; she gets dizzy for few minutes when standing; BP is borderline elevated; of note, she has hx of rectal Ca and is seeing oncology and surgery; CTA dated 9/7/17 showed < 50 % ICA stenosis; ulcerated plaques vs pseudoaneurysm was also noted; as per PCP, Dr. King's note dated 9/8/17, neurovascular felt finding was tortuous variant; BP is borderline elevated; I reviewed BMP dated 2/1/18 which was WNL; EKG dated 2/1/18 showed no significant abnormality    Review of Systems   Constitutional: Negative for chills and fever.   HENT: Negative for congestion and sore throat.    Respiratory: Negative for cough and shortness of breath.    Cardiovascular: Negative for chest pain.   Gastrointestinal: Positive for nausea and vomiting. Negative for abdominal pain and diarrhea.   Genitourinary: Negative for dysuria.   Neurological: Positive for dizziness.         Objective:    Physical Exam   Constitutional: She is oriented to person, place, and time.   Eyes: EOM are normal.   Neck: Normal range of motion.   Cardiovascular: Normal rate, regular rhythm and normal heart sounds.    Pulmonary/Chest: Effort normal and breath sounds normal.   Abdominal: Soft. There is no tenderness. There is no rebound and no guarding.   Musculoskeletal: Normal range of motion.   Neurological: She is alert and oriented to person, place, and time. Coordination normal.   Motor strength normal all extremities   Skin: No rash noted.         Health Maintenance   Topic Date Due    DEXA SCAN  10/31/2017    Lipid Panel  08/02/2022    TETANUS VACCINE  10/28/2026    Zoster Vaccine  Completed    Pneumococcal (65+)   Completed    Influenza Vaccine  Completed         Assessment:     1. Dizziness Active   2. Hypertension, unspecified type Sub-optimally controlled   3. Nausea Active   4. Stenosis of carotid artery, unspecified laterality Active   5. Cerumen debris on tympanic membrane of both ears Active   6. 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 to rise slowly when seated; get EKG  Orders:  -     Discontinue: meclizine (ANTIVERT) 25 mg tablet; Take 1 tablet (25 mg total) by mouth 2 (two) times daily as needed for Dizziness or Nausea.  Dispense: 30 tablet; Refill: 0  -     IN OFFICE EKG 12-LEAD (to [place])  -     meclizine (ANTIVERT) 25 mg tablet; Take 1 tablet (25 mg total) by mouth 2 (two) times daily as needed for Dizziness or Nausea.  Dispense: 30 tablet; Refill: 0    Hypertension, unspecified type  Comments:  start lisinopril 5 mg QD and encouraged low Na diet   Orders:  -     lisinopril (PRINIVIL,ZESTRIL) 5 MG tablet; Take 1 tablet (5 mg total) by mouth once daily.  Dispense: 30 tablet; Refill: 1    Nausea  Comments:  with dizziness; start antivert  Orders:  -     Discontinue: meclizine (ANTIVERT) 25 mg tablet; Take 1 tablet (25 mg total) by mouth 2 (two) times daily as needed for Dizziness or Nausea.  Dispense: 30 tablet; Refill: 0  -     meclizine (ANTIVERT) 25 mg tablet; Take 1 tablet (25 mg total) by mouth 2 (two) times daily as needed for Dizziness or Nausea.  Dispense: 30 tablet; Refill: 0    Stenosis of carotid artery, unspecified laterality  Comments:  continue statin and asa QD    Cerumen debris on tympanic membrane of both ears  Comments:  asked pt. 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 to rise slowly when seated; get EKG         Relevant Medications    meclizine (ANTIVERT) 25 mg tablet    Other Relevant Orders    IN OFFICE EKG 12-LEAD (to [place])

## 2018-03-28 ENCOUNTER — OFFICE VISIT (OUTPATIENT)
Dept: FAMILY MEDICINE | Facility: CLINIC | Age: 83
End: 2018-03-28
Payer: MEDICARE

## 2018-03-28 VITALS
HEART RATE: 61 BPM | WEIGHT: 125.69 LBS | DIASTOLIC BLOOD PRESSURE: 60 MMHG | HEIGHT: 60 IN | SYSTOLIC BLOOD PRESSURE: 130 MMHG | BODY MASS INDEX: 24.68 KG/M2

## 2018-03-28 DIAGNOSIS — K44.9 HIATAL HERNIA: ICD-10-CM

## 2018-03-28 DIAGNOSIS — Z78.0 ASYMPTOMATIC MENOPAUSE: ICD-10-CM

## 2018-03-28 DIAGNOSIS — K57.30 DIVERTICULOSIS OF LARGE INTESTINE WITHOUT HEMORRHAGE: ICD-10-CM

## 2018-03-28 DIAGNOSIS — L30.9 ECZEMA, UNSPECIFIED TYPE: ICD-10-CM

## 2018-03-28 DIAGNOSIS — E78.5 HYPERLIPIDEMIA, UNSPECIFIED HYPERLIPIDEMIA TYPE: ICD-10-CM

## 2018-03-28 DIAGNOSIS — Z00.00 ANNUAL PHYSICAL EXAM: Primary | ICD-10-CM

## 2018-03-28 DIAGNOSIS — L30.9 DERMATITIS: ICD-10-CM

## 2018-03-28 DIAGNOSIS — R10.32 LEFT LOWER QUADRANT PAIN: ICD-10-CM

## 2018-03-28 DIAGNOSIS — F51.01 PRIMARY INSOMNIA: ICD-10-CM

## 2018-03-28 PROCEDURE — 99499 UNLISTED E&M SERVICE: CPT | Mod: S$GLB,,, | Performed by: FAMILY MEDICINE

## 2018-03-28 PROCEDURE — 99397 PER PM REEVAL EST PAT 65+ YR: CPT | Mod: S$GLB,,, | Performed by: FAMILY MEDICINE

## 2018-03-28 PROCEDURE — 99999 PR PBB SHADOW E&M-EST. PATIENT-LVL III: CPT | Mod: PBBFAC,,, | Performed by: FAMILY MEDICINE

## 2018-03-28 RX ORDER — FLUOCINONIDE 0.5 MG/G
CREAM TOPICAL 2 TIMES DAILY
Qty: 120 G | Refills: 0 | Status: SHIPPED | OUTPATIENT
Start: 2018-03-28 | End: 2018-04-11 | Stop reason: CLARIF

## 2018-03-28 RX ORDER — ATORVASTATIN CALCIUM 20 MG/1
20 TABLET, FILM COATED ORAL NIGHTLY
Qty: 90 TABLET | Refills: 3 | Status: SHIPPED | OUTPATIENT
Start: 2018-03-28 | End: 2018-10-18 | Stop reason: SDUPTHER

## 2018-03-28 RX ORDER — HYDROXYZINE PAMOATE 50 MG/1
50 CAPSULE ORAL NIGHTLY PRN
Qty: 30 CAPSULE | Refills: 0 | Status: SHIPPED | OUTPATIENT
Start: 2018-03-28 | End: 2018-04-11 | Stop reason: CLARIF

## 2018-03-28 NOTE — PROGRESS NOTES
Subjective:       Patient ID: Ghada Dave is a 82 y.o. female.    Chief Complaint: Annual Exam    82 years old female who came to the clinic for her physical examination.  Patient came with multiple complaints.  Patient with left lower quadrant abdominal pain for the last year.  The pain is 3 out of 10 of intensity on and off aggravated with activity and better with rest.  Last CT scan with evidence of diverticulosis and hiatal hernia.  Patient with follow-up with colorectal for anal cancer.  Patient with both lower extremities rash associated with itching for the last year.  She was using the cream with significant improvement.  Patient was not able to sleep pretty well requesting a medicine to help her to sleep.      Review of Systems   Constitutional: Negative.    HENT: Negative.    Eyes: Negative.    Respiratory: Negative.    Cardiovascular: Negative.  Negative for chest pain, palpitations and leg swelling.   Gastrointestinal: Positive for abdominal pain.   Genitourinary: Negative.    Musculoskeletal: Negative.    Skin: Positive for rash.   Neurological: Negative.    Psychiatric/Behavioral: Negative.        Objective:      Physical Exam   Constitutional: She is oriented to person, place, and time. She appears well-developed and well-nourished. No distress.   HENT:   Head: Normocephalic and atraumatic.   Right Ear: External ear normal.   Left Ear: External ear normal.   Nose: Nose normal.   Mouth/Throat: Oropharynx is clear and moist. No oropharyngeal exudate.   Eyes: Conjunctivae and EOM are normal. Pupils are equal, round, and reactive to light. Right eye exhibits no discharge. Left eye exhibits no discharge. No scleral icterus.   Neck: Normal range of motion. Neck supple. No JVD present. No tracheal deviation present. No thyromegaly present.   Cardiovascular: Normal rate, regular rhythm, normal heart sounds and intact distal pulses.  Exam reveals no gallop and no friction rub.    No murmur  heard.  Pulmonary/Chest: Effort normal and breath sounds normal. No stridor. No respiratory distress. She has no wheezes. She has no rales. She exhibits no tenderness.   Abdominal: Soft. Bowel sounds are normal. She exhibits no distension and no mass. There is tenderness in the left lower quadrant. There is no rebound and no guarding.   Musculoskeletal: Normal range of motion. She exhibits no edema or tenderness.   Lymphadenopathy:     She has no cervical adenopathy.   Neurological: She is alert and oriented to person, place, and time. She has normal reflexes. No cranial nerve deficit. She exhibits normal muscle tone. Coordination and gait abnormal.   Skin: Skin is warm and dry. Rash noted. Rash is maculopapular (lower extremities). She is not diaphoretic. No erythema. No pallor.   Psychiatric: She has a normal mood and affect. Her behavior is normal. Judgment and thought content normal.       Assessment:       1. Annual physical exam    2. Hiatal hernia    3. Diverticulosis of large intestine without hemorrhage    4. Left lower quadrant pain    5. Dermatitis    6. Eczema, unspecified type    7. Primary insomnia    8. Asymptomatic menopause    9. Hyperlipidemia, unspecified hyperlipidemia type        Plan:         Ghada was seen today for annual exam.    Diagnoses and all orders for this visit:    Annual physical exam  -     Comprehensive metabolic panel; Future  -     Urinalysis; Future  -     TSH; Future  -     CBC auto differential; Future  -     Lipid panel; Future    Hiatal hernia    Diverticulosis of large intestine without hemorrhage  -     CBC auto differential; Future    Left lower quadrant pain  -     Comprehensive metabolic panel; Future  -     Urinalysis; Future  -     CBC auto differential; Future    Dermatitis    Eczema, unspecified type  -     fluocinonide 0.05% (LIDEX) 0.05 % cream; Apply topically 2 (two) times daily.    Primary insomnia  -     hydrOXYzine pamoate (VISTARIL) 50 MG Cap; Take 1  capsule (50 mg total) by mouth nightly as needed (insomnia).    Asymptomatic menopause  -     DXA Bone Density Spine And Hip; Future    Hyperlipidemia, unspecified hyperlipidemia type  -     atorvastatin (LIPITOR) 20 MG tablet; Take 1 tablet (20 mg total) by mouth every evening.     sleep hygiene.

## 2018-03-28 NOTE — PATIENT INSTRUCTIONS
Dermatitis atópica (adulto)  La dermatitis atópica es un salpullido enrojecido con comezón. También se conoce rm eczema. El salpullido es crónico, o continuo. Puede aparecer y desaparecer con el tiempo. La enfermedad suele ser genética, y se traspasa de generación en generación en la ranjan. Provoca un problema con la sharma de la piel. Entonces, la piel se vuelve más sensible al entorno y a otros factores. Esta mayor sensibilidad de la piel provoca picazón, por eso usted se rasca. Graciela, rascarse puede empeorar la picazón y también romper la piel. Eso aumenta el riesgo de tener infecciones.  Se da más comúnmente en personas con asma, fiebre del heno o que tienen piel seca y sensible. El salpullido puede deberse al calor extremo o a sudar mucho. Los irritantes de la piel pueden hacer que la erupción empeore. Los elementos que pueden irritarle la piel son ropa de daron o de rodrigo, la grasa, los aceites, algunos medicamentos, y detergentes y jabones muy aníbal. El estrés emocional también puede ser un desencadenante.  El tratamiento se hace para aliviar la picazón y la inflamación de la piel.  Cuidados en la casa  · Siga estos consejos para cuidarse si tiene esta afección:  · Mantenga limpias las zonas donde tiene salpullido. Para eso, báñese al menos día por medio. Báñese con agua tibia. No use [place], porque puede secarle la piel.  · No use jabones que tengan detergentes aníbal. Use jabones suaves para piel sensible.  · Aplíquese trinh crema o pomada para humectar la piel rosa después del baño.  · Evite las cosas que irritan hart piel. Use telas absorbentes y suaves en lugar de materiales ásperos o rugosos.  · Use un jabón para la ropa que sea suave, sin perfumes ni aromas. Asegúrese de enjuagar muy alex todo el jabón que pueda ish en hart ropa.  · Trate todas las infecciones de la piel según le indiquen.  · Use difenhidramina oral para ayudar a reducir la picazón. Es un antihistamínico que puede comprar en  farmacias y tiendas de alimentos. Puede que le cause somnolencia. Por eso, use dosis más pequeñas giovany el día. También puede usar loratadina. Es un antihistamínico que no le provocará somnolencia. No use difenhidramina si tiene glaucoma o problemas al orinar por un agrandamiento de la próstata.  Visita de control  Si paco síntomas no mejoran o si empeoran en los siete días siguientes, coordine trinh amaury con hart proveedor de atención médica.  ¿Cuándo debe buscar atención médica?  Llame a hart proveedor de atención médica de inmediato si tiene cualquiera de los siguientes síntomas:  · La lenin enrojecida o dolorosa de la piel se extiende  · Tiene costras oc o supuración húmeda en el salpullido  · Fiebre de 100.4 °F (38 °C) o más, o según le haya indicado hart proveedor de atención médica  Date Last Reviewed: 7/19/2014  © 4636-8013 [employer]. [address] [place] [id number]. Todos los derechos reservados. Esta información no pretende sustituir la atención médica profesional. Sólo hart médico puede diagnosticar y tratar un problema de maría.

## 2018-03-29 ENCOUNTER — OFFICE VISIT (OUTPATIENT)
Dept: SURGERY | Facility: CLINIC | Age: 83
End: 2018-03-29
Payer: MEDICARE

## 2018-03-29 VITALS
DIASTOLIC BLOOD PRESSURE: 73 MMHG | BODY MASS INDEX: 24.68 KG/M2 | WEIGHT: 125.69 LBS | HEART RATE: 67 BPM | HEIGHT: 60 IN | SYSTOLIC BLOOD PRESSURE: 146 MMHG

## 2018-03-29 DIAGNOSIS — K62.82 AIN GRADE II: Primary | ICD-10-CM

## 2018-03-29 PROCEDURE — 99214 OFFICE O/P EST MOD 30 MIN: CPT | Mod: 25,S$GLB,, | Performed by: COLON & RECTAL SURGERY

## 2018-03-29 PROCEDURE — 3078F DIAST BP <80 MM HG: CPT | Mod: CPTII,S$GLB,, | Performed by: COLON & RECTAL SURGERY

## 2018-03-29 PROCEDURE — 99999 PR PBB SHADOW E&M-EST. PATIENT-LVL III: CPT | Mod: PBBFAC,,, | Performed by: COLON & RECTAL SURGERY

## 2018-03-29 PROCEDURE — 3077F SYST BP >= 140 MM HG: CPT | Mod: CPTII,S$GLB,, | Performed by: COLON & RECTAL SURGERY

## 2018-03-29 RX ORDER — HYDROGEN PEROXIDE 3 %
20 SOLUTION, NON-ORAL MISCELLANEOUS
COMMUNITY
End: 2018-10-18 | Stop reason: SDUPTHER

## 2018-04-02 ENCOUNTER — OFFICE VISIT (OUTPATIENT)
Dept: OPTOMETRY | Facility: CLINIC | Age: 83
End: 2018-04-02
Payer: MEDICARE

## 2018-04-02 DIAGNOSIS — Z96.1 PSEUDOPHAKIA OF BOTH EYES: ICD-10-CM

## 2018-04-02 DIAGNOSIS — H52.4 PRESBYOPIA: ICD-10-CM

## 2018-04-02 DIAGNOSIS — K62.82 ANAL DYSPLASIA: Primary | ICD-10-CM

## 2018-04-02 DIAGNOSIS — Z13.5 GLAUCOMA SCREENING: ICD-10-CM

## 2018-04-02 DIAGNOSIS — H04.123 DRY EYES, BILATERAL: Primary | ICD-10-CM

## 2018-04-02 PROCEDURE — 92015 DETERMINE REFRACTIVE STATE: CPT | Mod: S$GLB,,, | Performed by: OPTOMETRIST

## 2018-04-02 PROCEDURE — 92014 COMPRE OPH EXAM EST PT 1/>: CPT | Mod: S$GLB,,, | Performed by: OPTOMETRIST

## 2018-04-02 PROCEDURE — 99999 PR PBB SHADOW E&M-EST. PATIENT-LVL II: CPT | Mod: PBBFAC,,, | Performed by: OPTOMETRIST

## 2018-04-02 NOTE — PROGRESS NOTES
HPI     DLS: 12/7/2017  Pt states near va has decreased with glasses. States has trouble reading   with glasses. States OD was aching yesterday 4/1/18. Denies f/f    AT ou PRN     Sp PC IOL OU/ YAG OU  NYDIA OU--not using ATs frequently    Last edited by Chong Shah, OD on 4/2/2018  9:44 AM. (History)        ROS     Positive for: Eyes (cat surgery/YAG OU)    Negative for: Constitutional, Gastrointestinal, Neurological, Skin,   Genitourinary, Musculoskeletal, HENT, Endocrine, Cardiovascular,   Respiratory, Psychiatric, Allergic/Imm, Heme/Lymph    Last edited by Chong Shah, OD on 4/2/2018  9:44 AM. (History)        Assessment /Plan     For exam results, see Encounter Report.    Dry eyes, bilateral    Pseudophakia of both eyes    Glaucoma screening    Presbyopia      ESL pt--had  present      1. Sp pciol/YAG OU.  Wrote new spex Rx  2. Eye pain/transient blur 2 to Dry eye sx.  Long discussion w pt thru --she needs to DC the OPCON-A she has been using, and start SYS BAL or SOOTHE XP ATs QID DAILY.  Discussed chronic nature of condition    PLAN:    Pt will call sooner if sx persist w tx, and may try short course of FML.  Otherwise rtc 1 yr

## 2018-04-10 NOTE — PROGRESS NOTES
Subjective:       Patient ID: Ghada Dave is a 82 y.o. female.    Chief Complaint: No chief complaint on file.    HPI This is a pleasant 82-year-old Tamazight-speaking female here for f/u of dysplasia involving an anal polyp.    PCP -     She has had anal warts for a few years now and has been undergoing regular procedure.  She was seen previously in the past in the past.  Then she saw Dr. Myers and underwent a colonoscopy on 08/28/2017 that revealed a polyp at the dentate line.  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  So she saw Dr. Shaw and underwent a proctoscopy with polypectomy on 09/19/2017 that revealed a high-grade squamous intraepithelial lesion and an anal intraepithelial lesion-1 that is present and focally approaching the inked margin.      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  Margins were positive.    She speaks Tamazight.  She is accompanied by her friend, Ms. Lim.  Her friend is interpreting for her.    Patient continues to have some burning sensation and discomfort in the anal area.  She has tenesmus.  Denies bloody bowel movements.    Review of Systems   Constitutional: Negative for appetite change, fatigue, fever and unexpected weight change.   HENT: Negative for facial swelling and nosebleeds.    Eyes: Negative for photophobia and pain.   Respiratory: Negative for cough and shortness of breath.    Cardiovascular: Negative for chest pain and leg swelling.   Gastrointestinal: Positive for rectal pain. Negative for abdominal pain, anal bleeding, blood in stool and nausea.   Genitourinary: Negative for dysuria and hematuria.   Musculoskeletal: Positive  for arthralgias.   Skin: Negative for color change and rash.   Neurological: Negative for seizures, weakness and headaches.   Hematological: Negative for adenopathy. Does not bruise/bleed easily.   All other systems reviewed and are negative.        Objective:      Physical Exam   Constitutional: She is oriented to person, place, and time.  Non-toxic appearance. No distress.   HENT:   Mouth/Throat: No oropharyngeal exudate.   Eyes: No scleral icterus.   Neck: Neck supple. No thyroid mass and no thyromegaly present.   Cardiovascular: Normal rate, regular rhythm, S1 normal and normal heart sounds.    Pulmonary/Chest: Effort normal and breath sounds normal. No accessory muscle usage. No respiratory distress. She has no wheezes. She has no rales.   Abdominal: Soft. She exhibits no ascites and no mass. There is no hepatosplenomegaly. There is no tenderness.   Musculoskeletal: She exhibits no edema.   Lymphadenopathy:     She has no cervical adenopathy.     She has no axillary adenopathy.   Neurological: She is alert and oriented to person, place, and time. She has normal strength. Gait normal.   Skin: No bruising, no petechiae and no rash noted. She is not diaphoretic.   Psychiatric: Her behavior is normal. Cognition and memory are normal.   Vitals reviewed.        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 Anal or rectal pain        Plan:     AIN 2, condylomatous lesions  Based on this will schedule her for high-resolution anoscopy with focal destruction of any residual anal canal lesions  I have explained the procedure including indications, alternatives, expected outcomes and potential complications. The patient appears to understand and gives informed consent. The patient will be evaluated by the preop center  .

## 2018-04-11 ENCOUNTER — HOSPITAL ENCOUNTER (OUTPATIENT)
Dept: RADIOLOGY | Facility: HOSPITAL | Age: 83
Discharge: HOME | End: 2018-04-11
Attending: FAMILY MEDICINE
Payer: MEDICARE

## 2018-04-11 ENCOUNTER — ANESTHESIA EVENT (OUTPATIENT)
Dept: SURGERY | Facility: HOSPITAL | Age: 83
End: 2018-04-11
Payer: MEDICARE

## 2018-04-11 DIAGNOSIS — Z78.0 ASYMPTOMATIC MENOPAUSE: ICD-10-CM

## 2018-04-11 PROCEDURE — 77080 DXA BONE DENSITY AXIAL: CPT | Mod: TC

## 2018-04-11 PROCEDURE — 77080 DXA BONE DENSITY AXIAL: CPT | Mod: 26,,, | Performed by: RADIOLOGY

## 2018-04-11 NOTE — ANESTHESIA PREPROCEDURE EVALUATION
Anesthesia Assessment: Preoperative EQUATION    Planned Procedure: Procedure(s) (LRB):  ANOSCOPY-HIGH RESOLUTION (N/A)  Requested Anesthesia Type:Choice  Surgeon: Bernard Pisano MD  Service: Colon and Rectal  Known or anticipated Date of Surgery:4/23/2018    Surgeon notes: reviewed and Anal dysplasia  Previous anesthesia records:2/5/18-Proctoscopy with Polypectomy-General No PONV  -Patient stated:  Postop abdominal pain from anesthesia and feelings of nausea with previous surgeries-  Periodontal disease-mild  Last PCP note: within 1 month , within [facility] , Annual physical exam  Subspecialty notes: Cardiology: General, Hematology/Oncology, General Surgery/Neuroendocrine/OB/GYN/Ophthalmology/Optometry/Urology     Tests already available:  Results have been reviewed.Labs-2/1/18-BMP/ CXR-3/14/17/ EKG-3/5/18   Plan:    Testing:  None needed at this time        Patient  has previously scheduled Medical Appointment:None    Navigation: Phone               Straight Line to surgery.               No tests, anesthesia preop clinic visit, or consult required.                  Pat Barber RN  4/11/18 04/11/2018  Ghada Dave is a 82 y.o., female.    Anesthesia Evaluation         Review of Systems  Anesthesia Hx:  Hx of Anesthetic complications Abdominal pain after previous Surgeries from some type of anesthesia and feelings of nausea from the extreme abdominal pain Denies Family Hx of Anesthesia complications.  Personal Hx of Anesthesia complications   Social:  Non-Smoker, No Alcohol Use    EENT/Dental:   Wears glasses/Dental Implants   Cardiovascular:   Hypertension, well controlled CAD   hyperlipidemia DANIEL Walking daily/ Stenosis of carotid artery / dyspnea on exertion-sometimes/ Takes Lipitor daily   Renal/:   renal calculi Frequency/nocturia/hx of renal problems-kidney infection    Hepatic/GI:   GERD Diverticulosis/rectal polyp   Musculoskeletal:   Scoliosis of thoracolumbar spine   Neurological:   Neuromuscular Disease, Post herpetic neuralgia   Dermatological:   Psoriasis history       Physical Exam  General:  Well nourished    Airway/Jaw/Neck:  Airway Findings: Mouth Opening: Normal Tongue: Normal  General Airway Assessment: Adult  Mallampati: II  Improves to II with phonation.  TM Distance: Normal, at least 6 cm      Dental:  Dental Findings: In tact   Chest/Lungs:  Chest/Lungs Findings: Clear to auscultation     Heart/Vascular:  Heart Findings: Rate: Normal  Rhythm: Regular Rhythm  Sounds: Normal        Mental Status:  Mental Status Findings:  Cooperative, Alert and Oriented     Pat Barber RN  4/11/18    Anesthesia Plan  Type of Anesthesia, risks & benefits discussed:  Anesthesia Type:  general  Patient's Preference: General  Intra-op Monitoring Plan: standard ASA monitors  Intra-op Monitoring Plan Comments: Standard ASA monitors.   Post Op Pain Control Plan: per primary service following discharge from PACU  Post Op Pain Control Plan Comments: Per primary service.     Induction:   IV  Beta Blocker:  Patient is not currently on a Beta-Blocker (No further documentation required).       Informed Consent: Patient understands risks and agrees with Anesthesia plan.  Questions answered. Anesthesia consent signed with patient.  ASA Score: 2     Day of Surgery Review of History & Physical:    H&P update referred to the surgeon.     Anesthesia Plan Notes: Chart reviewed, patient interviewed and examined.  The plan for general anesthesia was explained.  Questions were answered and the consent was signed.  Trang KEITH         Ready For Surgery From Anesthesia Perspective.

## 2018-04-11 NOTE — PRE ADMISSION SCREENING
Anesthesia Assessment: Preoperative EQUATION    Planned Procedure: Procedure(s) (LRB):  ANOSCOPY-HIGH RESOLUTION (N/A)  Requested Anesthesia Type:Choice  Surgeon: Bernard Pisano MD  Service: Colon and Rectal  Known or anticipated Date of Surgery:4/23/2018    Surgeon notes: reviewed and Anal dysplasia  Previous anesthesia records:2/5/18-Proctoscopy with Polypectomy-General No PONV  -Patient stated:  Postop abdominal pain from anesthesia and feelings of nausea with previous surgeries-  Periodontal disease-mild  Last PCP note: within 1 month , within [facility] , Annual physical exam  Subspecialty notes: Cardiology: General, Hematology/Oncology, General Surgery/Neuroendocrine/OB/GYN/Ophthalmology/Optometry/Urology     Tests already available:  Results have been reviewed.Labs-2/1/18-BMP/ CXR-3/14/17/ EKG-3/5/18   Plan:    Testing:  None needed at this time        Patient  has previously scheduled Medical Appointment:None    Navigation: Phone               Straight Line to surgery.               No tests, anesthesia preop clinic visit, or consult required.                  Pat Barber RN  4/11/18

## 2018-04-20 ENCOUNTER — TELEPHONE (OUTPATIENT)
Dept: SURGERY | Facility: CLINIC | Age: 83
End: 2018-04-20

## 2018-04-23 ENCOUNTER — SURGERY (OUTPATIENT)
Age: 83
End: 2018-04-23

## 2018-04-23 ENCOUNTER — ANESTHESIA (OUTPATIENT)
Dept: SURGERY | Facility: HOSPITAL | Age: 83
End: 2018-04-23
Payer: MEDICARE

## 2018-04-23 ENCOUNTER — HOSPITAL ENCOUNTER (OUTPATIENT)
Facility: HOSPITAL | Age: 83
LOS: 1 days | Discharge: HOME | End: 2018-04-23
Attending: COLON & RECTAL SURGERY | Admitting: COLON & RECTAL SURGERY
Payer: MEDICARE

## 2018-04-23 VITALS
RESPIRATION RATE: 16 BRPM | OXYGEN SATURATION: 99 % | HEIGHT: 61 IN | TEMPERATURE: 97 F | HEART RATE: 55 BPM | SYSTOLIC BLOOD PRESSURE: 120 MMHG | WEIGHT: 125 LBS | DIASTOLIC BLOOD PRESSURE: 45 MMHG | BODY MASS INDEX: 23.6 KG/M2

## 2018-04-23 DIAGNOSIS — K62.82 ANAL DYSPLASIA: ICD-10-CM

## 2018-04-23 PROCEDURE — 71000016 HC POSTOP RECOV ADDL HR: Performed by: COLON & RECTAL SURGERY

## 2018-04-23 PROCEDURE — D9220A PRA ANESTHESIA: Mod: ANES,,, | Performed by: ANESTHESIOLOGY

## 2018-04-23 PROCEDURE — 36000705 HC OR TIME LEV I EA ADD 15 MIN: Performed by: COLON & RECTAL SURGERY

## 2018-04-23 PROCEDURE — 25000003 PHARM REV CODE 250: Performed by: NURSE ANESTHETIST, CERTIFIED REGISTERED

## 2018-04-23 PROCEDURE — 36000704 HC OR TIME LEV I 1ST 15 MIN: Performed by: COLON & RECTAL SURGERY

## 2018-04-23 PROCEDURE — 63600175 PHARM REV CODE 636 W HCPCS: Performed by: NURSE ANESTHETIST, CERTIFIED REGISTERED

## 2018-04-23 PROCEDURE — 63600175 PHARM REV CODE 636 W HCPCS: Performed by: ANESTHESIOLOGY

## 2018-04-23 PROCEDURE — 37000008 HC ANESTHESIA 1ST 15 MINUTES: Performed by: COLON & RECTAL SURGERY

## 2018-04-23 PROCEDURE — 88305 TISSUE EXAM BY PATHOLOGIST: CPT | Mod: 26,,, | Performed by: PATHOLOGY

## 2018-04-23 PROCEDURE — 71000015 HC POSTOP RECOV 1ST HR: Performed by: COLON & RECTAL SURGERY

## 2018-04-23 PROCEDURE — 27000221 HC OXYGEN, UP TO 24 HOURS

## 2018-04-23 PROCEDURE — D9220A PRA ANESTHESIA: Mod: CRNA,,, | Performed by: NURSE ANESTHETIST, CERTIFIED REGISTERED

## 2018-04-23 PROCEDURE — 25000003 PHARM REV CODE 250: Performed by: NURSE PRACTITIONER

## 2018-04-23 PROCEDURE — 88305 TISSUE EXAM BY PATHOLOGIST: CPT | Performed by: PATHOLOGY

## 2018-04-23 PROCEDURE — 46607 DIAGNOSTIC ANOSCOPY & BIOPSY: CPT | Mod: ,,, | Performed by: COLON & RECTAL SURGERY

## 2018-04-23 PROCEDURE — 94761 N-INVAS EAR/PLS OXIMETRY MLT: CPT

## 2018-04-23 PROCEDURE — 37000009 HC ANESTHESIA EA ADD 15 MINS: Performed by: COLON & RECTAL SURGERY

## 2018-04-23 PROCEDURE — 25000003 PHARM REV CODE 250: Performed by: COLON & RECTAL SURGERY

## 2018-04-23 PROCEDURE — 71000033 HC RECOVERY, INTIAL HOUR: Performed by: COLON & RECTAL SURGERY

## 2018-04-23 RX ORDER — MUPIROCIN 20 MG/G
OINTMENT TOPICAL
Status: DISCONTINUED | OUTPATIENT
Start: 2018-04-23 | End: 2018-04-23 | Stop reason: HOSPADM

## 2018-04-23 RX ORDER — SODIUM CHLORIDE 9 MG/ML
INJECTION, SOLUTION INTRAVENOUS CONTINUOUS
Status: DISCONTINUED | OUTPATIENT
Start: 2018-04-23 | End: 2018-04-23 | Stop reason: HOSPADM

## 2018-04-23 RX ORDER — GLYCOPYRROLATE 0.2 MG/ML
INJECTION INTRAMUSCULAR; INTRAVENOUS
Status: DISCONTINUED | OUTPATIENT
Start: 2018-04-23 | End: 2018-04-23

## 2018-04-23 RX ORDER — FENTANYL CITRATE 50 UG/ML
INJECTION, SOLUTION INTRAMUSCULAR; INTRAVENOUS
Status: DISCONTINUED | OUTPATIENT
Start: 2018-04-23 | End: 2018-04-23

## 2018-04-23 RX ORDER — FENTANYL CITRATE 50 UG/ML
25 INJECTION, SOLUTION INTRAMUSCULAR; INTRAVENOUS EVERY 5 MIN PRN
Status: COMPLETED | OUTPATIENT
Start: 2018-04-23 | End: 2018-04-23

## 2018-04-23 RX ORDER — PHENYLEPHRINE HYDROCHLORIDE 10 MG/ML
INJECTION INTRAVENOUS
Status: DISCONTINUED | OUTPATIENT
Start: 2018-04-23 | End: 2018-04-23

## 2018-04-23 RX ORDER — DEXAMETHASONE SODIUM PHOSPHATE 4 MG/ML
INJECTION, SOLUTION INTRA-ARTICULAR; INTRALESIONAL; INTRAMUSCULAR; INTRAVENOUS; SOFT TISSUE
Status: DISCONTINUED | OUTPATIENT
Start: 2018-04-23 | End: 2018-04-23

## 2018-04-23 RX ORDER — DIPHENHYDRAMINE HYDROCHLORIDE 50 MG/ML
25 INJECTION INTRAMUSCULAR; INTRAVENOUS EVERY 6 HOURS PRN
Status: DISCONTINUED | OUTPATIENT
Start: 2018-04-23 | End: 2018-04-23 | Stop reason: HOSPADM

## 2018-04-23 RX ORDER — ACETIC ACID 20.65 MG/ML
SOLUTION AURICULAR (OTIC)
Status: DISCONTINUED | OUTPATIENT
Start: 2018-04-23 | End: 2018-04-23 | Stop reason: HOSPADM

## 2018-04-23 RX ORDER — ONDANSETRON 2 MG/ML
INJECTION INTRAMUSCULAR; INTRAVENOUS
Status: DISCONTINUED | OUTPATIENT
Start: 2018-04-23 | End: 2018-04-23

## 2018-04-23 RX ORDER — NEOSTIGMINE METHYLSULFATE 1 MG/ML
INJECTION, SOLUTION INTRAVENOUS
Status: DISCONTINUED | OUTPATIENT
Start: 2018-04-23 | End: 2018-04-23

## 2018-04-23 RX ORDER — LIDOCAINE HYDROCHLORIDE 10 MG/ML
1 INJECTION, SOLUTION EPIDURAL; INFILTRATION; INTRACAUDAL; PERINEURAL ONCE
Status: DISCONTINUED | OUTPATIENT
Start: 2018-04-23 | End: 2018-04-23 | Stop reason: HOSPADM

## 2018-04-23 RX ORDER — LIDOCAINE HCL/PF 100 MG/5ML
SYRINGE (ML) INTRAVENOUS
Status: DISCONTINUED | OUTPATIENT
Start: 2018-04-23 | End: 2018-04-23

## 2018-04-23 RX ORDER — PROPOFOL 10 MG/ML
VIAL (ML) INTRAVENOUS
Status: DISCONTINUED | OUTPATIENT
Start: 2018-04-23 | End: 2018-04-23

## 2018-04-23 RX ORDER — ROCURONIUM BROMIDE 10 MG/ML
INJECTION, SOLUTION INTRAVENOUS
Status: DISCONTINUED | OUTPATIENT
Start: 2018-04-23 | End: 2018-04-23

## 2018-04-23 RX ADMIN — FENTANYL CITRATE 50 MCG: 50 INJECTION, SOLUTION INTRAMUSCULAR; INTRAVENOUS at 11:04

## 2018-04-23 RX ADMIN — PHENYLEPHRINE HYDROCHLORIDE 100 MCG: 10 INJECTION INTRAVENOUS at 12:04

## 2018-04-23 RX ADMIN — GLYCOPYRROLATE 0.4 MG: 0.2 INJECTION, SOLUTION INTRAMUSCULAR; INTRAVENOUS at 12:04

## 2018-04-23 RX ADMIN — PROPOFOL 100 MG: 10 INJECTION, EMULSION INTRAVENOUS at 11:04

## 2018-04-23 RX ADMIN — NEOSTIGMINE METHYLSULFATE 3 MG: 1 INJECTION INTRAVENOUS at 12:04

## 2018-04-23 RX ADMIN — PROPOFOL 20 MG: 10 INJECTION, EMULSION INTRAVENOUS at 12:04

## 2018-04-23 RX ADMIN — FENTANYL CITRATE 25 MCG: 50 INJECTION INTRAMUSCULAR; INTRAVENOUS at 12:04

## 2018-04-23 RX ADMIN — FENTANYL CITRATE 25 MCG: 50 INJECTION INTRAMUSCULAR; INTRAVENOUS at 01:04

## 2018-04-23 RX ADMIN — LIDOCAINE HYDROCHLORIDE 50 MG: 20 INJECTION, SOLUTION INTRAVENOUS at 11:04

## 2018-04-23 RX ADMIN — SODIUM CHLORIDE: 0.9 INJECTION, SOLUTION INTRAVENOUS at 11:04

## 2018-04-23 RX ADMIN — DEXAMETHASONE SODIUM PHOSPHATE 4 MG: 4 INJECTION, SOLUTION INTRAMUSCULAR; INTRAVENOUS at 12:04

## 2018-04-23 RX ADMIN — SODIUM CHLORIDE, SODIUM GLUCONATE, SODIUM ACETATE, POTASSIUM CHLORIDE, MAGNESIUM CHLORIDE, SODIUM PHOSPHATE, DIBASIC, AND POTASSIUM PHOSPHATE: .53; .5; .37; .037; .03; .012; .00082 INJECTION, SOLUTION INTRAVENOUS at 12:04

## 2018-04-23 RX ADMIN — ACETIC ACID 4 DROP: 20.65 SOLUTION AURICULAR (OTIC) at 11:04

## 2018-04-23 RX ADMIN — IODINE SOLUTION STRONG 5% (LUGOL'S) 3 DROP: 5 SOLUTION at 11:04

## 2018-04-23 RX ADMIN — MUPIROCIN: 20 OINTMENT TOPICAL at 11:04

## 2018-04-23 RX ADMIN — ONDANSETRON 4 MG: 2 INJECTION INTRAMUSCULAR; INTRAVENOUS at 12:04

## 2018-04-23 RX ADMIN — ROCURONIUM BROMIDE 30 MG: 10 INJECTION, SOLUTION INTRAVENOUS at 11:04

## 2018-04-23 NOTE — PLAN OF CARE
Patient arrived from PACU with JESSE Pham RN.  Patient stable.  Report received at this time.  Assumed care of patient at this time.

## 2018-04-23 NOTE — PLAN OF CARE
POC reviewed with pt, acknowledged understanding. Pt remains free of falls/injuries. Pt on telemetry remains SR.  Pt tolerating clear liquid diet. Pt pain controlled with prescribed meds. Pt incision- CDI gauze. Pt dean 10/10. No acute events throughout shift. No distress noted, will continue to monitor until pt is transferred  To [facility].

## 2018-04-23 NOTE — H&P (VIEW-ONLY)
Subjective:       Patient ID: Ghada Dave is a 82 y.o. female.    Chief Complaint: No chief complaint on file.    HPI This is a pleasant 82-year-old Azeri-speaking female here for f/u of dysplasia involving an anal polyp.    PCP -     She has had anal warts for a few years now and has been undergoing regular procedure.  She was seen previously in the past in the past.  Then she saw Dr. Myers and underwent a colonoscopy on 08/28/2017 that revealed a polyp at the dentate line.  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  So she saw Dr. Shaw and underwent a proctoscopy with polypectomy on 09/19/2017 that revealed a high-grade squamous intraepithelial lesion and an anal intraepithelial lesion-1 that is present and focally approaching the inked margin.      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  Margins were positive.    She speaks Azeri.  She is accompanied by her friend, Ms. Lim.  Her friend is interpreting for her.    Patient continues to have some burning sensation and discomfort in the anal area.  She has tenesmus.  Denies bloody bowel movements.    Review of Systems   Constitutional: Negative for appetite change, fatigue, fever and unexpected weight change.   HENT: Negative for facial swelling and nosebleeds.    Eyes: Negative for photophobia and pain.   Respiratory: Negative for cough and shortness of breath.    Cardiovascular: Negative for chest pain and leg swelling.   Gastrointestinal: Positive for rectal pain. Negative for abdominal pain, anal bleeding, blood in stool and nausea.   Genitourinary: Negative for dysuria and hematuria.   Musculoskeletal: Positive  for arthralgias.   Skin: Negative for color change and rash.   Neurological: Negative for seizures, weakness and headaches.   Hematological: Negative for adenopathy. Does not bruise/bleed easily.   All other systems reviewed and are negative.        Objective:      Physical Exam   Constitutional: She is oriented to person, place, and time.  Non-toxic appearance. No distress.   HENT:   Mouth/Throat: No oropharyngeal exudate.   Eyes: No scleral icterus.   Neck: Neck supple. No thyroid mass and no thyromegaly present.   Cardiovascular: Normal rate, regular rhythm, S1 normal and normal heart sounds.    Pulmonary/Chest: Effort normal and breath sounds normal. No accessory muscle usage. No respiratory distress. She has no wheezes. She has no rales.   Abdominal: Soft. She exhibits no ascites and no mass. There is no hepatosplenomegaly. There is no tenderness.   Musculoskeletal: She exhibits no edema.   Lymphadenopathy:     She has no cervical adenopathy.     She has no axillary adenopathy.   Neurological: She is alert and oriented to person, place, and time. She has normal strength. Gait normal.   Skin: No bruising, no petechiae and no rash noted. She is not diaphoretic.   Psychiatric: Her behavior is normal. Cognition and memory are normal.   Vitals reviewed.        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 Anal or rectal pain        Plan:     AIN 2, condylomatous lesions  Based on this will schedule her for high-resolution anoscopy with focal destruction of any residual anal canal lesions  I have explained the procedure including indications, alternatives, expected outcomes and potential complications. The patient appears to understand and gives informed consent. The patient will be evaluated by the preop center  .

## 2018-04-23 NOTE — DISCHARGE SUMMARY
Operative Note     SUMMARY     Surgery Date: 4/23/2018     Surgeon(s) and Role:     * Bernard Pisano MD - Primary    Pre-op Diagnosis:  Anal dysplasia [K62.82]    Post-op Diagnosis:  Post-Op Diagnosis Codes:     * Anal dysplasia [K62.82]    Procedure(s) (LRB):  ANOSCOPY-HIGH RESOLUTION (N/A)    Anesthesia: Choice    Description of Procedure:   HRA with posterior anal canal lesion    Findings/Key Components:  Posterior anal canal lesion    Estimated Blood Loss:  10         Specimens     Start     Ordered    04/23/18 1220  Specimen to Pathology - Surgery  Once      04/23/18 1220            Discharge Note      SUMMARY     Admit Date: 4/23/2018    Attending Physician: Bernard Pisano MD     Discharge Physician: Bernard Pisano MD    Discharge Date: 4/23/2018     Final Diagnosis: Post-Op Diagnosis Codes:     * Anal dysplasia [K62.82]    Hospital Course: Patient was admitted for an outpatient procedure and tolerated the procedure well with no complications.    Disposition: Home or Self Care    Follow Up/Patient Instructions:   Current Discharge Medication List      CONTINUE these medications which have NOT CHANGED    Details   aspirin (ECOTRIN) 81 MG EC tablet Take 1 tablet (81 mg total) by mouth once daily.  Refills: 0      atorvastatin (LIPITOR) 20 MG tablet Take 1 tablet (20 mg total) by mouth every evening.  Qty: 90 tablet, Refills: 3    Associated Diagnoses: Hyperlipidemia, unspecified hyperlipidemia type      b complex vitamins capsule Take 1 capsule by mouth once daily.      CARBAMIDE PEROXIDE (DEBROX OTIC) Place in ear(s).      !! esomeprazole (NEXIUM) 20 MG capsule Take 20 mg by mouth before breakfast.      !! ESOMEPRAZOLE MAGNESIUM ORAL Take by mouth.       !! - Potential duplicate medications found. Please discuss with provider.          Discharge Procedure Orders (must include Diet, Follow-up, Activity)  Regular diet  Activity ad romeo  Remove gauze in AM  May see some spotting  RTC 2-3 weeks

## 2018-04-23 NOTE — INTERVAL H&P NOTE
The patient has been examined and the H&P has been reviewed:    I concur with the findings and no changes have occurred since H&P was written.    Anesthesia/Surgery risks, benefits and alternative options discussed and understood by patient/family.          Active Hospital Problems    Diagnosis  POA    Anal dysplasia [K62.82]  Yes      Resolved Hospital Problems    Diagnosis Date Resolved POA   No resolved problems to display.   No change in H+P I have again explained the procedure including indications, alternatives, expected outcomes and potential complications. All questions were answered  Have seen and examined the patient with the fellow and agree with their plan.  DESTINEY JON

## 2018-04-23 NOTE — DISCHARGE INSTRUCTIONS
Post Anorectal Surgery Instructions:     You had one biopsy. The results will be discussed at your follow up appointment.     Some bleeding is expected.     Do Sitz Baths or tub soaks three times a day in warm water    Take stool softeners or laxatives to keep the stool soft. The goal is 1-2 nonstraining nonloose bowel movements per day.    Take fiber supplements such as Metamucil, Citrucel, Konsyl, etc. We recommend 25-30 grams of fiber daily or reaching the above bowel movement goal. Stay hydrated - drink 8x 8 ounce glasses of water a day.         Instrucciones Despues de la Cirugia Anorectal:    · Le tomaron trinh biopsia. El doctor discutira los resultados de michelle biopsia con usted en hart proxima amaury.  · Puede esperar un poco de sangrado.  · Lave el area de la cirugia con agua tibia alex veces al geetha.  · [place] un suavizante de eses o laxante para mantener las eses suaves. Necesita tener 1-2 movimientos diarios.  · [place] suplementos de fibra rm Metamucil, Citrucel, Konsyl, etc. Recomendamos 25-30 gramos de fibra diaria para alcanzar 1-2 movimientos diarios.  · Mantengase hidratado. [place] 8 vasos de 8 onzas con agua darios.

## 2018-04-23 NOTE — PLAN OF CARE
Patient and patient's friend received discharge instructions in patient's native language, Khmer, by REBECA Sy RN.  Patient and patient's friend verbalized understanding of all instructions given and all questions were addressed prior to patient's discharge.  Patient's vital signs are stable and within patient's baseline.  Patient tolerated clear liquids PO.  Patient denies pain.  Patient denies nausea and vomiting at this time.  Patient meets all criteria for discharge at this time.  All required consents present in patient's chart upon patient's discharge.

## 2018-04-23 NOTE — BRIEF OP NOTE
[facility]  Brief Operative Note     SUMMARY     Surgery Date: 4/23/2018     Surgeon(s) and Role:     * Bernard Pisano MD - Primary    Assisting Surgeon: None    Pre-op Diagnosis:  Anal dysplasia [K62.82]    Post-op Diagnosis:  Post-Op Diagnosis Codes:     * Anal dysplasia [K62.82]    Procedure(s) (LRB):  ANOSCOPY-HIGH RESOLUTION (N/A)    Anesthesia: Choice    Description of the findings of the procedure: One lesion in posterior midline anal canal which was biopsied    Findings/Key Components: Rest of anal canal normal    Estimated Blood Loss: minimal         Specimens:   Specimen (12h ago through future)    Start     Ordered    04/23/18 1220  Specimen to Pathology - Surgery  Once     Comments:  1.) Posterior Anal Canal Lesion - PERM      04/23/18 1220          Discharge Note    SUMMARY     Admit Date: 4/23/2018    Discharge Date and Time:  04/23/2018 12:29 PM    Hospital Course (synopsis of major diagnoses, care, treatment, and services provided during the course of the hospital stay): Underwent above elective procedure which she tolerated well without complication.      Final Diagnosis: Post-Op Diagnosis Codes:     * Anal dysplasia [K62.82]    Disposition: Home or Self Care    Follow Up/Patient Instructions:     Medications:  Reconciled Home Medications:      Medication List      CONTINUE taking these medications    aspirin 81 MG EC tablet  Commonly known as:  ECOTRIN  Take 1 tablet (81 mg total) by mouth once daily.     atorvastatin 20 MG tablet  Commonly known as:  LIPITOR  Take 1 tablet (20 mg total) by mouth every evening.     b complex vitamins capsule  Take 1 capsule by mouth once daily.     DEBROX OTIC  Place in ear(s).     * esomeprazole 20 MG capsule  Commonly known as:  NEXIUM  Take 20 mg by mouth before breakfast.     * ESOMEPRAZOLE MAGNESIUM ORAL  Take by mouth.        * This list has 2 medication(s) that are the same as other medications prescribed for you. Read the directions  carefully, and ask your doctor or other care provider to review them with you.                Discharge Procedure Orders  Activity as tolerated     Notify your health care provider if you experience any of the following:  temperature >100.4     Notify your health care provider if you experience any of the following:  persistent nausea and vomiting or diarrhea     Notify your health care provider if you experience any of the following:  severe uncontrolled pain     Notify your health care provider if you experience any of the following:  redness, tenderness, or signs of infection (pain, swelling, redness, odor or green/yellow discharge around incision site)     Post anorectal surgery instructions:     You had one biopsy. The results will be discussed at your follow up appointment.     Some bleeding is expected.     Do Sitz Baths or tub soaks three times a day in warm water    Take stool softeners or laxatives to keep the stool soft. The goal is 1-2 nonstraining nonloose bowel movements per day.    Take fiber supplements such as Metamucil, Citrucel, Konsyl, etc. We recommend 25-30 grams of fiber daily or reaching the above bowel movement goal. Stay hydrated - drink 8x 8 ounce glasses of water a day.     Follow-up Information     Bernard Pisano MD. Schedule an appointment as soon as possible for a visit in 2 weeks.    Specialty:  Colon and Rectal Surgery  Contact information:  [facility] [facility] [address] [phone]               Have seen and examined the patient with the fellow and agree with their plan.  BERNARD PISANO

## 2018-04-23 NOTE — TRANSFER OF CARE
"Anesthesia Transfer of Care Note    Patient: Ghada Dave    Procedure(s) Performed: Procedure(s) (LRB):  ANOSCOPY-HIGH RESOLUTION (N/A)    Patient location: PACU    Anesthesia Type: general    Transport from OR: Transported from OR on 6-10 L/min O2 by face mask with adequate spontaneous ventilation    Post pain: adequate analgesia    Post assessment: no apparent anesthetic complications and tolerated procedure well    Post vital signs: stable    Level of consciousness: alert, oriented and awake    Nausea/Vomiting: no nausea/vomiting    Complications: none    Transfer of care protocol was followed      Last vitals:   Visit Vitals  /62 (BP Location: Left arm, Patient Position: Lying)   Pulse 68   Temp 36.2 °C (97.1 °F) (Temporal)   Resp 16   Ht 5' 1" (1.549 m)   Wt 56.7 kg (125 lb)   SpO2 100%   Breastfeeding? No   BMI 23.62 kg/m²     "

## 2018-04-24 NOTE — OP NOTE
DATE OF PROCEDURE:  04/23/2018    PREOPERATIVE DIAGNOSIS:  Lesion with AIN 2.    POSTOPERATIVE DIAGNOSIS:  Anal canal lesion in the right posterior aspect,   fulgurated and excised.    SURGEON:  Bernard Pisano M.D.    RESIDENT:  Cee Eddy M.D.    ANESTHESIA:  General endotracheal.    INDICATIONS:  Ms. Flanagan is an 82-year-old female with a lesion that has been   treated outside with AIN 2, here for definitive therapy and high-resolution   anoscopy.    DESCRIPTION OF PROCEDURE:  The patient was taken to the Operating Room and in a   prone jackknife position under general anesthesia, buttock was effaced.    Perineum was prepped and draped in a sterile manner.  Acetowhitening with 3%   acetic acid was performed for 2 minutes.  Then, quadrant by quadrant inspection   of the anal canal with the operating microscope and Lugol's were performed.  The   anal canal was normal except for a small 0.5 cm lesion in the right posterior   anal canal.  This area was biopsied and fulgurated and destroyed.  Hemostasis   was assured.  Sponge and needle counts were correct.  Exparel was placed.    Vaseline gauze was placed in the anal canal.  The patient tolerated the   procedure and was transported to Recovery Room in a stable condition.      DAM/HN  dd: 04/23/2018 15:04:54 (CDT)  td: 04/23/2018 19:08:20 (CDT)  Doc ID   #[id number]  Job ID #[id number]    CC:

## 2018-05-29 ENCOUNTER — OFFICE VISIT (OUTPATIENT)
Dept: SURGERY | Facility: CLINIC | Age: 83
End: 2018-05-29
Payer: MEDICARE

## 2018-05-29 VITALS
WEIGHT: 123.44 LBS | BODY MASS INDEX: 24.23 KG/M2 | DIASTOLIC BLOOD PRESSURE: 64 MMHG | HEIGHT: 60 IN | HEART RATE: 75 BPM | SYSTOLIC BLOOD PRESSURE: 140 MMHG

## 2018-05-29 DIAGNOSIS — Z98.890 POSTOPERATIVE STATE: Primary | ICD-10-CM

## 2018-05-29 PROCEDURE — 99999 PR PBB SHADOW E&M-EST. PATIENT-LVL III: CPT | Mod: PBBFAC,,, | Performed by: COLON & RECTAL SURGERY

## 2018-05-29 PROCEDURE — 99024 POSTOP FOLLOW-UP VISIT: CPT | Mod: S$GLB,,, | Performed by: COLON & RECTAL SURGERY

## 2018-05-29 RX ORDER — DIAZEPAM 5 MG/1
TABLET ORAL
Refills: 0 | COMMUNITY
Start: 2018-04-16 | End: 2018-05-30

## 2018-05-29 RX ORDER — OMEPRAZOLE 20 MG/1
20 CAPSULE, DELAYED RELEASE ORAL DAILY
Refills: 11 | COMMUNITY
Start: 2018-05-17 | End: 2018-05-29

## 2018-05-29 RX ORDER — DICLOFENAC SODIUM 75 MG/1
TABLET, DELAYED RELEASE ORAL
Refills: 5 | COMMUNITY
Start: 2018-05-11 | End: 2018-05-30

## 2018-05-29 NOTE — PROGRESS NOTES
Subjective:       Patient ID: Ghada Dave is a 82 y.o. female.    Chief Complaint: No chief complaint on file.    HPI     This is a pleasant 82-year-old Green[place]ic-speaking female here for f/u of dysplasia involving an anal polyp.    PCP -     4/10/18  She has had anal warts for a few years now and has been undergoing regular procedure.  She was seen previously in the past in the past.  Then she saw Dr. Myers and underwent a colonoscopy on 08/28/2017 that revealed a polyp at the dentate line.  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  So she saw Dr. Shaw and underwent a proctoscopy with polypectomy on 09/19/2017 that revealed a high-grade squamous intraepithelial lesion and an anal intraepithelial lesion-1 that is present and focally approaching the inked margin.  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  Margins were positive. She speaks Greenlandic.  She is accompanied by her friend, Ms. Lim.  Her friend is interpreting for her. Patient continues to have some burning sensation and discomfort in the anal area.  She has tenesmus.  Denies bloody bowel movements.    5/29/18  82F presents for follow up of anal canal lesion in the right posterior aspect, fulgurated and excised on 4/23/18. Pathology showed low grade squamous intraepithelial lesion (AIN 1). Since the procedure she reports having sudden urges to defecate with 1 episode of fecal incontinence. She also c/o ongoing pain in the anal area. She has done a sitz bath with relief but has not been doing them regularly.       Review of Systems   Constitutional: Negative for appetite change, fatigue, fever and unexpected weight  change.   HENT: Negative for facial swelling and nosebleeds.    Eyes: Negative for photophobia and pain.   Respiratory: Negative for cough and shortness of breath.    Cardiovascular: Negative for chest pain and leg swelling.   Gastrointestinal: Positive for rectal pain. Negative for abdominal pain, anal bleeding, blood in stool and nausea.   Genitourinary: Negative for dysuria and hematuria.   Musculoskeletal: Positive for arthralgias.   Skin: Negative for color change and rash.   Neurological: Negative for seizures, weakness and headaches.   Hematological: Negative for adenopathy. Does not bruise/bleed easily.   All other systems reviewed and are negative.        Objective:      Vitals:    05/29/18 0916   BP: (!) 140/64   Pulse: 75   Weight: 56 kg (123 lb 7.3 oz)   Height: 5' (1.524 m)       Physical Exam   Constitutional: She is oriented to person, place, and time.  Non-toxic appearance. No distress.   HENT:   Mouth/Throat: No oropharyngeal exudate.   Eyes: No scleral icterus.   Neck: Neck supple. No thyroid mass and no thyromegaly present.   Cardiovascular: Normal rate, regular rhythm, S1 normal and normal heart sounds.    Pulmonary/Chest: Effort normal and breath sounds normal. No accessory muscle usage. No respiratory distress. She has no wheezes. She has no rales.   Abdominal: Soft. She exhibits no ascites and no mass. There is no hepatosplenomegaly. There is no tenderness.   Musculoskeletal: She exhibits no edema.   Lymphadenopathy:     She has no cervical adenopathy.     She has no axillary adenopathy.   Neurological: She is alert and oriented to person, place, and time. She has normal strength. Gait normal.   Skin: No bruising, no petechiae and no rash noted. She is not diaphoretic.   Psychiatric: Her behavior is normal. Cognition and memory are normal.   Vitals reviewed.        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 Anal or rectal pain         Plan:     82F with h/o AIN 2, condylomatous lesions presents for follow up of anal canal lesion in the right posterior aspect, fulgurated and excised on 4/23/18. Pathology showed low grade squamous intraepithelial lesion (AIN 1).    Discussed pathology findings. Instructed patient that fecal urgency and her pain in anal area are common after her procedure and will continue to improve with time. This could take several months before her symptoms completely resolve.     Return to clinic in 3 months.    Sergey Reed, DO  Colon and Rectal Surgery  [facility] [place] [facility] [address]    Have seen and examined the patient with the fellow and agree with their plan.  DESTINEY JON

## 2018-05-30 ENCOUNTER — OFFICE VISIT (OUTPATIENT)
Dept: FAMILY MEDICINE | Facility: CLINIC | Age: 83
End: 2018-05-30
Payer: MEDICARE

## 2018-05-30 VITALS
OXYGEN SATURATION: 97 % | WEIGHT: 125.25 LBS | BODY MASS INDEX: 24.59 KG/M2 | SYSTOLIC BLOOD PRESSURE: 120 MMHG | HEIGHT: 60 IN | HEART RATE: 70 BPM | DIASTOLIC BLOOD PRESSURE: 60 MMHG

## 2018-05-30 DIAGNOSIS — I10 HYPERTENSION, UNSPECIFIED TYPE: ICD-10-CM

## 2018-05-30 DIAGNOSIS — H60.391 OTHER INFECTIVE ACUTE OTITIS EXTERNA OF RIGHT EAR: Primary | ICD-10-CM

## 2018-05-30 PROCEDURE — 99214 OFFICE O/P EST MOD 30 MIN: CPT | Mod: S$GLB,,, | Performed by: FAMILY MEDICINE

## 2018-05-30 PROCEDURE — 99999 PR PBB SHADOW E&M-EST. PATIENT-LVL III: CPT | Mod: PBBFAC,,, | Performed by: FAMILY MEDICINE

## 2018-05-30 PROCEDURE — 3078F DIAST BP <80 MM HG: CPT | Mod: CPTII,S$GLB,, | Performed by: FAMILY MEDICINE

## 2018-05-30 PROCEDURE — 3074F SYST BP LT 130 MM HG: CPT | Mod: CPTII,S$GLB,, | Performed by: FAMILY MEDICINE

## 2018-05-30 RX ORDER — CIPROFLOXACIN AND DEXAMETHASONE 3; 1 MG/ML; MG/ML
4 SUSPENSION/ DROPS AURICULAR (OTIC) 2 TIMES DAILY
Qty: 7.5 ML | Refills: 0 | Status: SHIPPED | OUTPATIENT
Start: 2018-05-30 | End: 2018-06-06

## 2018-05-30 NOTE — PROGRESS NOTES
Subjective:       Patient ID: Ghada Dave is a 82 y.o. female.    Chief Complaint: Otalgia (x 2 wks)  81 yo female pt of Dr. Corrales with HTN, and hyperlipidemia presents for an urgent care visit c/o right ear pain for 2 weeks.  Otalgia    There is pain in the right ear. This is a new problem. The current episode started 1 to 4 weeks ago. The problem occurs every few hours. There has been no fever. The pain is at a severity of 3/10. The pain is mild. Pertinent negatives include no abdominal pain, coughing, diarrhea, ear discharge, headaches, hearing loss, neck pain, rash, rhinorrhea, sore throat or vomiting. She has tried nothing for the symptoms. There is no history of a chronic ear infection, hearing loss or a tympanostomy tube.     Review of Systems   HENT: Positive for ear pain. Negative for ear discharge, hearing loss, rhinorrhea and sore throat.    Respiratory: Negative for cough.    Gastrointestinal: Negative for abdominal pain, diarrhea and vomiting.   Musculoskeletal: Negative for neck pain.   Skin: Negative for rash.   Neurological: Negative for headaches.       Objective:      Physical Exam   Constitutional: She appears well-developed and well-nourished. No distress.   HENT:   Head: Normocephalic and atraumatic.   Right Ear: Hearing, tympanic membrane and external ear normal. There is swelling and tenderness.   Left Ear: Hearing, tympanic membrane, external ear and ear canal normal.   Nose: Nose normal. Right sinus exhibits no maxillary sinus tenderness and no frontal sinus tenderness. Left sinus exhibits no maxillary sinus tenderness and no frontal sinus tenderness.   Mouth/Throat: Uvula is midline, oropharynx is clear and moist and mucous membranes are normal.   Eyes: Conjunctivae and EOM are normal. Right eye exhibits no discharge. Left eye exhibits no discharge.   Neck: Normal range of motion. Neck supple. No JVD present. No thyromegaly present.   Lymphadenopathy:     She has no  cervical adenopathy.   Skin: She is not diaphoretic.   Vitals reviewed.      Assessment:       See plan  Plan:       Ghada was seen today for otalgia.    Diagnoses and all orders for this visit:    Other infective acute otitis externa of right ear  -     ciprofloxacin-dexamethasone 0.3-0.1% (CIPRODEX) 0.3-0.1 % DrpS; Place 4 drops into the right ear 2 (two) times daily.      Hypertension, unspecified type: Stable    F/U as needed.

## 2018-06-01 ENCOUNTER — OFFICE VISIT (OUTPATIENT)
Dept: FAMILY MEDICINE | Facility: CLINIC | Age: 83
End: 2018-06-01
Payer: MEDICARE

## 2018-06-01 VITALS
OXYGEN SATURATION: 97 % | DIASTOLIC BLOOD PRESSURE: 54 MMHG | SYSTOLIC BLOOD PRESSURE: 104 MMHG | HEART RATE: 73 BPM | HEIGHT: 60 IN | WEIGHT: 124.31 LBS | BODY MASS INDEX: 24.41 KG/M2

## 2018-06-01 DIAGNOSIS — M54.50 CHRONIC BILATERAL LOW BACK PAIN WITHOUT SCIATICA: Primary | ICD-10-CM

## 2018-06-01 DIAGNOSIS — G89.29 CHRONIC BILATERAL LOW BACK PAIN WITHOUT SCIATICA: Primary | ICD-10-CM

## 2018-06-01 DIAGNOSIS — H61.21 EXCESSIVE CERUMEN IN RIGHT EAR CANAL: ICD-10-CM

## 2018-06-01 DIAGNOSIS — F51.01 PRIMARY INSOMNIA: ICD-10-CM

## 2018-06-01 PROCEDURE — 99999 PR PBB SHADOW E&M-EST. PATIENT-LVL III: CPT | Mod: PBBFAC,,, | Performed by: FAMILY MEDICINE

## 2018-06-01 PROCEDURE — 3078F DIAST BP <80 MM HG: CPT | Mod: CPTII,S$GLB,, | Performed by: FAMILY MEDICINE

## 2018-06-01 PROCEDURE — 99214 OFFICE O/P EST MOD 30 MIN: CPT | Mod: S$GLB,,, | Performed by: FAMILY MEDICINE

## 2018-06-01 PROCEDURE — 3074F SYST BP LT 130 MM HG: CPT | Mod: CPTII,S$GLB,, | Performed by: FAMILY MEDICINE

## 2018-06-01 RX ORDER — PIROXICAM 20 MG/1
20 CAPSULE ORAL DAILY
Qty: 30 CAPSULE | Refills: 0 | Status: SHIPPED | OUTPATIENT
Start: 2018-06-01 | End: 2018-07-10

## 2018-06-01 RX ORDER — TRAZODONE HYDROCHLORIDE 50 MG/1
50 TABLET ORAL NIGHTLY
Qty: 90 TABLET | Refills: 0 | Status: SHIPPED | OUTPATIENT
Start: 2018-06-01 | End: 2018-07-10

## 2018-06-01 RX ORDER — TIZANIDINE HYDROCHLORIDE 2 MG/1
2 CAPSULE, GELATIN COATED ORAL 2 TIMES DAILY
Qty: 20 CAPSULE | Refills: 0 | Status: SHIPPED | OUTPATIENT
Start: 2018-06-01 | End: 2018-06-11

## 2018-06-01 NOTE — PROGRESS NOTES
Subjective:       Patient ID: Ghada Dave is a 82 y.o. female.    Chief Complaint: Back Pain    82 years old female who came to the clinic with lower back pain for the last month.  Patient with chronic back pain for the last couple years.  The pain is 6/10 of intensity on and off aggravated with activity and better with rest.  Patient went through physical therapy before with significant improvement.  Patient with right ear cerumen buildup for the last couple of weeks.  Patient requests some medicine to help her to sleep.  She was using hydroxyzine with no significant improvement.      Back Pain       Review of Systems   Constitutional: Negative.    HENT: Negative.    Eyes: Negative.    Respiratory: Negative.    Cardiovascular: Negative.    Gastrointestinal: Negative.    Genitourinary: Negative.    Musculoskeletal: Positive for back pain.   Skin: Negative.    Neurological: Negative.    Psychiatric/Behavioral: Positive for sleep disturbance.       Objective:      Physical Exam   Constitutional: She is oriented to person, place, and time. She appears well-developed and well-nourished. No distress.   HENT:   Head: Normocephalic and atraumatic.   Left Ear: External ear normal.   Ears:    Nose: Nose normal.   Mouth/Throat: Oropharynx is clear and moist. No oropharyngeal exudate.   Eyes: Conjunctivae and EOM are normal. Pupils are equal, round, and reactive to light. Right eye exhibits no discharge. Left eye exhibits no discharge. No scleral icterus.   Neck: Normal range of motion. Neck supple. No JVD present. No tracheal deviation present. No thyromegaly present.   Cardiovascular: Normal rate, regular rhythm, normal heart sounds and intact distal pulses.  Exam reveals no gallop and no friction rub.    No murmur heard.  Pulmonary/Chest: Effort normal and breath sounds normal. No stridor. No respiratory distress. She has no wheezes. She has no rales. She exhibits no tenderness.   Abdominal: Soft. Bowel  sounds are normal. She exhibits no distension and no mass. There is no tenderness. There is no rebound and no guarding.   Musculoskeletal: Normal range of motion. She exhibits no edema or tenderness.   Lymphadenopathy:     She has no cervical adenopathy.   Neurological: She is alert and oriented to person, place, and time. She has normal reflexes. No cranial nerve deficit. She exhibits normal muscle tone. Coordination normal.   Skin: Skin is warm and dry. No rash noted. She is not diaphoretic. No erythema. No pallor.   Psychiatric: She has a normal mood and affect. Her behavior is normal. Judgment and thought content normal.       Assessment:       1. Chronic bilateral low back pain without sciatica    2. Primary insomnia    3. Excessive cerumen in right ear canal        Plan:         Ghada was seen today for back pain.    Diagnoses and all orders for this visit:    Chronic bilateral low back pain without sciatica  -     Ambulatory Referral to Physical/Occupational Therapy  -     piroxicam (FELDENE) 20 MG capsule; Take 1 capsule (20 mg total) by mouth once daily.  -     tiZANidine 2 mg Cap; Take 1 capsule (2 mg total) by mouth 2 (two) times daily.    Primary insomnia  -     traZODone (DESYREL) 50 MG tablet; Take 1 tablet (50 mg total) by mouth every evening.    Excessive cerumen in right ear canal  -     Ear wax removal

## 2018-06-04 NOTE — ED NOTES
Pt. on bedside commode. Daughter at bedside.    Partial Purse String (Intermediate) Text: Given the location of the defect and the characteristics of the surrounding skin an intermediate purse string closure was deemed most appropriate.  Undermining was performed circumfirentially around the surgical defect.  A purse string suture was then placed and tightened. Wound tension only allowed a partial closure of the circular defect.

## 2018-06-22 ENCOUNTER — CLINICAL SUPPORT (OUTPATIENT)
Dept: REHABILITATION | Facility: HOSPITAL | Age: 83
End: 2018-06-22
Payer: MEDICARE

## 2018-06-22 DIAGNOSIS — Z78.9 IMPAIRED MOBILITY AND ACTIVITIES OF DAILY LIVING: ICD-10-CM

## 2018-06-22 DIAGNOSIS — R29.3 POOR POSTURE: ICD-10-CM

## 2018-06-22 DIAGNOSIS — Z74.09 IMPAIRED MOBILITY AND ACTIVITIES OF DAILY LIVING: ICD-10-CM

## 2018-06-22 DIAGNOSIS — M54.50 ACUTE RIGHT-SIDED LOW BACK PAIN WITHOUT SCIATICA: ICD-10-CM

## 2018-06-22 PROCEDURE — 97110 THERAPEUTIC EXERCISES: CPT | Mod: PN

## 2018-06-22 PROCEDURE — 97161 PT EVAL LOW COMPLEX 20 MIN: CPT | Mod: PN

## 2018-06-22 NOTE — PLAN OF CARE
[facility]  Physical Therapy Initial Evaluation    Name: Ghada Dave  Clinic Number: [id number]    Therapy Diagnosis:   Encounter Diagnoses   Name Primary?    Acute right-sided low back pain without sciatica     Poor posture     Impaired mobility and activities of daily living      Physician: Kodak Beauchamp*    Physician Orders: PT Eval and Treat   Medical Diagnosis: Chronic LBP without sciatica  Evaluation Date: 6/22/2018  Authorization Period Expiration:06/19/2018  Plan of Care Certification Period: 06/22/2018 to 07/19/2018  Visit # / Visits authorized: 1/ ?    Time In: 0800  Time Out: 0855  Total Billable Time: 55 minutes    Precautions: Standard    Subjective   Ghada reports R-sided back pain that originates in her R low back area and radiates up her back.  She states that she experiences most of her pain at night when she attempts to roll from her back onto her side.    She does reports a history of LBP with PT helping to manage her pain in the past although she self-discharged according to her last PT discharge note.  Reports cramping in her B toes/feet with walking.  She does have a history CAD; she is attempting to get scheduled to see her cardiologist as -according to the patient- she had a positive TM stress test recently.  Walks everywhere due to not having a car.  She states she is very active and likes to dress young because it helps her feel young.  Denies falls.  Denies LE numbness/tingling or saddle anesthesia.   used throughout the evaluation process.      Past Medical History:   Diagnosis Date    Abnormal Pap smear 2013    CAD (coronary artery disease) 06/2017     iFR of LAD and RCA performed confirmed nonobstructive disease.    Cataract     Colon polyps     Frequent urination     Hemorrhoid     High cholesterol     Poor circulation     Postoperative abdominal pain     Psoriasis     Varicose vein      Ghada Lyles  Panchito  has a past surgical history that includes Polypectomy; Cataract extraction w/  intraocular lens implant (Bilateral); YAG Laser Capsulotomy (Right, 04/17/2017); Colonoscopy (N/A, 8/28/2017); and Cardiac catheterization (06/2017).    Ghada has a current medication list which includes the following prescription(s): aspirin, atorvastatin, b complex vitamins, carbamide peroxide, esomeprazole, piroxicam, and trazodone.    Review of patient's allergies indicates:   Allergen Reactions    Keflex [cephalexin] Rash      Imaging: yes; see chart.     Prior Therapy: Yes at this clinic.  She reports benefits but she self-discharged according to her last PT note in 08/2017.  Social History: Lives alone  Occupation: On disability; not working.  Prior Level of Function: Walks a lot due to lack of car.  She states that she is very active.   Current Level of Function: No change in her walking frequency or exercise frequency.     Pain:  Current 1/10, worst 7/10, best 0/10   Location: right back   Description: Sharp  Aggravating Factors: Rolling   Easing Factors: relaxation    Pts goals: To be able to rollover in bed without pain.     Objective     Palpation: TTP along R PSIS and R gluteus medius muscle.      Posture:  Cervical:  Significant forward head posture        Thoracic:  R thoracolumbar scoliosis        Lumbar:  L pelvic shift         LEs:  R hip is positioned in adduction and L hip is in abduction.    Gross movement analysis:  -Gait: non-antalgic.  -Forward bending: normal  -Squat: NT    Lumbar Range of Motion:    Degrees Pain   Flexion WNL NP     Extension 5 degres  P!   Left Side Bending 25% limitation NP   Right Side Bending 50% limitation NP   Left rotation   50% limitation NP   Right Rotation   50% limitation NP         Right   Left  Hip flexion:  WNL   WNL  Hip abduction:  30 degrees  40 degrees  Hip IR:   WNL   WNL  Hip ER:  30 degrees  30 degrees  Knee:   WNL   WNL  Ankle DF:   5 degrees  5 degrees    Lower  Extremity Strength   Right Left   Hip flexion: 4-/5 3+/5   Hip extension: 5/5 5/5   Hip abduction: 4/5 4/5   Hip ER:  3+/5 4/5   Knee extension: 5/5 5/5   Knee flexion: 5/5 5/5   Ankle dorsiflexion: 5/5 5/5   Great toe extension: 5/5 5/5       Special Tests:  -Repeated Flexion: negative  -Repeated Ext: NT  -Piriformis Test: negative   -SLR neural tension test: negative  -Lumbar quadrant test: positive on the right  -GEOVANNY:  Negative  -Kem's sign: positive  -Gaelslen's test: positive on the right  -SIJ compression (sidelying): negative    Joint Mobility: normal B hip joint play.  Signifcant stiffness in lumbosacral spine    Sensation: Intact light touch sensation to BLE through L2-S2 dermatomal distribution    Flexibility:    Ely's test: NT   Popliteal Angle:normal   Edvin test: positive B   P/SLR test: negative      CMS Impairment/Limitation/Restriction for FOTO Lumbar Survey    Therapist reviewed FOTO scores for Ghada Dave on 6/22/2018.   FOTO documents entered into [employer] - see Media section.    Limitation Score: 48%  Category: Body Position    Current : CK = at least 40% but < 60% impaired, limited or restricted  Goal: CJ = at least 20% but < 40% impaired, limited or restricted       TREATMENT   Treatment Time In: 0835  Treatment Time Out: 0855  Total Treatment time separate from Evaluation time:20'    Ghada received therapeutic exercises to develop strength for 20' minutes with PT 1:1 including time for HEP explanation.   DATE  06/22/2018   VISIT  1   G CODE  1/10   FOTO  1/5   Cap Visit  Cap Total  113.20  113.20        hooklying hip add ball squeeze  10 reps x 5 second holds   hooklying hip abd iso with belt  10 reps x 5 second holds   Bridges with hip abd belt  2x10             INITIALS  JH     Home Exercises and Patient Education Provided  Education provided re:   - HEP; issued literature.  Answered all the patients questions including re-affirmation questions about specific therex  modifications at home.  - continuation of HEP.     Written Home Exercises Provided: .  Exercises were reviewed and Ghada was able to demonstrate them prior to the end of the session.   Pt received a written copy of exercises to perform at home. Ghada demonstrated good  understanding of the education provided.     See EMR under patient instructions for exercises given.   Assessment   Ghada is a 82 y.o. female referred to outpatient Physical Therapy with a medical diagnosis of chronic LBP without sciatica. Pt presents with low irritability R lumbosacral pain with extension-based motions.  Noted R thoracolumbar scoliosis with pelvic obliquity and functional LLD due to scoliotic pattern. Mild R hip weakness as compared to the L.  Overall, Mrs. Flanagan is a very fit and active 85 y/o female.  She is experiencing an acute/subacute flare of her chronic LBP.  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     Pt prognosis is Excellent.   Pt will benefit from skilled outpatient Physical Therapy to address the deficits stated above and in the chart below, provide pt/family education, and to maximize pt's level of independence.     Plan of care discussed with patient: Yes  Pt's spiritual, cultural and educational needs considered and patient is agreeable to the plan of care and goals as stated below:     Anticipated Barriers for therapy: lack of transportation.     Medical Necessity is demonstrated by the following  History  Co-morbidities and personal factors that may impact the plan of care Co-morbidities:   advanced age, anxiety, level of undertstanding of current condition and recurrent urinary infections    Personal Factors:   age  lifestyle     moderate   Examination  Body Structures and Functions, activity limitations and participation restrictions that may impact the plan of care Body Regions:   back  lower extremities    Body Systems:    gross symmetry  ROM  strength  gross coordinated  movement  balance  transfers  motor control  motor learning    Participation Restrictions:   Lack of transportation    Activity limitations:   Learning and applying knowledge  no deficits    General Tasks and Commands  undertaking multiple tasks    Communication  requires     Mobility  lifting and carrying objects    Self care  looking after one's health    Domestic Life  doing house work (cleaning house, washing dishes, laundry)    Interactions/Relationships  no deficits    Life Areas  no deficits    Community and Social Life  no deficits         moderate   Clinical Presentation stable and uncomplicated low   Decision Making/ Complexity Score: low     Goals:  Short Term Goals: 3-4 weeks:  1.  Patient will demonstrate 4/5 R hip abductor/ ER strength for improved transfer independence without pain.  2.  Patient will demonstrate no pain with lumbar quadrant test on the R.  3.  Patient will report <3/10 R-sided LBP at worst with transition movements such as [id number] lying.    Long Term Goals: 8 weeks   1. Patient will demonstrate to PT compliance with HEP without cueing.  2. Patient will report CJ = at least 20% but < 40% impaired, limited or restricted on lumbar FOTO survey    Plan   Certification Period/Plan of care expiration: 6/22/2018 to 07/19/2018.    Outpatient Physical Therapy 2 times weekly for 4 weeks to include the following interventions: Cervical/Lumbar Traction, Electrical Stimulation IFC, Manual Therapy, Moist Heat/ Ice, Neuromuscular Re-ed, Patient Education, Self Care, Therapeutic Activites and Therapeutic Exercise.     Jake Hoyt, PTyjuj

## 2018-06-25 ENCOUNTER — CLINICAL SUPPORT (OUTPATIENT)
Dept: REHABILITATION | Facility: HOSPITAL | Age: 83
End: 2018-06-25
Payer: MEDICARE

## 2018-06-25 DIAGNOSIS — Z78.9 IMPAIRED MOBILITY AND ACTIVITIES OF DAILY LIVING: ICD-10-CM

## 2018-06-25 DIAGNOSIS — R29.3 POOR POSTURE: ICD-10-CM

## 2018-06-25 DIAGNOSIS — M54.50 ACUTE RIGHT-SIDED LOW BACK PAIN WITHOUT SCIATICA: ICD-10-CM

## 2018-06-25 DIAGNOSIS — Z74.09 IMPAIRED MOBILITY AND ACTIVITIES OF DAILY LIVING: ICD-10-CM

## 2018-06-25 PROCEDURE — 97110 THERAPEUTIC EXERCISES: CPT | Mod: PN

## 2018-06-25 NOTE — PROGRESS NOTES
Physical Therapy Daily Treatment Note     Name: Ghada Dave  Clinic Number: [id number]    Therapy Diagnosis:   Encounter Diagnoses   Name Primary?    Acute right-sided low back pain without sciatica     Poor posture     Impaired mobility and activities of daily living      Physician: Kodak Beauchamp*    Visit Date: 6/25/2018  Physician Orders: PT Eval and Treat   Medical Diagnosis: Chronic LBP without sciatica  Evaluation Date: 6/22/2018  Authorization Period Expiration:06/19/2018  Plan of Care Certification Period: 06/22/2018 to 07/19/2018  Visit # / Visits authorized: 2/ ?    Time In:1600  Time Out: 1645  Total Billable Time: 30 minutes (2 TE)    Precautions: Standard    Subjective   Mrs Lisa presents to PT today for her first f/u visit.  She walked to PT.     Pt reports: she was not compliant with home exercise program given last session.   Response to previous treatment:No  Functional change: non    Pain: 2/10  Location: right back      Objective     Ghada received therapeutic exercises to develop strength, endurance, ROM, posture and core stabilization for 30' minutes with PT 1:1 and 15' supervised with PT.   DATE 6/25/2018   VISIT 2   G CODE 2/10   FOTO 2/5   Cap Visit  Cap Total 60.64  173.84       hooklying hip add ball squeeze 10 reps x 5 second holds   hooklying hip abd with belt 10 reps x 5 second holds   Bridges with belt  3x10   A/SLR 3x10 phase I B   Side lying hip abduction 3x10 B               INITIALS JH     Home Exercises Provided and Patient Education Provided   Education provided:   - encourage performance of HEP 1x/daily along with her walking program.     Ghada demonstrated good  understanding of the education provided.     Assessment   Ghada is a 82 y.o. female referred to outpatient Physical Therapy with a medical diagnosis of chronic LBP without sciatica. Pt presents with low irritability R lumbosacral pain with extension-based motions.  Noted R thoracolumbar  scoliosis with pelvic obliquity and functional LLD due to scoliotic pattern. Mild R hip weakness as compared to the L.  Overall, Mrs. Flanagan is a very fit and active 85 y/o female.  She is experiencing an acute/subacute flare of her chronic LBP.  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     Ghada is progressing well towards her goals.   Pt prognosis is Excellent.     Pt will continue to benefit from skilled outpatient physical therapy to address the deficits listed in the problem list box on initial evaluation, provide pt/family education and to maximize pt's level of independence in the home and community environment.     Pt's spiritual, cultural and educational needs considered and pt agreeable to plan of care and goals.    Anticipated barriers to physical therapy: lack of transportation.    Goals:   Short Term Goals: 3-4 weeks:  1.  Patient will demonstrate 4/5 R hip abductor/ ER strength for improved transfer independence without pain.  2.  Patient will demonstrate no pain with lumbar quadrant test on the R.  3.  Patient will report <3/10 R-sided LBP at worst with transition movements such as [id number] lying.     Long Term Goals: 8 weeks   1. Patient will demonstrate to PT compliance with HEP without cueing.  2. Patient will report CJ = at least 20% but < 40% impaired, limited or restricted on lumbar FOTO survey    Plan     Continue plan of care.     Jake Hoyt, PT  t

## 2018-06-29 ENCOUNTER — CLINICAL SUPPORT (OUTPATIENT)
Dept: REHABILITATION | Facility: HOSPITAL | Age: 83
End: 2018-06-29
Payer: MEDICARE

## 2018-06-29 DIAGNOSIS — R29.3 POOR POSTURE: ICD-10-CM

## 2018-06-29 DIAGNOSIS — Z74.09 IMPAIRED MOBILITY AND ACTIVITIES OF DAILY LIVING: ICD-10-CM

## 2018-06-29 DIAGNOSIS — M54.50 ACUTE RIGHT-SIDED LOW BACK PAIN WITHOUT SCIATICA: ICD-10-CM

## 2018-06-29 DIAGNOSIS — Z78.9 IMPAIRED MOBILITY AND ACTIVITIES OF DAILY LIVING: ICD-10-CM

## 2018-06-29 PROCEDURE — 97140 MANUAL THERAPY 1/> REGIONS: CPT | Mod: PN

## 2018-06-29 PROCEDURE — 97110 THERAPEUTIC EXERCISES: CPT | Mod: PN

## 2018-06-29 NOTE — PROGRESS NOTES
"  Physical Therapy Daily Treatment Note     Name: Ghada Dave  Clinic Number: [id number]    Therapy Diagnosis:   Encounter Diagnoses   Name Primary?    Acute right-sided low back pain without sciatica     Poor posture     Impaired mobility and activities of daily living      Physician: Kodak Beauchamp*    Visit Date: 6/29/2018  Physician Orders: PT Eval and Treat   Medical Diagnosis: Chronic LBP without sciatica  Evaluation Date: 6/22/2018  Authorization Period Expiration:06/19/2018  Plan of Care Certification Period: 06/22/2018 to 07/19/2018  Visit # / Visits authorized: 2/ ?    Time In:4:15  Time Out: 5:00  Total Billable Time: 45 minutes (2 TE, 1 MT )    Precautions: Standard    Subjective     Pain: 10/10  Location: B Mid lower back pain, but more in right lower back. Pt reports she has run errands today, she has ambulated around the city. Pt agreeable to PT session.     Objective   Initiated session with all therex as per logged below. STM to B lumbar paraspinals/ QL/IT band x25 '/ Manual  B hamstring stretch 3 x 30' , B Piriformis x 30 ' ..   DATE 6/29/18 6/25/2018        VISIT 3 2   G CODE 3/10 2/10   FOTO 3/5 2/5   Cap Visit  Cap Total 88.10  261.94 60.64  173.84   LTR B 2 "    hooklying hip add ball squeeze 2 x 10 10 reps x 5 second holds   hooklying hip abd with belt 2 x 10 10 reps x 5 second holds   Bridges with ball 2 x 10 3x10   A/SLR nt 3x10 phase I B   Side lying hip abduction nt 3x10 B                  INITIALS JA 1/6            Assessment     Pt responded well to PT session. She states her pain in B mid lower back has decreased post manual therapy, noted decreased R muscular tension in QL mainly.  Pt adamant about receiving manual therapy today, required explanation of benefits of therex and manual therapy combination for improvements.     Plan     Continue plan of care.   Short Term Goals: 3-4 weeks:  1.  Patient will demonstrate 4/5 R hip abductor/ ER strength for improved " transfer independence without pain.  2.  Patient will demonstrate no pain with lumbar quadrant test on the R.  3.  Patient will report <3/10 R-sided LBP at worst with transition movements such as [id number] lying.     Long Term Goals: 8 weeks   1. Patient will demonstrate to PT compliance with HEP without cueing.  2. Patient will report CJ = at least 20% but < 40% impaired, limited or restricted on lumbar FOTO survey     Plan   Certification Period/Plan of care expiration: 6/22/2018 to 07/19/2018.    Min Chowdary, HARPREET

## 2018-07-02 ENCOUNTER — CLINICAL SUPPORT (OUTPATIENT)
Dept: REHABILITATION | Facility: HOSPITAL | Age: 83
End: 2018-07-02
Payer: MEDICARE

## 2018-07-02 DIAGNOSIS — R29.3 POOR POSTURE: ICD-10-CM

## 2018-07-02 DIAGNOSIS — Z78.9 IMPAIRED MOBILITY AND ACTIVITIES OF DAILY LIVING: ICD-10-CM

## 2018-07-02 DIAGNOSIS — M54.50 ACUTE RIGHT-SIDED LOW BACK PAIN WITHOUT SCIATICA: ICD-10-CM

## 2018-07-02 DIAGNOSIS — Z74.09 IMPAIRED MOBILITY AND ACTIVITIES OF DAILY LIVING: ICD-10-CM

## 2018-07-02 PROCEDURE — 97140 MANUAL THERAPY 1/> REGIONS: CPT | Mod: PN

## 2018-07-02 NOTE — PROGRESS NOTES
"  Physical Therapy Daily Treatment Note     Name: Ghada Dave  Clinic Number: [id number]    Therapy Diagnosis:   Encounter Diagnoses   Name Primary?    Acute right-sided low back pain without sciatica     Poor posture     Impaired mobility and activities of daily living      Physician: Kodak Beauchamp*    Visit Date: 7/2/2018  Physician Orders: PT Eval and Treat   Medical Diagnosis: Chronic LBP without sciatica  Evaluation Date: 6/22/2018  Authorization Period Expiration:06/19/2018  Plan of Care Certification Period: 06/22/2018 to 07/19/2018  Visit # / Visits authorized: 2/ ?    Time In:4:15  Time Out: 5:00  Total Billable Time: 45 minutes (2 TE, 1 MT )    Precautions: Standard    Subjective     Pain: 10/10  Location: B Mid lower back pain, but more in right lower back. Pt agreeable to PT session.     Objective   Initiated session with all therex as per logged below, under supervision then pt received manual therapy consisting of STM to B lumbar paraspinals/ QL/IT band x25 '/ Manual  B hamstring stretch 3 x 30' , B Piriformis x 25 min.  DATE 7/2/18 6/29/18 6/25/2018         VISIT 4 3 2   G CODE 4/10 3/10 2/10   FOTO 4/5 3/5 2/5   Cap Visit  Cap Total 54.92  316.86 88.10  261.94 60.64  173.84   LTR B 2 2 "    hooklying hip add ball squeeze 2x10 2 x 10 10 reps x 5 second holds   hooklying hip abd with belt 2x10 2 x 10 10 reps x 5 second holds   Bridges with ball 2x10 2 x 10 3x10   A/SLR 2x10 B nt 3x10 phase I B   Side lying hip abduction 2x10 B nt 3x10 B                     INITIALS JA 2/6 JA 1/6 JH           Assessment     Pt completed session with no reports of increased mid lower back pain today. Noted decreased paraspinal mm tension post manual therapy.     Plan     Continue plan of care.   Short Term Goals: 3-4 weeks:  1.  Patient will demonstrate 4/5 R hip abductor/ ER strength for improved transfer independence without pain.  2.  Patient will demonstrate no pain with lumbar quadrant test " on the R.  3.  Patient will report <3/10 R-sided LBP at worst with transition movements such as [id number] lying.     Long Term Goals: 8 weeks   1. Patient will demonstrate to PT compliance with HEP without cueing.  2. Patient will report CJ = at least 20% but < 40% impaired, limited or restricted on lumbar FOTO survey     Plan   Certification Period/Plan of care expiration: 6/22/2018 to 07/19/2018.    Min Chowdary, PTA

## 2018-07-06 ENCOUNTER — CLINICAL SUPPORT (OUTPATIENT)
Dept: REHABILITATION | Facility: HOSPITAL | Age: 83
End: 2018-07-06
Payer: MEDICARE

## 2018-07-06 DIAGNOSIS — Z78.9 IMPAIRED MOBILITY AND ACTIVITIES OF DAILY LIVING: ICD-10-CM

## 2018-07-06 DIAGNOSIS — Z74.09 IMPAIRED MOBILITY AND ACTIVITIES OF DAILY LIVING: ICD-10-CM

## 2018-07-06 DIAGNOSIS — R29.3 POOR POSTURE: ICD-10-CM

## 2018-07-06 DIAGNOSIS — M54.50 ACUTE RIGHT-SIDED LOW BACK PAIN WITHOUT SCIATICA: ICD-10-CM

## 2018-07-06 PROCEDURE — 97110 THERAPEUTIC EXERCISES: CPT | Mod: PN

## 2018-07-06 PROCEDURE — 97140 MANUAL THERAPY 1/> REGIONS: CPT | Mod: PN

## 2018-07-06 NOTE — PROGRESS NOTES
"  Physical Therapy Daily Treatment Note     Name: Ghada Dave  Clinic Number: [id number]    Therapy Diagnosis:   Encounter Diagnoses   Name Primary?    Acute right-sided low back pain without sciatica     Poor posture     Impaired mobility and activities of daily living      Physician: Kodak Beauchamp*    Visit Date: 7/6/2018  Physician Orders: PT Eval and Treat   Medical Diagnosis: Chronic LBP without sciatica  Evaluation Date: 6/22/2018  Authorization Period Expiration:06/19/2018  Plan of Care Certification Period: 06/22/2018 to 07/19/2018  Visit # / Visits authorized: 2/ ?    Time In:4:00  Time Out: 4:45  Total Billable Time: 45 minutes (2 TE, 1 MT )    Precautions: Standard    Subjective     Pain: 10/10  Location: Pt reported pain 10/10 B mid/lower back. "I'm sore from receiving manual last time".    Objective    Initiated session with  manual therapy consisting of STM to B lumbar paraspinals/ QL/IT band x25 '/Pt completed all therex as per logged below 1:1 by SPTA under direct supervision of Min Chowdary PTA   DATE 7/6/18 7/2/18 6/29/18 6/25/2018          VISIT 5 4 3 2   G CODE 5/10 4/10 3/10 2/10   FOTO DONE 4/5 3/5 2/5   Cap Visit  Cap Total 85.24  402.10 54.92  316.86 88.10  261.94 60.64  173.84   LTR B 2' 2 2 "    hooklying hip add ball squeeze 2x10 2x10 2 x 10 10 reps x 5 second holds   hooklying hip abd with belt 2x10 2x10 2 x 10 10 reps x 5 second holds   Bridges with ball 2x10 2x10 2 x 10 3x10   A/SLR 2x10 2x10 B nt 3x10 phase I B   Side lying hip abduction 2x10 2x10 B nt 3x10 B                        INITIALS TL/JA JA 2/6 JA 1/6 JH           Assessment   Manual techniques were performed  to reduce pain.  Noted muscle tension in lumbar/thoracic paraspinals with palpation.  Pt requires verbal instructions for proper technique during all therex.  Pt had no complaints of increased pain following session.    Plan     Continue plan of care.   Short Term Goals: 3-4 weeks:  1.  " Patient will demonstrate 4/5 R hip abductor/ ER strength for improved transfer independence without pain.  2.  Patient will demonstrate no pain with lumbar quadrant test on the R.  3.  Patient will report <3/10 R-sided LBP at worst with transition movements such as [id number] lying.     Long Term Goals: 8 weeks   1. Patient will demonstrate to PT compliance with HEP without cueing.  2. Patient will report CJ = at least 20% but < 40% impaired, limited or restricted on lumbar FOTO survey     Plan   Certification Period/Plan of care expiration: 6/22/2018 to 07/19/2018.    Session conducted by Feliciano Bonner under direct supervision of Min Chowdary PTA

## 2018-07-10 ENCOUNTER — OFFICE VISIT (OUTPATIENT)
Dept: CARDIOLOGY | Facility: CLINIC | Age: 83
End: 2018-07-10
Payer: MEDICARE

## 2018-07-10 VITALS
OXYGEN SATURATION: 99 % | HEART RATE: 76 BPM | HEIGHT: 60 IN | SYSTOLIC BLOOD PRESSURE: 127 MMHG | BODY MASS INDEX: 24.74 KG/M2 | DIASTOLIC BLOOD PRESSURE: 71 MMHG | WEIGHT: 126 LBS

## 2018-07-10 DIAGNOSIS — R42 DIZZINESS: ICD-10-CM

## 2018-07-10 DIAGNOSIS — K62.82 ANAL DYSPLASIA: ICD-10-CM

## 2018-07-10 DIAGNOSIS — R33.9 URINARY RETENTION WITH INCOMPLETE BLADDER EMPTYING: ICD-10-CM

## 2018-07-10 DIAGNOSIS — M54.50 ACUTE RIGHT-SIDED LOW BACK PAIN WITHOUT SCIATICA: ICD-10-CM

## 2018-07-10 DIAGNOSIS — I25.10 CORONARY ARTERY DISEASE WITHOUT ANGINA PECTORIS, UNSPECIFIED VESSEL OR LESION TYPE, UNSPECIFIED WHETHER NATIVE OR TRANSPLANTED HEART: Primary | ICD-10-CM

## 2018-07-10 DIAGNOSIS — I10 HYPERTENSION, UNSPECIFIED TYPE: ICD-10-CM

## 2018-07-10 DIAGNOSIS — R07.89 NON-CARDIAC CHEST PAIN: ICD-10-CM

## 2018-07-10 DIAGNOSIS — F51.04 PSYCHOPHYSIOLOGICAL INSOMNIA: ICD-10-CM

## 2018-07-10 DIAGNOSIS — M79.606 PAIN OF LOWER EXTREMITY, UNSPECIFIED LATERALITY: ICD-10-CM

## 2018-07-10 DIAGNOSIS — I25.10 NON-OCCLUSIVE CORONARY ARTERY DISEASE: ICD-10-CM

## 2018-07-10 DIAGNOSIS — K57.30 DIVERTICULOSIS OF LARGE INTESTINE WITHOUT HEMORRHAGE: ICD-10-CM

## 2018-07-10 DIAGNOSIS — I65.29 STENOSIS OF CAROTID ARTERY, UNSPECIFIED LATERALITY: ICD-10-CM

## 2018-07-10 DIAGNOSIS — R11.0 NAUSEA: ICD-10-CM

## 2018-07-10 PROCEDURE — 3078F DIAST BP <80 MM HG: CPT | Mod: CPTII,S$GLB,, | Performed by: INTERNAL MEDICINE

## 2018-07-10 PROCEDURE — 99999 PR PBB SHADOW E&M-EST. PATIENT-LVL III: CPT | Mod: PBBFAC,,, | Performed by: INTERNAL MEDICINE

## 2018-07-10 PROCEDURE — 93000 ELECTROCARDIOGRAM COMPLETE: CPT | Mod: S$GLB,,, | Performed by: INTERNAL MEDICINE

## 2018-07-10 PROCEDURE — 99214 OFFICE O/P EST MOD 30 MIN: CPT | Mod: S$GLB,,, | Performed by: INTERNAL MEDICINE

## 2018-07-10 PROCEDURE — 3074F SYST BP LT 130 MM HG: CPT | Mod: CPTII,S$GLB,, | Performed by: INTERNAL MEDICINE

## 2018-07-10 RX ORDER — DIAZEPAM 2 MG/1
2 TABLET ORAL NIGHTLY
COMMUNITY
End: 2019-03-21

## 2018-07-10 RX ORDER — DICLOFENAC SODIUM 75 MG/1
75 TABLET, DELAYED RELEASE ORAL DAILY
COMMUNITY
End: 2018-10-18

## 2018-07-10 NOTE — PROGRESS NOTES
Subjective:    Patient ID:  Ghada Dave is a 82 y.o. female who presents for evaluation of No chief complaint on file.      HPI  83 y/o Togolese speaking female with hx of non obstructive CAD ([facility] in 2017), HTN, ROGER, who presents for f/u. Formerly seen [facility] and [facility] by Dr Perez. She continues to have intermittent non cardiac CP which is mild and chronic. Has some DANIEL and intermittent palps. Has bilateral nocturnal leg cramping when in bed. No claudication, rest pain, non healing ulceration, or signs of limb ischemia. Compliant with meds. Does not exercise regularly or follow a low salt diet.     Review of Systems   Constitution: Positive for weakness. Negative for malaise/fatigue.   HENT: Negative for congestion.    Eyes: Negative for blurred vision.   Cardiovascular: Positive for chest pain. Negative for claudication, cyanosis, dyspnea on exertion, irregular heartbeat, leg swelling, near-syncope, orthopnea, palpitations, paroxysmal nocturnal dyspnea and syncope.   Respiratory: Negative for shortness of breath.    Endocrine: Negative for polyuria.   Hematologic/Lymphatic: Negative for bleeding problem.   Skin: Negative for itching and rash.   Musculoskeletal: Positive for muscle cramps. Negative for joint swelling and muscle weakness.   Gastrointestinal: Negative for abdominal pain, hematemesis, hematochezia, melena, nausea and vomiting.   Genitourinary: Negative for dysuria and hematuria.   Neurological: Negative for dizziness, focal weakness, headaches, light-headedness and loss of balance.   Psychiatric/Behavioral: Negative for depression. The patient is not nervous/anxious.         Objective:    Physical Exam   Constitutional: She is oriented to person, place, and time. She appears well-developed and well-nourished.   HENT:   Head: Normocephalic and atraumatic.   Neck: Neck supple. No JVD present.   Cardiovascular: Normal rate, regular rhythm and normal heart sounds.    Pulses:        Carotid pulses are 2+ on the right side, and 2+ on the left side.       Radial pulses are 2+ on the right side, and 2+ on the left side.        Femoral pulses are 2+ on the right side, and 2+ on the left side.       Dorsalis pedis pulses are 2+ on the right side, and 2+ on the left side.        Posterior tibial pulses are 2+ on the right side, and 2+ on the left side.   Pulmonary/Chest: Effort normal and breath sounds normal.   Abdominal: Soft. Bowel sounds are normal.   Musculoskeletal: She exhibits no edema.   Neurological: She is alert and oriented to person, place, and time.   Skin: Skin is warm and dry.   Psychiatric: She has a normal mood and affect. Her behavior is normal. Thought content normal.         Assessment:       1. Coronary artery disease without angina pectoris, unspecified vessel or lesion type, unspecified whether native or transplanted heart    2. Non-occlusive coronary artery disease    3. Stenosis of carotid artery, unspecified laterality    4. Hypertension, unspecified type    5. Diverticulosis of large intestine without hemorrhage    6. Acute right-sided low back pain without sciatica    7. Dizziness    8. Non-cardiac chest pain    9. Psychophysiological insomnia    10. Nausea    11. Urinary retention with incomplete bladder emptying    12. Anal dysplasia      83 y/o pt with hx and presentation as above. Doing well from a cardiac perspective. CP is chronic and non cardiac. Leg pain likely not PAD. Will evaluate symptoms with 2DE and NASIR's.         Plan:       -NASIR  -2DE with CFD  -f/u in 1 year

## 2018-07-19 ENCOUNTER — HOSPITAL ENCOUNTER (OUTPATIENT)
Dept: CARDIOLOGY | Facility: HOSPITAL | Age: 83
Discharge: HOME | End: 2018-07-19
Attending: INTERNAL MEDICINE
Payer: MEDICARE

## 2018-07-19 DIAGNOSIS — I25.10 CORONARY ARTERY DISEASE WITHOUT ANGINA PECTORIS, UNSPECIFIED VESSEL OR LESION TYPE, UNSPECIFIED WHETHER NATIVE OR TRANSPLANTED HEART: ICD-10-CM

## 2018-07-19 DIAGNOSIS — M79.606 PAIN OF LOWER EXTREMITY, UNSPECIFIED LATERALITY: ICD-10-CM

## 2018-07-19 LAB
AORTIC VALVE REGURGITATION: ABNORMAL
DIASTOLIC DYSFUNCTION: YES
ESTIMATED PA SYSTOLIC PRESSURE: 16.84
MITRAL VALVE MOBILITY: NORMAL
RETIRED EF AND QEF - SEE NOTES: 55 (ref 55–65)
TRICUSPID VALVE REGURGITATION: ABNORMAL

## 2018-07-19 PROCEDURE — 93306 TTE W/DOPPLER COMPLETE: CPT

## 2018-07-19 PROCEDURE — 93922 UPR/L XTREMITY ART 2 LEVELS: CPT | Mod: 26,,, | Performed by: INTERNAL MEDICINE

## 2018-07-19 PROCEDURE — 93922 UPR/L XTREMITY ART 2 LEVELS: CPT

## 2018-07-19 PROCEDURE — 93306 TTE W/DOPPLER COMPLETE: CPT | Mod: 26,,, | Performed by: INTERNAL MEDICINE

## 2018-07-20 LAB — VASCULAR ANKLE BRACHIAL INDEX (ABI) RIGHT: 1.07 (ref 0.9–1.2)

## 2018-07-24 ENCOUNTER — TELEPHONE (OUTPATIENT)
Dept: CARDIOLOGY | Facility: CLINIC | Age: 83
End: 2018-07-24

## 2018-07-24 NOTE — TELEPHONE ENCOUNTER
"Pt was advised in Jordanian: "Your NASIR's do not show evidence of any significant leg blockages. Your echocardiogram shows normal heart function. It also shows "diastolic dysfunction" which is elevated pressure in the heart due to a history of high blood pressure and stiffening of the heart muscle. Treatment is blood pressure control and low salt diet. One of your heart valves has moderate regurgitation (leaky) and will further discuss at next clinic visit. " Pt stated she understood and was inquiring about a sooner cristiane time. PT was advised that I would f/u  "

## 2018-08-10 ENCOUNTER — TELEPHONE (OUTPATIENT)
Dept: CARDIOLOGY | Facility: CLINIC | Age: 83
End: 2018-08-10

## 2018-08-10 NOTE — TELEPHONE ENCOUNTER
----- Message from Celena Goetz sent at 8/10/2018  8:50 AM CDT -----  Regarding: EKG ORDER  Please put in EKG order, thanks. Celena [phone]

## 2018-08-17 ENCOUNTER — DOCUMENTATION ONLY (OUTPATIENT)
Dept: REHABILITATION | Facility: HOSPITAL | Age: 83
End: 2018-08-17

## 2018-08-17 PROBLEM — Z78.9 IMPAIRED MOBILITY AND ACTIVITIES OF DAILY LIVING: Status: RESOLVED | Noted: 2018-06-22 | Resolved: 2018-08-17

## 2018-08-17 PROBLEM — R29.3 POOR POSTURE: Status: RESOLVED | Noted: 2018-06-22 | Resolved: 2018-08-17

## 2018-08-17 PROBLEM — Z74.09 IMPAIRED MOBILITY AND ACTIVITIES OF DAILY LIVING: Status: RESOLVED | Noted: 2018-06-22 | Resolved: 2018-08-17

## 2018-08-17 PROBLEM — M54.50 ACUTE RIGHT-SIDED LOW BACK PAIN WITHOUT SCIATICA: Status: RESOLVED | Noted: 2018-06-22 | Resolved: 2018-08-17

## 2018-08-17 NOTE — PROGRESS NOTES
Mrs. Flanagan to be discharge from OPPT at this time. She came for a total of 5 visits with good relief in symptoms.  She lacks transportation and had difficulty making her appointments as she walks to her appointments and it is the summer months.  She was issued an HEP but was non-compliant with this overall.  She met her STGs only.  Discharge from PT at this time.

## 2018-09-04 ENCOUNTER — OFFICE VISIT (OUTPATIENT)
Dept: SURGERY | Facility: CLINIC | Age: 83
End: 2018-09-04
Payer: MEDICARE

## 2018-09-04 VITALS
HEART RATE: 74 BPM | BODY MASS INDEX: 24.63 KG/M2 | DIASTOLIC BLOOD PRESSURE: 69 MMHG | SYSTOLIC BLOOD PRESSURE: 148 MMHG | HEIGHT: 60 IN | WEIGHT: 125.44 LBS

## 2018-09-04 DIAGNOSIS — K62.82 MILD ANAL DYSPLASIA: Primary | ICD-10-CM

## 2018-09-04 PROCEDURE — 99212 OFFICE O/P EST SF 10 MIN: CPT | Mod: PBBFAC | Performed by: COLON & RECTAL SURGERY

## 2018-09-04 PROCEDURE — 3077F SYST BP >= 140 MM HG: CPT | Mod: CPTII,,, | Performed by: COLON & RECTAL SURGERY

## 2018-09-04 PROCEDURE — 99999 PR PBB SHADOW E&M-EST. PATIENT-LVL II: CPT | Mod: PBBFAC,,, | Performed by: COLON & RECTAL SURGERY

## 2018-09-04 PROCEDURE — 3078F DIAST BP <80 MM HG: CPT | Mod: CPTII,,, | Performed by: COLON & RECTAL SURGERY

## 2018-09-04 PROCEDURE — 99213 OFFICE O/P EST LOW 20 MIN: CPT | Mod: S$PBB,,, | Performed by: COLON & RECTAL SURGERY

## 2018-09-04 NOTE — PROGRESS NOTES
Subjective:       Patient ID: Ghada Dave is a 82 y.o. female.    Chief Complaint: No chief complaint on file.    HPI     This is a pleasant 82-year-old Mozambican-speaking female here for f/u of dysplasia involving an anal polyp.    PCP -     4/10/18  She has had anal warts for a few years now and has been undergoing regular procedure.  She was seen previously in the past in the past.  Then she saw Dr. Myers and underwent a colonoscopy on 08/28/2017 that revealed a polyp at the dentate line.  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  So she saw Dr. Shaw and underwent a proctoscopy with polypectomy on 09/19/2017 that revealed a high-grade squamous intraepithelial lesion and an anal intraepithelial lesion-1 that is present and focally approaching the inked margin.  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  Margins were positive. She speaks Mozambican.  She is accompanied by her friend, Ms. Lim.  Her friend is interpreting for her. Patient continues to have some burning sensation and discomfort in the anal area.  She has tenesmus.  Denies bloody bowel movements.    5/29/18  82F presents for follow up of anal canal lesion in the right posterior aspect, fulgurated and excised on 4/23/18. Pathology showed low grade squamous intraepithelial lesion (AIN 1). Since the procedure she reports having sudden urges to defecate with 1 episode of fecal incontinence. She also c/o ongoing pain in the anal area. She has done a sitz bath with relief but has not been doing them regularly.     9/4/18  Has been doing well overall. Continues to have urgency especially when out of the home. Has had 2-3  episodes of incontinence; very small amounts of solid stool. Has been taking metamucil daily which helps. Occasional anal burning/itching but no pain at baseline.    Review of Systems   Constitutional: Negative for appetite change, fatigue, fever and unexpected weight change.   HENT: Negative for facial swelling and nosebleeds.    Eyes: Negative for photophobia and pain.   Respiratory: Negative for cough and shortness of breath.    Cardiovascular: Negative for chest pain and leg swelling.   Gastrointestinal: Positive for rectal burning. Negative for abdominal pain, anal bleeding, blood in stool and nausea.   Genitourinary: Negative for dysuria and hematuria.   Musculoskeletal: Positive for arthralgias.   Skin: Negative for color change and rash.   Neurological: Negative for seizures, weakness and headaches.   Hematological: Negative for adenopathy. Does not bruise/bleed easily.   All other systems reviewed and are negative.        Objective:      Vitals:    09/04/18 0931   BP: (!) 148/69   Pulse: 74   Weight: 56.9 kg (125 lb 7.1 oz)   Height: 5' (1.524 m)       Physical Exam   Constitutional: She is oriented to person, place, and time.  Non-toxic appearance. No distress.   HENT:   Mouth/Throat: No oropharyngeal exudate.   Eyes: No scleral icterus.   Neck: Neck supple. No thyroid mass and no thyromegaly present.   Cardiovascular: Normal rate, regular rhythm, S1 normal and normal heart sounds.    Pulmonary/Chest: Effort normal and breath sounds normal. No accessory muscle usage. No respiratory distress. She has no wheezes. She has no rales.   Abdominal: Soft. She exhibits no ascites and no mass. There is no hepatosplenomegaly. There is no tenderness.   Musculoskeletal: She exhibits no edema.   Lymphadenopathy:     She has no cervical adenopathy.     She has no axillary adenopathy.   Rectal exam: external exam without any masses or other lesions. Digital exam negative without mass, lesions or tenderness.          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 Anal or rectal pain        Plan:     82F with h/o AIN 2, condylomatous lesions presents for follow up of anal canal lesion in the right posterior aspect, fulgurated and excised on 4/23/18. Pathology showed low grade squamous intraepithelial lesion (AIN 1).    Encouraged that symptoms should improve with time and that exam today was normal. She is aware that the lesions may return and regular follow up is important to monitor.     Continue metamucil  Return to clinic in 1 year or sooner if having probelms.    Pio Pollack     Have seen and examined the patient with the fellow and agree with their plan.  DESTINEY JON

## 2018-09-18 ENCOUNTER — HOSPITAL ENCOUNTER (EMERGENCY)
Facility: HOSPITAL | Age: 83
Discharge: HOME | End: 2018-09-18
Attending: EMERGENCY MEDICINE
Payer: MEDICARE

## 2018-09-18 VITALS
BODY MASS INDEX: 23.22 KG/M2 | DIASTOLIC BLOOD PRESSURE: 54 MMHG | TEMPERATURE: 98 F | SYSTOLIC BLOOD PRESSURE: 114 MMHG | HEIGHT: 61 IN | HEART RATE: 73 BPM | OXYGEN SATURATION: 98 % | RESPIRATION RATE: 17 BRPM | WEIGHT: 123 LBS

## 2018-09-18 DIAGNOSIS — R10.2 PELVIC PAIN: ICD-10-CM

## 2018-09-18 DIAGNOSIS — K59.00 CONSTIPATION, UNSPECIFIED CONSTIPATION TYPE: Primary | ICD-10-CM

## 2018-09-18 LAB
ALBUMIN SERPL BCP-MCNC: 3.9 G/DL
ALP SERPL-CCNC: 74 U/L
ALT SERPL W/O P-5'-P-CCNC: 11 U/L
ANION GAP SERPL CALC-SCNC: 9 MMOL/L
AST SERPL-CCNC: 21 U/L
BASOPHILS # BLD AUTO: 0.05 K/UL
BASOPHILS NFR BLD: 1.7 %
BILIRUB SERPL-MCNC: 0.6 MG/DL
BILIRUB UR QL STRIP: NEGATIVE
BUN SERPL-MCNC: 15 MG/DL
CALCIUM SERPL-MCNC: 9.8 MG/DL
CHLORIDE SERPL-SCNC: 107 MMOL/L
CLARITY UR: CLEAR
CO2 SERPL-SCNC: 24 MMOL/L
COLOR UR: YELLOW
CREAT SERPL-MCNC: 0.9 MG/DL
DIFFERENTIAL METHOD: ABNORMAL
EOSINOPHIL # BLD AUTO: 0.2 K/UL
EOSINOPHIL NFR BLD: 5.1 %
ERYTHROCYTE [DISTWIDTH] IN BLOOD BY AUTOMATED COUNT: 14 %
EST. GFR  (AFRICAN AMERICAN): >60 ML/MIN/1.73 M^2
EST. GFR  (NON AFRICAN AMERICAN): 60 ML/MIN/1.73 M^2
GLUCOSE SERPL-MCNC: 95 MG/DL
GLUCOSE UR QL STRIP: NEGATIVE
HCT VFR BLD AUTO: 33.9 %
HGB BLD-MCNC: 11.3 G/DL
HGB UR QL STRIP: ABNORMAL
KETONES UR QL STRIP: NEGATIVE
LEUKOCYTE ESTERASE UR QL STRIP: NEGATIVE
LYMPHOCYTES # BLD AUTO: 1.2 K/UL
LYMPHOCYTES NFR BLD: 39.1 %
MCH RBC QN AUTO: 29 PG
MCHC RBC AUTO-ENTMCNC: 33.3 G/DL
MCV RBC AUTO: 87 FL
MICROSCOPIC COMMENT: NORMAL
MONOCYTES # BLD AUTO: 0.2 K/UL
MONOCYTES NFR BLD: 7.4 %
NEUTROPHILS # BLD AUTO: 1.4 K/UL
NEUTROPHILS NFR BLD: 46.7 %
NITRITE UR QL STRIP: NEGATIVE
PH UR STRIP: 6 [PH] (ref 5–8)
PLATELET # BLD AUTO: 278 K/UL
PMV BLD AUTO: 9.4 FL
POTASSIUM SERPL-SCNC: 4.3 MMOL/L
PROT SERPL-MCNC: 7.8 G/DL
PROT UR QL STRIP: NEGATIVE
RBC # BLD AUTO: 3.9 M/UL
RBC #/AREA URNS HPF: 1 /HPF (ref 0–4)
SODIUM SERPL-SCNC: 140 MMOL/L
SP GR UR STRIP: 1.01 (ref 1–1.03)
URN SPEC COLLECT METH UR: ABNORMAL
UROBILINOGEN UR STRIP-ACNC: NEGATIVE EU/DL
WBC # BLD AUTO: 2.97 K/UL
WBC #/AREA URNS HPF: 3 /HPF (ref 0–5)

## 2018-09-18 PROCEDURE — 96374 THER/PROPH/DIAG INJ IV PUSH: CPT | Mod: 59

## 2018-09-18 PROCEDURE — 85025 COMPLETE CBC W/AUTO DIFF WBC: CPT

## 2018-09-18 PROCEDURE — 63600175 PHARM REV CODE 636 W HCPCS: Performed by: EMERGENCY MEDICINE

## 2018-09-18 PROCEDURE — 99285 EMERGENCY DEPT VISIT HI MDM: CPT | Mod: 25

## 2018-09-18 PROCEDURE — 80053 COMPREHEN METABOLIC PANEL: CPT

## 2018-09-18 PROCEDURE — 25500020 PHARM REV CODE 255: Performed by: EMERGENCY MEDICINE

## 2018-09-18 PROCEDURE — 81000 URINALYSIS NONAUTO W/SCOPE: CPT

## 2018-09-18 PROCEDURE — 96375 TX/PRO/DX INJ NEW DRUG ADDON: CPT

## 2018-09-18 RX ORDER — POLYETHYLENE GLYCOL 3350 17 G/17G
17 POWDER, FOR SOLUTION ORAL DAILY
Qty: 1 BOTTLE | Refills: 0 | Status: SHIPPED | OUTPATIENT
Start: 2018-09-18 | End: 2019-03-21

## 2018-09-18 RX ORDER — MORPHINE SULFATE 2 MG/ML
3 INJECTION, SOLUTION INTRAMUSCULAR; INTRAVENOUS
Status: COMPLETED | OUTPATIENT
Start: 2018-09-18 | End: 2018-09-18

## 2018-09-18 RX ORDER — ONDANSETRON 2 MG/ML
4 INJECTION INTRAMUSCULAR; INTRAVENOUS
Status: COMPLETED | OUTPATIENT
Start: 2018-09-18 | End: 2018-09-18

## 2018-09-18 RX ORDER — IBUPROFEN 600 MG/1
600 TABLET ORAL EVERY 8 HOURS PRN
Qty: 20 TABLET | Refills: 0 | Status: SHIPPED | OUTPATIENT
Start: 2018-09-18 | End: 2018-10-18

## 2018-09-18 RX ADMIN — IOHEXOL 75 ML: 350 INJECTION, SOLUTION INTRAVENOUS at 03:09

## 2018-09-18 RX ADMIN — ONDANSETRON 4 MG: 2 INJECTION INTRAMUSCULAR; INTRAVENOUS at 02:09

## 2018-09-18 RX ADMIN — MORPHINE SULFATE 3 MG: 2 INJECTION, SOLUTION INTRAMUSCULAR; INTRAVENOUS at 02:09

## 2018-09-18 NOTE — ED NOTES
Per : pt reports periumbilical abd pain that radiates to right flank. Pt reports dysuria as well. Pt reports that she has had multiple colonoscopies and she reports that her colon is fine. Pt reports severe pain.

## 2018-09-18 NOTE — ED PROVIDER NOTES
Encounter Date: 9/18/2018       History     Chief Complaint   Patient presents with    Abdominal Pain     Pain to pelvic region with dysuria. Also complaining of lower back pain. Symptoms x2 weeks.      The history is provided by the patient. The history is limited by a language barrier. A  was used.   Abdominal Pain   Illness onset: two weeks. Onset quality: waxing/waning. The problem has not changed since onset.Pain radiation: lower abdomen. The abdominal pain is relieved by nothing. The other symptoms of the illness include diarrhea (in past, not associated with this episode) and dysuria (urgency). The other symptoms of the illness do not include fever, nausea, vomiting or hematochezia. Primary symptoms comment: urge to defecate/urinate.   The dysuria is associated with urgency.   Additional symptoms associated with the illness include urgency and back pain. Symptoms associated with the illness do not include constipation.     Review of patient's allergies indicates:   Allergen Reactions    Keflex [cephalexin] Rash     Past Medical History:   Diagnosis Date    Abnormal Pap smear 2013    CAD (coronary artery disease) 06/2017     iFR of LAD and RCA performed confirmed nonobstructive disease.    Cataract     Colon polyps     Frequent urination     Hemorrhoid     High cholesterol     Poor circulation     Postoperative abdominal pain     Psoriasis     Varicose vein      Past Surgical History:   Procedure Laterality Date    ANOSCOPY-HIGH RESOLUTION N/A 4/23/2018    Performed by Bernard P[facility]ano MD at [facility] OR [address] [place] [address]    CARDIAC CATHETERIZATION  06/2017     iFR of LAD and RCA performed confirmed nonobstructive disease.    CATARACT EXTRACTION W/  INTRAOCULAR LENS IMPLANT Bilateral     COLONOSCOPY N/A 8/28/2017    Procedure: COLONOSCOPY;  Surgeon: Bertram Myers MD;  Location: [facility];  Service: Endoscopy;  Laterality: N/A;    COLONOSCOPY N/A 8/28/2017    Performed by Bertram VALERIO  MD [place] at [facility] ENDO    COLONOSCOPY - Pt requires  - Please call Kimberly () at [phone] with time of arrival for procedure. N/A 5/12/2015    Performed by Mann Thornton MD at [facility] ENDO    ESOPHAGOGASTRODUODENOSCOPY (EGD) N/A 11/18/2014    Performed by Mann Thornton MD at [facility] ENDO    EXCISION, MASS, RECTUM N/A 9/16/2013    Performed by Bharath Shaw MD at [facility] OR    FLUORO URODYNAMIC STUDY (FUDS) N/A 6/3/2014    Performed by Radha Vasquez MD at [facility] OR 1ST FLR    FULGURATION-CONDYLOMA-ANAL N/A 6/11/2015    Performed by Bernard Pisano MD at [facility] OR 2ND FLR    POLYPECTOMY      PROCTOSCOPY WITH POLYPECTOMY N/A 2/5/2018    Performed by Bharath Shaw MD at [facility] OR    PROCTOSCOPY WITH POLYPECTOMY N/A 9/19/2017    Performed by Bharath Shaw MD at [facility] OR    PROCTOSIGMOIDOSCOPY, WITH POLYPECTOMY N/A 9/16/2013    Performed by Bharath Shaw MD at [facility] OR    YAG Laser Capsulotomy Right 04/17/2017    Dr. Chavez     Family History   Problem Relation Age of Onset    Glaucoma Neg Hx     Macular degeneration Neg Hx     Strabismus Neg Hx     Retinal detachment Neg Hx     Amblyopia Neg Hx     Blindness Neg Hx     Cataracts Neg Hx     Prostate cancer Neg Hx     Kidney disease Neg Hx     Anesthesia problems Neg Hx      Social History     Tobacco Use    Smoking status: Never Smoker    Smokeless tobacco: Never Used   Substance Use Topics    Alcohol use: No     Alcohol/week: 0.0 oz    Drug use: No     Review of Systems   Constitutional: Negative for fever.   Gastrointestinal: Positive for abdominal pain and diarrhea (in past, not associated with this episode). Negative for abdominal distention, anal bleeding, constipation, hematochezia, nausea and vomiting.   Genitourinary: Positive for dysuria (urgency) and urgency.   Musculoskeletal: Positive for back pain.   Skin: Negative for wound.   All other systems reviewed and are  negative.      Physical Exam     Initial Vitals [09/18/18 1234]   BP Pulse Resp Temp SpO2   (!) 149/63 84 19 98.2 °F (36.8 °C) 96 %      MAP       --         Physical Exam    Nursing note and vitals reviewed.  Constitutional: She appears well-developed and well-nourished.   HENT:   Head: Normocephalic.   Mouth/Throat: Oropharynx is clear and moist.   Eyes: Conjunctivae and EOM are normal.   Neck: Normal range of motion. Neck supple.   Cardiovascular: Normal rate, regular rhythm and intact distal pulses.   No murmur heard.  Pulmonary/Chest: Breath sounds normal. No respiratory distress.   Abdominal: Soft. She exhibits no distension. There is tenderness (mild lower abdominal). There is no rebound and no guarding.   Genitourinary:   Genitourinary Comments: Defer     Musculoskeletal: Normal range of motion. She exhibits no edema or tenderness.   Neurological: She is alert and oriented to person, place, and time. She has normal strength.   Skin: Skin is warm and dry. Capillary refill takes less than 2 seconds. No rash noted.   Psychiatric: She has a normal mood and affect.         ED Course   Procedures  Labs Reviewed   URINALYSIS - Abnormal; Notable for the following components:       Result Value    Occult Blood UA 2+ (*)     All other components within normal limits   CBC W/ AUTO DIFFERENTIAL - Abnormal; Notable for the following components:    WBC 2.97 (*)     RBC 3.90 (*)     Hemoglobin 11.3 (*)     Hematocrit 33.9 (*)     Gran # (ANC) 1.4 (*)     Mono # 0.2 (*)     All other components within normal limits   COMPREHENSIVE METABOLIC PANEL   URINALYSIS MICROSCOPIC          Imaging Results          CT Abdomen Pelvis With Contrast (Final result)  Result time 09/18/18 15:28:06    Final result by Kanu Andres MD (09/18/18 15:28:06)                 Impression:      1. Colonic diverticulosis without findings to suggest diverticulitis.  2. Moderate stool throughout the colon noting slow flow within a few proximal  small bowel loops, taken together, could reflect sequela of developing constipation, correlation recommended.  3. 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  4. Stable low attenuating lesions within the hepatic parenchyma, largest is most consistent with cyst.  5. Possible hepatic steatosis, correlation with LFTs recommended.  6. Large hiatal hernia, and several additional findings above.      Electronically signed by: Kanu Andres MD  Date:    09/18/2018  Time:    15:28             Narrative:    EXAMINATION:  CT ABDOMEN PELVIS WITH CONTRAST    CLINICAL HISTORY:  abdominal pain;    TECHNIQUE:  Low dose axial images, sagittal and coronal reformations were obtained from the lung bases to the pubic symphysis following the IV administration of 75 mL of Omnipaque 350 .  Oral contrast was not given.    COMPARISON:  09/22/2017    FINDINGS:  Images of the lower thorax are remarkable for bilateral dependent atelectasis.  There is a calcified granuloma within the right lower lobe.    There is a large hiatal hernia without inflammation.  The liver is hypoattenuating, suggesting steatosis, correlation with LFTs recommended.  There are punctate hypoattenuating lesions within the right and left hepatic dome, too small for characterization.  A low attenuating lesion is noted within the posterior aspect of the right hepatic lobe measuring 3.7 cm, stable, attenuation of which suggests cyst.  The spleen and adrenal glands are grossly unremarkable.  There is atrophic change of the pancreas without ductal dilation.  The gallbladder is grossly unremarkable.  The portal vein, splenic vein, SMV, celiac axis and SMA all are grossly patent.  Scattered shotty periaortic and paracaval lymph nodes are noted.    The kidneys enhance symmetrically and excrete contrast appropriately.  There is a subcentimeter low attenuating lesion within the interpolar region of the left kidney, too small for characterization.  The right kidney is  position within the pelvis, and is ptotic in configuration.  There is mild prominence of the right renal collecting system, similar to the previous examination noting mild urothelial enhancement, also grossly stable.  There is renal vascular calcification.  No definite renal calculi seen.  The urinary bladder is grossly unremarkable.  The uterus and adnexa is unremarkable.  No significant free fluid in the pelvis.    There are several scattered colonic diverticula without convincing surrounding inflammation to suggest diverticulitis.  There is moderate stool throughout the colon.  The appendix and terminal ileum are grossly unremarkable.  The small bowel is grossly unremarkable noting there is some slow flow through proximal small bowel loops.  There is atherosclerotic calcification of the aorta and its branches.  No focal organized pelvic fluid collection.    There is osteopenia.  Degenerative changes are noted of the sacroiliac joints and lumbar spine without focal destructive process.  There is grade 1 anterolisthesis of L1 on T12.  There are bilateral fat containing inguinal hernias.                                 Medical Decision Making:   History:   Old Medical Records: I decided to obtain old medical records.  Old Records Summarized: records from another hospital.       <> Summary of Records: Seen by Dr. Pisano on 9/4 in conjunction with follow up for anal polyp removal 4/18 with concern for neoplasia, placed on metamucil for voiding symptoms.  ED Management:  Pt expressed numerous concerns regarding her stooling process and concern for future pain episodes.    Pt reports ability to stool however imaging shows significant stool burden.  Abd is soft, NT, no R/G, walking around and is in no distress.  Do not suspect obstructive process, however will try short course of miralax with close GI follow up, and discussed return precautions.                        Clinical Impression:   The primary encounter  diagnosis was Constipation, unspecified constipation type. A diagnosis of Pelvic pain was also pertinent to this visit.      Disposition:   Disposition: Discharged  Condition: Stable                        Guy J. Lefort, MD  09/18/18 7884

## 2018-10-18 ENCOUNTER — OFFICE VISIT (OUTPATIENT)
Dept: FAMILY MEDICINE | Facility: CLINIC | Age: 83
End: 2018-10-18
Payer: MEDICARE

## 2018-10-18 VITALS
BODY MASS INDEX: 23.73 KG/M2 | HEART RATE: 78 BPM | HEIGHT: 61 IN | DIASTOLIC BLOOD PRESSURE: 60 MMHG | SYSTOLIC BLOOD PRESSURE: 118 MMHG | WEIGHT: 125.69 LBS | OXYGEN SATURATION: 96 %

## 2018-10-18 DIAGNOSIS — H92.01 OTALGIA OF RIGHT EAR: ICD-10-CM

## 2018-10-18 DIAGNOSIS — D63.8 CHRONIC DISEASE ANEMIA: ICD-10-CM

## 2018-10-18 DIAGNOSIS — K21.9 GASTROESOPHAGEAL REFLUX DISEASE WITHOUT ESOPHAGITIS: ICD-10-CM

## 2018-10-18 DIAGNOSIS — M15.9 OSTEOARTHRITIS OF MULTIPLE JOINTS, UNSPECIFIED OSTEOARTHRITIS TYPE: Primary | ICD-10-CM

## 2018-10-18 DIAGNOSIS — E78.5 HYPERLIPIDEMIA, UNSPECIFIED HYPERLIPIDEMIA TYPE: ICD-10-CM

## 2018-10-18 DIAGNOSIS — H61.21 CERUMEN DEBRIS ON TYMPANIC MEMBRANE OF RIGHT EAR: ICD-10-CM

## 2018-10-18 PROCEDURE — 99214 OFFICE O/P EST MOD 30 MIN: CPT | Mod: S$PBB,,, | Performed by: FAMILY MEDICINE

## 2018-10-18 PROCEDURE — 3074F SYST BP LT 130 MM HG: CPT | Mod: CPTII,,, | Performed by: FAMILY MEDICINE

## 2018-10-18 PROCEDURE — 99213 OFFICE O/P EST LOW 20 MIN: CPT | Mod: PBBFAC,PO | Performed by: FAMILY MEDICINE

## 2018-10-18 PROCEDURE — 1101F PT FALLS ASSESS-DOCD LE1/YR: CPT | Mod: CPTII,,, | Performed by: FAMILY MEDICINE

## 2018-10-18 PROCEDURE — 3078F DIAST BP <80 MM HG: CPT | Mod: CPTII,,, | Performed by: FAMILY MEDICINE

## 2018-10-18 PROCEDURE — 99999 PR PBB SHADOW E&M-EST. PATIENT-LVL III: CPT | Mod: PBBFAC,,, | Performed by: FAMILY MEDICINE

## 2018-10-18 RX ORDER — HYDROGEN PEROXIDE 3 %
20 SOLUTION, NON-ORAL MISCELLANEOUS
Qty: 90 CAPSULE | Refills: 3 | Status: SHIPPED | OUTPATIENT
Start: 2018-10-18 | End: 2019-10-28 | Stop reason: SDUPTHER

## 2018-10-18 RX ORDER — FERROUS SULFATE 325(65) MG
325 TABLET ORAL
Qty: 30 TABLET | Refills: 0 | Status: SHIPPED | OUTPATIENT
Start: 2018-10-18 | End: 2018-11-17

## 2018-10-18 RX ORDER — CELECOXIB 200 MG/1
200 CAPSULE ORAL DAILY PRN
Qty: 90 CAPSULE | Refills: 0 | Status: SHIPPED | OUTPATIENT
Start: 2018-10-18 | End: 2019-01-16

## 2018-10-18 RX ORDER — ATORVASTATIN CALCIUM 20 MG/1
20 TABLET, FILM COATED ORAL NIGHTLY
Qty: 90 TABLET | Refills: 3 | Status: SHIPPED | OUTPATIENT
Start: 2018-10-18 | End: 2019-03-21

## 2018-10-18 NOTE — PATIENT INSTRUCTIONS
Anemia  Trinh persona tiene anemia cuando hart cuerpo no posee suficientes glóbulos rojos sanos. Los glóbulos rojos byron parte de la juli y se encargan de transportar el oxígeno por todo el cuerpo. Trinh proteína llamada hemoglobina les permite a los glóbulos rojos absorber y liberar el oxígeno. Si no tiene suficientes glóbulos rojos o hemoglobina, el cuerpo no recibe el oxígeno necesario y pueden aparecer síntomas de anemia.    Síntomas de la anemia  Algunas personas con anemia no tienen síntomas, olivier la mayoría tiene síntomas que van de leves a graves. Estos pueden incluir:  · Cansancio (fatiga)  · Debilidad  · Palidez  · Falta de aire  · Mareos o desmayos  · Latidos acelerados (taquicardia)  · Problemas para realizar las actividades que se llevan a cabo normalmente  · Ictericia (ojos, piel o boca amarillentos; orina oscura)  Causas de la anemia  La anemia puede ocurrir cuando el cuerpo:  · Pierde demasiada juli  · No produce suficientes glóbulos rojos  · Elimina los glóbulos rojos más rápido de lo que los produce  · No produce trinh cantidad normal de hemoglobina en los glóbulos rojos  Dichos problemas pueden ocurrir por varios motivos; por ejemplo:  · Trinh afección de nacimiento (congénita o hereditaria), rm la anemia de células falciformes o la talasemia.  · El sangrado intenso por alguna razón, ya sea lesión, cirugía, parto o hasta períodos menstruales intensos.  · La falta de determinados nutrientes rm el elmer, el folato o la vitamina B12, lo cual puede ocurrir debido a trinh pablo alimentación. Trinh afección rm la enfermedad celíaca o la enfermedad de Crohn también puede causar trinh pablo absorción de los nutrientes.  · Algunas condiciones crónicas rm la diabetes, la artritis o la enfermedad renal.  · Ciertas infecciones crónicas rm la tuberculosis o el VIH.  · La exposición a ciertos medicamentos, rm los que se usan para la quimioterapia.  · Existen diferentes tipos de anemia. Hart médico puede  brindarle más información sobre el tipo de anemia que usted tiene y paco causas.  Diagnóstico de la anemia  Para diagnosticar la anemia, se realizan análisis de juli que pueden incluir:  · Recuento sanguíneo completo (CBC, por paco siglas en inglés): Esta prueba mide las cantidades de los diferentes tipos de células sanguíneas.  · Extensión de juli: Prueba que permite evaluar el tamaño y la forma de las células sanguíneas. Para realizar el análisis, se debe observar trinh gota de juli con un microscopio. Se utiliza un colorante a fin de lograr que las células sanguíneas se vean mejor.  · Exámenes de elmer: Miden la cantidad de elmer que hay en la juli. El elmer es necesario para producir hemoglobina en los RBC.  · Exámenes de vitamina B12 y folato. Estos exámenes comprueban la existencia de algunos de los componentes que ayudan a randolph a los glóbulos rojos un tamaño y forma normales.  · Recuento de reticulocitos: Con esta prueba se puede medir la cantidad de nuevos glóbulos rojos que produce la médula ósea.  · Electroforesis de hemoglobina: Esta prueba sirve para descubrir problemas en la hemoglobina de los glóbulos rojos.  Tratamiento de la anemia  Los tratamientos que se utilizan dependen del tipo de anemia, hart causa y la gravedad de los síntomas. Los tratamientos pueden incluir:  · Cambios en la alimentación: Consisten en aumentar la cantidad de ciertos nutrientes en la dieta, rm el elmer, la vitamina B12 o el folato. También es posible que hart médico le recete suplementos nutritivos.  · Medicamentos: Algunos medicamentos se utilizan para tratar la causa de la anemia y otros ayudan a crear nuevos glóbulos rojos o a aliviar los síntomas. Si algún medicamento le produce anemia, debe dejar de tomarlo o cambiarlo.  · Transfusiones de juli: Reemplazar parte de hart juli puede aumentar el número de glóbulos rojos sanos de hart cuerpo.  · Cirugía: En algunos casos, se puede realizar trinh cirugía para tratar las  causas de la anemia. Si dicha cirugía es necesaria, el médico le explicará el procedimiento y le dará trinh idea general de los beneficios y riesgos que puede tener para usted.  Inquietudes a dipti plazo  Algunas personas con ciertos tipos de anemia pueden recuperarse completamente trinh vez terminado el tratamiento, olivier otros tipos de anemia (en especial las que son de nacimiento) necesitan ser tratadas giovany toda la david. Hart médico le puede brindar más información al respecto.  Date Last Reviewed: 4/27/2015  © [id number] [employer]. [address] [place] [id number]. Todos los derechos reservados. Esta información no pretende sustituir la atención médica profesional. Sólo hart médico puede diagnosticar y tratar un problema de maría.        Carbamide Peroxide ear solution  ¿Qué es randy medicamento?  La CARBAMIDA PERÓXIDA se utiliza para ablandar y ayudar a quitar la cera del oído.  ¿Cómo tawnya utilizar randy medicamento?  Randy medicamento se utiliza solamente en el conducto auditivo externo. Siga las instrucciones atentamente. Lávese las willy antes y después de usar el medicamento. Puede entibiar la solución sosteniendo el frasco en las willy giovany aproximadamente 1 a 2 minutos. Recuéstese con el oído infectado hacia arriba. Coloque la cantidad de gotas adecuada dentro del conducto auditivo. Luego de colocar las gotas, permanezca recostado con el oído infectado hacia arriba giovany 5 minutos para que las gotas permanezcan en el conducto auditivo. Puede insertar suavemente trinh compresa de algodón en la cavidad del oído giovany no más de 5 a 10 minutos para asegurar la retención del medicamento. Si es necesario, repita el procedimiento con el otro oído. Evite que la punta del gotero toque el oído, las yemas de los dedos u otra superficie. No enjuague el gotero después de usarlo. Mantenga el envase alex cerrado.  Hable con hart pediatra para informarse acerca del uso de randy medicamento en niños.  Aunque randy medicamento ha sido recetado a niños tan menores rm de 12 años de edad para condiciones selectivas, las precauciones se aplican.  ¿Qué efectos secundarios puedo tener al utilizar randy medicamento?  Efectos secundarios que debe informar a hart médico o a hart profesional de la maría tan pronto rm sea posible:  · reacciones alérgicas rm erupción cutánea, picazón o urticarias, hinchazón de la moses, labios o lengua  · ardor, picazón y enrojecimiento  · aumento del dolor de oído  · erupción  Efectos secundarios que, por lo general, no requieren atención médica (debe informarlos a hart médico o a hart profesional de la maría si persisten o si son molestos):  · sensación anormal mientras se aplica las gotas en el oído  · reducción pasajera de la audición (olivier no trinh pérdida completa de la misma)  ¿Qué puede interactuar con randy medicamento?  No se esperan interacciones. No utilice otros productos para los oídos sin consultar a hart médico o a hart profesional de la maría.  ¿Qué sucede si me olvido de trinh dosis?  Si olvida trinh dosis, aplíquela lo antes posible. Si es leanne la hora de la próxima dosis, aplique sólo michelle dosis. No use dosis adicionales o dobles.  ¿Dónde tawnya guardar mi medicina?  Manténgala fuera del alcance de los niños.  Guárdela a temperatura ambiente, entre 15 y 30 grados C (59 y 86 grados F) en un recipiente hermético y resistente a la sonny. Guarde el frasco lejos marcial solar directa y del calor excesivo. Deseche todos los medicamentos que no haya utilizado, después de la fecha de vencimiento.  ¿Qué le tawnya informar a mi profesional de la maría antes de eddy randy medicamento?  Necesita saber si usted presenta alguno de los siguientes problemas o situaciones:  · mareos  · secreción en el oído  · irritación, erupción o dolor de oído  · infección  · perforación del tímpano (agujero en el tímpano)  · trinh reacción alérgica o inusual a la carbamida peróxida, a la glicerina, al peróxido de hidrógeno, a  otros medicamentos, alimentos, colorantes o conservantes  · si está embarazada o buscando quedar embarazada  · si está amamantando a un bebé  ¿A qué tawnya estar atento al usar randy medicamento?  Randy medicamento no debe usarse giovany un período de tiempo prolongado. No lo use giovany más de 4 días sin consultar a hart profesional de maría. Consulte a hart médico o a hart profesional de la maría si hart problema no mejora en pocos días o si presenta ardor, enrojecimiento, picazón o hinchazón.  © [id number] [employer]. [address] [place] [id number]. Todos los derechos reservados. Esta información no pretende sustituir la atención médica profesional. Sólo hart médico puede diagnosticar y tratar un problema de maría.

## 2018-10-18 NOTE — PROGRESS NOTES
Subjective:       Patient ID: Ghada Dave is a 83 y.o. female.    Chief Complaint: Shoulder Pain (x 4 months) and Otalgia (x 4 months)    83 years old female came to the clinic with multiple joints pain. The pain is 3/10 of intensity on and off aggravated with activity and better with rest.  Patient with mild anemia but stable in comparison with previous reports .  Patient with mild discomfort for over the right ear.      Review of Systems   Constitutional: Negative.    HENT: Positive for ear pain.    Eyes: Negative.    Respiratory: Negative.    Cardiovascular: Negative.    Gastrointestinal: Negative.    Genitourinary: Negative.    Musculoskeletal: Positive for arthralgias.   Skin: Negative.    Neurological: Negative.    Psychiatric/Behavioral: Negative.        Objective:      Physical Exam   Constitutional: She is oriented to person, place, and time. She appears well-developed and well-nourished. No distress.   HENT:   Head: Normocephalic and atraumatic.   Right Ear: External ear normal.   Left Ear: External ear normal.   Ears:    Nose: Nose normal.   Mouth/Throat: Oropharynx is clear and moist. No oropharyngeal exudate.   Eyes: Conjunctivae and EOM are normal. Pupils are equal, round, and reactive to light. Right eye exhibits no discharge. Left eye exhibits no discharge. No scleral icterus.   Neck: Normal range of motion. Neck supple. No JVD present. No tracheal deviation present. No thyromegaly present.   Cardiovascular: Normal rate, regular rhythm, normal heart sounds and intact distal pulses. Exam reveals no gallop and no friction rub.   No murmur heard.  Pulmonary/Chest: Effort normal and breath sounds normal. No stridor. No respiratory distress. She has no wheezes. She has no rales. She exhibits no tenderness.   Abdominal: Soft. Bowel sounds are normal. She exhibits no distension and no mass. There is no tenderness. There is no rebound and no guarding.   Musculoskeletal: Normal range of  motion. She exhibits no edema.        Right shoulder: She exhibits tenderness.        Cervical back: She exhibits tenderness.   Lymphadenopathy:     She has no cervical adenopathy.   Neurological: She is alert and oriented to person, place, and time. She has normal reflexes. No cranial nerve deficit. She exhibits normal muscle tone. Coordination normal.   Skin: Skin is warm and dry. No rash noted. She is not diaphoretic. No erythema. No pallor.   Psychiatric: She has a normal mood and affect. Her behavior is normal. Judgment and thought content normal.       Assessment:       1. Osteoarthritis of multiple joints, unspecified osteoarthritis type    2. Otalgia of right ear    3. Hyperlipidemia, unspecified hyperlipidemia type    4. Gastroesophageal reflux disease without esophagitis    5. Chronic disease anemia    6. Cerumen debris on tympanic membrane of right ear        Plan:     Ghada was seen today for shoulder pain and otalgia.    Diagnoses and all orders for this visit:    Osteoarthritis of multiple joints, unspecified osteoarthritis type  -     celecoxib (CELEBREX) 200 MG capsule; Take 1 capsule (200 mg total) by mouth daily as needed for Pain.    Otalgia of right ear    Hyperlipidemia, unspecified hyperlipidemia type  -     atorvastatin (LIPITOR) 20 MG tablet; Take 1 tablet (20 mg total) by mouth every evening.    Gastroesophageal reflux disease without esophagitis  -     esomeprazole (NEXIUM) 20 MG capsule; Take 1 capsule (20 mg total) by mouth before breakfast.    Chronic disease anemia  -     ferrous sulfate (FEOSOL) 325 mg (65 mg iron) Tab tablet; Take 1 tablet (325 mg total) by mouth daily with breakfast.    Cerumen debris on tympanic membrane of right ear

## 2019-02-01 ENCOUNTER — OFFICE VISIT (OUTPATIENT)
Dept: CARDIOLOGY | Facility: CLINIC | Age: 84
End: 2019-02-01
Payer: MEDICARE

## 2019-02-01 VITALS
HEIGHT: 60 IN | BODY MASS INDEX: 25.52 KG/M2 | DIASTOLIC BLOOD PRESSURE: 80 MMHG | HEART RATE: 80 BPM | SYSTOLIC BLOOD PRESSURE: 108 MMHG | WEIGHT: 130 LBS

## 2019-02-01 DIAGNOSIS — G89.29 CHRONIC MIDLINE LOW BACK PAIN WITHOUT SCIATICA: ICD-10-CM

## 2019-02-01 DIAGNOSIS — G89.29 CHRONIC CHEST PAIN: ICD-10-CM

## 2019-02-01 DIAGNOSIS — I65.29 STENOSIS OF CAROTID ARTERY, UNSPECIFIED LATERALITY: ICD-10-CM

## 2019-02-01 DIAGNOSIS — M54.50 CHRONIC MIDLINE LOW BACK PAIN WITHOUT SCIATICA: ICD-10-CM

## 2019-02-01 DIAGNOSIS — R07.9 CHEST PAIN, UNSPECIFIED TYPE: ICD-10-CM

## 2019-02-01 DIAGNOSIS — R12 HEARTBURN: ICD-10-CM

## 2019-02-01 DIAGNOSIS — I10 HYPERTENSION, UNSPECIFIED TYPE: ICD-10-CM

## 2019-02-01 DIAGNOSIS — I25.10 CORONARY ARTERY DISEASE WITHOUT ANGINA PECTORIS, UNSPECIFIED VESSEL OR LESION TYPE, UNSPECIFIED WHETHER NATIVE OR TRANSPLANTED HEART: Primary | ICD-10-CM

## 2019-02-01 DIAGNOSIS — R07.9 CHRONIC CHEST PAIN: ICD-10-CM

## 2019-02-01 DIAGNOSIS — F51.04 PSYCHOPHYSIOLOGICAL INSOMNIA: ICD-10-CM

## 2019-02-01 PROCEDURE — 99999 PR PBB SHADOW E&M-EST. PATIENT-LVL III: ICD-10-PCS | Mod: PBBFAC,,, | Performed by: INTERNAL MEDICINE

## 2019-02-01 PROCEDURE — 99999 PR PBB SHADOW E&M-EST. PATIENT-LVL III: CPT | Mod: PBBFAC,,, | Performed by: INTERNAL MEDICINE

## 2019-02-01 PROCEDURE — 3079F DIAST BP 80-89 MM HG: CPT | Mod: CPTII,S$GLB,, | Performed by: INTERNAL MEDICINE

## 2019-02-01 PROCEDURE — 1101F PT FALLS ASSESS-DOCD LE1/YR: CPT | Mod: CPTII,S$GLB,, | Performed by: INTERNAL MEDICINE

## 2019-02-01 PROCEDURE — 99214 PR OFFICE/OUTPT VISIT, EST, LEVL IV, 30-39 MIN: ICD-10-PCS | Mod: S$GLB,,, | Performed by: INTERNAL MEDICINE

## 2019-02-01 PROCEDURE — 3079F PR MOST RECENT DIASTOLIC BLOOD PRESSURE 80-89 MM HG: ICD-10-PCS | Mod: CPTII,S$GLB,, | Performed by: INTERNAL MEDICINE

## 2019-02-01 PROCEDURE — 1101F PR PT FALLS ASSESS DOC 0-1 FALLS W/OUT INJ PAST YR: ICD-10-PCS | Mod: CPTII,S$GLB,, | Performed by: INTERNAL MEDICINE

## 2019-02-01 PROCEDURE — 99214 OFFICE O/P EST MOD 30 MIN: CPT | Mod: S$GLB,,, | Performed by: INTERNAL MEDICINE

## 2019-02-01 PROCEDURE — 3074F SYST BP LT 130 MM HG: CPT | Mod: CPTII,S$GLB,, | Performed by: INTERNAL MEDICINE

## 2019-02-01 PROCEDURE — 3074F PR MOST RECENT SYSTOLIC BLOOD PRESSURE < 130 MM HG: ICD-10-PCS | Mod: CPTII,S$GLB,, | Performed by: INTERNAL MEDICINE

## 2019-02-04 NOTE — PROGRESS NOTES
Subjective:    Patient ID:  Ghada Dave is a 83 y.o. female who presents for follow-up of Coronary Artery Disease      HPI  83 y/o Cymraes speaking female with hx of non obstructive CAD ([facility] in 2017), moderate AI per last 2DE,  HTN, ROGER, who presents for f/u. Formerly seen [facility] and [facility] by Dr Perez. She continues to have intermittent CP which is mild and chronic, however, she states that it radiates to her left arm sometimes. Has some DANIEL and intermittent palps. Has bilateral nocturnal leg cramping when in bed. No claudication, rest pain, non healing ulceration, or signs of limb ischemia. Compliant with meds. Does not exercise regularly or follow a low salt diet. Last clinic visit had 2DE which showed normal EF, DD, and moderate AI. Had normal NASIR's.     Review of Systems   Constitution: Positive for weakness and malaise/fatigue.   HENT: Negative for congestion.    Eyes: Negative for blurred vision.   Cardiovascular: Positive for chest pain. Negative for claudication, cyanosis, dyspnea on exertion, irregular heartbeat, leg swelling, near-syncope, orthopnea, palpitations, paroxysmal nocturnal dyspnea and syncope.   Respiratory: Negative for shortness of breath.    Endocrine: Negative for polyuria.   Hematologic/Lymphatic: Negative for bleeding problem.   Skin: Negative for itching and rash.   Musculoskeletal: Positive for back pain, joint pain, muscle cramps and myalgias. Negative for joint swelling and muscle weakness.   Gastrointestinal: Negative for abdominal pain, hematemesis, hematochezia, melena, nausea and vomiting.   Genitourinary: Negative for dysuria and hematuria.   Neurological: Negative for dizziness, focal weakness, headaches, light-headedness and loss of balance.   Psychiatric/Behavioral: Negative for depression. The patient is not nervous/anxious.         Objective:    Physical Exam   Constitutional: She is oriented to person, place, and time. She appears well-developed and  well-nourished.   HENT:   Head: Normocephalic and atraumatic.   Neck: Neck supple. No JVD present.   Cardiovascular: Normal rate and regular rhythm.   Murmur heard.   Systolic murmur is present with a grade of 2/6.  Pulses:       Carotid pulses are 2+ on the right side, and 2+ on the left side.       Radial pulses are 2+ on the right side, and 2+ on the left side.        Femoral pulses are 2+ on the right side, and 2+ on the left side.       Posterior tibial pulses are 2+ on the right side, and 1+ on the left side.   Pulmonary/Chest: Effort normal and breath sounds normal.   Abdominal: Soft. Bowel sounds are normal.   Musculoskeletal: She exhibits no edema.   Neurological: She is alert and oriented to person, place, and time.   Skin: Skin is warm and dry.   Psychiatric: She has a normal mood and affect. Her behavior is normal. Thought content normal.         Assessment:       1. Coronary artery disease without angina pectoris, unspecified vessel or lesion type, unspecified whether native or transplanted heart    2. Chronic chest pain    3. Chest pain, unspecified type    4. Hypertension, unspecified type    5. Stenosis of carotid artery, unspecified laterality    6. Heartburn    7. Psychophysiological insomnia    8. Chronic midline low back pain without sciatica      83 year old patient with hx and presentation as above. Has risk factors for having obstructive CAD and will evaluate symptoms with noninvasive cardiac stress imaging.Discussed the importance of med compliance, heart healthy diet, and regular exercise.      Plan:       -Treadmill nuclear SPECT  -f/u in 6 months

## 2019-02-11 ENCOUNTER — HOSPITAL ENCOUNTER (OUTPATIENT)
Dept: RADIOLOGY | Facility: HOSPITAL | Age: 84
Discharge: HOME | End: 2019-02-11
Attending: INTERNAL MEDICINE
Payer: MEDICARE

## 2019-02-11 ENCOUNTER — HOSPITAL ENCOUNTER (OUTPATIENT)
Dept: CARDIOLOGY | Facility: HOSPITAL | Age: 84
Discharge: HOME | End: 2019-02-11
Attending: INTERNAL MEDICINE
Payer: MEDICARE

## 2019-02-11 DIAGNOSIS — R07.9 CHRONIC CHEST PAIN: ICD-10-CM

## 2019-02-11 DIAGNOSIS — R07.9 CHEST PAIN, UNSPECIFIED TYPE: ICD-10-CM

## 2019-02-11 DIAGNOSIS — G89.29 CHRONIC CHEST PAIN: ICD-10-CM

## 2019-02-11 PROCEDURE — 78452 HT MUSCLE IMAGE SPECT MULT: CPT | Mod: 26,,, | Performed by: RADIOLOGY

## 2019-02-11 PROCEDURE — 93016 CV STRESS TEST SUPVJ ONLY: CPT | Mod: ,,, | Performed by: INTERNAL MEDICINE

## 2019-02-11 PROCEDURE — 93018 CV STRESS TEST I&R ONLY: CPT | Mod: ,,, | Performed by: INTERNAL MEDICINE

## 2019-02-11 PROCEDURE — 93016 TREADMILL STRESS TEST (CUPID ONLY): ICD-10-PCS | Mod: ,,, | Performed by: INTERNAL MEDICINE

## 2019-02-11 PROCEDURE — 93017 CV STRESS TEST TRACING ONLY: CPT

## 2019-02-11 PROCEDURE — 63600175 PHARM REV CODE 636 W HCPCS: Performed by: INTERNAL MEDICINE

## 2019-02-11 PROCEDURE — 93018 TREADMILL STRESS TEST (CUPID ONLY): ICD-10-PCS | Mod: ,,, | Performed by: INTERNAL MEDICINE

## 2019-02-11 PROCEDURE — 78452 NM MYOCARDIAL PERFUSION SPECT MULTI PHARM: ICD-10-PCS | Mod: 26,,, | Performed by: RADIOLOGY

## 2019-02-11 PROCEDURE — A9502 TC99M TETROFOSMIN: HCPCS

## 2019-02-11 RX ORDER — REGADENOSON 0.08 MG/ML
0.4 INJECTION, SOLUTION INTRAVENOUS ONCE
Status: COMPLETED | OUTPATIENT
Start: 2019-02-11 | End: 2019-02-11

## 2019-02-11 RX ADMIN — REGADENOSON 0.4 MG: 0.08 INJECTION, SOLUTION INTRAVENOUS at 01:02

## 2019-02-12 LAB
CV STRESS BASE HR: 63 BPM
DIASTOLIC BLOOD PRESSURE: 37 MMHG
OHS CV CPX 1 MINUTE RECOVERY HEART RATE: 89 BPM
OHS CV CPX 85 PERCENT MAX PREDICTED HEART RATE MALE: 114
OHS CV CPX MAX PREDICTED HEART RATE: 134
OHS CV CPX PATIENT IS FEMALE: 1
OHS CV CPX PATIENT IS MALE: 0
OHS CV CPX PEAK HEAR RATE: 123 BPM
OHS CV CPX PEAK RATE PRESSURE PRODUCT: 6396
OHS CV CPX PEAK SYSTOLIC BLOOD PRESSURE: 52 MMHG
OHS CV CPX PERCENT MAX PREDICTED HEART RATE ACHIEVED: 92
OHS CV CPX RATE PRESSURE PRODUCT PRESENTING: 7245
SYSTOLIC BLOOD PRESSURE: 115 MMHG

## 2019-02-18 ENCOUNTER — TELEPHONE (OUTPATIENT)
Dept: CARDIOLOGY | Facility: CLINIC | Age: 84
End: 2019-02-18

## 2019-02-18 NOTE — TELEPHONE ENCOUNTER
----- Message from Renato Escalona MD sent at 2/15/2019  4:07 PM CST -----  Your stress test was abnormal. I would like to perform a procedure called an angiogram to look for heart blockages. We can schedule for the procedure directly or schedule you a clinic visit first to discuss the procedure.

## 2019-02-19 ENCOUNTER — OFFICE VISIT (OUTPATIENT)
Dept: CARDIOLOGY | Facility: CLINIC | Age: 84
End: 2019-02-19
Payer: MEDICARE

## 2019-02-19 VITALS
DIASTOLIC BLOOD PRESSURE: 72 MMHG | SYSTOLIC BLOOD PRESSURE: 114 MMHG | WEIGHT: 128 LBS | BODY MASS INDEX: 23.55 KG/M2 | HEIGHT: 62 IN | HEART RATE: 68 BPM

## 2019-02-19 DIAGNOSIS — I65.29 STENOSIS OF CAROTID ARTERY, UNSPECIFIED LATERALITY: ICD-10-CM

## 2019-02-19 DIAGNOSIS — L28.0 LICHEN SIMPLEX CHRONICUS: ICD-10-CM

## 2019-02-19 DIAGNOSIS — I25.10 CORONARY ARTERY DISEASE WITHOUT ANGINA PECTORIS, UNSPECIFIED VESSEL OR LESION TYPE, UNSPECIFIED WHETHER NATIVE OR TRANSPLANTED HEART: Primary | ICD-10-CM

## 2019-02-19 DIAGNOSIS — R11.0 NAUSEA: ICD-10-CM

## 2019-02-19 DIAGNOSIS — M54.50 CHRONIC MIDLINE LOW BACK PAIN WITHOUT SCIATICA: ICD-10-CM

## 2019-02-19 DIAGNOSIS — G89.29 CHRONIC MIDLINE LOW BACK PAIN WITHOUT SCIATICA: ICD-10-CM

## 2019-02-19 DIAGNOSIS — I25.10 NON-OCCLUSIVE CORONARY ARTERY DISEASE: ICD-10-CM

## 2019-02-19 DIAGNOSIS — B02.29 POST HERPETIC NEURALGIA: ICD-10-CM

## 2019-02-19 DIAGNOSIS — M25.511 RIGHT SUBSCAPULAR PAIN: ICD-10-CM

## 2019-02-19 DIAGNOSIS — I10 HYPERTENSION, UNSPECIFIED TYPE: ICD-10-CM

## 2019-02-19 DIAGNOSIS — L40.9 PSORIASIS: ICD-10-CM

## 2019-02-19 PROCEDURE — 3078F PR MOST RECENT DIASTOLIC BLOOD PRESSURE < 80 MM HG: ICD-10-PCS | Mod: CPTII,S$GLB,, | Performed by: INTERNAL MEDICINE

## 2019-02-19 PROCEDURE — 99214 OFFICE O/P EST MOD 30 MIN: CPT | Mod: S$GLB,,, | Performed by: INTERNAL MEDICINE

## 2019-02-19 PROCEDURE — 3074F SYST BP LT 130 MM HG: CPT | Mod: CPTII,S$GLB,, | Performed by: INTERNAL MEDICINE

## 2019-02-19 PROCEDURE — 3074F PR MOST RECENT SYSTOLIC BLOOD PRESSURE < 130 MM HG: ICD-10-PCS | Mod: CPTII,S$GLB,, | Performed by: INTERNAL MEDICINE

## 2019-02-19 PROCEDURE — 1101F PT FALLS ASSESS-DOCD LE1/YR: CPT | Mod: CPTII,S$GLB,, | Performed by: INTERNAL MEDICINE

## 2019-02-19 PROCEDURE — 1101F PR PT FALLS ASSESS DOC 0-1 FALLS W/OUT INJ PAST YR: ICD-10-PCS | Mod: CPTII,S$GLB,, | Performed by: INTERNAL MEDICINE

## 2019-02-19 PROCEDURE — 99999 PR PBB SHADOW E&M-EST. PATIENT-LVL III: ICD-10-PCS | Mod: PBBFAC,,, | Performed by: INTERNAL MEDICINE

## 2019-02-19 PROCEDURE — 99214 PR OFFICE/OUTPT VISIT, EST, LEVL IV, 30-39 MIN: ICD-10-PCS | Mod: S$GLB,,, | Performed by: INTERNAL MEDICINE

## 2019-02-19 PROCEDURE — 3078F DIAST BP <80 MM HG: CPT | Mod: CPTII,S$GLB,, | Performed by: INTERNAL MEDICINE

## 2019-02-19 PROCEDURE — 99999 PR PBB SHADOW E&M-EST. PATIENT-LVL III: CPT | Mod: PBBFAC,,, | Performed by: INTERNAL MEDICINE

## 2019-02-19 NOTE — PROGRESS NOTES
"Subjective:    Patient ID:  Ghada Dave is a 83 y.o. female who presents for follow-up of Abnormal Stress Test      HPI     82 y/o Congolese speaking female with hx of non obstructive CAD ([facility] in 2017), moderate AI per last 2DE,  HTN, ROGER, who presents for f/u. Formerly seen [facility] by Dr Perez. She continues to have intermittent CP which is mild and chronic, however, she states that it radiates to her left arm sometimes. Has some DANIEL and intermittent palps. Has bilateral nocturnal leg cramping when in bed. No claudication, rest pain, non healing ulceration, or signs of limb ischemia. Compliant with meds. Does not exercise regularly or follow a low salt diet. Had 2DE which showed normal EF, DD, and moderate AI. Had normal NASIR's. Had recent nuclear SPECT that was abnormal and here to discuss results and management. Has a recent "kidney infection" for which she is currently on nitrofurantoin and rash for which she is on hydroxyzine. States also she has developed sore throat and body aches and thinks she has the flu.     Review of Systems   Constitution: Positive for weakness and malaise/fatigue.   HENT: Positive for sore throat. Negative for congestion.    Eyes: Negative for blurred vision.   Cardiovascular: Positive for chest pain, dyspnea on exertion and palpitations. Negative for claudication, cyanosis, irregular heartbeat, leg swelling, near-syncope, orthopnea, paroxysmal nocturnal dyspnea and syncope.   Respiratory: Negative for shortness of breath.    Endocrine: Negative for polyuria.   Hematologic/Lymphatic: Negative for bleeding problem.   Skin: Positive for rash. Negative for itching.   Musculoskeletal: Positive for joint pain and myalgias. Negative for joint swelling, muscle cramps and muscle weakness.   Gastrointestinal: Negative for abdominal pain, hematemesis, hematochezia, melena, nausea and vomiting.   Genitourinary: Positive for dysuria. Negative for hematuria. "   Neurological: Positive for dizziness. Negative for focal weakness, headaches, light-headedness and loss of balance.   Psychiatric/Behavioral: Negative for depression. The patient is not nervous/anxious.         Objective:    Physical Exam   Constitutional: She is oriented to person, place, and time. She appears well-developed and well-nourished.   HENT:   Head: Normocephalic and atraumatic.   Neck: Neck supple. No JVD present.   Cardiovascular: Normal rate, regular rhythm and normal heart sounds.   Pulses:       Carotid pulses are 2+ on the right side, and 2+ on the left side.       Radial pulses are 2+ on the right side, and 2+ on the left side.        Femoral pulses are 2+ on the right side, and 2+ on the left side.       Dorsalis pedis pulses are 2+ on the right side, and 2+ on the left side.        Posterior tibial pulses are 2+ on the right side, and 2+ on the left side.   Pulmonary/Chest: Effort normal and breath sounds normal.   Abdominal: Soft. Bowel sounds are normal.   Musculoskeletal: She exhibits no edema.   Neurological: She is alert and oriented to person, place, and time.   Skin: Skin is warm and dry.   Psychiatric: She has a normal mood and affect. Her behavior is normal. Thought content normal.         Assessment:       1. Coronary artery disease without angina pectoris, unspecified vessel or lesion type, unspecified whether native or transplanted heart    2. Non-occlusive coronary artery disease    3. Hypertension, unspecified type    4. Stenosis of carotid artery, unspecified laterality    5. Psoriasis    6. Lichen simplex chronicus of the heel    7. Post herpetic neuralgia    8. Nausea    9. Chronic midline low back pain without sciatica    10. Right subscapular pain      84 y/o pt with hx and presentation as above. Has abnormal stress test and will plan for diagnostic LHC once issues with infection and possible flu have been addressed with PCP. Discussed the importance of med compliance, heart  healthy diet, and regular exercise. Was told to present to ED immediately if CP worsens or persists.        Plan:       -LHC (diagnostic only) once infectious issues addressed  -Right radial access with 4/5 slender, pigtail, Kirit  -The procedure was discussed with the pt along with the risks/benefits and the pt voiced understanding. All questions were answered and written consents obtained.   -f/u in 1 month

## 2019-03-13 ENCOUNTER — LAB VISIT (OUTPATIENT)
Dept: LAB | Facility: HOSPITAL | Age: 84
End: 2019-03-13
Attending: INTERNAL MEDICINE
Payer: MEDICARE

## 2019-03-13 DIAGNOSIS — R31.9 HEMATURIA SYNDROME: ICD-10-CM

## 2019-03-13 DIAGNOSIS — N39.41 URGE INCONTINENCE: ICD-10-CM

## 2019-03-13 DIAGNOSIS — R10.32 LEFT LOWER QUADRANT PAIN: ICD-10-CM

## 2019-03-13 LAB
BILIRUB UR QL STRIP: NEGATIVE
CLARITY UR: CLEAR
COLOR UR: YELLOW
GLUCOSE UR QL STRIP: NEGATIVE
HGB UR QL STRIP: ABNORMAL
KETONES UR QL STRIP: NEGATIVE
LEUKOCYTE ESTERASE UR QL STRIP: ABNORMAL
MICROSCOPIC COMMENT: NORMAL
NITRITE UR QL STRIP: NEGATIVE
PH UR STRIP: 6 [PH] (ref 5–8)
PROT UR QL STRIP: NEGATIVE
RBC #/AREA URNS HPF: 3 /HPF (ref 0–4)
SP GR UR STRIP: 1.02 (ref 1–1.03)
URN SPEC COLLECT METH UR: ABNORMAL
UROBILINOGEN UR STRIP-ACNC: NEGATIVE EU/DL
WBC #/AREA URNS HPF: 2 /HPF (ref 0–5)

## 2019-03-13 PROCEDURE — 81000 URINALYSIS NONAUTO W/SCOPE: CPT

## 2019-03-21 ENCOUNTER — OFFICE VISIT (OUTPATIENT)
Dept: CARDIOLOGY | Facility: CLINIC | Age: 84
End: 2019-03-21
Payer: MEDICARE

## 2019-03-21 VITALS
BODY MASS INDEX: 24.39 KG/M2 | WEIGHT: 129.06 LBS | DIASTOLIC BLOOD PRESSURE: 52 MMHG | OXYGEN SATURATION: 97 % | SYSTOLIC BLOOD PRESSURE: 134 MMHG | HEART RATE: 81 BPM

## 2019-03-21 DIAGNOSIS — I65.29 STENOSIS OF CAROTID ARTERY, UNSPECIFIED LATERALITY: ICD-10-CM

## 2019-03-21 DIAGNOSIS — I25.10 NON-OCCLUSIVE CORONARY ARTERY DISEASE: ICD-10-CM

## 2019-03-21 DIAGNOSIS — I25.10 CORONARY ARTERY DISEASE WITHOUT ANGINA PECTORIS, UNSPECIFIED VESSEL OR LESION TYPE, UNSPECIFIED WHETHER NATIVE OR TRANSPLANTED HEART: ICD-10-CM

## 2019-03-21 DIAGNOSIS — R12 HEARTBURN: ICD-10-CM

## 2019-03-21 DIAGNOSIS — R06.02 SOB (SHORTNESS OF BREATH): ICD-10-CM

## 2019-03-21 DIAGNOSIS — R94.39 ABNORMAL STRESS TEST: Primary | ICD-10-CM

## 2019-03-21 DIAGNOSIS — I10 HYPERTENSION, UNSPECIFIED TYPE: ICD-10-CM

## 2019-03-21 PROCEDURE — 99999 PR PBB SHADOW E&M-EST. PATIENT-LVL III: ICD-10-PCS | Mod: PBBFAC,,, | Performed by: INTERNAL MEDICINE

## 2019-03-21 PROCEDURE — 3078F PR MOST RECENT DIASTOLIC BLOOD PRESSURE < 80 MM HG: ICD-10-PCS | Mod: CPTII,S$GLB,, | Performed by: INTERNAL MEDICINE

## 2019-03-21 PROCEDURE — 3075F SYST BP GE 130 - 139MM HG: CPT | Mod: CPTII,S$GLB,, | Performed by: INTERNAL MEDICINE

## 2019-03-21 PROCEDURE — 1101F PR PT FALLS ASSESS DOC 0-1 FALLS W/OUT INJ PAST YR: ICD-10-PCS | Mod: CPTII,S$GLB,, | Performed by: INTERNAL MEDICINE

## 2019-03-21 PROCEDURE — 3078F DIAST BP <80 MM HG: CPT | Mod: CPTII,S$GLB,, | Performed by: INTERNAL MEDICINE

## 2019-03-21 PROCEDURE — 99214 PR OFFICE/OUTPT VISIT, EST, LEVL IV, 30-39 MIN: ICD-10-PCS | Mod: S$GLB,,, | Performed by: INTERNAL MEDICINE

## 2019-03-21 PROCEDURE — 99214 OFFICE O/P EST MOD 30 MIN: CPT | Mod: S$GLB,,, | Performed by: INTERNAL MEDICINE

## 2019-03-21 PROCEDURE — 3075F PR MOST RECENT SYSTOLIC BLOOD PRESS GE 130-139MM HG: ICD-10-PCS | Mod: CPTII,S$GLB,, | Performed by: INTERNAL MEDICINE

## 2019-03-21 PROCEDURE — 1101F PT FALLS ASSESS-DOCD LE1/YR: CPT | Mod: CPTII,S$GLB,, | Performed by: INTERNAL MEDICINE

## 2019-03-21 PROCEDURE — 99999 PR PBB SHADOW E&M-EST. PATIENT-LVL III: CPT | Mod: PBBFAC,,, | Performed by: INTERNAL MEDICINE

## 2019-03-26 RX ORDER — SODIUM CHLORIDE 9 MG/ML
3 INJECTION, SOLUTION INTRAVENOUS CONTINUOUS
Status: CANCELLED | OUTPATIENT
Start: 2019-03-26 | End: 2019-03-26

## 2019-03-26 RX ORDER — DIPHENHYDRAMINE HCL 25 MG
50 CAPSULE ORAL ONCE
Status: CANCELLED | OUTPATIENT
Start: 2019-03-26 | End: 2019-03-26

## 2019-03-26 NOTE — H&P (VIEW-ONLY)
Subjective:    Patient ID:  Ghada Dave is a 83 y.o. 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 Formerly seen [facility] and [facility] by Dr Perez. She continues to have intermittent CP which is mild and chronic, however, she states that it radiates to her left arm sometimes. Has some DANIEL and intermittent palps. Has bilateral nocturnal leg cramping when in bed. No claudication, rest pain, non healing ulceration, or signs of limb ischemia. Compliant with meds. Does not exercise regularly or follow a low salt diet. Had 2DE which showed normal EF, DD, and moderate AI. Had normal NASIR's. Had recent nuclear SPECT that was abnormal and C deferred due to acute infectious issues. Has finished abx course.       Review of Systems   Constitution: Positive for malaise/fatigue.   HENT: Negative for congestion.    Eyes: Negative for blurred vision.   Cardiovascular: Positive for chest pain and dyspnea on exertion. Negative for claudication, cyanosis, irregular heartbeat, leg swelling, near-syncope, orthopnea, palpitations, paroxysmal nocturnal dyspnea and syncope.   Respiratory: Negative for shortness of breath.    Endocrine: Negative for polyuria.   Hematologic/Lymphatic: Negative for bleeding problem.   Skin: Negative for itching and rash.   Musculoskeletal: Positive for joint pain and joint swelling. Negative for muscle cramps and muscle weakness.   Gastrointestinal: Negative for abdominal pain, hematemesis, hematochezia, melena, nausea and vomiting.   Genitourinary: Negative for dysuria and hematuria.   Neurological: Negative for dizziness, focal weakness, headaches, light-headedness, loss of balance and weakness.   Psychiatric/Behavioral: Negative for depression. The patient is not nervous/anxious.         Objective:    Physical Exam    Constitutional: She is oriented to person, place, and time. She appears well-developed and well-nourished.   HENT:   Head: Normocephalic and atraumatic.   Neck: Neck supple. No JVD present.   Cardiovascular: Normal rate, regular rhythm and normal heart sounds.   Pulses:       Carotid pulses are 2+ on the right side, and 2+ on the left side.       Radial pulses are 2+ on the right side, and 2+ on the left side.        Femoral pulses are 2+ on the right side, and 2+ on the left side.       Dorsalis pedis pulses are 2+ on the right side, and 2+ on the left side.        Posterior tibial pulses are 2+ on the right side, and 2+ on the left side.   Pulmonary/Chest: Effort normal and breath sounds normal.   Abdominal: Soft. Bowel sounds are normal.   Musculoskeletal: She exhibits no edema.   Neurological: She is alert and oriented to person, place, and time.   Skin: Skin is warm and dry.   Psychiatric: She has a normal mood and affect. Her behavior is normal. Thought content normal.         Assessment:       1. Abnormal stress test    2. Coronary artery disease without angina pectoris, unspecified vessel or lesion type, unspecified whether native or transplanted heart    3. Non-occlusive coronary artery disease    4. Hypertension, unspecified type    5. Stenosis of carotid artery, unspecified laterality    6. Heartburn    7. SOB (shortness of breath)      83 year old patient with hx and presentation as above. Has risk factors for having CAD, abnormal stress test and will evaluate with LHC. Discussed the etiology, evaluation, and management of CAD. Discussed the importance of med compliance, heart healthy diet, and regular exercise.        Plan:       -LHC (diagnostic only)   -Right radial access with 4/5 slender, pigtail, Kirit  -The procedure was discussed with the pt along with the risks/benefits and the pt voiced understanding. All questions were answered and written consents obtained.

## 2019-03-26 NOTE — PROGRESS NOTES
Subjective:    Patient ID:  Ghada Dave is a 83 y.o. 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 Formerly seen [facility] and [facility] by Dr Perez. She continues to have intermittent CP which is mild and chronic, however, she states that it radiates to her left arm sometimes. Has some DANIEL and intermittent palps. Has bilateral nocturnal leg cramping when in bed. No claudication, rest pain, non healing ulceration, or signs of limb ischemia. Compliant with meds. Does not exercise regularly or follow a low salt diet. Had 2DE which showed normal EF, DD, and moderate AI. Had normal NASIR's. Had recent nuclear SPECT that was abnormal and C deferred due to acute infectious issues. Has finished abx course.       Review of Systems   Constitution: Positive for malaise/fatigue.   HENT: Negative for congestion.    Eyes: Negative for blurred vision.   Cardiovascular: Positive for chest pain and dyspnea on exertion. Negative for claudication, cyanosis, irregular heartbeat, leg swelling, near-syncope, orthopnea, palpitations, paroxysmal nocturnal dyspnea and syncope.   Respiratory: Negative for shortness of breath.    Endocrine: Negative for polyuria.   Hematologic/Lymphatic: Negative for bleeding problem.   Skin: Negative for itching and rash.   Musculoskeletal: Positive for joint pain and joint swelling. Negative for muscle cramps and muscle weakness.   Gastrointestinal: Negative for abdominal pain, hematemesis, hematochezia, melena, nausea and vomiting.   Genitourinary: Negative for dysuria and hematuria.   Neurological: Negative for dizziness, focal weakness, headaches, light-headedness, loss of balance and weakness.   Psychiatric/Behavioral: Negative for depression. The patient is not nervous/anxious.         Objective:    Physical Exam    Constitutional: She is oriented to person, place, and time. She appears well-developed and well-nourished.   HENT:   Head: Normocephalic and atraumatic.   Neck: Neck supple. No JVD present.   Cardiovascular: Normal rate, regular rhythm and normal heart sounds.   Pulses:       Carotid pulses are 2+ on the right side, and 2+ on the left side.       Radial pulses are 2+ on the right side, and 2+ on the left side.        Femoral pulses are 2+ on the right side, and 2+ on the left side.       Dorsalis pedis pulses are 2+ on the right side, and 2+ on the left side.        Posterior tibial pulses are 2+ on the right side, and 2+ on the left side.   Pulmonary/Chest: Effort normal and breath sounds normal.   Abdominal: Soft. Bowel sounds are normal.   Musculoskeletal: She exhibits no edema.   Neurological: She is alert and oriented to person, place, and time.   Skin: Skin is warm and dry.   Psychiatric: She has a normal mood and affect. Her behavior is normal. Thought content normal.         Assessment:       1. Abnormal stress test    2. Coronary artery disease without angina pectoris, unspecified vessel or lesion type, unspecified whether native or transplanted heart    3. Non-occlusive coronary artery disease    4. Hypertension, unspecified type    5. Stenosis of carotid artery, unspecified laterality    6. Heartburn    7. SOB (shortness of breath)      83 year old patient with hx and presentation as above. Has risk factors for having CAD, abnormal stress test and will evaluate with LHC. Discussed the etiology, evaluation, and management of CAD. Discussed the importance of med compliance, heart healthy diet, and regular exercise.        Plan:       -LHC (diagnostic only)   -Right radial access with 4/5 slender, pigtail, Kirit  -The procedure was discussed with the pt along with the risks/benefits and the pt voiced understanding. All questions were answered and written consents obtained.

## 2019-04-02 ENCOUNTER — TELEPHONE (OUTPATIENT)
Dept: SURGERY | Facility: CLINIC | Age: 84
End: 2019-04-02

## 2019-04-02 NOTE — TELEPHONE ENCOUNTER
----- Message from Genoveva Yousif sent at 4/2/2019  2:05 PM CDT -----  Contact: pt son Asif Viera [phone]  Needs Advice    Reason for call: pt son calling to see if it's time for his mother to have a colonoscopy. Her PCP stated it's time for a colonoscopy due to she have pre-cancer cells. Son is not sure of her last colonoscopy         Communication Preference: [phone]    Additional Information:

## 2019-04-02 NOTE — TELEPHONE ENCOUNTER
----- Message from Manju Rajput sent at 4/2/2019  3:42 PM CDT -----  Contact: Pt:[phone]  .Needs Advice    Reason for call:Pt son called and states he would like to speak with the nurse in regards to getting an appointment with .         Communication Preference:Pt:[phone]    Additional Information:

## 2019-04-03 ENCOUNTER — TELEPHONE (OUTPATIENT)
Dept: SURGERY | Facility: CLINIC | Age: 84
End: 2019-04-03

## 2019-04-03 NOTE — TELEPHONE ENCOUNTER
Left message for patient's son informing him that the patient does not need a repeat colonoscopy.  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

## 2019-04-03 NOTE — TELEPHONE ENCOUNTER
----- Message from Manju Rajput sent at 4/3/2019  3:07 PM CDT -----  Contact: Pt Son:[phone]  .Needs Advice    Reason for call:Pt Son was advised to speak with the colonoscopy nurse to schedule the pt procedure. The pt son states he would like to speak with  nurse to verify.         Communication Preference:Pt Son:[phone]    Additional Information:

## 2019-04-08 ENCOUNTER — HOSPITAL ENCOUNTER (OUTPATIENT)
Facility: HOSPITAL | Age: 84
Discharge: HOME | End: 2019-04-08
Attending: INTERNAL MEDICINE | Admitting: INTERNAL MEDICINE
Payer: MEDICARE

## 2019-04-08 VITALS
RESPIRATION RATE: 32 BRPM | SYSTOLIC BLOOD PRESSURE: 156 MMHG | HEIGHT: 64 IN | WEIGHT: 128 LBS | BODY MASS INDEX: 21.85 KG/M2 | OXYGEN SATURATION: 98 % | DIASTOLIC BLOOD PRESSURE: 67 MMHG | TEMPERATURE: 98 F | HEART RATE: 52 BPM

## 2019-04-08 DIAGNOSIS — I25.10 CORONARY ARTERY DISEASE WITHOUT ANGINA PECTORIS, UNSPECIFIED VESSEL OR LESION TYPE, UNSPECIFIED WHETHER NATIVE OR TRANSPLANTED HEART: ICD-10-CM

## 2019-04-08 DIAGNOSIS — R94.39 ABNORMAL STRESS TEST: ICD-10-CM

## 2019-04-08 LAB
ABO + RH BLD: NORMAL
ANION GAP SERPL CALC-SCNC: 7 MMOL/L (ref 8–16)
BASOPHILS # BLD AUTO: 0.05 K/UL (ref 0–0.2)
BASOPHILS NFR BLD: 1.6 % (ref 0–1.9)
BLD GP AB SCN CELLS X3 SERPL QL: NORMAL
BUN SERPL-MCNC: 19 MG/DL (ref 8–23)
CALCIUM SERPL-MCNC: 9.3 MG/DL (ref 8.7–10.5)
CATH EF QUANTITATIVE: 60 %
CHLORIDE SERPL-SCNC: 111 MMOL/L (ref 95–110)
CO2 SERPL-SCNC: 22 MMOL/L (ref 23–29)
CREAT SERPL-MCNC: 0.8 MG/DL (ref 0.5–1.4)
DIFFERENTIAL METHOD: ABNORMAL
EOSINOPHIL # BLD AUTO: 0.2 K/UL (ref 0–0.5)
EOSINOPHIL NFR BLD: 6.1 % (ref 0–8)
ERYTHROCYTE [DISTWIDTH] IN BLOOD BY AUTOMATED COUNT: 14.4 % (ref 11.5–14.5)
EST. GFR  (AFRICAN AMERICAN): >60 ML/MIN/1.73 M^2
EST. GFR  (NON AFRICAN AMERICAN): >60 ML/MIN/1.73 M^2
GLUCOSE SERPL-MCNC: 91 MG/DL (ref 70–110)
HCT VFR BLD AUTO: 34.4 % (ref 37–48.5)
HGB BLD-MCNC: 11.5 G/DL (ref 12–16)
LYMPHOCYTES # BLD AUTO: 1.3 K/UL (ref 1–4.8)
LYMPHOCYTES NFR BLD: 41.4 % (ref 18–48)
MCH RBC QN AUTO: 29.3 PG (ref 27–31)
MCHC RBC AUTO-ENTMCNC: 33.4 G/DL (ref 32–36)
MCV RBC AUTO: 88 FL (ref 82–98)
MONOCYTES # BLD AUTO: 0.4 K/UL (ref 0.3–1)
MONOCYTES NFR BLD: 12.9 % (ref 4–15)
NEUTROPHILS # BLD AUTO: 1.2 K/UL (ref 1.8–7.7)
NEUTROPHILS NFR BLD: 37.7 % (ref 38–73)
PLATELET # BLD AUTO: 275 K/UL (ref 150–350)
PMV BLD AUTO: 8.9 FL (ref 9.2–12.9)
POTASSIUM SERPL-SCNC: 3.8 MMOL/L (ref 3.5–5.1)
RBC # BLD AUTO: 3.93 M/UL (ref 4–5.4)
SODIUM SERPL-SCNC: 140 MMOL/L (ref 136–145)
WBC # BLD AUTO: 3.09 K/UL (ref 3.9–12.7)

## 2019-04-08 PROCEDURE — C1894 INTRO/SHEATH, NON-LASER: HCPCS | Performed by: INTERNAL MEDICINE

## 2019-04-08 PROCEDURE — 63600175 PHARM REV CODE 636 W HCPCS: Performed by: INTERNAL MEDICINE

## 2019-04-08 PROCEDURE — 93010 EKG 12-LEAD: ICD-10-PCS | Mod: ,,, | Performed by: INTERNAL MEDICINE

## 2019-04-08 PROCEDURE — 86850 RBC ANTIBODY SCREEN: CPT

## 2019-04-08 PROCEDURE — 80048 BASIC METABOLIC PNL TOTAL CA: CPT

## 2019-04-08 PROCEDURE — 99152 MOD SED SAME PHYS/QHP 5/>YRS: CPT | Performed by: INTERNAL MEDICINE

## 2019-04-08 PROCEDURE — 99152 MOD SED SAME PHYS/QHP 5/>YRS: CPT | Mod: ,,, | Performed by: INTERNAL MEDICINE

## 2019-04-08 PROCEDURE — C1769 GUIDE WIRE: HCPCS | Performed by: INTERNAL MEDICINE

## 2019-04-08 PROCEDURE — 25500020 PHARM REV CODE 255: Performed by: INTERNAL MEDICINE

## 2019-04-08 PROCEDURE — 93458 L HRT ARTERY/VENTRICLE ANGIO: CPT | Mod: 26,,, | Performed by: INTERNAL MEDICINE

## 2019-04-08 PROCEDURE — C1887 CATHETER, GUIDING: HCPCS | Performed by: INTERNAL MEDICINE

## 2019-04-08 PROCEDURE — 36415 COLL VENOUS BLD VENIPUNCTURE: CPT

## 2019-04-08 PROCEDURE — 99152 PR MOD CONSCIOUS SEDATION, SAME PHYS, 5+ YRS, FIRST 15 MIN: ICD-10-PCS | Mod: ,,, | Performed by: INTERNAL MEDICINE

## 2019-04-08 PROCEDURE — 93005 ELECTROCARDIOGRAM TRACING: CPT

## 2019-04-08 PROCEDURE — 93010 ELECTROCARDIOGRAM REPORT: CPT | Mod: ,,, | Performed by: INTERNAL MEDICINE

## 2019-04-08 PROCEDURE — 93458 L HRT ARTERY/VENTRICLE ANGIO: CPT | Performed by: INTERNAL MEDICINE

## 2019-04-08 PROCEDURE — 85025 COMPLETE CBC W/AUTO DIFF WBC: CPT

## 2019-04-08 PROCEDURE — 25000003 PHARM REV CODE 250: Performed by: INTERNAL MEDICINE

## 2019-04-08 PROCEDURE — 93458 PR CATH PLACE/CORON ANGIO, IMG SUPER/INTERP,W LEFT HEART VENTRICULOGRAPHY: ICD-10-PCS | Mod: 26,,, | Performed by: INTERNAL MEDICINE

## 2019-04-08 RX ORDER — ATORVASTATIN CALCIUM 20 MG/1
20 TABLET, FILM COATED ORAL DAILY
Qty: 90 TABLET | Refills: 3 | Status: SHIPPED | OUTPATIENT
Start: 2019-04-08 | End: 2020-11-04

## 2019-04-08 RX ORDER — DIPHENHYDRAMINE HCL 25 MG
50 CAPSULE ORAL ONCE
Status: DISCONTINUED | OUTPATIENT
Start: 2019-04-08 | End: 2019-04-08 | Stop reason: HOSPADM

## 2019-04-08 RX ORDER — HEPARIN SODIUM 1000 [USP'U]/ML
INJECTION, SOLUTION INTRAVENOUS; SUBCUTANEOUS
Status: DISCONTINUED | OUTPATIENT
Start: 2019-04-08 | End: 2019-04-08 | Stop reason: HOSPADM

## 2019-04-08 RX ORDER — VERAPAMIL HYDROCHLORIDE 2.5 MG/ML
INJECTION, SOLUTION INTRAVENOUS
Status: DISCONTINUED | OUTPATIENT
Start: 2019-04-08 | End: 2019-04-08 | Stop reason: HOSPADM

## 2019-04-08 RX ORDER — SODIUM CHLORIDE 9 MG/ML
3 INJECTION, SOLUTION INTRAVENOUS CONTINUOUS
Status: ACTIVE | OUTPATIENT
Start: 2019-04-08 | End: 2019-04-08

## 2019-04-08 RX ORDER — FENTANYL CITRATE 50 UG/ML
INJECTION, SOLUTION INTRAMUSCULAR; INTRAVENOUS
Status: DISCONTINUED | OUTPATIENT
Start: 2019-04-08 | End: 2019-04-08 | Stop reason: HOSPADM

## 2019-04-08 RX ORDER — MIDAZOLAM HYDROCHLORIDE 1 MG/ML
INJECTION, SOLUTION INTRAMUSCULAR; INTRAVENOUS
Status: DISCONTINUED | OUTPATIENT
Start: 2019-04-08 | End: 2019-04-08 | Stop reason: HOSPADM

## 2019-04-08 RX ORDER — HEPARIN SODIUM 200 [USP'U]/100ML
INJECTION, SOLUTION INTRAVENOUS
Status: DISCONTINUED | OUTPATIENT
Start: 2019-04-08 | End: 2019-04-08 | Stop reason: HOSPADM

## 2019-04-08 RX ORDER — IODIXANOL 320 MG/ML
INJECTION, SOLUTION INTRAVASCULAR
Status: DISCONTINUED | OUTPATIENT
Start: 2019-04-08 | End: 2019-04-08 | Stop reason: HOSPADM

## 2019-04-08 RX ADMIN — SODIUM CHLORIDE 3 ML/KG/HR: 0.9 INJECTION, SOLUTION INTRAVENOUS at 06:04

## 2019-04-08 NOTE — INTERVAL H&P NOTE
The patient has been examined and the H&P has been reviewed:    I concur with the findings and no changes have occurred since H&P was written.    Anesthesia/Surgery risks, benefits and alternative options discussed and understood by patient/family.          Active Hospital Problems    Diagnosis  POA    Abnormal stress test [R94.39]  Yes     Priority: Low      Resolved Hospital Problems   No resolved problems to display.

## 2019-04-08 NOTE — NURSING
2 mL of air removed from radial vasc band.  No hematoma or bleeding noted.  +2 ronnie radial pulses palpated. Skin normal in color, warm to touch, < 3 sec cap refill.   Will continue to monitor pt.

## 2019-04-08 NOTE — Clinical Note
Catheter is repositioned to the ostium   right coronary artery. Angiography performed of the right coronary arteries in multiple views. Angiography performed via hand injection with 10 mL of contrast.

## 2019-04-08 NOTE — NURSING
Remaining air removed from right radial vasc band. Gauze and tegaderm dressing applied c.d.i.   No drainage or shadowing noted. No bleeding or hematoma noted around site. +2 ronnie radial pulses palpated. Skin normal in color, warm to touch, < 3 sec cap refill.   Pt tolerated well.  Will continue to monitor pt.

## 2019-04-08 NOTE — PROCEDURES
": Renato Escalona MD  Pre-procedure diagnosis: Abnormal stress test  Post-procedure diagnosis: nonobstructive CAD    Post Procedure Note: [facility]    The pt was brought to the cath lab and under sterile technique, right radial access was obtained without difficulty. 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 Please see full report for details. The pt tolerated the procedure well without complications. Radial band device used with successful hemostasis.     Vitals:    04/08/19 0655   BP: 131/83   BP Location: Left arm   Patient Position: Lying   Pulse: 60   Resp: 20   Temp: 98.4 °F (36.9 °C)   TempSrc: Oral   SpO2: 98%   Weight: 58.1 kg (128 lb)   Height: 5' 4" (1.626 m)         Gen: NAD  Ext: 2+ radial pulse, no evidence of hematoma  Estimated blood loss: < 50 cc    Plan:  -Post cath care per protocol  -ASA/statin  "

## 2019-04-08 NOTE — Clinical Note
The left ventricle was injected, visualized and selectively engaged. Rate = 10 mL/sec. Total volume = 30 mL.

## 2019-04-08 NOTE — Clinical Note
Catheter is inserted into the ostium   left main. Angiography performed of the left coronary arteries in multiple views. Angiography performed via hand injection with 10 mL of contrast.

## 2019-04-08 NOTE — DISCHARGE INSTRUCTIONS
Discharge Instructions:    Do not drive a car, operate heavy equipment, care for a young child, etc for the next 12-24 hours.   Avoid drinking alcohol for 24 hours.  Do not make any important decisions for 24 hours.    Drink fluids to keep hydrated. Resume your usual diet as tolerated.     Rest for today then activity as tolerated.   Do not lift anything over 5 pounds for the first 3 days after procedure.    Remove dressing tomorrow then may shower with warm soapy water. Do not scrub site. Pat dry.   May apply bandaid for 2 days.  No tub baths.  Do not submerge wound in water for 3 days.     Call MD for any unrelieved pain, excessive nausea or vomiting, redness around site, bleeding, or pus or foul smelling drainage, or any other questions or concerns.    Go to the ER for any difficulty breathing or chest pain.      If site swells or bleeds, hold direct pressure to area for 10 full minutes.   If site continues to bleed, continue to hold pressure to site and have someone bring you to the ER.      Follow any additional instructions given to you by MD.      Call MD to schedule a follow up appointment, or follow up as instructed.      Sedación para procedimiento (adulto)  A usted le hamilton dado medicamentos intravenosos para sedarlo giovany hart procedimiento de hoy. Es probable que le hayan dado un medicamento contra el dolor y otro para dormir. La mayor parte del efecto de estos medicamentos ya ha desaparecido, olivier es posible que continúe teniendo somnolencia giovany las próximas 6-8 horas.  Cuidados en la casa  · Es importante que haya un adulto responsable a hart lado giovayn las próximas ocho horas para vigilar si se produce un empeoramiento de hart estado.  · No tome medicamentos orales contra el dolor o para dormir giovany las próximas cuatro horas, ya que esto puede reaccionar con los medicamentos que le dieron en el hospital y provocar trinh respuesta mucho más adrianna que la habitual.  · No nadeem nada de alcohol giovany  las próximas 24 horas.  · No maneje ni opere maquinaria peligrosa, ni tampoco tome decisiones importantes de negocios o personales giovany las siguientes 24 horas.   Visita de control  Programe trinh visita de control con hart médico o con randy centro si no se siente alex despierto y no ha recuperado hart nivel normal de actividad en un plazo de doce horas.  ¿Cuándo debe buscar atención médica?  Llame de inmediato al proveedor de atención médica si ocurre cualquiera de las siguientes situaciones:  · Somnolencia (adormecimiento) que va en aumento  · Debilidad o mareo en aumento  · Vómito persistente  · Si no pueden despertarlo  Date Last Reviewed: 5/22/2014  © 6081-1287 [employer]. [address] [place] [address]. Todos los derechos reservados. Esta información no pretende sustituir la atención médica profesional. Sólo hart médico puede diagnosticar y tratar un problema de maría.        ¿Qué es la cateterización cardíaca transradial?    La cateterización cardíaca es un procedimiento común no quirúrgico. Giovany el procedimiento, hart médico le insertará un tubo melendez y dipti (llamado catéter) en trinh arteria y la llevará hasta hart corazón. Transradial significa que el catéter se inserta en trinh arteria de la haim (que es donde está la arteria radial). Randy procedimiento se puede usar para diagnosticar y tratar ciertos problemas de corazón.  ¿Por qué necesito trinh cateterización cardíaca transradial?  Puede necesitar trinh cateterización cardíaca si tiene signos que indican un problema con hart corazón. Pueden ser, por ejemplo:  · Síntomas de dolor, opresión o pesadez en el pecho (conocido rm angina). Randy es un síntoma común cuando las arterias del corazón están obstruidas. Nicollet se conoce rm enfermedad de las arterias coronarias.  · Síntomas de debilidad, mareos, dificultad para respirar o piernas o pies hinchados. Estos pueden ser síntomas de un problema con trinh válvula del corazón o con el músculo  cardíaco.  · Otras pruebas muestran problemas en el corazón. Estas pruebas pueden ser pruebas de esfuerzo, exploraciones del corazón y ecocardiografías.  Giovany trinh cateterización cardíaca, hart médico puede skyler el estado de las arterias coronarias y las válvulas cardíacas. También puede comprobar qué tan alex bombea el corazón y qué tan alex circula la juli por el corazón. Hart médico también puede medir hart presión y eddy muestras de juli. De ser necesario, también puede abrir las arterias bloqueadas. Eso puede ayudar a reducir los síntomas de la angina.  La cateterización cardíaca suele hacerse insertando un catéter en trinh arteria de la entrepierna. Giovany la cateterización cardíaca transradial, el catéter se inserta en trinh arteria de hart haim. Rarden puede significar menos sangrado y trinh recuperación más rápida. Algunas personas pueden tener bloqueos tanto en las arterias de la entrepierna rm en las arterias del corazón, lo que hace que resulte difícil llegar hasta el corazón. El método transradial puede usarse cuando existe fei problema.   ¿Qué sucede giovany trinh cateterización cardíaca transradial?  Darya procedimiento se lleva a cabo en un hospital o centro quirúrgico. Tatiana, le colocarán trinh línea intravenosa (IV) en hart mano o brazo para darle líquidos y medicamentos. Probablemente le administrarán medicamentos para que se relaje y se adormezca. Cuando comience el procedimiento:  · Se recostará sobre trinh curry para radiografías.  · Le adormecerán la piel de la haim, en el sitio donde le insertarán el catéter.  · El médico le hará trinh pequeña punción o incisión en la arteria de la haim. Luego, le insertará el catéter y lo irá pasando por el vaso sanguíneo hasta llegar a hart corazón.  · El médico puede inyectarle un líquido de contraste en las arterias a través del catéter. Darya líquido hace que las arterias se vean mejor en las radiografías.  · Pueden hacerle pruebas para skyler el estado de hart corazón y  paco arterias. De ser necesario, el médico puede quitar los bloqueos que haya en las arterias o hacer otras reparaciones.  · Cuando el médico haya terminado, le quitará el catéter y hará presión directa sobre el sitio para evitar que juli.  · Se quedará allí un tiempo para recuperarse, y luego se irá a casa.  ¿Cuáles son los riesgos de trinh cateterización cardíaca transradial?  Por ejemplo:  · Sangrado, moretones, infección o coágulos sanguíneos  · Daños en la arteria radial que pueden causarle lesiones en la mano  · Reacción alérgica al medio de contraste  · Ritmo cardíaco anormal (arritmia)  · Daño en los vasos sanguíneos o tejidos  · Daño en los riñones o insuficiencia renal  · Necesidad de trinh cirugía cardíaca de emergencia  · Ataque al corazón, ataque cerebral o muerte  Date Last Reviewed: 5/1/2016  © 8702-3938 [employer]. [address] [place] [address], [place] [address]. All rights reserved. This information is not intended as a substitute for professional medical care. Always follow your healthcare professional's instructions.

## 2019-04-08 NOTE — DISCHARGE SUMMARY
[facility]  Discharge Summary      Admit Date: 4/8/2019    Discharge Date and Time:  04/08/2019 9:12 AM    Attending Physician: Renato Escalona MD     Reason for Admission: [facility]    Procedures Performed: Procedure(s) (LRB):  Left heart cath (N/A)  Ventriculogram, Left  Angiogram, Aortic Arch, Coronary    Hospital Course (synopsis of major diagnoses, care, treatment, and services provided during the course of the hospital stay): The pt was brought to the cath lab and LHC revealed nonobstructive CAD and coronary-cameral fistula, normal  EF and normal EDP. No immediate post procedure complications.        Final Diagnoses:    Principal Problem: Abnormal stress test   Secondary Diagnoses:   Active Hospital Problems    Diagnosis  POA    *Abnormal stress test [R94.39]  Yes     Priority: Low    Hypertension [I10]  Yes     start lisinopril 5 mg QD and encouraged low Na diet       Stenosis of carotid artery [I65.29]  Yes     continue statin and asa QD      Non-occlusive coronary artery disease [I25.10]  Yes    CAD (coronary artery disease) [I25.10]  Yes      iFR of LAD and RCA performed confirmed nonobstructive disease.        Resolved Hospital Problems   No resolved problems to display.       Discharged Condition: good    Disposition: Home or Self Care    Follow Up/Patient Instructions:   As previously scheduled    Medications:  Reconciled Home Medications:      Medication List      START taking these medications    atorvastatin 20 MG tablet  Commonly known as:  LIPITOR  Take 1 tablet (20 mg total) by mouth once daily.        CONTINUE taking these medications    aspirin 81 MG EC tablet  Commonly known as:  ECOTRIN  Take 1 tablet (81 mg total) by mouth once daily.     b complex vitamins capsule  Take 1 capsule by mouth once daily.     esomeprazole 20 MG capsule  Commonly known as:  NEXIUM  Take 1 capsule (20 mg total) by mouth before breakfast.          Discharge Procedure Orders   Diet Cardiac      Activity as tolerated     Follow-up Information     Renato Escalona MD In 3 months.    Specialties:  INTERVENTIONAL CARDIOLOGY, Cardiology  Contact information:  [address]  [address]  [facility] [address] [phone]

## 2019-04-08 NOTE — NURSING
0855 Patient transferred to recovery cath lab slot 4 via stretcher with side rails up x2 .  Pt drowsy but able to follow commands. Pt is stable when connecting to cardiac monitors.  VSS. Right radial vasc band in place with 15ml of air in band c.d.i. no drainage or noted. No redness, bruising, or hematoma noted around site. +2 ronnie radial pulses palpated.  dopplered ronnie pedal pulses.  Skin normal in color and warm to touch, <3 sec cap refill.

## 2019-04-22 ENCOUNTER — TELEPHONE (OUTPATIENT)
Dept: OPHTHALMOLOGY | Facility: CLINIC | Age: 84
End: 2019-04-22

## 2019-04-24 ENCOUNTER — HOSPITAL ENCOUNTER (EMERGENCY)
Facility: HOSPITAL | Age: 84
Discharge: HOME | End: 2019-04-24
Attending: EMERGENCY MEDICINE
Payer: MEDICARE

## 2019-04-24 VITALS
DIASTOLIC BLOOD PRESSURE: 62 MMHG | TEMPERATURE: 98 F | SYSTOLIC BLOOD PRESSURE: 134 MMHG | RESPIRATION RATE: 18 BRPM | WEIGHT: 128 LBS | BODY MASS INDEX: 24.17 KG/M2 | HEART RATE: 81 BPM | OXYGEN SATURATION: 97 % | HEIGHT: 61 IN

## 2019-04-24 DIAGNOSIS — H11.32 SUBCONJUNCTIVAL HEMORRHAGE OF LEFT EYE: Primary | ICD-10-CM

## 2019-04-24 PROCEDURE — 25000003 PHARM REV CODE 250: Performed by: EMERGENCY MEDICINE

## 2019-04-24 PROCEDURE — 99283 EMERGENCY DEPT VISIT LOW MDM: CPT

## 2019-04-24 RX ORDER — PROPARACAINE HYDROCHLORIDE 5 MG/ML
1 SOLUTION/ DROPS OPHTHALMIC
Status: COMPLETED | OUTPATIENT
Start: 2019-04-24 | End: 2019-04-24

## 2019-04-24 RX ADMIN — PROPARACAINE HYDROCHLORIDE 1 DROP: 5 SOLUTION/ DROPS OPHTHALMIC at 03:04

## 2019-04-24 NOTE — DISCHARGE INSTRUCTIONS
Thank you for choosing [facility]! We appreciate you coming to us for your medical care. We hope you feel better soon! Please come back to [facility] for all of your future medical needs.    Our goal in the emergency department is to always give you outstanding care and exceptional service. You may receive a survey by mail or e-mail in the next week regarding your experience in our ED. We would greatly appreciate your completing and returning the survey. Your feedback provides us with a way to recognize our staff who give very good care and it helps us learn how to improve when your experience was below our aspiration of excellence.       Sincerely,    Roe Norton MD  Medical Director  Emergency Department  [facility] [place]

## 2019-04-24 NOTE — ED PROVIDER NOTES
Encounter Date: 4/24/2019    SCRIBE #1 NOTE: I, Mc Calix, am scribing for, and in the presence of,  Dr. Roe Norton. I have scribed the entire note.       History     Chief Complaint   Patient presents with    Eye Problem     c/o scleral hemorrhage to left eye that has been worsening x5 days. Denies vision change. C/o frontal headache     This is a 83 y.o. female who has a past medical history of Abnormal Pap smear (2013), CAD (coronary artery disease) (06/2017), Cataract, Colon polyps, Frequent urination, Hemorrhoid, High cholesterol, Poor circulation, Postoperative abdominal pain, Psoriasis, and Varicose vein.     The patient presents to the Emergency Department due to left eye redness x 5 days.  Symptoms are associated with mild frontal headache.  Pt denies eye pain or changes in vision.  She denies trauma or injury to the eye.  No aggravating or alleviating factors reported.  Patient has history of cataract surgery.  Patient has no prior history of similar symptoms.     The history is provided by the patient. The history is limited by a language barrier. A  was used (Family at bedside).     Review of patient's allergies indicates:   Allergen Reactions    Keflex [cephalexin] Rash     Past Medical History:   Diagnosis Date    Abnormal Pap smear 2013    CAD (coronary artery disease) 06/2017     iFR of LAD and RCA performed confirmed nonobstructive disease.    Cataract     Colon polyps     Frequent urination     Hemorrhoid     High cholesterol     Poor circulation     Postoperative abdominal pain     Psoriasis     Varicose vein      Past Surgical History:   Procedure Laterality Date    Angiogram, Aortic Arch, Coronary  4/8/2019    Performed by [facility] at [facility] CATH LAB/EP    ANOSCOPY-HIGH RESOLUTION N/A 4/23/2018    [facility] 2ND FLR    CARDIAC CATHETERIZATION  06/2017     iFR of LAD and RCA performed confirmed nonobstructive  disease.    CATARACT EXTRACTION W/  INTRAOCULAR LENS IMPLANT Bilateral     COLONOSCOPY N/A 8/28/2017    Performed by Bertram Myers MD at [facility] ENDO    COLONOSCOPY - Pt requires  - Please call Kimberly () at [phone] with time of arrival for procedure. N/A 5/12/2015    Performed by Mann Thornton MD at [facility] ENDO    ESOPHAGOGASTRODUODENOSCOPY (EGD) N/A 11/18/2014    Performed by Mann Thornton MD at [facility] ENDO    EXCISION, MASS, RECTUM N/A 9/16/2013    Performed by Bharath Shaw MD at [facility] OR    FLUORO URODYNAMIC STUDY (FUDS) N/A 6/3/2014    Performed by [facility] at [facility] OR 1ST FLR    FULGURATION-CONDYLOMA-ANAL N/A 6/11/2015    Performed by [facility] at [facility] OR 2ND FLR    Left heart cath N/A 4/8/2019    Performed by Renato [facility]alona MD at [facility] CATH LAB/EP    POLYPECTOMY      PROCTOSCOPY WITH POLYPECTOMY N/A 2/5/2018    Performed by Bharath Shaw MD at [facility] OR    PROCTOSCOPY WITH POLYPECTOMY N/A 9/19/2017    Performed by Bharath Shaw MD at [facility] OR    PROCTOSIGMOIDOSCOPY, WITH POLYPECTOMY N/A 9/16/2013    Performed by Bharath Shaw MD at [facility] OR    Ventriculogram, Left  4/8/2019    Performed by [facility]ato [facility]alona MD at [facility] CATH LAB/EP    YAG Laser Capsulotomy Right 04/17/2017    Dr. Chavez     Family History   Problem Relation Age of Onset    Glaucoma Neg Hx     Macular degeneration Neg Hx     Strabismus Neg Hx     Retinal detachment Neg Hx     Amblyopia Neg Hx     Blindness Neg Hx     Cataracts Neg Hx     Prostate cancer Neg Hx     Kidney disease Neg Hx     Anesthesia problems Neg Hx      Social History     Tobacco Use    Smoking status: Never Smoker    Smokeless tobacco: Never Used   Substance Use Topics    Alcohol use: No     Alcohol/week: 0.0 oz    Drug use: No     Review of Systems   Eyes: Positive for redness. Negative for pain and visual disturbance.   Neurological: Positive for headaches.    All other systems reviewed and are negative.      Physical Exam     Initial Vitals [04/24/19 1446]   BP Pulse Resp Temp SpO2   134/62 81 18 98.2 °F (36.8 °C) 97 %      MAP       --         Physical Exam    Nursing note and vitals reviewed.  Constitutional: She appears well-developed and well-nourished. No distress.   HENT:   Head: Normocephalic and atraumatic.   Eyes: EOM are normal. Pupils are equal, round, and reactive to light.   No proptosis. No chemosis. No hyphema  Positive subconjuctival hemorrhage left eye.   Neck: Normal range of motion. Neck supple.   Neurological: She is alert and oriented to person, place, and time.   Skin: Skin is warm and dry.           ED Course   Procedures  Labs Reviewed - No data to display       Imaging Results    None              Medical Decision Making:   Initial Assessment:   Presents with subconjuctival hemorrhage to left eye.  No trauma on aspirin no blood thinners.        Differential Diagnosis:   Will also evaluate for any increased intraocular pressure and visual acuity.                     ED Course as of Apr 24 1641 Wed Apr 24, 2019   1634 I, Dr. Roe Norton, personally performed the services described in this documentation.   All medical record entries made by the scribe were at my direction and in my presence.   I have reviewed the chart and agree that the record is accurate and complete.   Roe Norton MD.     [NP]      ED Course User Index  [NP] Roe Norton MD       Proparacaine to assess intraocular pressure.  Intraocular pressure: 11, 15, 8  Intraocular pressure within normal limits.    Visual acuity assessment:  Right eye: 20/30  Left eye: 20/50  Both eyes: 20/25    Overall impression is subconjunctival hemorrhage. No hyphema, no concern for retro-orbital hematoma.  Stable for discharge current.  Recommend use of Lacri Lube and follow up with Ophthalmology as referred.        Clinical Impression:       ICD-10-CM ICD-9-CM   1. Subconjunctival hemorrhage of  left eye H11.32 372.72           Disposition:   Disposition: Discharged  Condition: Stable                   Roe Norton MD  04/24/19 4742

## 2019-05-14 ENCOUNTER — TELEPHONE (OUTPATIENT)
Dept: UROLOGY | Facility: CLINIC | Age: 84
End: 2019-05-14

## 2019-05-14 NOTE — TELEPHONE ENCOUNTER
----- Message from Shannon Stoll sent at 5/10/2019  2:35 PM CDT -----  Contact: didier- son  Didier  would like to be called back regarding getting a appt for his mother    Didier can be reached at [phone]

## 2019-05-15 NOTE — TELEPHONE ENCOUNTER
Contacted patient's son.  He informed me patient is not having any  Urological issues and need to reschedule an ultrasound Dr Mendoza ordered.  Call transferred to Diagnostic call center to schedule.

## 2019-05-23 ENCOUNTER — HOSPITAL ENCOUNTER (OUTPATIENT)
Dept: RADIOLOGY | Facility: HOSPITAL | Age: 84
Discharge: HOME | End: 2019-05-23
Attending: INTERNAL MEDICINE
Payer: MEDICARE

## 2019-05-23 DIAGNOSIS — N39.41 URGE INCONTINENCE: ICD-10-CM

## 2019-05-23 PROCEDURE — 76770 US EXAM ABDO BACK WALL COMP: CPT | Mod: 26,,, | Performed by: RADIOLOGY

## 2019-05-23 PROCEDURE — 76770 US EXAM ABDO BACK WALL COMP: CPT | Mod: TC

## 2019-05-23 PROCEDURE — 76770 US RETROPERITONEAL COMPLETE: ICD-10-PCS | Mod: 26,,, | Performed by: RADIOLOGY

## 2019-09-18 ENCOUNTER — OFFICE VISIT (OUTPATIENT)
Dept: NEUROLOGY | Facility: HOSPITAL | Age: 84
End: 2019-09-18
Attending: INTERNAL MEDICINE
Payer: MEDICARE

## 2019-09-18 VITALS
HEIGHT: 61 IN | WEIGHT: 130.63 LBS | HEART RATE: 66 BPM | BODY MASS INDEX: 24.66 KG/M2 | TEMPERATURE: 97 F | DIASTOLIC BLOOD PRESSURE: 57 MMHG | SYSTOLIC BLOOD PRESSURE: 130 MMHG

## 2019-09-18 DIAGNOSIS — K62.0 ANAL POLYP: Primary | ICD-10-CM

## 2019-09-18 PROCEDURE — 99214 OFFICE O/P EST MOD 30 MIN: CPT | Performed by: INTERNAL MEDICINE

## 2019-09-18 NOTE — PROGRESS NOTES
"[facility]    CC: anal polyp     HPI 84 y.o. 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      Past Medical History:   Diagnosis Date    Abnormal Pap smear 2013    Acidosis     CAD (coronary artery disease) 06/2017     iFR of LAD and RCA performed confirmed nonobstructive disease.    Cataract     Colon polyps     Disorder of kidney and ureter     Frequent urination     Hemorrhoid     High cholesterol     Poor circulation     Postoperative abdominal pain     Psoriasis     Varicose vein          Review of Systems  General ROS: negative for chills, fever or weight loss  Cardiovascular ROS: no chest pain or dyspnea on exertion      Physical Examination  BP (!) 130/57 (BP Location: Right arm, Patient Position: Sitting, BP Method: Medium (Automatic))   Pulse 66   Temp 97.4 °F (36.3 °C) (Oral)   Ht 5' 1" (1.549 m)   Wt 59.2 kg (130 lb 10 oz)   BMI 24.68 kg/m²   General appearance: alert, cooperative, no distress  HENT: Normocephalic, atraumatic, neck symmetrical, no nasal discharge   Lungs: clear to auscultation bilaterally, no dullness to percussion bilaterally  Heart: regular rate and rhythm without rub; no displacement of the PMI   Abdomen: soft, non-tender; bowel sounds normoactive; no organomegaly  Extremities: extremities symmetric; no clubbing, cyanosis, or edema      Assessment:   History of squamous anal lesion with dysplasia (AIN) s/p previous surgery at [facility] but may have recurrent disease   She also needs a surveillance colonoscopy     Plan:  Refer for follow up with CRS at [facility] for re-evaluation and potential repeat colonoscopy at that location by CRS       Bertram Myers MD   [address] [place] [address], [address]   [place] [address] [phone]    "

## 2019-09-18 NOTE — PATIENT INSTRUCTIONS
You will be referred to Dr. Pisano at [facility] for surgical consult.    Dr. Psiano's staff will contact to schedule.

## 2019-09-24 ENCOUNTER — TELEPHONE (OUTPATIENT)
Dept: SURGERY | Facility: CLINIC | Age: 84
End: 2019-09-24

## 2019-09-24 NOTE — TELEPHONE ENCOUNTER
----- Message from Bernard Pisano MD sent at 9/18/2019 10:24 AM CDT -----  Can we get her in with me or someone soon  Bernard  ----- Message -----  From: Bertram Myers MD  Sent: 9/18/2019  10:21 AM  To: Bernard Pisano MD, ROSA Ambrocio,   This patient appears to have recurrence of her anal squamous cell lesion. She may need a 2nd surgery. Can you or one of your partners see her in clinic?   Thanks!   Basil Myers   Gastroenterology

## 2019-09-30 NOTE — PROGRESS NOTES
"CRS Office Visit Follow-up  Referring Md:   Bertram Myers Md  [address] [facility] [address]  [place] [address]    SUBJECTIVE:     Chief Complaint: anal polyp    History of Present Illness:  Patient is a 84 y.o. female presents with anal polyp. The patient is a established patient to this practice.     Course is as follows:  9/16/2013: Proctosigmoidoscopy and excision of rectal mass.   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  9/19/17: Proctosigmoidoscopy and excision of rectal polyp.   - PATHOLOGY:-High-grade squamous intraepithelial lesion (AIN-2)     -AIN-1 also present and focally approaches the inked margin  2/5/18: Proctosigmoidoscopy and excision of anal canal rectal polyp, size 1 x 2 cm.   - PATHOLOGY: -High-grade squamous intraepithelial lesion (AIN 2)     -Squamous dysplasia arising in background of condyloma     -Dysplasia extends to the inked biopsy margin  4/23/18: high resolution anoscopy with excision and fulgaration of Anal canal lesion in the right posterior aspect   - PATHOLOGY: - Low grade squamous intraepithelial lesion (AIN 1).    Last Colonoscopy:  8/28/17                        - Mild left sided diverticulosis                        - Polyp at the dentate line consistent with likely  condyloma - biopsied                        - Otherwise normal colonoscopy although bowel prep was inadequate to evaluate for all small polyps and flat lesions  5/12/15:               - One tiny colon polyp - removed. --> pathology was tubular adenoma.                         - Diverticulosis in the entire examined colon.                        - Anal condylomata. Biopsied.      Current status:  Presents for evaluation for repeat polyp at the anal canal.  Causes her " "constant burning.  Has 3-4 bowel movements per day.  She does not feel that she fully evacuates with each bowel movement.  Her main concern is perianal burning.  Takes Metamucil regularly.  No family history of colon or rectal cancer.  Denies bleeding.    Review of Systems:  Review of Systems   Constitutional: Negative for chills, diaphoresis, fever, malaise/fatigue and weight loss.   HENT: Negative for congestion.    Respiratory: Negative for shortness of breath.    Cardiovascular: Negative for chest pain and leg swelling.   Gastrointestinal: Positive for diarrhea. Negative for abdominal pain, blood in stool, constipation, nausea and vomiting.   Genitourinary: Negative for dysuria.   Musculoskeletal: Negative for back pain and myalgias.   Skin: Positive for rash.   Neurological: Negative for dizziness and weakness.   Endo/Heme/Allergies: Does not bruise/bleed easily.   Psychiatric/Behavioral: Negative for depression.       OBJECTIVE:     Vital Signs (Most Recent)  BP (!) 147/79 (BP Location: Right arm, Patient Position: Sitting, BP Method: Large (Automatic))   Pulse 65   Ht 5' 1" (1.549 m)   Wt 58 kg (127 lb 13.9 oz)   BMI 24.16 kg/m²     Physical Exam:  General: White female in no distress   Neuro: alert and oriented x 4.  Moves all extremities.     HEENT: no icterus.  Trachea midline  Respiratory: respirations are even and unlabored  Cardiac: regular rate  Abdomen:  Soft, nontender, no masses  Extremities: Warm dry and intact  Skin: no rashes  Anorectal:  External exam was normal. Digital exam performed. No masses or lesions palpated.  Normal tone.  Detailed anoscopy was then performed.  Scar seen on the right lateral side.  No signs of recurrent disease.    Labs: H/H = 12/34    Imaging: no new imaging      ASSESSMENT/PLAN:     Ghada was seen today for anal polyp.    Diagnoses and all orders for this visit:    AIN grade II  -     Case request GI: COLONOSCOPY    Change in bowel movement  -     Case request GI: " COLONOSCOPY    Other orders  -     hydrocortisone 2.5 % cream; Apply topically 2 (two) times daily. for 10 days        84-year-old woman with a history of AIN 2 status post multiple transanal excisions presents with rectal burning and concern for recurrent anal lesion.  No anal lesion was seen today on detailed anoscopy.  We will plan for repeat colonoscopy to better evaluate the distal rectum.  Repeat detailed anoscopy can be performed at that time..  Steroid cream given for the perianal skin burning.    PANTERA Barber MD  Staff Surgeon  Colon & Rectal Surgery

## 2019-10-01 ENCOUNTER — TELEPHONE (OUTPATIENT)
Dept: ENDOSCOPY | Facility: HOSPITAL | Age: 84
End: 2019-10-01

## 2019-10-01 ENCOUNTER — OFFICE VISIT (OUTPATIENT)
Dept: SURGERY | Facility: CLINIC | Age: 84
End: 2019-10-01
Payer: MEDICARE

## 2019-10-01 VITALS
DIASTOLIC BLOOD PRESSURE: 79 MMHG | HEART RATE: 65 BPM | HEIGHT: 61 IN | BODY MASS INDEX: 24.15 KG/M2 | SYSTOLIC BLOOD PRESSURE: 147 MMHG | WEIGHT: 127.88 LBS

## 2019-10-01 DIAGNOSIS — R19.8 CHANGE IN BOWEL MOVEMENT: ICD-10-CM

## 2019-10-01 DIAGNOSIS — K62.82 AIN GRADE II: Primary | ICD-10-CM

## 2019-10-01 PROCEDURE — 99999 PR PBB SHADOW E&M-EST. PATIENT-LVL III: ICD-10-PCS | Mod: PBBFAC,,, | Performed by: COLON & RECTAL SURGERY

## 2019-10-01 PROCEDURE — 99214 OFFICE O/P EST MOD 30 MIN: CPT | Mod: S$GLB,,, | Performed by: COLON & RECTAL SURGERY

## 2019-10-01 PROCEDURE — 3078F PR MOST RECENT DIASTOLIC BLOOD PRESSURE < 80 MM HG: ICD-10-PCS | Mod: CPTII,S$GLB,, | Performed by: COLON & RECTAL SURGERY

## 2019-10-01 PROCEDURE — 3077F SYST BP >= 140 MM HG: CPT | Mod: CPTII,S$GLB,, | Performed by: COLON & RECTAL SURGERY

## 2019-10-01 PROCEDURE — 3078F DIAST BP <80 MM HG: CPT | Mod: CPTII,S$GLB,, | Performed by: COLON & RECTAL SURGERY

## 2019-10-01 PROCEDURE — 99999 PR PBB SHADOW E&M-EST. PATIENT-LVL III: CPT | Mod: PBBFAC,,, | Performed by: COLON & RECTAL SURGERY

## 2019-10-01 PROCEDURE — 3077F PR MOST RECENT SYSTOLIC BLOOD PRESSURE >= 140 MM HG: ICD-10-PCS | Mod: CPTII,S$GLB,, | Performed by: COLON & RECTAL SURGERY

## 2019-10-01 PROCEDURE — 1101F PT FALLS ASSESS-DOCD LE1/YR: CPT | Mod: CPTII,S$GLB,, | Performed by: COLON & RECTAL SURGERY

## 2019-10-01 PROCEDURE — 99214 PR OFFICE/OUTPT VISIT, EST, LEVL IV, 30-39 MIN: ICD-10-PCS | Mod: S$GLB,,, | Performed by: COLON & RECTAL SURGERY

## 2019-10-01 PROCEDURE — 1101F PR PT FALLS ASSESS DOC 0-1 FALLS W/OUT INJ PAST YR: ICD-10-PCS | Mod: CPTII,S$GLB,, | Performed by: COLON & RECTAL SURGERY

## 2019-10-01 RX ORDER — IBANDRONATE SODIUM 150 MG/1
150 TABLET, FILM COATED ORAL
Refills: 5 | COMMUNITY
Start: 2019-09-27 | End: 2020-11-04 | Stop reason: SDUPTHER

## 2019-10-01 RX ORDER — HYDROCORTISONE 25 MG/G
CREAM TOPICAL 2 TIMES DAILY
Qty: 1 TUBE | Refills: 5 | Status: SHIPPED | OUTPATIENT
Start: 2019-10-01 | End: 2020-12-23 | Stop reason: SDUPTHER

## 2019-10-01 RX ORDER — ALPRAZOLAM 1 MG/1
1 TABLET ORAL 2 TIMES DAILY
COMMUNITY
End: 2020-12-23 | Stop reason: DRUGHIGH

## 2019-10-01 RX ORDER — MOMETASONE FUROATE 1 MG/G
CREAM TOPICAL
Refills: 1 | COMMUNITY
Start: 2019-09-27 | End: 2020-11-04 | Stop reason: SDUPTHER

## 2019-10-01 RX ORDER — METFORMIN HYDROCHLORIDE 500 MG/1
500 TABLET ORAL 2 TIMES DAILY WITH MEALS
COMMUNITY
End: 2020-11-04

## 2019-10-01 NOTE — TELEPHONE ENCOUNTER
Dr. Escalona,  Patient needs to be scheduled for Colonoscopy.   Is patient cleared from Cardiology standpoint?  Please advise.    Thank you,  Shayna

## 2019-10-01 NOTE — LETTER
October 1, 2019      Bertram Myers MD  [address]  [place] [address]           [facility]  [address]  [facility] [address]  Phone: [phone]          Patient: Ghada Dave   MR Number: [id number]   YOB: 1935   Date of Visit: 10/1/2019       Dear Dr. Bertram Myers:    Thank you for referring Ghada Flanagan to me for evaluation. Attached you will find relevant portions of my assessment and plan of care.    If you have questions, please do not hesitate to call me. I look forward to following Ghada Flanagan along with you.    Sincerely,    Wilfred Barber MD    Enclosure  CC:  No Recipients    If you would like to receive this communication electronically, please contact [email or web address] or [phone] to request more information on [employer] access.    For providers and/or their staff who would like to refer a patient to [facility], please contact us through our one-stop-shop provider referral line, [facility], at [phone].    If you feel you have received this communication in error or would no longer like to receive these types of communications, please e-mail [email or web address]

## 2019-10-01 NOTE — TELEPHONE ENCOUNTER
Patient in office with son to schedule Colonoscopy procedure ordered. Patient Kyrgyz speaking only, and patent's  son able to translate. Explained to patient and patient's son, that Cardiac Clearance would need to be requested from Dr. REBECA Escalona prior to scheduling proceudre. Explained to both patient and patient's son that once Cardiology clearance is received, patient would be contacted to schedule Colonoscopy. Patient and patient's son both verbalized understanding.

## 2019-10-03 DIAGNOSIS — Z12.11 SPECIAL SCREENING FOR MALIGNANT NEOPLASMS, COLON: Primary | ICD-10-CM

## 2019-10-03 RX ORDER — POLYETHYLENE GLYCOL 3350, SODIUM SULFATE ANHYDROUS, SODIUM BICARBONATE, SODIUM CHLORIDE, POTASSIUM CHLORIDE 236; 22.74; 6.74; 5.86; 2.97 G/4L; G/4L; G/4L; G/4L; G/4L
4 POWDER, FOR SOLUTION ORAL ONCE
Qty: 4000 ML | Refills: 0 | Status: SHIPPED | OUTPATIENT
Start: 2019-10-03 | End: 2019-10-03

## 2019-10-03 NOTE — TELEPHONE ENCOUNTER
MD Sagrario Mancia MA 21 hours ago (10:24 AM)      Clear for procedure   GERARDO    Routing comment

## 2019-10-18 ENCOUNTER — ANESTHESIA EVENT (OUTPATIENT)
Dept: ENDOSCOPY | Facility: HOSPITAL | Age: 84
End: 2019-10-18
Payer: MEDICARE

## 2019-10-18 ENCOUNTER — HOSPITAL ENCOUNTER (OUTPATIENT)
Facility: HOSPITAL | Age: 84
Discharge: HOME | End: 2019-10-18
Attending: COLON & RECTAL SURGERY | Admitting: COLON & RECTAL SURGERY
Payer: MEDICARE

## 2019-10-18 ENCOUNTER — ANESTHESIA (OUTPATIENT)
Dept: ENDOSCOPY | Facility: HOSPITAL | Age: 84
End: 2019-10-18
Payer: MEDICARE

## 2019-10-18 VITALS
OXYGEN SATURATION: 98 % | RESPIRATION RATE: 12 BRPM | HEART RATE: 67 BPM | SYSTOLIC BLOOD PRESSURE: 111 MMHG | DIASTOLIC BLOOD PRESSURE: 53 MMHG | WEIGHT: 134 LBS | BODY MASS INDEX: 22.88 KG/M2 | HEIGHT: 64 IN | TEMPERATURE: 99 F

## 2019-10-18 DIAGNOSIS — K62.82 ANAL DYSPLASIA: ICD-10-CM

## 2019-10-18 DIAGNOSIS — K62.0 ANAL POLYP: Primary | ICD-10-CM

## 2019-10-18 LAB — POCT GLUCOSE: 93 MG/DL (ref 70–110)

## 2019-10-18 PROCEDURE — 88305 TISSUE SPECIMEN TO PATHOLOGY - SURGERY: ICD-10-PCS | Mod: 26,,, | Performed by: PATHOLOGY

## 2019-10-18 PROCEDURE — 37000008 HC ANESTHESIA 1ST 15 MINUTES: Performed by: COLON & RECTAL SURGERY

## 2019-10-18 PROCEDURE — 88305 TISSUE EXAM BY PATHOLOGIST: CPT | Performed by: PATHOLOGY

## 2019-10-18 PROCEDURE — E9220 PRA ENDO ANESTHESIA: ICD-10-PCS | Mod: PT,,, | Performed by: NURSE ANESTHETIST, CERTIFIED REGISTERED

## 2019-10-18 PROCEDURE — 27201089 HC SNARE, DISP (ANY): Performed by: COLON & RECTAL SURGERY

## 2019-10-18 PROCEDURE — 45385 COLONOSCOPY W/LESION REMOVAL: CPT | Mod: PT,,, | Performed by: COLON & RECTAL SURGERY

## 2019-10-18 PROCEDURE — 63600175 PHARM REV CODE 636 W HCPCS: Performed by: NURSE ANESTHETIST, CERTIFIED REGISTERED

## 2019-10-18 PROCEDURE — 37000009 HC ANESTHESIA EA ADD 15 MINS: Performed by: COLON & RECTAL SURGERY

## 2019-10-18 PROCEDURE — 45385 PR COLONOSCOPY,REMV LESN,SNARE: ICD-10-PCS | Mod: PT,,, | Performed by: COLON & RECTAL SURGERY

## 2019-10-18 PROCEDURE — E9220 PRA ENDO ANESTHESIA: HCPCS | Mod: PT,,, | Performed by: NURSE ANESTHETIST, CERTIFIED REGISTERED

## 2019-10-18 PROCEDURE — 88305 TISSUE EXAM BY PATHOLOGIST: CPT | Mod: 26,,, | Performed by: PATHOLOGY

## 2019-10-18 PROCEDURE — 63600175 PHARM REV CODE 636 W HCPCS: Performed by: COLON & RECTAL SURGERY

## 2019-10-18 PROCEDURE — 45385 COLONOSCOPY W/LESION REMOVAL: CPT | Performed by: COLON & RECTAL SURGERY

## 2019-10-18 RX ORDER — PROPOFOL 10 MG/ML
VIAL (ML) INTRAVENOUS
Status: DISCONTINUED | OUTPATIENT
Start: 2019-10-18 | End: 2019-10-18

## 2019-10-18 RX ORDER — PHENYLEPHRINE HYDROCHLORIDE 10 MG/ML
INJECTION INTRAVENOUS
Status: DISCONTINUED | OUTPATIENT
Start: 2019-10-18 | End: 2019-10-18

## 2019-10-18 RX ORDER — LIDOCAINE HCL/PF 100 MG/5ML
SYRINGE (ML) INTRAVENOUS
Status: DISCONTINUED | OUTPATIENT
Start: 2019-10-18 | End: 2019-10-18

## 2019-10-18 RX ORDER — SODIUM CHLORIDE 9 MG/ML
INJECTION, SOLUTION INTRAVENOUS CONTINUOUS
Status: DISCONTINUED | OUTPATIENT
Start: 2019-10-18 | End: 2019-10-18 | Stop reason: HOSPADM

## 2019-10-18 RX ADMIN — PHENYLEPHRINE HYDROCHLORIDE 150 MCG: 10 INJECTION INTRAVENOUS at 07:10

## 2019-10-18 RX ADMIN — PROPOFOL 50 MG: 10 INJECTION, EMULSION INTRAVENOUS at 07:10

## 2019-10-18 RX ADMIN — PROPOFOL 100 MG: 10 INJECTION, EMULSION INTRAVENOUS at 07:10

## 2019-10-18 RX ADMIN — LIDOCAINE HYDROCHLORIDE 50 MG: 20 INJECTION, SOLUTION INTRAVENOUS at 07:10

## 2019-10-18 RX ADMIN — SODIUM CHLORIDE: 0.9 INJECTION, SOLUTION INTRAVENOUS at 07:10

## 2019-10-18 NOTE — ANESTHESIA PREPROCEDURE EVALUATION
10/18/2019  Ghada Dave is a 84 y.o., female.  Past Medical History:   Diagnosis Date    Abnormal Pap smear 2013    Acidosis     CAD (coronary artery disease) 06/2017     iFR of LAD and RCA performed confirmed nonobstructive disease.    Cataract     Colon polyps     Disorder of kidney and ureter     Frequent urination     Hemorrhoid     High cholesterol     Poor circulation     Postoperative abdominal pain     Psoriasis     Varicose vein      Anesthesia Evaluation    I have reviewed the Patient Summary Reports.        Review of Systems  Anesthesia Hx:  No previous Anesthesia    Social:  Non-Smoker    Hematology/Oncology:  Hematology Normal   Oncology Normal     EENT/Dental:EENT/Dental Normal   Cardiovascular:   Hypertension, well controlled CAD      Pulmonary:  Pulmonary Normal    Renal/:   Chronic Renal Disease, CRI    Hepatic/GI:  Hepatic/GI Normal    Musculoskeletal:  Musculoskeletal Normal    Neurological:  Neurology Normal    Endocrine:  Endocrine Normal    Dermatological:  Skin Normal    Psych:  Psychiatric Normal           Physical Exam  General:  Well nourished    Airway/Jaw/Neck:  Airway Findings: Mouth Opening: Normal General Airway Assessment: Adult  Mallampati: I  TM Distance: Normal, at least 6 cm      Dental:  Dental Findings: In tact             Anesthesia Plan  Type of Anesthesia, risks & benefits discussed:  Anesthesia Type:  general  Patient's Preference:   Intra-op Monitoring Plan: standard ASA monitors  Intra-op Monitoring Plan Comments:   Post Op Pain Control Plan:   Post Op Pain Control Plan Comments:   Induction:   IV  Beta Blocker:  Patient is not currently on a Beta-Blocker (No further documentation required).       Informed Consent: Patient understands risks and agrees with Anesthesia plan.  Questions answered.   ASA Score: 2     Day of Surgery Review  of History & Physical: I have interviewed and examined the patient. I have reviewed the patient's H&P dated:     H&P completed by Anesthesiologist.       Ready For Surgery From Anesthesia Perspective.

## 2019-10-18 NOTE — DISCHARGE INSTRUCTIONS
La colonoscopia     Se usa trinh cámara acoplada a un tubo flexible con trinh lente que leopoldo imágenes de video.     La colonoscopia es un examen que permite mirar el interior del aparato digestivo inferior (el colon y el recto). Algunas veces de puede mirar la última parte del intestino melendez llamada íleon. Randy examen puede ayudar a detectar si hay cáncer de colon, así rm a encontrar el origen de un dolor abdominal, sangrado y cambios en los hábitos intestinales.  Giovany la colonoscopia, un procedimiento ambulatorio que suele efectuarse en el hospital, el proveedor de atención médica puede extirpar (sacar) trinh pequeña muestra de tejido, o biopsia, para enviarla a análisis; también puede extirpar pequeñas masas, rm los pólipos. La frecuencia con la que se requiere trinh colonoscopia depende de muchos factores incluyendo  la edad, las afecciones médicas, los antecedentes familiares, los síntomas y lo que se descubra giovany la colonoscopia anterior.  Los preparativos  · Asegúrese de mencionar al proveedor de atención médica todos los medicamentos que usted leopoldo, así rm cualquier problema de maría que tenga.   · Hable con hart proveedor de atención médica acerca de los riesgos de randy examen, que incluyen sangrado y perforación del intestino, riesgos de la anestesia y los riesgos de que se pase por alto trinh lesión.  · Ya que usted debe tener el recto y el colon vacíos para randy examen, asegúrese de seguir la dieta y las instrucciones de preparación de hart intestino al pie de la letra. Si no lo hace, podría ser necesario postergarle el examen.  · Pregunte a hart médico si necesita tener a un amigo o familiar preparado para que lo lleven a hart casa después del examen.  Giovany el examen  · Le administrarán un sedante (medicamento relajante) a través de trinh sonda IV. Quizás usted esté soñoliento o completamente dormido giovany el examen.  · El procedimiento lleva rm mínimo 30 minutos.  · Giovany esta prueba podrían  hacerse biopsias (muestras de tejido), remoción de pólipos y otros tratamientos.  · El médico realiza un examen digital del recto para skyler si hay algún problema allí o en el ano. Se lubrica el recto y se introduce el colonoscopio.   · Si usted está despierto, quizás sienta trinh sensación similar a la que tiene cuando necesita evacuar. También podría sentir presión a medida que le bombean aire dentro del colon. No se preocupe si tiene que eliminar gases giovany el procedimiento.     Trinh colonoscopia permite al médico skyler todo el interior del colon.     Después del examen  · Es posible que hart médico le informe de los resultados de inmediato, o en trinh visita posterior.  · Trate de eliminar todos los gases rosa después del examen, para evitar trinh hinchazón y cólicos.  · Después del examen podrá volver a hart alimentación normal y reanudar paco actividades.  Los riesgos y posibles complicaciones incluyen:  · Sangrado (hemorragia)  · Punción o rasgadura en el colon  · Riesgos propios de la anestesia  · Pasar por alto trinh lesión          Date Last Reviewed: 3/30/2014  © 4728-6047 [employer]. [address] [place] [id number]. Todos los derechos reservados. Esta información no pretende sustituir la atención médica profesional. Sólo hart médico puede diagnosticar y tratar un problema de maría.

## 2019-10-18 NOTE — [employer]ATION PATIENT INSTRUCTIONS
Discharge Summary/Instructions after an Endoscopic Procedure  Patient Name: Ghada Flanagan  Patient MRN: [id number]  Patient YOB: 1935 Friday, October 18, 2019  Wilfred Barber MD  RESTRICTIONS:  During your procedure today, you received medications for sedation.  These   medications may affect your judgment, balance and coordination.  Therefore,   for 24 hours, you have the following restrictions:   - DO NOT drive a car, operate machinery, make legal/financial decisions,   sign important papers or drink alcohol.    ACTIVITY:  Today: no heavy lifting, straining or running due to procedural   sedation/anesthesia.  The following day: return to full activity including work.  DIET:  Eat and drink normally unless instructed otherwise.     TREATMENT FOR COMMON SIDE EFFECTS:  - Mild abdominal pain, nausea, belching, bloating or excessive gas:  rest,   eat lightly and use a heating pad.  - Sore Throat: treat with throat lozenges and/or gargle with warm salt   water.  - Because air was used during the procedure, expelling large amounts of air   from your rectum or belching is normal.  - If a bowel prep was taken, you may not have a bowel movement for 1-3 days.    This is normal.  SYMPTOMS TO WATCH FOR AND REPORT TO YOUR PHYSICIAN:  1. Abdominal pain or bloating, other than gas cramps.  2. Chest pain.  3. Back pain.  4. Signs of infection such as: chills or fever occurring within 24 hours   after the procedure.  5. Rectal bleeding, which would show as bright red, maroon, or black stools.   (A tablespoon of blood from the rectum is not serious, especially if   hemorrhoids are present.)  6. Vomiting.  7. Weakness or dizziness.  GO DIRECTLY TO THE NEAREST EMERGENCY ROOM IF YOU HAVE ANY OF THE FOLLOWING:      Difficulty breathing              Chills and/or fever over 101 F   Persistent vomiting and/or vomiting blood   Severe abdominal pain   Severe chest pain   Black, tarry stools   Bleeding- more than one  tablespoon   Any other symptom or condition that you feel may need urgent attention  Your doctor recommends these additional instructions:  If any biopsies were taken, your doctors clinic will contact you in 1 to 2   weeks with any results.  - Discharge patient to home (ambulatory).   - Resume previous diet.   - Continue present medications.   - Await pathology results.   - Repeat colonoscopy in 5 years for surveillance.  For questions, problems or results please call your physician - Wilfred Barber MD at Work:  [phone].  [facility], EMERGENCY ROOM PHONE NUMBER: [phone]  IF A COMPLICATION OR EMERGENCY SITUATION ARISES AND YOU ARE UNABLE TO REACH   YOUR PHYSICIAN - GO DIRECTLY TO THE EMERGENCY ROOM.  Wilfred Barber MD  10/18/2019 8:03:59 AM  This report has been verified and signed electronically.  [employer]

## 2019-10-18 NOTE — H&P
COLONOSCOPY HISTORY AND PHYSICAL EXAM    Procedure : Colonoscopy      INDICATIONS: history of AIN    Family Hx of CRC: no    Last Colonoscopy:   8/28/17                        - Mild left sided diverticulosis                        - Polyp at the dentate line consistent with likely  condyloma - biopsied                        - Otherwise normal colonoscopy although bowel prep was inadequate to evaluate for all small polyps and flat lesions  5/12/15:                          - One tiny colon polyp - removed. --> pathology was tubular adenoma.                         - Diverticulosis in the entire examined colon.                        - Anal condylomata. Biopsied.          Past Medical History:   Diagnosis Date    Abnormal Pap smear 2013    Acidosis     CAD (coronary artery disease) 06/2017     iFR of LAD and RCA performed confirmed nonobstructive disease.    Cataract     Colon polyps     Disorder of kidney and ureter     Frequent urination     Hemorrhoid     High cholesterol     Poor circulation     Postoperative abdominal pain     Psoriasis     Varicose vein      Sedation Problems: NO  Family History   Problem Relation Age of Onset    Glaucoma Neg Hx     Macular degeneration Neg Hx     Strabismus Neg Hx     Retinal detachment Neg Hx     Amblyopia Neg Hx     Blindness Neg Hx     Cataracts Neg Hx     Prostate cancer Neg Hx     Kidney disease Neg Hx     Anesthesia problems Neg Hx      Fam Hx of Sedation Problems: NO  Social History     Socioeconomic History    Marital status: Single     Spouse name: Not on file    Number of children: Not on file    Years of education: Not on file    Highest education level: Not on file   Occupational History    Not on file   Social Needs    Financial resource strain: Not on file    Food insecurity:     Worry: Not on file     Inability: Not on file    Transportation needs:     Medical: Not on file     Non-medical: Not on file   Tobacco Use    Smoking  "status: Never Smoker    Smokeless tobacco: Never Used   Substance and Sexual Activity    Alcohol use: No     Alcohol/week: 0.0 standard drinks    Drug use: No    Sexual activity: Never   Lifestyle    Physical activity:     Days per week: Not on file     Minutes per session: Not on file    Stress: Not on file   Relationships    Social connections:     Talks on phone: Not on file     Gets together: Not on file     Attends [employer] service: Not on file     Active member of club or organization: Not on file     Attends meetings of clubs or organizations: Not on file     Relationship status: Not on file   Other Topics Concern    Not on file   Social History Narrative    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 No   BMI 21.95 kg/m²   General: no distress  Head: normocephalic  Mallampati Score   Neck: supple, symmetrical, trachea midline  Lungs:  clear to auscultation bilaterally and normal respiratory effort  Heart: regular rate and rhythm and no murmur  Abdomen: soft, non-tender non-distented; bowel sounds normal; no masses,  no organomegaly  Extremities: no cyanosis or edema, or clubbing    ASA:  II    PLAN  COLONOSCOPY.    SedationPlan :MAC    The details of the procedure, the possible need for biopsy or polypectomy and the potential risks including bleeding, perforation, missed polyps were discussed in detail.      "

## 2019-10-18 NOTE — H&P
COLONOSCOPY HISTORY AND PHYSICAL EXAM    Procedure : Colonoscopy      INDICATIONS: personal history of colon polyps and polyp at dentate line, perianal burning    Family Hx of CRC: no    Last Colonoscopy:  2017   Findings: inadequate prep for screening, polyp at dentate line with AIN 1/2 on pathology       Past Medical History:   Diagnosis Date    Abnormal Pap smear 2013    Acidosis     CAD (coronary artery disease) 06/2017     iFR of LAD and RCA performed confirmed nonobstructive disease.    Cataract     Colon polyps     Disorder of kidney and ureter     Frequent urination     Hemorrhoid     High cholesterol     Poor circulation     Postoperative abdominal pain     Psoriasis     Varicose vein      Sedation Problems: NO  Family History   Problem Relation Age of Onset    Glaucoma Neg Hx     Macular degeneration Neg Hx     Strabismus Neg Hx     Retinal detachment Neg Hx     Amblyopia Neg Hx     Blindness Neg Hx     Cataracts Neg Hx     Prostate cancer Neg Hx     Kidney disease Neg Hx     Anesthesia problems Neg Hx      Fam Hx of Sedation Problems: NO  Social History     Socioeconomic History    Marital status: Single     Spouse name: Not on file    Number of children: Not on file    Years of education: Not on file    Highest education level: Not on file   Occupational History    Not on file   Social Needs    Financial resource strain: Not on file    Food insecurity:     Worry: Not on file     Inability: Not on file    Transportation needs:     Medical: Not on file     Non-medical: Not on file   Tobacco Use    Smoking status: Never Smoker    Smokeless tobacco: Never Used   Substance and Sexual Activity    Alcohol use: No     Alcohol/week: 0.0 standard drinks    Drug use: No    Sexual activity: Never   Lifestyle    Physical activity:     Days per week: Not on file     Minutes per session: Not on file    Stress: Not on file   Relationships    Social connections:     Talks on phone:  Not on file     Gets together: Not on file     Attends [employer] service: Not on file     Active member of club or organization: Not on file     Attends meetings of clubs or organizations: Not on file     Relationship status: Not on file   Other Topics Concern    Not on file   Social History Narrative    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  General: no distress  Head: normocephalic  Mallampati Score   Neck: supple, symmetrical, trachea midline  Lungs:  clear to auscultation bilaterally and normal respiratory effort  Heart: regular rate and rhythm and no murmur  Abdomen: soft, non-tender non-distented; bowel sounds normal; no masses,  no organomegaly  Extremities: no cyanosis or edema, or clubbing    ASA:  III    PLAN  COLONOSCOPY.    SedationPlan :MAC    The details of the procedure, the possible need for biopsy or polypectomy and the potential risks including bleeding, perforation, missed polyps were discussed in detail.

## 2019-10-18 NOTE — TRANSFER OF CARE
"Anesthesia Transfer of Care Note    Patient: Ghada Dave    Procedure(s) Performed: Procedure(s) (LRB):  COLONOSCOPY (N/A)    Patient location: PACU    Anesthesia Type: general    Transport from OR: Transported from OR on 6-10 L/min O2 by face mask with adequate spontaneous ventilation    Post pain: adequate analgesia    Post assessment: no apparent anesthetic complications and tolerated procedure well    Post vital signs: stable    Level of consciousness: awake and alert    Nausea/Vomiting: no nausea/vomiting    Complications: none    Transfer of care protocol was followed      Last vitals:   Visit Vitals  BP (!) 161/70   Pulse 66   Temp 36.6 °C (97.9 °F)   Resp 16   Ht 5' 4" (1.626 m)   Wt 60.8 kg (134 lb)   SpO2 95%   Breastfeeding? No   BMI 23.00 kg/m²     "

## 2019-10-18 NOTE — ANESTHESIA POSTPROCEDURE EVALUATION
Anesthesia Post Evaluation    Patient: Ghada Dave    Procedure(s) Performed: Procedure(s) (LRB):  COLONOSCOPY (N/A)    Final Anesthesia Type: general  Patient location during evaluation: GI PACU  Patient participation: Yes- Able to Participate  Level of consciousness: awake and alert  Post-procedure vital signs: reviewed and stable  Pain management: adequate  Airway patency: patent  PONV status at discharge: No PONV  Anesthetic complications: no      Cardiovascular status: blood pressure returned to baseline  Respiratory status: unassisted  Hydration status: euvolemic  Follow-up not needed.          Vitals Value Taken Time   /63 10/18/2019  8:05 AM   Temp 37.1 °C (98.7 °F) 10/18/2019  8:05 AM   Pulse 62 10/18/2019  8:05 AM   Resp 12 10/18/2019  8:05 AM   SpO2 98 % 10/18/2019  8:05 AM         No case tracking events are documented in the log.      Pain/Erin Score: No data recorded

## 2019-10-25 ENCOUNTER — TELEPHONE (OUTPATIENT)
Dept: ENDOSCOPY | Facility: HOSPITAL | Age: 84
End: 2019-10-25

## 2019-10-28 DIAGNOSIS — K21.9 GASTROESOPHAGEAL REFLUX DISEASE WITHOUT ESOPHAGITIS: ICD-10-CM

## 2019-10-28 RX ORDER — HYDROGEN PEROXIDE 3 %
SOLUTION, NON-ORAL MISCELLANEOUS
Qty: 90 CAPSULE | Refills: 3 | Status: SHIPPED | OUTPATIENT
Start: 2019-10-28 | End: 2020-10-15

## 2020-01-30 ENCOUNTER — OFFICE VISIT (OUTPATIENT)
Dept: OPTOMETRY | Facility: CLINIC | Age: 85
End: 2020-01-30
Payer: COMMERCIAL

## 2020-01-30 DIAGNOSIS — H04.123 DRY EYES, BILATERAL: ICD-10-CM

## 2020-01-30 DIAGNOSIS — Z13.5 GLAUCOMA SCREENING: ICD-10-CM

## 2020-01-30 DIAGNOSIS — H52.4 PRESBYOPIA: Primary | ICD-10-CM

## 2020-01-30 PROCEDURE — 92015 DETERMINE REFRACTIVE STATE: CPT | Mod: S$GLB,,, | Performed by: OPTOMETRIST

## 2020-01-30 PROCEDURE — 92014 PR EYE EXAM, EST PATIENT,COMPREHESV: ICD-10-PCS | Mod: S$GLB,,, | Performed by: OPTOMETRIST

## 2020-01-30 PROCEDURE — 99999 PR PBB SHADOW E&M-EST. PATIENT-LVL II: ICD-10-PCS | Mod: PBBFAC,,, | Performed by: OPTOMETRIST

## 2020-01-30 PROCEDURE — 99999 PR PBB SHADOW E&M-EST. PATIENT-LVL II: CPT | Mod: PBBFAC,,, | Performed by: OPTOMETRIST

## 2020-01-30 PROCEDURE — 92014 COMPRE OPH EXAM EST PT 1/>: CPT | Mod: S$GLB,,, | Performed by: OPTOMETRIST

## 2020-01-30 PROCEDURE — 92015 PR REFRACTION: ICD-10-PCS | Mod: S$GLB,,, | Performed by: OPTOMETRIST

## 2020-01-30 NOTE — PROGRESS NOTES
"HPI     DLS: 4/2/18  Pt states her eyes itch a lot. Also states her dist and reading [facility] had   changed she is not see as well as she once was with her glasses.   No f/f  At gtts ([employer]?) "occasionally"    Last edited by Chong Shah, OD on 1/30/2020 11:01 AM. (History)        ROS     Positive for: Eyes (cat surgery/YAG OU)    Negative for: Constitutional, Gastrointestinal, Neurological, Skin,   Genitourinary, Musculoskeletal, HENT, Endocrine, Cardiovascular,   Respiratory, Psychiatric, Allergic/Imm, Heme/Lymph    Last edited by Chong Shah, OD on 1/30/2020 11:01 AM. (History)        Assessment /Plan     For exam results, see Encounter Report.    Presbyopia    Glaucoma screening    Dry eyes, bilateral        ESL pt--had  present    1. Sp pciol/YAG OU.  Wrote new spex Rx, but discussed w pt that blurry vision from dry eyes, and NOT spex Rx change!!!  2. Eye pain/transient blur 2 to Dry eye sx.  AGAIN had Long discussion w pt thru --she needs to start SYS BAL or SOOTHE XP ATs QID DAILY.  Discussed chronic nature of condition    PLAN:    Pt will call sooner if sx persist w tx, and may try short course of FML.  Otherwise rtc 1 yr                 "

## 2020-09-16 ENCOUNTER — HOSPITAL ENCOUNTER (EMERGENCY)
Facility: HOSPITAL | Age: 85
Discharge: HOME | End: 2020-09-16
Attending: EMERGENCY MEDICINE
Payer: MEDICARE

## 2020-09-16 VITALS
BODY MASS INDEX: 24.29 KG/M2 | HEART RATE: 76 BPM | OXYGEN SATURATION: 98 % | TEMPERATURE: 98 F | RESPIRATION RATE: 16 BRPM | DIASTOLIC BLOOD PRESSURE: 64 MMHG | WEIGHT: 132 LBS | HEIGHT: 62 IN | SYSTOLIC BLOOD PRESSURE: 144 MMHG

## 2020-09-16 DIAGNOSIS — N30.90 CYSTITIS: Primary | ICD-10-CM

## 2020-09-16 DIAGNOSIS — S31.40XA VAGINAL WOUND, INITIAL ENCOUNTER: ICD-10-CM

## 2020-09-16 LAB
BACTERIA #/AREA URNS HPF: ABNORMAL /HPF
BACTERIA GENITAL QL WET PREP: NORMAL
BILIRUB UR QL STRIP: NEGATIVE
CLARITY UR: CLEAR
CLUE CELLS VAG QL WET PREP: NORMAL
COLOR UR: YELLOW
FILAMENT FUNGI VAG WET PREP-#/AREA: NORMAL
GLUCOSE UR QL STRIP: NEGATIVE
HGB UR QL STRIP: ABNORMAL
HYALINE CASTS #/AREA URNS LPF: 0 /LPF
KETONES UR QL STRIP: NEGATIVE
LEUKOCYTE ESTERASE UR QL STRIP: ABNORMAL
MICROSCOPIC COMMENT: ABNORMAL
NITRITE UR QL STRIP: NEGATIVE
PH UR STRIP: 6 [PH] (ref 5–8)
PROT UR QL STRIP: NEGATIVE
RBC #/AREA URNS HPF: 5 /HPF (ref 0–4)
SP GR UR STRIP: 1.02 (ref 1–1.03)
SPECIMEN SOURCE: NORMAL
SQUAMOUS #/AREA URNS HPF: 2 /HPF
T VAGINALIS GENITAL QL WET PREP: NORMAL
URN SPEC COLLECT METH UR: ABNORMAL
UROBILINOGEN UR STRIP-ACNC: NEGATIVE EU/DL
WBC #/AREA URNS HPF: 12 /HPF (ref 0–5)
WBC #/AREA VAG WET PREP: NORMAL
WBC CLUMPS URNS QL MICRO: ABNORMAL
YEAST GENITAL QL WET PREP: NORMAL
YEAST URNS QL MICRO: ABNORMAL

## 2020-09-16 PROCEDURE — 81000 URINALYSIS NONAUTO W/SCOPE: CPT

## 2020-09-16 PROCEDURE — 87210 SMEAR WET MOUNT SALINE/INK: CPT

## 2020-09-16 PROCEDURE — 87086 URINE CULTURE/COLONY COUNT: CPT

## 2020-09-16 PROCEDURE — 99283 EMERGENCY DEPT VISIT LOW MDM: CPT

## 2020-09-16 RX ORDER — CIPROFLOXACIN 500 MG/1
500 TABLET ORAL 2 TIMES DAILY
Qty: 14 TABLET | Refills: 0 | Status: SHIPPED | OUTPATIENT
Start: 2020-09-16 | End: 2020-09-23

## 2020-09-16 NOTE — ED NOTES
Patient complained of abcess to vaginal area on left labia, patient complained that sometimes it get bigger and it has itching. Patient also complained of pain with urination and blood in urine x 3 days. Patient changed into gown and urine specimen collected. Son at bedside with patient    APPEARANCE: Alert, oriented and in no acute distress.  HEENT: Speaks without hoarseness.  CARDIAC: Normal rate and rhythm.    PERIPHERAL VASCULAR: peripheral pulses present. Normal cap refill. No edema. Warm to touch.    RESPIRATORY:Normal rate and effort. Respirations are equal and unlabored no obvious signs of distress.  GASTRO: soft, nondistended, nontender. Denies nausea, vomiting, or diarrhea.  : voids spontaneously and with pain on urination. Patient stated that she has had blood in her urine.   MUSC: Full ROM. No obvious deformity. Ambulatory with a steady gait  SKIN: Skin is warm and dry, without discoloration. Mucous membranes moist.  NEURO: Pt is awake, alert, aware of environment. No neurologic deficits noted.

## 2020-09-16 NOTE — ED PROVIDER NOTES
Encounter Date: 9/16/2020    SCRIBE #1 NOTE: I, Diana Back, am scribing for, and in the presence of,  Roe Norton MD. I have scribed the entire note.       History     Chief Complaint   Patient presents with    Dysuria     dysuri and hematuria x days    Abscess     abscess to groin x 1 week.     Ghada Dave is a 85 y.o. female who  has a past medical history of Abnormal Pap smear (2013), Acidosis, CAD (coronary artery disease) (06/2017), Cataract, Colon polyps, Disorder of kidney and ureter, Frequent urination, Hemorrhoid, High cholesterol, Poor circulation, Postoperative abdominal pain, Psoriasis, and Varicose vein.    The patient presents to the ED due to dysuria x1 week.  She describes the dysuria as both burning and itching.  Her associated symptoms include lower back pain, feeling bloated, and an abscess at groin that she describes as a small ball.   The patient denies any N/V or any other concerning symptoms.   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     The history is provided by the patient and a relative. The history is limited by a language barrier. A  was used (son).     Review of patient's allergies indicates:   Allergen Reactions    Keflex [cephalexin] Rash     Past Medical History:   Diagnosis Date    Abnormal Pap smear 2013    Acidosis     CAD (coronary artery disease) 06/2017     iFR of LAD and RCA performed confirmed nonobstructive disease.    Cataract     Colon polyps     Disorder of kidney and ureter     Frequent urination     Hemorrhoid     High cholesterol     Poor circulation     Postoperative abdominal pain     Psoriasis     Varicose vein      Past Surgical History:   Procedure Laterality Date    CARDIAC CATHETERIZATION  06/2017     iFR of LAD and RCA performed confirmed nonobstructive disease.    CATARACT EXTRACTION W/  INTRAOCULAR LENS IMPLANT  Bilateral     COLONOSCOPY N/A 8/28/2017    Procedure: COLONOSCOPY;  Surgeon: Bertram Myers MD;  Location: [facility] ENDO;  Service: Endoscopy;  Laterality: N/A;    COLONOSCOPY N/A 10/18/2019    Procedure: COLONOSCOPY;  Surgeon: Wilfred Barber MD;  Location: [facility] ENDO (4TH FLR);  Service: Endoscopy;  Laterality: N/A;   needed-BB  Cardiology Clearance received from Dr. REBECA Escalona, see telephone encounter 10/1/19-BB    LEFT HEART CATHETERIZATION N/A 4/8/2019    Procedure: Left heart cath;  Surgeon: Renato Escalona MD;  Location: [facility] CATH LAB/EP;  Service: Cardiology;  Laterality: N/A;    POLYPECTOMY      YAG Laser Capsulotomy Right 04/17/2017    Dr. Chavez     Family History   Problem Relation Age of Onset    Glaucoma Neg Hx     Macular degeneration Neg Hx     Strabismus Neg Hx     Retinal detachment Neg Hx     Amblyopia Neg Hx     Blindness Neg Hx     Cataracts Neg Hx     Prostate cancer Neg Hx     Kidney disease Neg Hx     Anesthesia problems Neg Hx      Social History     Tobacco Use    Smoking status: Never Smoker    Smokeless tobacco: Never Used   Substance Use Topics    Alcohol use: No     Alcohol/week: 0.0 standard drinks    Drug use: No     Review of Systems   Constitutional: Negative for chills and fever.   HENT: Negative for sore throat.    Eyes: Negative for visual disturbance.   Respiratory: Negative for shortness of breath.    Cardiovascular: Negative for chest pain.   Gastrointestinal: Positive for abdominal distention (bloated feeling). Negative for abdominal pain, nausea and vomiting.   Genitourinary: Positive for dysuria. Negative for difficulty urinating, frequency and urgency.        Wound at groin   Musculoskeletal: Positive for gait problem. Negative for back pain.   Skin: Negative for rash and wound.   Neurological: Negative for syncope, weakness and headaches.   Psychiatric/Behavioral: Negative for agitation, behavioral problems and confusion.        Physical Exam     Initial Vitals [09/16/20 1639]   BP Pulse Resp Temp SpO2   135/61 92 17 97.8 °F (36.6 °C) 98 %      MAP       --         Physical Exam    Nursing note and vitals reviewed.  Constitutional: She appears well-developed and well-nourished. She is not diaphoretic. No distress.   HENT:   Head: Normocephalic and atraumatic.   Right Ear: Tympanic membrane normal.   Left Ear: Tympanic membrane normal.   Mouth/Throat: Oropharynx is clear and moist.   Eyes: Conjunctivae and EOM are normal. Pupils are equal, round, and reactive to light.   Neck: Normal range of motion. Neck supple.   Cardiovascular: Normal rate, regular rhythm and normal heart sounds. Exam reveals no gallop and no friction rub.    No murmur heard.  Pulmonary/Chest: Breath sounds normal. She has no wheezes. She has no rhonchi. She has no rales.   Abdominal: Soft. Bowel sounds are normal. There is abdominal tenderness (Suprapubic, mild). There is no rebound and no guarding.   Genitourinary:    Genitourinary Comments: Less than 1 cm ulcerated wound just outside introitus on the left side; no mass, no fluctuance, no laceration, no purulence or bleeding     Musculoskeletal: Normal range of motion. No tenderness or edema.   Lymphadenopathy:     She has no cervical adenopathy.   Neurological: She is alert and oriented to person, place, and time. She has normal strength.   Skin: Skin is warm and dry. Capillary refill takes less than 2 seconds. No rash noted.   Psychiatric: She has a normal mood and affect. Thought content normal.         ED Course   Procedures  Labs Reviewed   URINALYSIS, REFLEX TO URINE CULTURE - Abnormal; Notable for the following components:       Result Value    Occult Blood UA 2+ (*)     Leukocytes, UA 1+ (*)     All other components within normal limits    Narrative:     Specimen Source->Urine   URINALYSIS MICROSCOPIC - Abnormal; Notable for the following components:    RBC, UA 5 (*)     WBC, UA 12 (*)     WBC Clumps, UA  "Moderate (*)     Bacteria Few (*)     All other components within normal limits    Narrative:     Specimen Source->Urine   CULTURE, URINE   VAGINAL SCREEN          Imaging Results    None          Medical Decision Making:   History:   Old Medical Records: I decided to obtain old medical records.  Old Records Summarized: records from clinic visits.       <> Summary of Records: 04/11/19 Retroperitoneal US findings include: "Right: Measures 9.9 cm.  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  Normal cortical thickness and echogenicity.  No focal lesions.  No hydronephrosis.  Left: Measures 10.4 cm.  Normal cortical thickness and echogenicity.  No focal lesions.  No hydronephrosis"  Clinical Tests:   Lab Tests: Reviewed and Ordered                   ED Course as of Sep 16 2243   Wed Sep 16, 2020   1916 WBC Clumps, UA(!): Moderate [NP]   1916 WBC, UA(!): 12 [NP]      ED Course User Index  [NP] Roe Norton MD           Patient is UTI, no evidence of abscess, cyst or other concerning lesion.  I believe that wound is secondary to excoriation.  vaginal swab was negative for Candida or bacterial vaginosis.  Recommend antibiotic ointment as needed for healing.  Antibiotics for UTI as prescribed.  Follow up with PCP as needed in 1 week or return for any emergent concerns.      Clinical Impression:     ICD-10-CM ICD-9-CM   1. Cystitis  N30.90 595.9   2. Vaginal wound, initial encounter  S31.40XA 878.6                          ED Disposition Condition    Discharge Stable        ED Prescriptions     Medication Sig Dispense Start Date End Date Auth. Provider    ciprofloxacin HCl (CIPRO) 500 MG tablet Take 1 tablet (500 mg total) by mouth 2 (two) times daily. for 7 days 14 tablet 9/16/2020 9/23/2020 Roe Norton MD    neomycin-polymyxin-pramoxine (NEOSPORIN) 3.5-10,000-10 mg-unit-mg/gram Crea Apply topically 2 (two) times daily. 1 Tube 9/16/2020  Roe Norton MD        Follow-up Information     Follow up With Specialties Details Why " Contact Info    Dandre Mckeon MD Internal Medicine   [address] [place] [address] [address]  [place] [id number]  [phone]                        I, Dr. Roe Norton, personally performed the services described in this documentation.   All medical record entries made by the scribe were at my direction and in my presence.   I have reviewed the chart and agree that the record is accurate and complete.   Roe Norton MD.                    Roe Norton MD  09/16/20 [id number]

## 2020-09-17 LAB — BACTERIA UR CULT: NORMAL

## 2020-09-17 NOTE — DISCHARGE INSTRUCTIONS
Thank you for choosing [facility]! We appreciate you coming to us for your medical care. We hope you feel better soon! Please come back to [facility] for all of your future medical needs.    Our goal in the emergency department is to always give you outstanding care and exceptional service. You may receive a survey by mail or e-mail in the next week regarding your experience in our ED. We would greatly appreciate your completing and returning the survey. Your feedback provides us with a way to recognize our staff who give very good care and it helps us learn how to improve when your experience was below our aspiration of excellence.       Sincerely,    Roe Norton MD  Medical Director  Emergency Department  [facility]

## 2020-09-17 NOTE — ED NOTES
upon carehandoff, pt vital signs are stable at this time. pt in no apparent distress. pt updated on results waiting at this time. pt verbalizes understanding. pt given call bell and instructed to call for assistance. pt verbalizes understanding and provides return demo. Will continue to monitor.

## 2020-11-04 ENCOUNTER — OFFICE VISIT (OUTPATIENT)
Dept: FAMILY MEDICINE | Facility: CLINIC | Age: 85
End: 2020-11-04
Payer: MEDICARE

## 2020-11-04 VITALS
WEIGHT: 131.38 LBS | TEMPERATURE: 98 F | HEART RATE: 85 BPM | SYSTOLIC BLOOD PRESSURE: 124 MMHG | OXYGEN SATURATION: 96 % | BODY MASS INDEX: 24.18 KG/M2 | HEIGHT: 62 IN | DIASTOLIC BLOOD PRESSURE: 58 MMHG

## 2020-11-04 DIAGNOSIS — Z28.21 REFUSED INFLUENZA VACCINE: ICD-10-CM

## 2020-11-04 DIAGNOSIS — K21.9 GASTROESOPHAGEAL REFLUX DISEASE WITHOUT ESOPHAGITIS: ICD-10-CM

## 2020-11-04 DIAGNOSIS — M25.511 CHRONIC RIGHT SHOULDER PAIN: Primary | ICD-10-CM

## 2020-11-04 DIAGNOSIS — Z00.00 ROUTINE ADULT HEALTH MAINTENANCE: ICD-10-CM

## 2020-11-04 DIAGNOSIS — R73.03 PREDIABETES: ICD-10-CM

## 2020-11-04 DIAGNOSIS — M81.8 AGE-RELATED OSTEOPOROSIS WITHOUT FRACTURE: ICD-10-CM

## 2020-11-04 DIAGNOSIS — M14.88: ICD-10-CM

## 2020-11-04 DIAGNOSIS — I10 ESSENTIAL (PRIMARY) HYPERTENSION: ICD-10-CM

## 2020-11-04 DIAGNOSIS — G47.00 INSOMNIA, UNSPECIFIED TYPE: ICD-10-CM

## 2020-11-04 DIAGNOSIS — L40.9 PSORIASIS: ICD-10-CM

## 2020-11-04 DIAGNOSIS — M15.9 OSTEOARTHRITIS OF MULTIPLE JOINTS, UNSPECIFIED OSTEOARTHRITIS TYPE: ICD-10-CM

## 2020-11-04 DIAGNOSIS — G89.29 CHRONIC RIGHT SHOULDER PAIN: Primary | ICD-10-CM

## 2020-11-04 DIAGNOSIS — I25.10 CORONARY ARTERY DISEASE INVOLVING NATIVE CORONARY ARTERY OF NATIVE HEART WITHOUT ANGINA PECTORIS: ICD-10-CM

## 2020-11-04 PROCEDURE — 3078F PR MOST RECENT DIASTOLIC BLOOD PRESSURE < 80 MM HG: ICD-10-PCS | Mod: CPTII,S$GLB,, | Performed by: INTERNAL MEDICINE

## 2020-11-04 PROCEDURE — 99214 PR OFFICE/OUTPT VISIT, EST, LEVL IV, 30-39 MIN: ICD-10-PCS | Mod: S$GLB,,, | Performed by: INTERNAL MEDICINE

## 2020-11-04 PROCEDURE — 99999 PR PBB SHADOW E&M-EST. PATIENT-LVL IV: CPT | Mod: PBBFAC,,, | Performed by: INTERNAL MEDICINE

## 2020-11-04 PROCEDURE — 1159F PR MEDICATION LIST DOCUMENTED IN MEDICAL RECORD: ICD-10-PCS | Mod: S$GLB,,, | Performed by: INTERNAL MEDICINE

## 2020-11-04 PROCEDURE — 3074F PR MOST RECENT SYSTOLIC BLOOD PRESSURE < 130 MM HG: ICD-10-PCS | Mod: CPTII,S$GLB,, | Performed by: INTERNAL MEDICINE

## 2020-11-04 PROCEDURE — 3074F SYST BP LT 130 MM HG: CPT | Mod: CPTII,S$GLB,, | Performed by: INTERNAL MEDICINE

## 2020-11-04 PROCEDURE — 1126F PR PAIN SEVERITY QUANTIFIED, NO PAIN PRESENT: ICD-10-PCS | Mod: S$GLB,,, | Performed by: INTERNAL MEDICINE

## 2020-11-04 PROCEDURE — 99999 PR PBB SHADOW E&M-EST. PATIENT-LVL IV: ICD-10-PCS | Mod: PBBFAC,,, | Performed by: INTERNAL MEDICINE

## 2020-11-04 PROCEDURE — 1101F PT FALLS ASSESS-DOCD LE1/YR: CPT | Mod: CPTII,S$GLB,, | Performed by: INTERNAL MEDICINE

## 2020-11-04 PROCEDURE — 1101F PR PT FALLS ASSESS DOC 0-1 FALLS W/OUT INJ PAST YR: ICD-10-PCS | Mod: CPTII,S$GLB,, | Performed by: INTERNAL MEDICINE

## 2020-11-04 PROCEDURE — 99214 OFFICE O/P EST MOD 30 MIN: CPT | Mod: S$GLB,,, | Performed by: INTERNAL MEDICINE

## 2020-11-04 PROCEDURE — 3078F DIAST BP <80 MM HG: CPT | Mod: CPTII,S$GLB,, | Performed by: INTERNAL MEDICINE

## 2020-11-04 PROCEDURE — 1126F AMNT PAIN NOTED NONE PRSNT: CPT | Mod: S$GLB,,, | Performed by: INTERNAL MEDICINE

## 2020-11-04 PROCEDURE — 1159F MED LIST DOCD IN RCRD: CPT | Mod: S$GLB,,, | Performed by: INTERNAL MEDICINE

## 2020-11-04 RX ORDER — CELECOXIB 100 MG/1
100 CAPSULE ORAL DAILY
Qty: 30 CAPSULE | Refills: 2 | Status: SHIPPED | OUTPATIENT
Start: 2020-11-04 | End: 2020-12-23 | Stop reason: SDUPTHER

## 2020-11-04 RX ORDER — HYDROGEN PEROXIDE 3 %
20 SOLUTION, NON-ORAL MISCELLANEOUS DAILY
Qty: 90 CAPSULE | Refills: 3 | Status: SHIPPED | OUTPATIENT
Start: 2020-11-04 | End: 2020-12-23 | Stop reason: SDUPTHER

## 2020-11-04 RX ORDER — TRAZODONE HYDROCHLORIDE 50 MG/1
25 TABLET ORAL NIGHTLY PRN
Qty: 15 TABLET | Refills: 11 | Status: SHIPPED | OUTPATIENT
Start: 2020-11-04 | End: 2020-12-23

## 2020-11-04 RX ORDER — MOMETASONE FUROATE 1 MG/G
CREAM TOPICAL 2 TIMES DAILY PRN
Qty: 30 G | Refills: 3 | Status: SHIPPED | OUTPATIENT
Start: 2020-11-04 | End: 2020-12-23 | Stop reason: SDUPTHER

## 2020-11-04 RX ORDER — IBANDRONATE SODIUM 150 MG/1
150 TABLET, FILM COATED ORAL
Qty: 3 TABLET | Refills: 1 | Status: SHIPPED | OUTPATIENT
Start: 2020-11-04 | End: 2020-11-09 | Stop reason: SDUPTHER

## 2020-11-04 RX ORDER — SIMVASTATIN 20 MG/1
TABLET, FILM COATED ORAL
COMMUNITY
End: 2021-01-21 | Stop reason: SDUPTHER

## 2020-11-04 NOTE — PROGRESS NOTES
"Subjective:       Patient ID: Ghada Dave is a 85 y.o. female.    Chief Complaint: Establish Care      The patient is here to get established with new PCP. Complains of chronic multiple joint pains with hx of "arthritis", especially shoulders. She wast told she had tears and had surgery on right shoulder that she feels has not helped as much as she thought. Celebrex used to help significantly but ran out and Ibuprofen not helping. Reports also knee , back, snf hand pains that Celebrex used to help. Pain tends to be worse in the morning and has stiffness for a while. She stays active and denies swelling. Previous PCP gave her a steroid injection to knee that helped but the left shoulder injection given by orthopedist did not.    She has hx of psoriasis (and chart also shows lichen planus diagnosis), with little change in rash and itching with creams prescribed.    She also has a hiatal hernia, on chronic PPI for GERD which does help symptmos. Fundoplication recommended but decided not to have it done. Appetite is good. Has lost 6 lbs without change in diet. She is up to date with colonoscopy and had EGD recently at [facility] when evaluated for GERD and fundoplication/surgery. Tends to be constipated but Metamucil helps.    She has trouble sleeping and has used Xanax in the past which helps.      Review of Systems   Constitutional: Positive for unexpected weight change (lost 6 lbs). Negative for activity change, appetite change, chills, fatigue and fever.   HENT: Negative for nasal congestion, ear pain, rhinorrhea and sore throat. Hearing loss: Had wax removed.    Eyes: Negative for redness and visual disturbance.   Respiratory: Positive for cough (dry cough) and wheezing (Occasional through congestion with mild squeeky noise.). Negative for shortness of breath.    Cardiovascular: Negative for chest pain, palpitations, leg swelling and claudication.   Gastrointestinal: Positive for constipation and reflux. " Negative for abdominal pain, blood in stool, change in bowel habit, diarrhea, nausea, vomiting and change in bowel habit.   Genitourinary: Positive for bladder incontinence and frequency. Negative for difficulty urinating and dysuria.   Musculoskeletal: Positive for arthralgias.   Integumentary:  Positive for rash (Psoriatric pathces in legs and trunk).   Neurological: Negative for dizziness, weakness, light-headedness, numbness, headaches and memory loss.   Hematological: Does not bruise/bleed easily.   Psychiatric/Behavioral: Positive for sleep disturbance. Suicidal ideas: denies depression. The patient is not nervous/anxious.       Past Medical History:   Diagnosis Date    Abnormal Pap smear 2013    Acidosis     CAD (coronary artery disease) 06/2017     iFR of LAD and RCA performed confirmed nonobstructive disease.    Cataract     Colon polyps     Disorder of kidney and ureter     Frequent urination     Hemorrhoid     High cholesterol     Poor circulation     Postoperative abdominal pain     Psoriasis     Varicose vein        Past Surgical History:   Procedure Laterality Date    CARDIAC CATHETERIZATION  06/2017     iFR of LAD and RCA performed confirmed nonobstructive disease.    CATARACT EXTRACTION W/  INTRAOCULAR LENS IMPLANT Bilateral     COLONOSCOPY N/A 8/28/2017    Procedure: COLONOSCOPY;  Surgeon: Bertram Myers MD;  Location: [facility] ENDO;  Service: Endoscopy;  Laterality: N/A;    COLONOSCOPY N/A 10/18/2019    Procedure: COLONOSCOPY;  Surgeon: Wilfred Barber MD;  Location: [facility] ([address] [place] [address]);  Service: Endoscopy;  Laterality: N/A;   needed-BB  Cardiology Clearance received from Dr. REBECA Escalona, see telephone encounter 10/1/19-BB    LEFT HEART CATHETERIZATION N/A 4/8/2019    Procedure: Left heart cath;  Surgeon: Renato Escalona MD;  Location: [facility] CATH LAB/EP;  Service: Cardiology;  Laterality: N/A;    POLYPECTOMY      YAG Laser Capsulotomy Right 04/17/2017     Dr. Chavez       Family History   Problem Relation Age of Onset    Glaucoma Neg Hx     Macular degeneration Neg Hx     Strabismus Neg Hx     Retinal detachment Neg Hx     Amblyopia Neg Hx     Blindness Neg Hx     Cataracts Neg Hx     Prostate cancer Neg Hx     Kidney disease Neg Hx     Anesthesia problems Neg Hx        Social History     Socioeconomic History    Marital status: Single     Spouse name: Not on file    Number of children: Not on file    Years of education: Not on file    Highest education level: Not on file   Occupational History    Not on file   Social Needs    Financial resource strain: Not on file    Food insecurity     Worry: Not on file     Inability: Not on file    Transportation needs     Medical: Not on file     Non-medical: Not on file   Tobacco Use    Smoking status: Never Smoker    Smokeless tobacco: Never Used   Substance and Sexual Activity    Alcohol use: No     Alcohol/week: 0.0 standard drinks    Drug use: No    Sexual activity: Never   Lifestyle    Physical activity     Days per week: Not on file     Minutes per session: Not on file    Stress: Not on file   Relationships    Social connections     Talks on phone: Not on file     Gets together: Not on file     Attends [employer] service: Not on file     Active member of club or organization: Not on file     Attends meetings of clubs or organizations: Not on file     Relationship status: Not on file   Other Topics Concern    Not on file   Social History Narrative    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      esomeprazole (NEXIUM) 20 MG capsule Take 1 capsule (20 mg total) by mouth once daily. 90 capsule 3    ALPRAZolam (XANAX) 1 MG tablet Take 1 mg by mouth 2 (two) times daily.      b complex vitamins capsule Take 1 capsule by mouth once daily.  "     celecoxib (CELEBREX) 100 MG capsule Take 1 capsule (100 mg total) by mouth once daily. 30 capsule 2    hydrocortisone 2.5 % cream Apply topically 2 (two) times daily. for 10 days (Patient not taking: Reported on 11/4/2020) 1 Tube 5    ibandronate (BONIVA) 150 mg tablet Take 1 tablet (150 mg total) by mouth every 30 days. 3 tablet 1    mometasone 0.1% (ELOCON) 0.1 % cream Apply topically 2 (two) times daily as needed. 30 g 3    simvastatin (ZOCOR) 20 MG tablet simvastatin 20 mg tablet      traZODone (DESYREL) 50 MG tablet Take 0.5 tablets (25 mg total) by mouth nightly as needed for Insomnia. 15 tablet 11    white petrolatum-mineral oil 56.8-42.5% (LACRI-LUBE S.O.P.) 56.8-42.5 % Oint Place into both eyes every evening. (Patient not taking: Reported on 11/4/2020) 15 g 0     No current facility-administered medications for this visit.        Review of patient's allergies indicates:   Allergen Reactions    Penicillin Anaphylaxis    Keflex [cephalexin] Rash         Objective:       Last 3 sets of Vitals    Vitals - 1 value per visit 1/30/2020 9/16/2020 11/4/2020   SYSTOLIC - 144 124   DIASTOLIC - 64 58   PULSE - 76 85   TEMPERATURE - 98 98.4   RESPIRATIONS - 16 -   SPO2 - 98 96   Weight (lb) - 132 131.39   Weight (kg) - 59.875 59.6   HEIGHT - 5' 2" 5' 2"   BODY MASS INDEX - 24.14 24.03   VISIT REPORT - - -   Pain Score  0 - 0   Some recent data might be hidden   Physical Exam  Constitutional:       General: She is not in acute distress.     Appearance: Normal appearance. She is normal weight.   HENT:      Head: Normocephalic.      Right Ear: Tympanic membrane, ear canal and external ear normal.      Left Ear: Tympanic membrane, ear canal and external ear normal.      Nose: Nose normal.      Mouth/Throat:      Mouth: Mucous membranes are moist.      Pharynx: Oropharynx is clear.   Eyes:      General: No scleral icterus.     Extraocular Movements: Extraocular movements intact.      Conjunctiva/sclera: " Conjunctivae normal.      Pupils: Pupils are equal, round, and reactive to light.   Neck:      Musculoskeletal: Neck supple.      Vascular: No carotid bruit.   Cardiovascular:      Rate and Rhythm: Normal rate and regular rhythm.      Pulses: Normal pulses.      Heart sounds: Normal heart sounds.   Pulmonary:      Effort: Pulmonary effort is normal.      Breath sounds: Normal breath sounds. No wheezing.   Abdominal:      General: Bowel sounds are normal. There is no distension.      Palpations: Abdomen is soft. There is no mass.      Tenderness: There is no abdominal tenderness.   Musculoskeletal:         General: No swelling, tenderness or deformity.      Comments: Decreased abduction of right shoulder but preserved strength.   Lymphadenopathy:      Cervical: No cervical adenopathy.   Skin:     General: Skin is warm.      Findings: Rash (small scabbed and mildly escoraiated area medially to axillae, dark chronic skin discolartion with central scab from scratching in legs with no scally plaques. ) present.   Neurological:      General: No focal deficit present.      Mental Status: She is alert and oriented to person, place, and time.      Motor: No weakness.   Psychiatric:         Mood and Affect: Mood normal.         Behavior: Behavior normal.           URINALYSIS:  Recent Labs   Lab Result Units 09/16/20  1751   Color, UA  Yellow   Specific Gravity, UA  1.020   pH, UA  6.0   Protein, UA  Negative   Bacteria /hpf Few*   Nitrite, UA  Negative   Leukocytes, UA  1+*   Urobilinogen, UA EU/dL Negative   Hyaline Casts, UA /lpf 0            Assessment:       1. Chronic right shoulder pain    2. Gastroesophageal reflux disease without esophagitis    3. Psoriasis    4. Coronary artery disease involving native coronary artery of native heart without angina pectoris    5. Osteoarthritis of multiple joints, unspecified osteoarthritis type    6. Routine adult health maintenance    7. Age-related osteoporosis without fracture     8. Insomnia, unspecified type    9. Arthropathies in other specified diseases classified elsewhere, vertebrae     10. Essential (primary) hypertension     11. Prediabetes    12. Refused influenza vaccine        Plan:       Ghada was seen today for establish care.    Diagnoses and all orders for this visit:    Chronic right shoulder pain  -     Ambulatory referral/consult to Orthopedics; Future  - Will consider PT but patient reports limitations with transportation and someone helping with translation.    Gastroesophageal reflux disease without esophagitis  -     esomeprazole (NEXIUM) 20 MG capsule; Take 1 capsule (20 mg total) by mouth once daily.  -  Request copies from [facility].    Psoriasis  -     mometasone 0.1% (ELOCON) 0.1 % cream; Apply topically 2 (two) times daily as needed.  -     traZODone (DESYREL) 50 MG tablet; Take 0.5 tablets (25 mg total) by mouth nightly as needed for Insomnia.  - CRP, RF      Coronary artery disease involving native coronary artery of native heart without angina pectoris  -     Lipid Panel; Future  - On Aspirin and statin.   - Has hx of abnormal stress test and no need of stents on cardiac acth eval.    Osteoarthritis of multiple joints, unspecified osteoarthritis type  -     Uric Acid; Future  -     celecoxib (CELEBREX) 100 MG capsule; Take 1 capsule (100 mg total) by mouth once daily.  - RF levels, CRP levels    Routine adult health maintenance  -     CBC Auto Differential; Future  -     Comprehensive Metabolic Panel; Future  -     C-Reactive Protein; Future  -     Lipid Panel; Future  -     Uric Acid; Future  -     TSH; Future  -     Urinalysis; Future    Age-related osteoporosis without fracture  -     ibandronate (BONIVA) 150 mg tablet; Take 1 tablet (150 mg total) by mouth every 30 days.    Insomnia, unspecified type   -  Trial of Trazodone.   - Does not use Xanax every day and splits tablet in fourth. Advise to try something different due to concerns of dependence, and  association to early dementia changes with medication.    Arthropathies in other specified diseases classified elsewhere, vertebrae   -     CBC Auto Differential; Future, CRP, RF, DAVIDSON    Essential (primary) hypertension   -     TSH; Future    Prediabetes  -     Hemoglobin A1C; Future, CMP  -  Has not taken Metformin in a long time.    Refused influenza vaccine

## 2020-11-06 DIAGNOSIS — M81.8 AGE-RELATED OSTEOPOROSIS WITHOUT FRACTURE: ICD-10-CM

## 2020-11-06 RX ORDER — IBANDRONATE SODIUM 150 MG/1
150 TABLET, FILM COATED ORAL
Qty: 3 TABLET | Refills: 1 | Status: CANCELLED | OUTPATIENT
Start: 2020-11-06

## 2020-11-09 DIAGNOSIS — M81.8 AGE-RELATED OSTEOPOROSIS WITHOUT FRACTURE: ICD-10-CM

## 2020-11-11 ENCOUNTER — LAB VISIT (OUTPATIENT)
Dept: LAB | Facility: HOSPITAL | Age: 85
End: 2020-11-11
Attending: INTERNAL MEDICINE
Payer: MEDICARE

## 2020-11-11 DIAGNOSIS — I10 ESSENTIAL (PRIMARY) HYPERTENSION: ICD-10-CM

## 2020-11-11 DIAGNOSIS — R73.03 PREDIABETES: ICD-10-CM

## 2020-11-11 DIAGNOSIS — M15.9 OSTEOARTHRITIS OF MULTIPLE JOINTS, UNSPECIFIED OSTEOARTHRITIS TYPE: ICD-10-CM

## 2020-11-11 DIAGNOSIS — Z00.00 ROUTINE ADULT HEALTH MAINTENANCE: ICD-10-CM

## 2020-11-11 DIAGNOSIS — I25.10 CORONARY ARTERY DISEASE INVOLVING NATIVE CORONARY ARTERY OF NATIVE HEART WITHOUT ANGINA PECTORIS: ICD-10-CM

## 2020-11-11 LAB
ALBUMIN SERPL BCP-MCNC: 4 G/DL (ref 3.5–5.2)
ALP SERPL-CCNC: 74 U/L (ref 55–135)
ALT SERPL W/O P-5'-P-CCNC: 12 U/L (ref 10–44)
ANION GAP SERPL CALC-SCNC: 10 MMOL/L (ref 8–16)
AST SERPL-CCNC: 23 U/L (ref 10–40)
BASOPHILS # BLD AUTO: 0.06 K/UL (ref 0–0.2)
BASOPHILS NFR BLD: 1.3 % (ref 0–1.9)
BILIRUB SERPL-MCNC: 0.6 MG/DL (ref 0.1–1)
BUN SERPL-MCNC: 16 MG/DL (ref 8–23)
CALCIUM SERPL-MCNC: 9.3 MG/DL (ref 8.7–10.5)
CHLORIDE SERPL-SCNC: 110 MMOL/L (ref 95–110)
CHOLEST SERPL-MCNC: 153 MG/DL (ref 120–199)
CHOLEST/HDLC SERPL: 2.4 {RATIO} (ref 2–5)
CO2 SERPL-SCNC: 24 MMOL/L (ref 23–29)
CREAT SERPL-MCNC: 0.8 MG/DL (ref 0.5–1.4)
CRP SERPL-MCNC: 9.2 MG/L (ref 0–8.2)
DIFFERENTIAL METHOD: NORMAL
EOSINOPHIL # BLD AUTO: 0.2 K/UL (ref 0–0.5)
EOSINOPHIL NFR BLD: 3.7 % (ref 0–8)
ERYTHROCYTE [DISTWIDTH] IN BLOOD BY AUTOMATED COUNT: 14.3 % (ref 11.5–14.5)
EST. GFR  (AFRICAN AMERICAN): >60 ML/MIN/1.73 M^2
EST. GFR  (NON AFRICAN AMERICAN): >60 ML/MIN/1.73 M^2
ESTIMATED AVG GLUCOSE: 114 MG/DL (ref 68–131)
GLUCOSE SERPL-MCNC: 89 MG/DL (ref 70–110)
HBA1C MFR BLD HPLC: 5.6 % (ref 4–5.6)
HCT VFR BLD AUTO: 37 % (ref 37–48.5)
HDLC SERPL-MCNC: 63 MG/DL (ref 40–75)
HDLC SERPL: 41.2 % (ref 20–50)
HGB BLD-MCNC: 12.3 G/DL (ref 12–16)
IMM GRANULOCYTES # BLD AUTO: 0.01 K/UL (ref 0–0.04)
IMM GRANULOCYTES NFR BLD AUTO: 0.2 % (ref 0–0.5)
LDLC SERPL CALC-MCNC: 70.4 MG/DL (ref 63–159)
LYMPHOCYTES # BLD AUTO: 1.3 K/UL (ref 1–4.8)
LYMPHOCYTES NFR BLD: 28 % (ref 18–48)
MCH RBC QN AUTO: 29.1 PG (ref 27–31)
MCHC RBC AUTO-ENTMCNC: 33.2 G/DL (ref 32–36)
MCV RBC AUTO: 88 FL (ref 82–98)
MONOCYTES # BLD AUTO: 0.4 K/UL (ref 0.3–1)
MONOCYTES NFR BLD: 9.1 % (ref 4–15)
NEUTROPHILS # BLD AUTO: 2.7 K/UL (ref 1.8–7.7)
NEUTROPHILS NFR BLD: 57.7 % (ref 38–73)
NONHDLC SERPL-MCNC: 90 MG/DL
NRBC BLD-RTO: 0 /100 WBC
PLATELET # BLD AUTO: 319 K/UL (ref 150–350)
PMV BLD AUTO: 10.2 FL (ref 9.2–12.9)
POTASSIUM SERPL-SCNC: 4.5 MMOL/L (ref 3.5–5.1)
PROT SERPL-MCNC: 7.7 G/DL (ref 6–8.4)
RBC # BLD AUTO: 4.22 M/UL (ref 4–5.4)
SODIUM SERPL-SCNC: 144 MMOL/L (ref 136–145)
TRIGL SERPL-MCNC: 98 MG/DL (ref 30–150)
TSH SERPL DL<=0.005 MIU/L-ACNC: 1.76 UIU/ML (ref 0.4–4)
URATE SERPL-MCNC: 5.4 MG/DL (ref 2.4–5.7)
WBC # BLD AUTO: 4.61 K/UL (ref 3.9–12.7)

## 2020-11-11 PROCEDURE — 86140 C-REACTIVE PROTEIN: CPT

## 2020-11-11 PROCEDURE — 80061 LIPID PANEL: CPT

## 2020-11-11 PROCEDURE — 84443 ASSAY THYROID STIM HORMONE: CPT

## 2020-11-11 PROCEDURE — 83036 HEMOGLOBIN GLYCOSYLATED A1C: CPT

## 2020-11-11 PROCEDURE — 36415 COLL VENOUS BLD VENIPUNCTURE: CPT

## 2020-11-11 PROCEDURE — 84550 ASSAY OF BLOOD/URIC ACID: CPT

## 2020-11-11 PROCEDURE — 85025 COMPLETE CBC W/AUTO DIFF WBC: CPT

## 2020-11-11 PROCEDURE — 80053 COMPREHEN METABOLIC PANEL: CPT

## 2020-11-11 RX ORDER — IBANDRONATE SODIUM 150 MG/1
150 TABLET, FILM COATED ORAL
Qty: 3 TABLET | Refills: 1 | Status: SHIPPED | OUTPATIENT
Start: 2020-11-11 | End: 2020-12-23 | Stop reason: SDUPTHER

## 2020-11-27 ENCOUNTER — TELEPHONE (OUTPATIENT)
Dept: ORTHOPEDICS | Facility: CLINIC | Age: 85
End: 2020-11-27

## 2020-11-27 DIAGNOSIS — G89.29 CHRONIC RIGHT SHOULDER PAIN: Primary | ICD-10-CM

## 2020-11-27 DIAGNOSIS — M25.511 CHRONIC RIGHT SHOULDER PAIN: Primary | ICD-10-CM

## 2020-12-03 ENCOUNTER — HOSPITAL ENCOUNTER (OUTPATIENT)
Dept: RADIOLOGY | Facility: HOSPITAL | Age: 85
Discharge: HOME | End: 2020-12-03
Attending: ORTHOPAEDIC SURGERY
Payer: MEDICARE

## 2020-12-03 ENCOUNTER — OFFICE VISIT (OUTPATIENT)
Dept: ORTHOPEDICS | Facility: CLINIC | Age: 85
End: 2020-12-03
Payer: MEDICARE

## 2020-12-03 VITALS
BODY MASS INDEX: 24.18 KG/M2 | TEMPERATURE: 98 F | DIASTOLIC BLOOD PRESSURE: 69 MMHG | HEIGHT: 62 IN | WEIGHT: 131.38 LBS | SYSTOLIC BLOOD PRESSURE: 133 MMHG | HEART RATE: 91 BPM

## 2020-12-03 DIAGNOSIS — M75.121 NONTRAUMATIC COMPLETE TEAR OF RIGHT ROTATOR CUFF: ICD-10-CM

## 2020-12-03 DIAGNOSIS — M25.511 CHRONIC RIGHT SHOULDER PAIN: ICD-10-CM

## 2020-12-03 DIAGNOSIS — G89.29 CHRONIC RIGHT SHOULDER PAIN: ICD-10-CM

## 2020-12-03 PROCEDURE — 3075F PR MOST RECENT SYSTOLIC BLOOD PRESS GE 130-139MM HG: ICD-10-PCS | Mod: CPTII,S$GLB,, | Performed by: ORTHOPAEDIC SURGERY

## 2020-12-03 PROCEDURE — 73030 X-RAY EXAM OF SHOULDER: CPT | Mod: TC,PN,RT

## 2020-12-03 PROCEDURE — 1157F ADVNC CARE PLAN IN RCRD: CPT | Mod: S$GLB,,, | Performed by: ORTHOPAEDIC SURGERY

## 2020-12-03 PROCEDURE — 1125F AMNT PAIN NOTED PAIN PRSNT: CPT | Mod: S$GLB,,, | Performed by: ORTHOPAEDIC SURGERY

## 2020-12-03 PROCEDURE — 3075F SYST BP GE 130 - 139MM HG: CPT | Mod: CPTII,S$GLB,, | Performed by: ORTHOPAEDIC SURGERY

## 2020-12-03 PROCEDURE — 3288F PR FALLS RISK ASSESSMENT DOCUMENTED: ICD-10-PCS | Mod: CPTII,S$GLB,, | Performed by: ORTHOPAEDIC SURGERY

## 2020-12-03 PROCEDURE — 1125F PR PAIN SEVERITY QUANTIFIED, PAIN PRESENT: ICD-10-PCS | Mod: S$GLB,,, | Performed by: ORTHOPAEDIC SURGERY

## 2020-12-03 PROCEDURE — 99999 PR PBB SHADOW E&M-EST. PATIENT-LVL IV: ICD-10-PCS | Mod: PBBFAC,,, | Performed by: ORTHOPAEDIC SURGERY

## 2020-12-03 PROCEDURE — 1157F PR ADVANCE CARE PLAN OR EQUIV PRESENT IN MEDICAL RECORD: ICD-10-PCS | Mod: S$GLB,,, | Performed by: ORTHOPAEDIC SURGERY

## 2020-12-03 PROCEDURE — 73030 X-RAY EXAM OF SHOULDER: CPT | Mod: 26,RT,, | Performed by: RADIOLOGY

## 2020-12-03 PROCEDURE — 3078F DIAST BP <80 MM HG: CPT | Mod: CPTII,S$GLB,, | Performed by: ORTHOPAEDIC SURGERY

## 2020-12-03 PROCEDURE — 99999 PR PBB SHADOW E&M-EST. PATIENT-LVL IV: CPT | Mod: PBBFAC,,, | Performed by: ORTHOPAEDIC SURGERY

## 2020-12-03 PROCEDURE — 99203 OFFICE O/P NEW LOW 30 MIN: CPT | Mod: S$GLB,,, | Performed by: ORTHOPAEDIC SURGERY

## 2020-12-03 PROCEDURE — 3288F FALL RISK ASSESSMENT DOCD: CPT | Mod: CPTII,S$GLB,, | Performed by: ORTHOPAEDIC SURGERY

## 2020-12-03 PROCEDURE — 1101F PT FALLS ASSESS-DOCD LE1/YR: CPT | Mod: CPTII,S$GLB,, | Performed by: ORTHOPAEDIC SURGERY

## 2020-12-03 PROCEDURE — 1159F PR MEDICATION LIST DOCUMENTED IN MEDICAL RECORD: ICD-10-PCS | Mod: S$GLB,,, | Performed by: ORTHOPAEDIC SURGERY

## 2020-12-03 PROCEDURE — 3078F PR MOST RECENT DIASTOLIC BLOOD PRESSURE < 80 MM HG: ICD-10-PCS | Mod: CPTII,S$GLB,, | Performed by: ORTHOPAEDIC SURGERY

## 2020-12-03 PROCEDURE — 99203 PR OFFICE/OUTPT VISIT, NEW, LEVL III, 30-44 MIN: ICD-10-PCS | Mod: S$GLB,,, | Performed by: ORTHOPAEDIC SURGERY

## 2020-12-03 PROCEDURE — 73030 XR SHOULDER COMPLETE 2 OR MORE VIEWS RIGHT: ICD-10-PCS | Mod: 26,RT,, | Performed by: RADIOLOGY

## 2020-12-03 PROCEDURE — 1101F PR PT FALLS ASSESS DOC 0-1 FALLS W/OUT INJ PAST YR: ICD-10-PCS | Mod: CPTII,S$GLB,, | Performed by: ORTHOPAEDIC SURGERY

## 2020-12-03 PROCEDURE — 1159F MED LIST DOCD IN RCRD: CPT | Mod: S$GLB,,, | Performed by: ORTHOPAEDIC SURGERY

## 2020-12-03 NOTE — LETTER
December 3, 2020      Ghada Hicks MD  [address]  [address]  [place] [address]           [facility]  [address], [address]  [place] [address]  Phone: [phone]          Patient: Ghdaa Dave   MR Number: [id number]   YOB: 1935   Date of Visit: 12/3/2020       Dear Dr. Ghada Hicks:    Thank you for referring Ghada Flanagan to me for evaluation. Attached you will find relevant portions of my assessment and plan of care.    If you have questions, please do not hesitate to call me. I look forward to following Ghada Flanagan along with you.    Sincerely,    Jason Alicea Jr., MD    Enclosure  CC:  No Recipients    If you would like to receive this communication electronically, please contact [email or web address] or [phone] to request more information on [facility] access.    For providers and/or their staff who would like to refer a patient to [facility], please contact us through our one-stop-shop provider referral line, [facility] , at [phone].    If you feel you have received this communication in error or would no longer like to receive these types of communications, please e-mail [email or web address]

## 2020-12-03 NOTE — PROGRESS NOTES
Subjective:      Patient ID: Ghada Dave is a 85 y.o. female.    Chief Complaint: Pain of the Right Shoulder      HPI  Ghada Dave is a  85 y.o. female presenting today for patient of right shoulder pain.  There was not a history of trauma.  Onset of symptoms began more than 6 months ago  She actually recently had surgery about 3 months ago on the right shoulder for arthroscopic rotator cuff repair done elsewhere  However following surgery she never really got any better and has had continued pain similar to what she had preoperatively  She tried physical therapy for a few visits which made symptoms worse  She is basically here for 2nd opinion regarding the right shoulder  She having difficulty with use and pain in the right shoulder.      Review of patient's allergies indicates:   Allergen Reactions    Penicillin Anaphylaxis    Keflex [cephalexin] Rash         Current Outpatient Medications   Medication Sig Dispense Refill    ALPRAZolam (XANAX) 1 MG tablet Take 1 mg by mouth 2 (two) times daily.      aspirin (ECOTRIN) 81 MG EC tablet Take 81 mg by mouth once daily.      b complex vitamins capsule Take 1 capsule by mouth once daily.      celecoxib (CELEBREX) 100 MG capsule Take 1 capsule (100 mg total) by mouth once daily. 30 capsule 2    esomeprazole (NEXIUM) 20 MG capsule Take 1 capsule (20 mg total) by mouth once daily. 90 capsule 3    ibandronate (BONIVA) 150 mg tablet Take 1 tablet (150 mg total) by mouth every 30 days. 3 tablet 1    mometasone 0.1% (ELOCON) 0.1 % cream Apply topically 2 (two) times daily as needed. 30 g 3    simvastatin (ZOCOR) 20 MG tablet simvastatin 20 mg tablet      traZODone (DESYREL) 50 MG tablet Take 0.5 tablets (25 mg total) by mouth nightly as needed for Insomnia. 15 tablet 11    white petrolatum-mineral oil 56.8-42.5% (LACRI-LUBE S.O.P.) 56.8-42.5 % Oint Place into both eyes every evening. 15 g 0    hydrocortisone 2.5 % cream Apply  "topically 2 (two) times daily. for 10 days (Patient not taking: Reported on 11/4/2020) 1 Tube 5     No current facility-administered medications for this visit.        Past Medical History:   Diagnosis Date    Abnormal Pap smear 2013    Acidosis     CAD (coronary artery disease) 06/2017     iFR of LAD and RCA performed confirmed nonobstructive disease.    Cataract     Colon polyps     Disorder of kidney and ureter     Frequent urination     Hemorrhoid     High cholesterol     Poor circulation     Postoperative abdominal pain     Psoriasis     Varicose vein        Past Surgical History:   Procedure Laterality Date    CARDIAC CATHETERIZATION  06/2017     iFR of LAD and RCA performed confirmed nonobstructive disease.    CATARACT EXTRACTION W/  INTRAOCULAR LENS IMPLANT Bilateral     COLONOSCOPY N/A 8/28/2017    Procedure: COLONOSCOPY;  Surgeon: Bertram Myers MD;  Location: [facility] ENDO;  Service: Endoscopy;  Laterality: N/A;    COLONOSCOPY N/A 10/18/2019    Procedure: COLONOSCOPY;  Surgeon: Wilfred Barber MD;  Location: [facility] ENDO (4TH FLR);  Service: Endoscopy;  Laterality: N/A;   needed-BB  Cardiology Clearance received from Dr. REBECA Escalona, see telephone encounter 10/1/19-BB    LEFT HEART CATHETERIZATION N/A 4/8/2019    Procedure: Left heart cath;  Surgeon: Renato Escalona MD;  Location: [facility] CATH LAB/EP;  Service: Cardiology;  Laterality: N/A;    POLYPECTOMY      YAG Laser Capsulotomy Right 04/17/2017    Dr. Chavez       Review of Systems:  ROS    OBJECTIVE:     PHYSICAL EXAM:  Height: 5' 2" (157.5 cm) Weight: 59.6 kg (131 lb 6.3 oz)  Vitals:    12/03/20 1124   BP: 133/69   Pulse: 91   Temp: 98.3 °F (36.8 °C)   Weight: 59.6 kg (131 lb 6.3 oz)   Height: 5' 2" (1.575 m)   PainSc:   7   PainLoc: Shoulder     Well developed, well nourished female in no acute distress  Alert and oriented x 3  HEENT- Normal exam  Lungs- Clear to auscultation  Heart- Regular rate and " rhythm  Abdomen- Soft nontender  Extremity exam- examination of the right shoulder there well-healed arthroscopic portals no evidence of infection no tenderness no bruising  Range of motion is limited secondary to pain  She has slight crepitation with abduction to 90° and pain  Positive impingement sign  Positive supraspinatus stress test with weakness of the rotator cuff  Neurologic exam intact    RADIOGRAPHS:  AP lateral x-ray right shoulder demonstrate 2 suture anchors in the bone and 1 suture anchor which appears outside of the bone in the subacromial space  Comments: I have personally reviewed the imaging and I agree with the above radiologist's report.    ASSESSMENT/PLAN:     IMPRESSION:  Rotator cuff repair with failure right shoulder including 1 suture anchor displaced    PLAN:  I explained the nature of the problem to the patient  Unfortunately this does happen sometimes especially in osteoporotic bone which may have caused the suture anchor to dislodge  At this point I have recommended read revision surgery for arthroscopic rotator cuff repair of the right shoulder  Certainly we could remove the dislodged anchor but the problem would be whether we can get good fixation of the remaining rotator cuff  For that reason even though I am recommending surgery I can NOT guarantee 100% that we can fix this rotator cuff problem  She would like to think this over possibly schedule after the holidays  Follow-up 1 month  In the meantime I would discontinue therapy and avoid overhead lifting right arm       - We talked at length about the anatomy and pathophysiology of   Encounter Diagnoses   Name Primary?    Chronic right shoulder pain     Nontraumatic complete tear of right rotator cuff            Disclaimer: This note has been generated using voice-recognition software. There may be typographical errors that have been missed during proof-reading.

## 2020-12-17 ENCOUNTER — TELEPHONE (OUTPATIENT)
Dept: ORTHOPEDICS | Facility: CLINIC | Age: 85
End: 2020-12-17

## 2020-12-17 DIAGNOSIS — Z41.9 ELECTIVE SURGERY: Primary | ICD-10-CM

## 2020-12-17 NOTE — TELEPHONE ENCOUNTER
----- Message from Lupis Mcgowan sent at 12/17/2020 11:53 AM CST -----  Regarding: Procedure Inquiry  Contact: Maximiliano Melendez/ [phone]  Patient requesting to speak with you regarding the patient getting surgery. Please advise.

## 2020-12-21 ENCOUNTER — TELEPHONE (OUTPATIENT)
Dept: ORTHOPEDICS | Facility: CLINIC | Age: 85
End: 2020-12-21

## 2020-12-21 ENCOUNTER — TELEPHONE (OUTPATIENT)
Dept: FAMILY MEDICINE | Facility: CLINIC | Age: 85
End: 2020-12-21

## 2020-12-21 DIAGNOSIS — Z41.9 ELECTIVE SURGERY: Primary | ICD-10-CM

## 2020-12-21 NOTE — TELEPHONE ENCOUNTER
----- Message from Rao Vargas sent at 12/21/2020 10:32 AM CST -----  Regarding: call back  Type:  Patient Returning Call    Who Called:patient son  Who Left Message for Patient:office  Does the patient know what this is regarding?:procedure   Would the patient rather a call back or a response via [facility]? Call back  Best Call Back Number:[phone]  Additional Information: n/a

## 2020-12-21 NOTE — TELEPHONE ENCOUNTER
Spoke to pt's son, Omi and stated that pt has been having abdominal pains. Pt was scheduled for an appt with Dr. Hicks.

## 2020-12-21 NOTE — TELEPHONE ENCOUNTER
----- Message from Chelsea Grimes sent at 12/21/2020 11:38 AM CST -----  Contact: PATIENT SON MADALYN   [phone]  [employer]IM[employer]ITIVE         Name of Caller:     PATIENT SON   Reason for Visit/Symptoms:    ABDOMINAL PAIN  Best Contact Number or Confirm if Mychart Preferred: [phone]  Preferred Date/Time of Appointment: 12-  Interested in Virtual Visit: NO  Additional Information:NONE

## 2020-12-21 NOTE — TELEPHONE ENCOUNTER
Spoke with patients son. Picked a tentative date of 2/5 for surgery. Will come in on 1/12 to discuss surgery and sign consent forms.

## 2020-12-23 ENCOUNTER — OFFICE VISIT (OUTPATIENT)
Dept: FAMILY MEDICINE | Facility: CLINIC | Age: 85
End: 2020-12-23
Payer: MEDICARE

## 2020-12-23 VITALS
WEIGHT: 132.5 LBS | BODY MASS INDEX: 26.01 KG/M2 | DIASTOLIC BLOOD PRESSURE: 64 MMHG | TEMPERATURE: 98 F | HEIGHT: 60 IN | SYSTOLIC BLOOD PRESSURE: 130 MMHG | HEART RATE: 71 BPM | OXYGEN SATURATION: 97 %

## 2020-12-23 DIAGNOSIS — G47.09 OTHER INSOMNIA: ICD-10-CM

## 2020-12-23 DIAGNOSIS — M15.9 OSTEOARTHRITIS OF MULTIPLE JOINTS, UNSPECIFIED OSTEOARTHRITIS TYPE: Primary | ICD-10-CM

## 2020-12-23 DIAGNOSIS — R10.30 LOWER ABDOMINAL PAIN: ICD-10-CM

## 2020-12-23 DIAGNOSIS — M81.8 AGE-RELATED OSTEOPOROSIS WITHOUT FRACTURE: ICD-10-CM

## 2020-12-23 DIAGNOSIS — K21.9 GASTROESOPHAGEAL REFLUX DISEASE WITHOUT ESOPHAGITIS: ICD-10-CM

## 2020-12-23 DIAGNOSIS — M25.511 CHRONIC RIGHT SHOULDER PAIN: ICD-10-CM

## 2020-12-23 DIAGNOSIS — I25.10 CORONARY ARTERY DISEASE INVOLVING NATIVE CORONARY ARTERY OF NATIVE HEART WITHOUT ANGINA PECTORIS: ICD-10-CM

## 2020-12-23 DIAGNOSIS — G89.29 CHRONIC RIGHT SHOULDER PAIN: ICD-10-CM

## 2020-12-23 DIAGNOSIS — L40.9 PSORIASIS: ICD-10-CM

## 2020-12-23 PROCEDURE — 3075F PR MOST RECENT SYSTOLIC BLOOD PRESS GE 130-139MM HG: ICD-10-PCS | Mod: HCNC,CPTII,S$GLB, | Performed by: INTERNAL MEDICINE

## 2020-12-23 PROCEDURE — 99499 UNLISTED E&M SERVICE: CPT | Mod: S$GLB,,, | Performed by: INTERNAL MEDICINE

## 2020-12-23 PROCEDURE — 3288F FALL RISK ASSESSMENT DOCD: CPT | Mod: HCNC,CPTII,S$GLB, | Performed by: INTERNAL MEDICINE

## 2020-12-23 PROCEDURE — 1159F PR MEDICATION LIST DOCUMENTED IN MEDICAL RECORD: ICD-10-PCS | Mod: HCNC,S$GLB,, | Performed by: INTERNAL MEDICINE

## 2020-12-23 PROCEDURE — 3288F PR FALLS RISK ASSESSMENT DOCUMENTED: ICD-10-PCS | Mod: HCNC,CPTII,S$GLB, | Performed by: INTERNAL MEDICINE

## 2020-12-23 PROCEDURE — 1101F PR PT FALLS ASSESS DOC 0-1 FALLS W/OUT INJ PAST YR: ICD-10-PCS | Mod: HCNC,CPTII,S$GLB, | Performed by: INTERNAL MEDICINE

## 2020-12-23 PROCEDURE — 1157F PR ADVANCE CARE PLAN OR EQUIV PRESENT IN MEDICAL RECORD: ICD-10-PCS | Mod: HCNC,S$GLB,, | Performed by: INTERNAL MEDICINE

## 2020-12-23 PROCEDURE — 1101F PT FALLS ASSESS-DOCD LE1/YR: CPT | Mod: HCNC,CPTII,S$GLB, | Performed by: INTERNAL MEDICINE

## 2020-12-23 PROCEDURE — 99999 PR PBB SHADOW E&M-EST. PATIENT-LVL III: CPT | Mod: PBBFAC,HCNC,, | Performed by: INTERNAL MEDICINE

## 2020-12-23 PROCEDURE — 3078F DIAST BP <80 MM HG: CPT | Mod: HCNC,CPTII,S$GLB, | Performed by: INTERNAL MEDICINE

## 2020-12-23 PROCEDURE — 99499 RISK ADDL DX/OHS AUDIT: ICD-10-PCS | Mod: S$GLB,,, | Performed by: INTERNAL MEDICINE

## 2020-12-23 PROCEDURE — 1157F ADVNC CARE PLAN IN RCRD: CPT | Mod: HCNC,S$GLB,, | Performed by: INTERNAL MEDICINE

## 2020-12-23 PROCEDURE — 3075F SYST BP GE 130 - 139MM HG: CPT | Mod: HCNC,CPTII,S$GLB, | Performed by: INTERNAL MEDICINE

## 2020-12-23 PROCEDURE — 99214 PR OFFICE/OUTPT VISIT, EST, LEVL IV, 30-39 MIN: ICD-10-PCS | Mod: HCNC,S$GLB,, | Performed by: INTERNAL MEDICINE

## 2020-12-23 PROCEDURE — 1159F MED LIST DOCD IN RCRD: CPT | Mod: HCNC,S$GLB,, | Performed by: INTERNAL MEDICINE

## 2020-12-23 PROCEDURE — 99214 OFFICE O/P EST MOD 30 MIN: CPT | Mod: HCNC,S$GLB,, | Performed by: INTERNAL MEDICINE

## 2020-12-23 PROCEDURE — 3078F PR MOST RECENT DIASTOLIC BLOOD PRESSURE < 80 MM HG: ICD-10-PCS | Mod: HCNC,CPTII,S$GLB, | Performed by: INTERNAL MEDICINE

## 2020-12-23 PROCEDURE — 99999 PR PBB SHADOW E&M-EST. PATIENT-LVL III: ICD-10-PCS | Mod: PBBFAC,HCNC,, | Performed by: INTERNAL MEDICINE

## 2020-12-23 RX ORDER — ASPIRIN 81 MG/1
81 TABLET ORAL DAILY
Qty: 30 TABLET | Refills: 3 | Status: ON HOLD | OUTPATIENT
Start: 2020-12-23 | End: 2023-08-02 | Stop reason: HOSPADM

## 2020-12-23 RX ORDER — L. ACIDOPHILUS/L.BULGARICUS 100MM CELL
1 GRANULES IN PACKET (EA) ORAL 2 TIMES DAILY
COMMUNITY
Start: 2020-12-23 | End: 2021-03-18

## 2020-12-23 RX ORDER — HYOSCYAMINE SULFATE 0.125 MG
125 TABLET ORAL EVERY 6 HOURS PRN
Qty: 30 TABLET | Refills: 3 | Status: SHIPPED | OUTPATIENT
Start: 2020-12-23 | End: 2021-03-18

## 2020-12-23 RX ORDER — HYDROGEN PEROXIDE 3 %
20 SOLUTION, NON-ORAL MISCELLANEOUS DAILY
Qty: 90 CAPSULE | Refills: 3 | Status: SHIPPED | OUTPATIENT
Start: 2020-12-23 | End: 2021-04-21 | Stop reason: SDUPTHER

## 2020-12-23 RX ORDER — CELECOXIB 100 MG/1
100 CAPSULE ORAL DAILY
Qty: 30 CAPSULE | Refills: 2 | Status: SHIPPED | OUTPATIENT
Start: 2020-12-23 | End: 2021-03-18

## 2020-12-23 RX ORDER — MOMETASONE FUROATE 1 MG/G
CREAM TOPICAL 2 TIMES DAILY PRN
Qty: 30 G | Refills: 3 | Status: SHIPPED | OUTPATIENT
Start: 2020-12-23 | End: 2021-03-18

## 2020-12-23 RX ORDER — HYDROCORTISONE 25 MG/G
CREAM TOPICAL 2 TIMES DAILY
Qty: 1 TUBE | Refills: 5 | Status: SHIPPED | OUTPATIENT
Start: 2020-12-23 | End: 2022-03-18

## 2020-12-23 RX ORDER — DOCUSATE SODIUM 100 MG/1
100 CAPSULE, LIQUID FILLED ORAL 2 TIMES DAILY
Qty: 60 CAPSULE | Refills: 3 | Status: SHIPPED | OUTPATIENT
Start: 2020-12-23 | End: 2021-03-18

## 2020-12-23 RX ORDER — IBANDRONATE SODIUM 150 MG/1
150 TABLET, FILM COATED ORAL
Qty: 3 TABLET | Refills: 1 | Status: SHIPPED | OUTPATIENT
Start: 2020-12-23 | End: 2021-09-24 | Stop reason: SDUPTHER

## 2020-12-23 RX ORDER — ALPRAZOLAM 0.5 MG/1
0.5 TABLET ORAL NIGHTLY PRN
Qty: 30 TABLET | Refills: 0 | Status: SHIPPED | OUTPATIENT
Start: 2020-12-23 | End: 2021-03-18

## 2020-12-23 NOTE — PROGRESS NOTES
Subjective:       Patient ID: Ghada Dave is a 85 y.o. female.    Chief Complaint: Abdominal Pain      This is an 85-year-old female who comes to the clinic for follow-up appointment.  Complains of low abdominal discomfort and constipation.  Reports bloating, mostly on the left side when she eats.  She has been having chronic constipation and in the past she was told to massage her abdomen to improve symptoms and sometimes helped.  She is not taking stool softener, denies nausea vomiting, denies weight loss.  She went to see the orthopedic surgeon for her right shoulder and is having revision on February 5th.  She finds that the medications that she receives from the new pharmacy do not work as they used to work from her original pharmacy and probably is a different generic brand that does not work for her.  She wants to switch back to the old pharmacy and basically only using her Zocor.  She tried trazodone for her insomnia and did not work for her and stop taking.  Xanax used to work for her and use to just take a 4th of the tablet as needed and will like to continue the medication.    Review of Systems   Constitutional: Negative for appetite change, chills, fatigue, fever and unexpected weight change.   HENT: Negative for nasal congestion, hearing loss and sore throat.    Eyes: Negative for visual disturbance.   Respiratory: Negative for cough and shortness of breath.    Cardiovascular: Negative for chest pain, palpitations and leg swelling.   Gastrointestinal: Positive for abdominal pain (Feeling bloated.) and constipation. Negative for blood in stool, change in bowel habit, diarrhea, nausea, vomiting and change in bowel habit.   Genitourinary: Negative for bladder incontinence, difficulty urinating and dysuria.   Musculoskeletal: Positive for arthralgias (Reports right shoulder pain and Celebrex not helping much.  Having surgery on February 5th.).   Neurological: Negative for dizziness,  weakness, light-headedness, numbness, headaches and memory loss.   Hematological: Does not bruise/bleed easily.   Psychiatric/Behavioral: Positive for sleep disturbance. Negative for suicidal ideas. The patient is not nervous/anxious.       Past Medical History:   Diagnosis Date    Abnormal Pap smear 2013    Acidosis     CAD (coronary artery disease) 06/2017     iFR of LAD and RCA performed confirmed nonobstructive disease.    Cataract     Colon polyps     Disorder of kidney and ureter     Frequent urination     Hemorrhoid     High cholesterol     Poor circulation     Postoperative abdominal pain     Psoriasis     Varicose vein        Past Surgical History:   Procedure Laterality Date    CARDIAC CATHETERIZATION  06/2017     iFR of LAD and RCA performed confirmed nonobstructive disease.    CATARACT EXTRACTION W/  INTRAOCULAR LENS IMPLANT Bilateral     COLONOSCOPY N/A 8/28/2017    Procedure: COLONOSCOPY;  Surgeon: Bertram Myers MD;  Location: [facility] ENDO;  Service: Endoscopy;  Laterality: N/A;    COLONOSCOPY N/A 10/18/2019    Procedure: COLONOSCOPY;  Surgeon: Wilfred Barber MD;  [facility] ([address] [place] [address]);  Service: Endoscopy;  Laterality: N/A;   needed-BB  Cardiology Clearance received from Dr. REBECA Escalona, see telephone encounter 10/1/19-BB    LEFT HEART CATHETERIZATION N/A 4/8/2019    Procedure: Left heart cath;  Surgeon: Renato Escalona MD;  Location: [facility] CATH LAB/EP;  Service: Cardiology;  Laterality: N/A;    POLYPECTOMY      YAG Laser Capsulotomy Right 04/17/2017    Dr. Chavez       Family History   Problem Relation Age of Onset    Glaucoma Neg Hx     Macular degeneration Neg Hx     Strabismus Neg Hx     Retinal detachment Neg Hx     Amblyopia Neg Hx     Blindness Neg Hx     Cataracts Neg Hx     Prostate cancer Neg Hx     Kidney disease Neg Hx     Anesthesia problems Neg Hx        Social History     Socioeconomic History    Marital status: Single      Spouse name: Not on file    Number of children: Not on file    Years of education: Not on file    Highest education level: Not on file   Occupational History    Not on file   Social Needs    Financial resource strain: Not on file    Food insecurity     Worry: Not on file     Inability: Not on file    Transportation needs     Medical: Not on file     Non-medical: Not on file   Tobacco Use    Smoking status: Never Smoker    Smokeless tobacco: Never Used   Substance and Sexual Activity    Alcohol use: No     Alcohol/week: 0.0 standard drinks    Drug use: No    Sexual activity: Never   Lifestyle    Physical activity     Days per week: Not on file     Minutes per session: Not on file    Stress: Not on file   Relationships    Social connections     Talks on phone: Not on file     Gets together: Not on file     Attends Anabaptism service: Not on file     Active member of club or organization: Not on file     Attends meetings of clubs or organizations: Not on file     Relationship status: Not on file   Other Topics Concern    Not on file   Social History Narrative    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 30 tablet 3    esomeprazole (NEXIUM) 20 MG capsule Take 1 capsule (20 mg total) by mouth once daily. 90 capsule 3    simvastatin (ZOCOR) 20 MG tablet simvastatin 20 mg tablet      ALPRAZolam (XANAX) 0.5 MG tablet Take 1 tablet (0.5 mg total) by mouth nightly as needed for Anxiety. 30 tablet 0    b complex vitamins capsule Take 1 capsule by mouth once daily.      celecoxib (CELEBREX) 100 MG capsule Take 1 capsule (100 mg total) by mouth once daily. 30 capsule 2    docusate sodium (COLACE) 100 MG capsule Take 1 capsule (100 mg total) by mouth 2 (two) times daily. 60 capsule 3    hydrocortisone 2.5 % cream Apply topically  "2 (two) times daily. for 10 days 1 Tube 5    hyoscyamine (ANASPAZ,LEVSIN) 0.125 mg Tab Take 1 tablet (125 mcg total) by mouth every 6 (six) hours as needed (cramps). 30 tablet 3    ibandronate (BONIVA) 150 mg tablet Take 1 tablet (150 mg total) by mouth every 30 days. 3 tablet 1    lactobacillus acidophilus & bulgar (LACTINEX) 100 million cell packet Take 1 packet (1 each total) by mouth 2 (two) times daily.      mometasone 0.1% (ELOCON) 0.1 % cream Apply topically 2 (two) times daily as needed. 30 g 3    white petrolatum-mineral oil 56.8-42.5% (LACRI-LUBE S.O.P.) 56.8-42.5 % Oint Place into both eyes every evening. 15 g 0     No current facility-administered medications for this visit.        Review of patient's allergies indicates:   Allergen Reactions    Penicillin Anaphylaxis    Keflex [cephalexin] Rash         Objective:       Last 3 sets of Vitals    Vitals - 1 value per visit 11/4/2020 12/3/2020 12/23/2020   SYSTOLIC 124 133 130   DIASTOLIC 58 69 64   PULSE 85 91 71   TEMPERATURE 98.4 98.3 97.9   RESPIRATIONS - - -   SPO2 96 - 97   Weight (lb) 131.39 131.39 132.5   Weight (kg) 59.6 59.6 60.1   HEIGHT 5' 2" 5' 2" 5' 0"   BODY MASS INDEX 24.03 24.03 25.88   VISIT REPORT - - -   Pain Score  0 7 -   Some recent data might be hidden   Physical Exam  Constitutional:       General: She is not in acute distress.     Appearance: Normal appearance. She is normal weight.   HENT:      Head: Normocephalic.      Nose: Nose normal.      Mouth/Throat:      Mouth: Mucous membranes are moist.   Eyes:      General: No scleral icterus.     Extraocular Movements: Extraocular movements intact.      Conjunctiva/sclera: Conjunctivae normal.      Pupils: Pupils are equal, round, and reactive to light.   Neck:      Musculoskeletal: Neck supple.      Vascular: No carotid bruit.   Cardiovascular:      Rate and Rhythm: Normal rate and regular rhythm.      Pulses: Normal pulses.      Heart sounds: Normal heart sounds.   Pulmonary: "      Effort: Pulmonary effort is normal.      Breath sounds: Normal breath sounds.   Abdominal:      General: Bowel sounds are normal. There is no distension.      Palpations: Abdomen is soft. There is no mass.      Tenderness: There is no abdominal tenderness. There is no guarding or rebound.   Musculoskeletal: Normal range of motion.         General: No swelling.   Lymphadenopathy:      Cervical: No cervical adenopathy.   Skin:     General: Skin is warm and dry.   Neurological:      General: No focal deficit present.      Mental Status: She is alert and oriented to person, place, and time.   Psychiatric:         Mood and Affect: Mood normal.         Behavior: Behavior normal.           CBC:  Recent Labs   Lab Result Units 11/11/20  0954   WBC K/uL 4.61   RBC M/uL 4.22   Hemoglobin g/dL 12.3   Hematocrit % 37.0   Platelets K/uL 319   MCV fL 88   MCH pg 29.1   MCHC g/dL 33.2     CMP:  Recent Labs   Lab Result Units 11/11/20  0954   Glucose mg/dL 89   Calcium mg/dL 9.3   Albumin g/dL 4.0   Total Protein g/dL 7.7   Sodium mmol/L 144   Potassium mmol/L 4.5   CO2 mmol/L 24   Chloride mmol/L 110   BUN mg/dL 16   Alkaline Phosphatase U/L 74   ALT U/L 12   AST U/L 23   Total Bilirubin mg/dL 0.6     URINALYSIS:  Recent Labs   Lab Result Units 11/11/20  0950   Color, UA  Yellow   Specific Gravity, UA  1.015   pH, UA  5.0   Protein, UA  Trace*   Nitrite, UA  Negative   Leukocytes, UA  Negative   Urobilinogen, UA EU/dL Negative      LIPIDS:  Recent Labs   Lab Result Units 11/11/20  0954   TSH uIU/mL 1.759   HDL mg/dL 63   Cholesterol mg/dL 153   Triglycerides mg/dL 98   LDL Cholesterol mg/dL 70.4   HDL/Cholesterol Ratio % 41.2   Non-HDL Cholesterol mg/dL 90   Total Cholesterol/HDL Ratio  2.4     TSH:  Recent Labs   Lab Result Units 11/11/20  0954   TSH uIU/mL 1.759       A1C:  Recent Labs   Lab 11/11/20  0954   Hemoglobin A1C 5.6           Assessment:       1. Osteoarthritis of multiple joints, unspecified osteoarthritis type     2. Chronic right shoulder pain    3. Age-related osteoporosis without fracture    4. Psoriasis    5. Other insomnia    6. Gastroesophageal reflux disease without esophagitis    7. Coronary artery disease involving native coronary artery of native heart without angina pectoris        Plan:       Ghada was seen today for abdominal pain.    Diagnoses and all orders for this visit:    Osteoarthritis of multiple joints, unspecified osteoarthritis type  -     celecoxib (CELEBREX) 100 MG capsule; Take 1 capsule (100 mg total) by mouth once daily.  -     Ambulatory referral/consult to Cardiology; Future for cardiac clearance prior shoulder surgery.    Chronic right shoulder pain- as above    Age-related osteoporosis without fracture  -     ibandronate (BONIVA) 150 mg tablet; Take 1 tablet (150 mg total) by mouth every 30 days.    Psoriasis  -     mometasone 0.1% (ELOCON) 0.1 % cream; Apply topically 2 (two) times daily as needed.  - stable    Other insomnia   - trazodone did not help.  Will start lower dose lorazepam.  Patient is aware of possible complications with dependence and memory loss.    Gastroesophageal reflux disease without esophagitis  -     esomeprazole (NEXIUM) 20 MG capsule; Take 1 capsule (20 mg total) by mouth once daily.    Coronary artery disease involving native coronary artery of native heart without angina pectoris  -     Ambulatory referral/consult to Cardiology; Future      Lower abdominal pain   - likely due to constipation.  Will try Levsin for occasional care cramps, stool softeners, and probiotics.    Other orders  -     hyoscyamine (ANASPAZ,LEVSIN) 0.125 mg Tab; Take 1 tablet (125 mcg total) by mouth every 6 (six) hours as needed (cramps).  -     lactobacillus acidophilus & bulgar (LACTINEX) 100 million cell packet; Take 1 packet (1 each total) by mouth 2 (two) times daily.  -     docusate sodium (COLACE) 100 MG capsule; Take 1 capsule (100 mg total) by mouth 2 (two) times daily.  -      hydrocortisone 2.5 % cream; Apply topically 2 (two) times daily. for 10 days  -     aspirin (ECOTRIN) 81 MG EC tablet; Take 1 tablet (81 mg total) by mouth once daily.  -     ALPRAZolam (XANAX) 0.5 MG tablet; Take 1 tablet (0.5 mg total) by mouth nightly as needed for Anxiety.

## 2021-01-07 ENCOUNTER — TELEPHONE (OUTPATIENT)
Dept: ORTHOPEDICS | Facility: CLINIC | Age: 86
End: 2021-01-07

## 2021-01-15 ENCOUNTER — TELEPHONE (OUTPATIENT)
Dept: FAMILY MEDICINE | Facility: CLINIC | Age: 86
End: 2021-01-15

## 2021-01-20 ENCOUNTER — PATIENT OUTREACH (OUTPATIENT)
Dept: ADMINISTRATIVE | Facility: OTHER | Age: 86
End: 2021-01-20

## 2021-01-21 ENCOUNTER — OFFICE VISIT (OUTPATIENT)
Dept: CARDIOLOGY | Facility: CLINIC | Age: 86
End: 2021-01-21
Payer: MEDICARE

## 2021-01-21 ENCOUNTER — OFFICE VISIT (OUTPATIENT)
Dept: ORTHOPEDICS | Facility: CLINIC | Age: 86
End: 2021-01-21
Payer: MEDICARE

## 2021-01-21 ENCOUNTER — TELEPHONE (OUTPATIENT)
Dept: ORTHOPEDICS | Facility: CLINIC | Age: 86
End: 2021-01-21

## 2021-01-21 VITALS
HEIGHT: 62 IN | SYSTOLIC BLOOD PRESSURE: 124 MMHG | BODY MASS INDEX: 18.6 KG/M2 | OXYGEN SATURATION: 97 % | DIASTOLIC BLOOD PRESSURE: 79 MMHG | WEIGHT: 101.06 LBS | HEART RATE: 95 BPM

## 2021-01-21 VITALS — HEIGHT: 62 IN | BODY MASS INDEX: 18.58 KG/M2 | WEIGHT: 101 LBS

## 2021-01-21 DIAGNOSIS — I10 ESSENTIAL (PRIMARY) HYPERTENSION: ICD-10-CM

## 2021-01-21 DIAGNOSIS — M15.9 OSTEOARTHRITIS OF MULTIPLE JOINTS, UNSPECIFIED OSTEOARTHRITIS TYPE: ICD-10-CM

## 2021-01-21 DIAGNOSIS — I65.29 STENOSIS OF CAROTID ARTERY, UNSPECIFIED LATERALITY: ICD-10-CM

## 2021-01-21 DIAGNOSIS — R94.39 ABNORMAL STRESS TEST: ICD-10-CM

## 2021-01-21 DIAGNOSIS — I25.10 CORONARY ARTERY DISEASE INVOLVING NATIVE CORONARY ARTERY OF NATIVE HEART WITHOUT ANGINA PECTORIS: ICD-10-CM

## 2021-01-21 DIAGNOSIS — Z01.818 PRE-OP EXAMINATION: Primary | ICD-10-CM

## 2021-01-21 DIAGNOSIS — R73.03 PREDIABETES: ICD-10-CM

## 2021-01-21 DIAGNOSIS — I25.10 NON-OCCLUSIVE CORONARY ARTERY DISEASE: ICD-10-CM

## 2021-01-21 DIAGNOSIS — K21.9 GASTROESOPHAGEAL REFLUX DISEASE WITHOUT ESOPHAGITIS: ICD-10-CM

## 2021-01-21 DIAGNOSIS — R06.02 SOB (SHORTNESS OF BREATH): ICD-10-CM

## 2021-01-21 DIAGNOSIS — M75.121 NONTRAUMATIC COMPLETE TEAR OF RIGHT ROTATOR CUFF: Primary | ICD-10-CM

## 2021-01-21 PROCEDURE — 1125F PR PAIN SEVERITY QUANTIFIED, PAIN PRESENT: ICD-10-PCS | Mod: HCNC,S$GLB,, | Performed by: ORTHOPAEDIC SURGERY

## 2021-01-21 PROCEDURE — 1157F ADVNC CARE PLAN IN RCRD: CPT | Mod: HCNC,S$GLB,, | Performed by: INTERNAL MEDICINE

## 2021-01-21 PROCEDURE — 1101F PT FALLS ASSESS-DOCD LE1/YR: CPT | Mod: HCNC,CPTII,S$GLB, | Performed by: ORTHOPAEDIC SURGERY

## 2021-01-21 PROCEDURE — 93000 EKG 12-LEAD: ICD-10-PCS | Mod: HCNC,S$GLB,, | Performed by: INTERNAL MEDICINE

## 2021-01-21 PROCEDURE — 1101F PT FALLS ASSESS-DOCD LE1/YR: CPT | Mod: HCNC,CPTII,S$GLB, | Performed by: INTERNAL MEDICINE

## 2021-01-21 PROCEDURE — 3074F PR MOST RECENT SYSTOLIC BLOOD PRESSURE < 130 MM HG: ICD-10-PCS | Mod: HCNC,CPTII,S$GLB, | Performed by: INTERNAL MEDICINE

## 2021-01-21 PROCEDURE — 3288F FALL RISK ASSESSMENT DOCD: CPT | Mod: HCNC,CPTII,S$GLB, | Performed by: INTERNAL MEDICINE

## 2021-01-21 PROCEDURE — 99999 PR PBB SHADOW E&M-EST. PATIENT-LVL III: ICD-10-PCS | Mod: PBBFAC,HCNC,, | Performed by: ORTHOPAEDIC SURGERY

## 2021-01-21 PROCEDURE — 1101F PR PT FALLS ASSESS DOC 0-1 FALLS W/OUT INJ PAST YR: ICD-10-PCS | Mod: HCNC,CPTII,S$GLB, | Performed by: ORTHOPAEDIC SURGERY

## 2021-01-21 PROCEDURE — 99999 PR PBB SHADOW E&M-EST. PATIENT-LVL IV: ICD-10-PCS | Mod: PBBFAC,HCNC,, | Performed by: INTERNAL MEDICINE

## 2021-01-21 PROCEDURE — 3288F PR FALLS RISK ASSESSMENT DOCUMENTED: ICD-10-PCS | Mod: HCNC,CPTII,S$GLB, | Performed by: ORTHOPAEDIC SURGERY

## 2021-01-21 PROCEDURE — 99999 PR PBB SHADOW E&M-EST. PATIENT-LVL III: CPT | Mod: PBBFAC,HCNC,, | Performed by: ORTHOPAEDIC SURGERY

## 2021-01-21 PROCEDURE — 93000 ELECTROCARDIOGRAM COMPLETE: CPT | Mod: HCNC,S$GLB,, | Performed by: INTERNAL MEDICINE

## 2021-01-21 PROCEDURE — 99214 PR OFFICE/OUTPT VISIT, EST, LEVL IV, 30-39 MIN: ICD-10-PCS | Mod: HCNC,S$GLB,, | Performed by: ORTHOPAEDIC SURGERY

## 2021-01-21 PROCEDURE — 3078F PR MOST RECENT DIASTOLIC BLOOD PRESSURE < 80 MM HG: ICD-10-PCS | Mod: HCNC,CPTII,S$GLB, | Performed by: ORTHOPAEDIC SURGERY

## 2021-01-21 PROCEDURE — 99499 RISK ADDL DX/OHS AUDIT: ICD-10-PCS | Mod: S$GLB,,, | Performed by: INTERNAL MEDICINE

## 2021-01-21 PROCEDURE — 99214 OFFICE O/P EST MOD 30 MIN: CPT | Mod: HCNC,S$GLB,, | Performed by: INTERNAL MEDICINE

## 2021-01-21 PROCEDURE — 1157F ADVNC CARE PLAN IN RCRD: CPT | Mod: HCNC,S$GLB,, | Performed by: ORTHOPAEDIC SURGERY

## 2021-01-21 PROCEDURE — 1126F AMNT PAIN NOTED NONE PRSNT: CPT | Mod: HCNC,S$GLB,, | Performed by: INTERNAL MEDICINE

## 2021-01-21 PROCEDURE — 3078F DIAST BP <80 MM HG: CPT | Mod: HCNC,CPTII,S$GLB, | Performed by: INTERNAL MEDICINE

## 2021-01-21 PROCEDURE — 3078F DIAST BP <80 MM HG: CPT | Mod: HCNC,CPTII,S$GLB, | Performed by: ORTHOPAEDIC SURGERY

## 2021-01-21 PROCEDURE — 1159F PR MEDICATION LIST DOCUMENTED IN MEDICAL RECORD: ICD-10-PCS | Mod: HCNC,S$GLB,, | Performed by: ORTHOPAEDIC SURGERY

## 2021-01-21 PROCEDURE — 3074F PR MOST RECENT SYSTOLIC BLOOD PRESSURE < 130 MM HG: ICD-10-PCS | Mod: HCNC,CPTII,S$GLB, | Performed by: ORTHOPAEDIC SURGERY

## 2021-01-21 PROCEDURE — 3288F PR FALLS RISK ASSESSMENT DOCUMENTED: ICD-10-PCS | Mod: HCNC,CPTII,S$GLB, | Performed by: INTERNAL MEDICINE

## 2021-01-21 PROCEDURE — 1157F PR ADVANCE CARE PLAN OR EQUIV PRESENT IN MEDICAL RECORD: ICD-10-PCS | Mod: HCNC,S$GLB,, | Performed by: ORTHOPAEDIC SURGERY

## 2021-01-21 PROCEDURE — 99999 PR PBB SHADOW E&M-EST. PATIENT-LVL IV: CPT | Mod: PBBFAC,HCNC,, | Performed by: INTERNAL MEDICINE

## 2021-01-21 PROCEDURE — 99214 PR OFFICE/OUTPT VISIT, EST, LEVL IV, 30-39 MIN: ICD-10-PCS | Mod: HCNC,S$GLB,, | Performed by: INTERNAL MEDICINE

## 2021-01-21 PROCEDURE — 1101F PR PT FALLS ASSESS DOC 0-1 FALLS W/OUT INJ PAST YR: ICD-10-PCS | Mod: HCNC,CPTII,S$GLB, | Performed by: INTERNAL MEDICINE

## 2021-01-21 PROCEDURE — 3074F SYST BP LT 130 MM HG: CPT | Mod: HCNC,CPTII,S$GLB, | Performed by: INTERNAL MEDICINE

## 2021-01-21 PROCEDURE — 3078F PR MOST RECENT DIASTOLIC BLOOD PRESSURE < 80 MM HG: ICD-10-PCS | Mod: HCNC,CPTII,S$GLB, | Performed by: INTERNAL MEDICINE

## 2021-01-21 PROCEDURE — 1157F PR ADVANCE CARE PLAN OR EQUIV PRESENT IN MEDICAL RECORD: ICD-10-PCS | Mod: HCNC,S$GLB,, | Performed by: INTERNAL MEDICINE

## 2021-01-21 PROCEDURE — 1126F PR PAIN SEVERITY QUANTIFIED, NO PAIN PRESENT: ICD-10-PCS | Mod: HCNC,S$GLB,, | Performed by: INTERNAL MEDICINE

## 2021-01-21 PROCEDURE — 99499 UNLISTED E&M SERVICE: CPT | Mod: S$GLB,,, | Performed by: INTERNAL MEDICINE

## 2021-01-21 PROCEDURE — 1159F MED LIST DOCD IN RCRD: CPT | Mod: HCNC,S$GLB,, | Performed by: ORTHOPAEDIC SURGERY

## 2021-01-21 PROCEDURE — 1159F MED LIST DOCD IN RCRD: CPT | Mod: HCNC,S$GLB,, | Performed by: INTERNAL MEDICINE

## 2021-01-21 PROCEDURE — 3074F SYST BP LT 130 MM HG: CPT | Mod: HCNC,CPTII,S$GLB, | Performed by: ORTHOPAEDIC SURGERY

## 2021-01-21 PROCEDURE — 3288F FALL RISK ASSESSMENT DOCD: CPT | Mod: HCNC,CPTII,S$GLB, | Performed by: ORTHOPAEDIC SURGERY

## 2021-01-21 PROCEDURE — 1159F PR MEDICATION LIST DOCUMENTED IN MEDICAL RECORD: ICD-10-PCS | Mod: HCNC,S$GLB,, | Performed by: INTERNAL MEDICINE

## 2021-01-21 PROCEDURE — 1125F AMNT PAIN NOTED PAIN PRSNT: CPT | Mod: HCNC,S$GLB,, | Performed by: ORTHOPAEDIC SURGERY

## 2021-01-21 PROCEDURE — 99214 OFFICE O/P EST MOD 30 MIN: CPT | Mod: HCNC,S$GLB,, | Performed by: ORTHOPAEDIC SURGERY

## 2021-01-21 RX ORDER — SIMVASTATIN 20 MG/1
20 TABLET, FILM COATED ORAL NIGHTLY
Qty: 90 TABLET | Refills: 3 | Status: SHIPPED | OUTPATIENT
Start: 2021-01-21 | End: 2021-06-10

## 2021-01-21 RX ORDER — TRAMADOL HYDROCHLORIDE 50 MG/1
50 TABLET ORAL EVERY 6 HOURS PRN
Qty: 30 TABLET | Refills: 0 | Status: SHIPPED | OUTPATIENT
Start: 2021-01-21 | End: 2021-01-31

## 2021-01-21 RX ORDER — OMEPRAZOLE 20 MG/1
20 CAPSULE, DELAYED RELEASE ORAL DAILY
COMMUNITY
End: 2021-04-21

## 2021-01-22 DIAGNOSIS — I10 ESSENTIAL (PRIMARY) HYPERTENSION: Primary | ICD-10-CM

## 2021-01-31 ENCOUNTER — HOSPITAL ENCOUNTER (EMERGENCY)
Facility: HOSPITAL | Age: 86
Discharge: HOME | End: 2021-01-31
Attending: EMERGENCY MEDICINE
Payer: MEDICARE

## 2021-01-31 VITALS
TEMPERATURE: 98 F | BODY MASS INDEX: 17.07 KG/M2 | SYSTOLIC BLOOD PRESSURE: 168 MMHG | HEART RATE: 68 BPM | RESPIRATION RATE: 18 BRPM | HEIGHT: 64 IN | WEIGHT: 100 LBS | OXYGEN SATURATION: 98 % | DIASTOLIC BLOOD PRESSURE: 81 MMHG

## 2021-01-31 DIAGNOSIS — T17.908A FOREIGN BODY ASPIRATION: ICD-10-CM

## 2021-01-31 DIAGNOSIS — U07.1 COVID-19 VIRUS DETECTED: ICD-10-CM

## 2021-01-31 LAB
ALBUMIN SERPL BCP-MCNC: 3.6 G/DL (ref 3.5–5.2)
ALP SERPL-CCNC: 75 U/L (ref 55–135)
ALT SERPL W/O P-5'-P-CCNC: 10 U/L (ref 10–44)
ANION GAP SERPL CALC-SCNC: 10 MMOL/L (ref 8–16)
AST SERPL-CCNC: 20 U/L (ref 10–40)
BASOPHILS # BLD AUTO: 0.09 K/UL (ref 0–0.2)
BASOPHILS NFR BLD: 1.5 % (ref 0–1.9)
BILIRUB SERPL-MCNC: 0.3 MG/DL (ref 0.1–1)
BUN SERPL-MCNC: 20 MG/DL (ref 8–23)
CALCIUM SERPL-MCNC: 8.5 MG/DL (ref 8.7–10.5)
CHLORIDE SERPL-SCNC: 108 MMOL/L (ref 95–110)
CO2 SERPL-SCNC: 22 MMOL/L (ref 23–29)
CREAT SERPL-MCNC: 1 MG/DL (ref 0.5–1.4)
CTP QC/QA: YES
DIFFERENTIAL METHOD: ABNORMAL
EOSINOPHIL # BLD AUTO: 0.1 K/UL (ref 0–0.5)
EOSINOPHIL NFR BLD: 1.7 % (ref 0–8)
ERYTHROCYTE [DISTWIDTH] IN BLOOD BY AUTOMATED COUNT: 14.1 % (ref 11.5–14.5)
EST. GFR  (AFRICAN AMERICAN): 60 ML/MIN/1.73 M^2
EST. GFR  (NON AFRICAN AMERICAN): 52 ML/MIN/1.73 M^2
GLUCOSE SERPL-MCNC: 98 MG/DL (ref 70–110)
HCT VFR BLD AUTO: 34.1 % (ref 37–48.5)
HGB BLD-MCNC: 11.6 G/DL (ref 12–16)
IMM GRANULOCYTES # BLD AUTO: 0.05 K/UL (ref 0–0.04)
IMM GRANULOCYTES NFR BLD AUTO: 0.8 % (ref 0–0.5)
LYMPHOCYTES # BLD AUTO: 1.1 K/UL (ref 1–4.8)
LYMPHOCYTES NFR BLD: 19 % (ref 18–48)
MCH RBC QN AUTO: 29.4 PG (ref 27–31)
MCHC RBC AUTO-ENTMCNC: 34 G/DL (ref 32–36)
MCV RBC AUTO: 87 FL (ref 82–98)
MONOCYTES # BLD AUTO: 0.5 K/UL (ref 0.3–1)
MONOCYTES NFR BLD: 8 % (ref 4–15)
NEUTROPHILS # BLD AUTO: 4.1 K/UL (ref 1.8–7.7)
NEUTROPHILS NFR BLD: 69 % (ref 38–73)
NRBC BLD-RTO: 0 /100 WBC
PLATELET # BLD AUTO: 445 K/UL (ref 150–350)
PMV BLD AUTO: 9.3 FL (ref 9.2–12.9)
POTASSIUM SERPL-SCNC: 4.2 MMOL/L (ref 3.5–5.1)
PROT SERPL-MCNC: 7.7 G/DL (ref 6–8.4)
RBC # BLD AUTO: 3.94 M/UL (ref 4–5.4)
SARS-COV-2 RDRP RESP QL NAA+PROBE: POSITIVE
SODIUM SERPL-SCNC: 140 MMOL/L (ref 136–145)
WBC # BLD AUTO: 5.99 K/UL (ref 3.9–12.7)

## 2021-01-31 PROCEDURE — 80053 COMPREHEN METABOLIC PANEL: CPT | Mod: HCNC

## 2021-01-31 PROCEDURE — 85025 COMPLETE CBC W/AUTO DIFF WBC: CPT | Mod: HCNC

## 2021-01-31 PROCEDURE — U0002 COVID-19 LAB TEST NON-CDC: HCPCS | Mod: HCNC | Performed by: NURSE PRACTITIONER

## 2021-01-31 PROCEDURE — 99284 EMERGENCY DEPT VISIT MOD MDM: CPT | Mod: 25,HCNC,CS

## 2021-01-31 RX ORDER — LIDOCAINE HYDROCHLORIDE 20 MG/ML
10 SOLUTION OROPHARYNGEAL
Status: DISCONTINUED | OUTPATIENT
Start: 2021-01-31 | End: 2021-01-31 | Stop reason: HOSPADM

## 2021-02-02 ENCOUNTER — ANESTHESIA EVENT (OUTPATIENT)
Dept: SURGERY | Facility: HOSPITAL | Age: 86
End: 2021-02-02
Payer: MEDICARE

## 2021-02-02 ENCOUNTER — HOSPITAL ENCOUNTER (OUTPATIENT)
Dept: PREADMISSION TESTING | Facility: HOSPITAL | Age: 86
Discharge: HOME | End: 2021-02-02
Attending: ORTHOPAEDIC SURGERY
Payer: MEDICARE

## 2021-02-03 ENCOUNTER — TELEPHONE (OUTPATIENT)
Dept: ADMINISTRATIVE | Facility: OTHER | Age: 86
End: 2021-02-03

## 2021-02-03 ENCOUNTER — PATIENT MESSAGE (OUTPATIENT)
Dept: FAMILY MEDICINE | Facility: CLINIC | Age: 86
End: 2021-02-03

## 2021-02-03 ENCOUNTER — TELEPHONE (OUTPATIENT)
Dept: ORTHOPEDICS | Facility: CLINIC | Age: 86
End: 2021-02-03

## 2021-02-04 ENCOUNTER — TELEPHONE (OUTPATIENT)
Dept: ADMINISTRATIVE | Facility: CLINIC | Age: 86
End: 2021-02-04

## 2021-02-04 ENCOUNTER — TELEPHONE (OUTPATIENT)
Dept: ADMINISTRATIVE | Facility: OTHER | Age: 86
End: 2021-02-04

## 2021-02-04 ENCOUNTER — PATIENT MESSAGE (OUTPATIENT)
Dept: ORTHOPEDICS | Facility: CLINIC | Age: 86
End: 2021-02-04

## 2021-02-04 ENCOUNTER — TELEPHONE (OUTPATIENT)
Dept: FAMILY MEDICINE | Facility: CLINIC | Age: 86
End: 2021-02-04

## 2021-02-05 ENCOUNTER — TELEPHONE (OUTPATIENT)
Dept: ADMINISTRATIVE | Facility: CLINIC | Age: 86
End: 2021-02-05

## 2021-02-05 ENCOUNTER — PATIENT MESSAGE (OUTPATIENT)
Dept: FAMILY MEDICINE | Facility: CLINIC | Age: 86
End: 2021-02-05

## 2021-02-07 ENCOUNTER — PATIENT MESSAGE (OUTPATIENT)
Dept: ADMINISTRATIVE | Facility: OTHER | Age: 86
End: 2021-02-07

## 2021-02-07 ENCOUNTER — PATIENT MESSAGE (OUTPATIENT)
Dept: ADMINISTRATIVE | Facility: CLINIC | Age: 86
End: 2021-02-07

## 2021-02-08 ENCOUNTER — TELEPHONE (OUTPATIENT)
Dept: ADMINISTRATIVE | Facility: CLINIC | Age: 86
End: 2021-02-08

## 2021-02-08 ENCOUNTER — NURSE TRIAGE (OUTPATIENT)
Dept: ADMINISTRATIVE | Facility: CLINIC | Age: 86
End: 2021-02-08

## 2021-02-09 ENCOUNTER — PATIENT MESSAGE (OUTPATIENT)
Dept: ADMINISTRATIVE | Facility: CLINIC | Age: 86
End: 2021-02-09

## 2021-02-09 ENCOUNTER — NURSE TRIAGE (OUTPATIENT)
Dept: ADMINISTRATIVE | Facility: CLINIC | Age: 86
End: 2021-02-09

## 2021-02-10 ENCOUNTER — NURSE TRIAGE (OUTPATIENT)
Dept: ADMINISTRATIVE | Facility: CLINIC | Age: 86
End: 2021-02-10

## 2021-02-18 ENCOUNTER — OFFICE VISIT (OUTPATIENT)
Dept: ORTHOPEDICS | Facility: CLINIC | Age: 86
End: 2021-02-18
Payer: MEDICARE

## 2021-02-18 VITALS — DIASTOLIC BLOOD PRESSURE: 64 MMHG | SYSTOLIC BLOOD PRESSURE: 124 MMHG | HEART RATE: 85 BPM

## 2021-02-18 DIAGNOSIS — M75.121 NONTRAUMATIC COMPLETE TEAR OF RIGHT ROTATOR CUFF: Primary | ICD-10-CM

## 2021-02-18 PROCEDURE — 1126F AMNT PAIN NOTED NONE PRSNT: CPT | Mod: S$GLB,,, | Performed by: ORTHOPAEDIC SURGERY

## 2021-02-18 PROCEDURE — 99213 PR OFFICE/OUTPT VISIT, EST, LEVL III, 20-29 MIN: ICD-10-PCS | Mod: S$GLB,,, | Performed by: ORTHOPAEDIC SURGERY

## 2021-02-18 PROCEDURE — 1126F PR PAIN SEVERITY QUANTIFIED, NO PAIN PRESENT: ICD-10-PCS | Mod: S$GLB,,, | Performed by: ORTHOPAEDIC SURGERY

## 2021-02-18 PROCEDURE — 3078F DIAST BP <80 MM HG: CPT | Mod: CPTII,S$GLB,, | Performed by: ORTHOPAEDIC SURGERY

## 2021-02-18 PROCEDURE — 99213 OFFICE O/P EST LOW 20 MIN: CPT | Mod: S$GLB,,, | Performed by: ORTHOPAEDIC SURGERY

## 2021-02-18 PROCEDURE — 99999 PR PBB SHADOW E&M-EST. PATIENT-LVL II: ICD-10-PCS | Mod: PBBFAC,,, | Performed by: ORTHOPAEDIC SURGERY

## 2021-02-18 PROCEDURE — 1159F MED LIST DOCD IN RCRD: CPT | Mod: S$GLB,,, | Performed by: ORTHOPAEDIC SURGERY

## 2021-02-18 PROCEDURE — 3074F PR MOST RECENT SYSTOLIC BLOOD PRESSURE < 130 MM HG: ICD-10-PCS | Mod: CPTII,S$GLB,, | Performed by: ORTHOPAEDIC SURGERY

## 2021-02-18 PROCEDURE — 3288F PR FALLS RISK ASSESSMENT DOCUMENTED: ICD-10-PCS | Mod: CPTII,S$GLB,, | Performed by: ORTHOPAEDIC SURGERY

## 2021-02-18 PROCEDURE — 1157F ADVNC CARE PLAN IN RCRD: CPT | Mod: S$GLB,,, | Performed by: ORTHOPAEDIC SURGERY

## 2021-02-18 PROCEDURE — 1101F PR PT FALLS ASSESS DOC 0-1 FALLS W/OUT INJ PAST YR: ICD-10-PCS | Mod: CPTII,S$GLB,, | Performed by: ORTHOPAEDIC SURGERY

## 2021-02-18 PROCEDURE — 1101F PT FALLS ASSESS-DOCD LE1/YR: CPT | Mod: CPTII,S$GLB,, | Performed by: ORTHOPAEDIC SURGERY

## 2021-02-18 PROCEDURE — 99999 PR PBB SHADOW E&M-EST. PATIENT-LVL II: CPT | Mod: PBBFAC,,, | Performed by: ORTHOPAEDIC SURGERY

## 2021-02-18 PROCEDURE — 3288F FALL RISK ASSESSMENT DOCD: CPT | Mod: CPTII,S$GLB,, | Performed by: ORTHOPAEDIC SURGERY

## 2021-02-18 PROCEDURE — 3074F SYST BP LT 130 MM HG: CPT | Mod: CPTII,S$GLB,, | Performed by: ORTHOPAEDIC SURGERY

## 2021-02-18 PROCEDURE — 3078F PR MOST RECENT DIASTOLIC BLOOD PRESSURE < 80 MM HG: ICD-10-PCS | Mod: CPTII,S$GLB,, | Performed by: ORTHOPAEDIC SURGERY

## 2021-02-18 PROCEDURE — 1157F PR ADVANCE CARE PLAN OR EQUIV PRESENT IN MEDICAL RECORD: ICD-10-PCS | Mod: S$GLB,,, | Performed by: ORTHOPAEDIC SURGERY

## 2021-02-18 PROCEDURE — 1159F PR MEDICATION LIST DOCUMENTED IN MEDICAL RECORD: ICD-10-PCS | Mod: S$GLB,,, | Performed by: ORTHOPAEDIC SURGERY

## 2021-02-27 ENCOUNTER — PATIENT MESSAGE (OUTPATIENT)
Dept: FAMILY MEDICINE | Facility: CLINIC | Age: 86
End: 2021-02-27

## 2021-03-03 ENCOUNTER — HOSPITAL ENCOUNTER (OUTPATIENT)
Dept: PREADMISSION TESTING | Facility: HOSPITAL | Age: 86
Discharge: HOME | End: 2021-03-03
Attending: ORTHOPAEDIC SURGERY
Payer: MEDICARE

## 2021-03-03 RX ORDER — LIDOCAINE HYDROCHLORIDE 10 MG/ML
1 INJECTION, SOLUTION EPIDURAL; INFILTRATION; INTRACAUDAL; PERINEURAL ONCE
Status: CANCELLED | OUTPATIENT
Start: 2021-03-03 | End: 2021-03-03

## 2021-03-03 RX ORDER — SODIUM CHLORIDE, SODIUM LACTATE, POTASSIUM CHLORIDE, CALCIUM CHLORIDE 600; 310; 30; 20 MG/100ML; MG/100ML; MG/100ML; MG/100ML
INJECTION, SOLUTION INTRAVENOUS CONTINUOUS
Status: CANCELLED | OUTPATIENT
Start: 2021-03-03

## 2021-03-05 ENCOUNTER — ANESTHESIA (OUTPATIENT)
Dept: SURGERY | Facility: HOSPITAL | Age: 86
End: 2021-03-05
Payer: MEDICARE

## 2021-03-05 ENCOUNTER — HOSPITAL ENCOUNTER (OUTPATIENT)
Facility: HOSPITAL | Age: 86
Discharge: HOME | End: 2021-03-05
Attending: ORTHOPAEDIC SURGERY | Admitting: ORTHOPAEDIC SURGERY
Payer: MEDICARE

## 2021-03-05 VITALS
WEIGHT: 130 LBS | OXYGEN SATURATION: 95 % | RESPIRATION RATE: 18 BRPM | HEIGHT: 64 IN | HEART RATE: 61 BPM | SYSTOLIC BLOOD PRESSURE: 132 MMHG | DIASTOLIC BLOOD PRESSURE: 54 MMHG | BODY MASS INDEX: 22.2 KG/M2 | TEMPERATURE: 98 F

## 2021-03-05 DIAGNOSIS — R07.89 CHEST WALL DISCOMFORT: ICD-10-CM

## 2021-03-05 DIAGNOSIS — M75.121 NONTRAUMATIC COMPLETE TEAR OF RIGHT ROTATOR CUFF: ICD-10-CM

## 2021-03-05 PROCEDURE — 64415 NJX AA&/STRD BRCH PLXS IMG: CPT | Mod: 59,RT | Performed by: STUDENT IN AN ORGANIZED HEALTH CARE EDUCATION/TRAINING PROGRAM

## 2021-03-05 PROCEDURE — 25000003 PHARM REV CODE 250: Performed by: ORTHOPAEDIC SURGERY

## 2021-03-05 PROCEDURE — 93010 EKG 12-LEAD: ICD-10-PCS | Mod: ,,, | Performed by: INTERNAL MEDICINE

## 2021-03-05 PROCEDURE — 93010 ELECTROCARDIOGRAM REPORT: CPT | Mod: ,,, | Performed by: INTERNAL MEDICINE

## 2021-03-05 PROCEDURE — 63600175 PHARM REV CODE 636 W HCPCS: Performed by: NURSE PRACTITIONER

## 2021-03-05 PROCEDURE — 29819 SHO ARTHRS SRG RMVL LOOSE/FB: CPT | Mod: RT,,, | Performed by: ORTHOPAEDIC SURGERY

## 2021-03-05 PROCEDURE — 37000008 HC ANESTHESIA 1ST 15 MINUTES: Performed by: ORTHOPAEDIC SURGERY

## 2021-03-05 PROCEDURE — 71000033 HC RECOVERY, INTIAL HOUR: Performed by: ORTHOPAEDIC SURGERY

## 2021-03-05 PROCEDURE — 25000003 PHARM REV CODE 250: Performed by: NURSE ANESTHETIST, CERTIFIED REGISTERED

## 2021-03-05 PROCEDURE — 29819 PR SHLDR ARTHROSCOP,SURG,W/REMOVAL,LOOSE/FB: ICD-10-PCS | Mod: RT,,, | Performed by: ORTHOPAEDIC SURGERY

## 2021-03-05 PROCEDURE — 63600175 PHARM REV CODE 636 W HCPCS: Performed by: ANESTHESIOLOGY

## 2021-03-05 PROCEDURE — 37000009 HC ANESTHESIA EA ADD 15 MINS: Performed by: ORTHOPAEDIC SURGERY

## 2021-03-05 PROCEDURE — 63600175 PHARM REV CODE 636 W HCPCS: Performed by: STUDENT IN AN ORGANIZED HEALTH CARE EDUCATION/TRAINING PROGRAM

## 2021-03-05 PROCEDURE — 93005 ELECTROCARDIOGRAM TRACING: CPT | Mod: 59

## 2021-03-05 PROCEDURE — 27200664 HC NERVE BLOCK COMPLETE KIT: Performed by: STUDENT IN AN ORGANIZED HEALTH CARE EDUCATION/TRAINING PROGRAM

## 2021-03-05 PROCEDURE — 27201423 OPTIME MED/SURG SUP & DEVICES STERILE SUPPLY: Performed by: ORTHOPAEDIC SURGERY

## 2021-03-05 PROCEDURE — 25000003 PHARM REV CODE 250: Performed by: STUDENT IN AN ORGANIZED HEALTH CARE EDUCATION/TRAINING PROGRAM

## 2021-03-05 PROCEDURE — 76942 ECHO GUIDE FOR BIOPSY: CPT | Performed by: STUDENT IN AN ORGANIZED HEALTH CARE EDUCATION/TRAINING PROGRAM

## 2021-03-05 PROCEDURE — 63600175 PHARM REV CODE 636 W HCPCS: Performed by: NURSE ANESTHETIST, CERTIFIED REGISTERED

## 2021-03-05 PROCEDURE — C9290 INJ, BUPIVACAINE LIPOSOME: HCPCS | Performed by: STUDENT IN AN ORGANIZED HEALTH CARE EDUCATION/TRAINING PROGRAM

## 2021-03-05 PROCEDURE — 36000710: Performed by: ORTHOPAEDIC SURGERY

## 2021-03-05 PROCEDURE — 71000016 HC POSTOP RECOV ADDL HR: Performed by: ORTHOPAEDIC SURGERY

## 2021-03-05 PROCEDURE — 63600175 PHARM REV CODE 636 W HCPCS: Performed by: ORTHOPAEDIC SURGERY

## 2021-03-05 PROCEDURE — 71000015 HC POSTOP RECOV 1ST HR: Performed by: ORTHOPAEDIC SURGERY

## 2021-03-05 PROCEDURE — 36000711: Performed by: ORTHOPAEDIC SURGERY

## 2021-03-05 RX ORDER — BUPIVACAINE HYDROCHLORIDE 2.5 MG/ML
INJECTION, SOLUTION EPIDURAL; INFILTRATION; INTRACAUDAL
Status: DISCONTINUED | OUTPATIENT
Start: 2021-03-05 | End: 2021-03-05

## 2021-03-05 RX ORDER — DEXAMETHASONE SODIUM PHOSPHATE 4 MG/ML
INJECTION, SOLUTION INTRA-ARTICULAR; INTRALESIONAL; INTRAMUSCULAR; INTRAVENOUS; SOFT TISSUE
Status: DISCONTINUED | OUTPATIENT
Start: 2021-03-05 | End: 2021-03-05

## 2021-03-05 RX ORDER — METOCLOPRAMIDE HYDROCHLORIDE 5 MG/ML
10 INJECTION INTRAMUSCULAR; INTRAVENOUS EVERY 10 MIN PRN
Status: COMPLETED | OUTPATIENT
Start: 2021-03-05 | End: 2021-03-05

## 2021-03-05 RX ORDER — MIDAZOLAM HYDROCHLORIDE 1 MG/ML
INJECTION INTRAMUSCULAR; INTRAVENOUS
Status: DISCONTINUED | OUTPATIENT
Start: 2021-03-05 | End: 2021-03-05

## 2021-03-05 RX ORDER — HYDROMORPHONE HYDROCHLORIDE 2 MG/ML
0.2 INJECTION, SOLUTION INTRAMUSCULAR; INTRAVENOUS; SUBCUTANEOUS EVERY 5 MIN PRN
Status: DISCONTINUED | OUTPATIENT
Start: 2021-03-05 | End: 2021-03-05 | Stop reason: HOSPADM

## 2021-03-05 RX ORDER — ONDANSETRON HYDROCHLORIDE 2 MG/ML
INJECTION, SOLUTION INTRAMUSCULAR; INTRAVENOUS
Status: DISCONTINUED | OUTPATIENT
Start: 2021-03-05 | End: 2021-03-05

## 2021-03-05 RX ORDER — KETOROLAC TROMETHAMINE 30 MG/ML
15 INJECTION, SOLUTION INTRAMUSCULAR; INTRAVENOUS ONCE
Status: COMPLETED | OUTPATIENT
Start: 2021-03-05 | End: 2021-03-05

## 2021-03-05 RX ORDER — OXYCODONE AND ACETAMINOPHEN 5; 325 MG/1; MG/1
1 TABLET ORAL EVERY 4 HOURS PRN
Qty: 40 TABLET | Refills: 0 | Status: SHIPPED | OUTPATIENT
Start: 2021-03-05 | End: 2021-03-15

## 2021-03-05 RX ORDER — LIDOCAINE HCL/PF 100 MG/5ML
SYRINGE (ML) INTRAVENOUS
Status: DISCONTINUED | OUTPATIENT
Start: 2021-03-05 | End: 2021-03-05

## 2021-03-05 RX ORDER — FENTANYL CITRATE 50 UG/ML
INJECTION, SOLUTION INTRAMUSCULAR; INTRAVENOUS
Status: DISCONTINUED | OUTPATIENT
Start: 2021-03-05 | End: 2021-03-05

## 2021-03-05 RX ORDER — ACETAMINOPHEN 325 MG/1
650 TABLET ORAL EVERY 4 HOURS PRN
Status: DISCONTINUED | OUTPATIENT
Start: 2021-03-05 | End: 2021-03-05 | Stop reason: HOSPADM

## 2021-03-05 RX ORDER — OXYCODONE HYDROCHLORIDE 5 MG/1
10 TABLET ORAL EVERY 4 HOURS PRN
Status: DISCONTINUED | OUTPATIENT
Start: 2021-03-05 | End: 2021-03-05 | Stop reason: HOSPADM

## 2021-03-05 RX ORDER — SODIUM CHLORIDE, SODIUM LACTATE, POTASSIUM CHLORIDE, CALCIUM CHLORIDE 600; 310; 30; 20 MG/100ML; MG/100ML; MG/100ML; MG/100ML
INJECTION, SOLUTION INTRAVENOUS CONTINUOUS
Status: DISCONTINUED | OUTPATIENT
Start: 2021-03-05 | End: 2021-03-05 | Stop reason: HOSPADM

## 2021-03-05 RX ORDER — EPINEPHRINE 1 MG/ML
INJECTION, SOLUTION INTRACARDIAC; INTRAMUSCULAR; INTRAVENOUS; SUBCUTANEOUS
Status: DISCONTINUED | OUTPATIENT
Start: 2021-03-05 | End: 2021-03-05 | Stop reason: HOSPADM

## 2021-03-05 RX ORDER — PROPOFOL 10 MG/ML
VIAL (ML) INTRAVENOUS
Status: DISCONTINUED | OUTPATIENT
Start: 2021-03-05 | End: 2021-03-05

## 2021-03-05 RX ORDER — LIDOCAINE HYDROCHLORIDE 10 MG/ML
1 INJECTION, SOLUTION EPIDURAL; INFILTRATION; INTRACAUDAL; PERINEURAL ONCE
Status: DISCONTINUED | OUTPATIENT
Start: 2021-03-05 | End: 2021-03-05 | Stop reason: HOSPADM

## 2021-03-05 RX ORDER — ROCURONIUM BROMIDE 10 MG/ML
INJECTION, SOLUTION INTRAVENOUS
Status: DISCONTINUED | OUTPATIENT
Start: 2021-03-05 | End: 2021-03-05

## 2021-03-05 RX ORDER — NEOSTIGMINE METHYLSULFATE 1 MG/ML
INJECTION, SOLUTION INTRAVENOUS
Status: DISCONTINUED | OUTPATIENT
Start: 2021-03-05 | End: 2021-03-05

## 2021-03-05 RX ORDER — SUCCINYLCHOLINE CHLORIDE 20 MG/ML
INJECTION INTRAMUSCULAR; INTRAVENOUS
Status: DISCONTINUED | OUTPATIENT
Start: 2021-03-05 | End: 2021-03-05

## 2021-03-05 RX ORDER — ONDANSETRON 8 MG/1
8 TABLET, ORALLY DISINTEGRATING ORAL EVERY 8 HOURS PRN
Status: DISCONTINUED | OUTPATIENT
Start: 2021-03-05 | End: 2021-03-05 | Stop reason: HOSPADM

## 2021-03-05 RX ORDER — SODIUM CHLORIDE 0.9 % (FLUSH) 0.9 %
10 SYRINGE (ML) INJECTION
Status: DISCONTINUED | OUTPATIENT
Start: 2021-03-05 | End: 2021-03-05 | Stop reason: HOSPADM

## 2021-03-05 RX ORDER — CLINDAMYCIN PHOSPHATE 900 MG/50ML
900 INJECTION, SOLUTION INTRAVENOUS
Status: COMPLETED | OUTPATIENT
Start: 2021-03-05 | End: 2021-03-05

## 2021-03-05 RX ORDER — PHENYLEPHRINE HYDROCHLORIDE 10 MG/ML
INJECTION INTRAVENOUS
Status: DISCONTINUED | OUTPATIENT
Start: 2021-03-05 | End: 2021-03-05

## 2021-03-05 RX ADMIN — SUCCINYLCHOLINE CHLORIDE 100 MG: 20 INJECTION, SOLUTION INTRAMUSCULAR; INTRAVENOUS at 02:03

## 2021-03-05 RX ADMIN — PROPOFOL 100 MG: 10 INJECTION, EMULSION INTRAVENOUS at 02:03

## 2021-03-05 RX ADMIN — OXYCODONE HYDROCHLORIDE 10 MG: 5 TABLET ORAL at 04:03

## 2021-03-05 RX ADMIN — SODIUM CHLORIDE, SODIUM LACTATE, POTASSIUM CHLORIDE, AND CALCIUM CHLORIDE: .6; .31; .03; .02 INJECTION, SOLUTION INTRAVENOUS at 11:03

## 2021-03-05 RX ADMIN — KETOROLAC TROMETHAMINE 15 MG: 30 INJECTION, SOLUTION INTRAMUSCULAR at 03:03

## 2021-03-05 RX ADMIN — MIDAZOLAM HYDROCHLORIDE 2 MG: 1 INJECTION, SOLUTION INTRAMUSCULAR; INTRAVENOUS at 01:03

## 2021-03-05 RX ADMIN — ONDANSETRON 8 MG: 8 TABLET, ORALLY DISINTEGRATING ORAL at 05:03

## 2021-03-05 RX ADMIN — ROCURONIUM BROMIDE 15 MG: 10 INJECTION, SOLUTION INTRAVENOUS at 02:03

## 2021-03-05 RX ADMIN — SODIUM CHLORIDE, SODIUM LACTATE, POTASSIUM CHLORIDE, AND CALCIUM CHLORIDE: .6; .31; .03; .02 INJECTION, SOLUTION INTRAVENOUS at 02:03

## 2021-03-05 RX ADMIN — FENTANYL CITRATE 50 MCG: 50 INJECTION, SOLUTION INTRAMUSCULAR; INTRAVENOUS at 02:03

## 2021-03-05 RX ADMIN — PHENYLEPHRINE HYDROCHLORIDE 100 MCG: 10 INJECTION INTRAVENOUS at 02:03

## 2021-03-05 RX ADMIN — ONDANSETRON 4 MG: 2 INJECTION, SOLUTION INTRAMUSCULAR; INTRAVENOUS at 03:03

## 2021-03-05 RX ADMIN — HYDROMORPHONE HYDROCHLORIDE 0.2 MG: 2 INJECTION INTRAMUSCULAR; INTRAVENOUS; SUBCUTANEOUS at 04:03

## 2021-03-05 RX ADMIN — CLINDAMYCIN PHOSPHATE 900 MG: 18 INJECTION, SOLUTION INTRAVENOUS at 02:03

## 2021-03-05 RX ADMIN — NEOSTIGMINE METHYLSULFATE 3 MG: 1 INJECTION INTRAVENOUS at 03:03

## 2021-03-05 RX ADMIN — METOCLOPRAMIDE 10 MG: 5 INJECTION, SOLUTION INTRAMUSCULAR; INTRAVENOUS at 05:03

## 2021-03-05 RX ADMIN — BUPIVACAINE 10 ML: 13.3 INJECTION, SUSPENSION, LIPOSOMAL INFILTRATION at 01:03

## 2021-03-05 RX ADMIN — GLYCOPYRROLATE 0.6 MG: 0.2 INJECTION, SOLUTION INTRAMUSCULAR; INTRAVITREAL at 03:03

## 2021-03-05 RX ADMIN — BUPIVACAINE HYDROCHLORIDE 10 ML: 2.5 INJECTION, SOLUTION EPIDURAL; INFILTRATION; INTRACAUDAL; PERINEURAL at 01:03

## 2021-03-05 RX ADMIN — ROCURONIUM BROMIDE 5 MG: 10 INJECTION, SOLUTION INTRAVENOUS at 02:03

## 2021-03-05 RX ADMIN — DEXAMETHASONE SODIUM PHOSPHATE 4 MG: 4 INJECTION, SOLUTION INTRA-ARTICULAR; INTRALESIONAL; INTRAMUSCULAR; INTRAVENOUS; SOFT TISSUE at 03:03

## 2021-03-05 RX ADMIN — HYDROMORPHONE HYDROCHLORIDE 0.2 MG: 2 INJECTION INTRAMUSCULAR; INTRAVENOUS; SUBCUTANEOUS at 03:03

## 2021-03-05 RX ADMIN — LIDOCAINE HYDROCHLORIDE 60 MG: 20 INJECTION, SOLUTION INTRAVENOUS at 02:03

## 2021-03-15 ENCOUNTER — TELEPHONE (OUTPATIENT)
Dept: ORTHOPEDICS | Facility: CLINIC | Age: 86
End: 2021-03-15

## 2021-03-18 ENCOUNTER — OFFICE VISIT (OUTPATIENT)
Dept: ORTHOPEDICS | Facility: CLINIC | Age: 86
End: 2021-03-18
Payer: MEDICARE

## 2021-03-18 VITALS — HEIGHT: 64 IN | BODY MASS INDEX: 22.2 KG/M2 | WEIGHT: 130.06 LBS

## 2021-03-18 DIAGNOSIS — M75.121 NONTRAUMATIC COMPLETE TEAR OF RIGHT ROTATOR CUFF: Primary | ICD-10-CM

## 2021-03-18 PROCEDURE — 99999 PR PBB SHADOW E&M-EST. PATIENT-LVL III: CPT | Mod: PBBFAC,,, | Performed by: PHYSICIAN ASSISTANT

## 2021-03-18 PROCEDURE — 99024 POSTOP FOLLOW-UP VISIT: CPT | Mod: S$GLB,,, | Performed by: PHYSICIAN ASSISTANT

## 2021-03-18 PROCEDURE — 99999 PR PBB SHADOW E&M-EST. PATIENT-LVL III: ICD-10-PCS | Mod: PBBFAC,,, | Performed by: PHYSICIAN ASSISTANT

## 2021-03-18 PROCEDURE — 1157F ADVNC CARE PLAN IN RCRD: CPT | Mod: S$GLB,,, | Performed by: PHYSICIAN ASSISTANT

## 2021-03-18 PROCEDURE — 1125F PR PAIN SEVERITY QUANTIFIED, PAIN PRESENT: ICD-10-PCS | Mod: S$GLB,,, | Performed by: PHYSICIAN ASSISTANT

## 2021-03-18 PROCEDURE — 99024 PR POST-OP FOLLOW-UP VISIT: ICD-10-PCS | Mod: S$GLB,,, | Performed by: PHYSICIAN ASSISTANT

## 2021-03-18 PROCEDURE — 1157F PR ADVANCE CARE PLAN OR EQUIV PRESENT IN MEDICAL RECORD: ICD-10-PCS | Mod: S$GLB,,, | Performed by: PHYSICIAN ASSISTANT

## 2021-03-18 PROCEDURE — 1125F AMNT PAIN NOTED PAIN PRSNT: CPT | Mod: S$GLB,,, | Performed by: PHYSICIAN ASSISTANT

## 2021-03-22 ENCOUNTER — CLINICAL SUPPORT (OUTPATIENT)
Dept: REHABILITATION | Facility: HOSPITAL | Age: 86
End: 2021-03-22
Payer: MEDICARE

## 2021-03-22 DIAGNOSIS — R53.1 DECREASED STRENGTH: ICD-10-CM

## 2021-03-22 DIAGNOSIS — M25.611 DECREASED ROM OF RIGHT SHOULDER: ICD-10-CM

## 2021-03-22 DIAGNOSIS — M75.121 NONTRAUMATIC COMPLETE TEAR OF RIGHT ROTATOR CUFF: ICD-10-CM

## 2021-03-22 PROCEDURE — 97110 THERAPEUTIC EXERCISES: CPT | Mod: PN

## 2021-03-22 PROCEDURE — 97161 PT EVAL LOW COMPLEX 20 MIN: CPT | Mod: PN

## 2021-04-07 ENCOUNTER — CLINICAL SUPPORT (OUTPATIENT)
Dept: REHABILITATION | Facility: HOSPITAL | Age: 86
End: 2021-04-07
Payer: MEDICARE

## 2021-04-07 DIAGNOSIS — R53.1 DECREASED STRENGTH: ICD-10-CM

## 2021-04-07 DIAGNOSIS — M25.611 DECREASED ROM OF RIGHT SHOULDER: ICD-10-CM

## 2021-04-07 PROCEDURE — 97110 THERAPEUTIC EXERCISES: CPT | Mod: PN

## 2021-04-07 PROCEDURE — 97140 MANUAL THERAPY 1/> REGIONS: CPT | Mod: PN

## 2021-04-09 ENCOUNTER — CLINICAL SUPPORT (OUTPATIENT)
Dept: REHABILITATION | Facility: HOSPITAL | Age: 86
End: 2021-04-09
Payer: MEDICARE

## 2021-04-09 DIAGNOSIS — M25.611 DECREASED ROM OF RIGHT SHOULDER: ICD-10-CM

## 2021-04-09 DIAGNOSIS — R53.1 DECREASED STRENGTH: ICD-10-CM

## 2021-04-09 PROCEDURE — 97110 THERAPEUTIC EXERCISES: CPT | Mod: PN

## 2021-04-16 ENCOUNTER — PATIENT MESSAGE (OUTPATIENT)
Dept: REHABILITATION | Facility: HOSPITAL | Age: 86
End: 2021-04-16

## 2021-04-16 ENCOUNTER — CLINICAL SUPPORT (OUTPATIENT)
Dept: REHABILITATION | Facility: HOSPITAL | Age: 86
End: 2021-04-16
Payer: MEDICARE

## 2021-04-16 ENCOUNTER — PATIENT MESSAGE (OUTPATIENT)
Dept: FAMILY MEDICINE | Facility: CLINIC | Age: 86
End: 2021-04-16

## 2021-04-16 DIAGNOSIS — R53.1 DECREASED STRENGTH: ICD-10-CM

## 2021-04-16 DIAGNOSIS — M25.611 DECREASED ROM OF RIGHT SHOULDER: ICD-10-CM

## 2021-04-16 PROCEDURE — 97140 MANUAL THERAPY 1/> REGIONS: CPT | Mod: PN,CQ

## 2021-04-16 PROCEDURE — 97110 THERAPEUTIC EXERCISES: CPT | Mod: PN,CQ

## 2021-04-21 ENCOUNTER — CLINICAL SUPPORT (OUTPATIENT)
Dept: REHABILITATION | Facility: HOSPITAL | Age: 86
End: 2021-04-21
Payer: MEDICARE

## 2021-04-21 ENCOUNTER — OFFICE VISIT (OUTPATIENT)
Dept: FAMILY MEDICINE | Facility: CLINIC | Age: 86
End: 2021-04-21
Payer: MEDICARE

## 2021-04-21 ENCOUNTER — TELEPHONE (OUTPATIENT)
Dept: FAMILY MEDICINE | Facility: CLINIC | Age: 86
End: 2021-04-21

## 2021-04-21 VITALS
OXYGEN SATURATION: 98 % | HEART RATE: 94 BPM | HEIGHT: 64 IN | DIASTOLIC BLOOD PRESSURE: 74 MMHG | TEMPERATURE: 98 F | SYSTOLIC BLOOD PRESSURE: 142 MMHG | BODY MASS INDEX: 21.68 KG/M2 | WEIGHT: 127 LBS

## 2021-04-21 DIAGNOSIS — I10 ESSENTIAL (PRIMARY) HYPERTENSION: Primary | ICD-10-CM

## 2021-04-21 DIAGNOSIS — G47.00 INSOMNIA, UNSPECIFIED TYPE: ICD-10-CM

## 2021-04-21 DIAGNOSIS — I25.10 CORONARY ARTERY DISEASE INVOLVING NATIVE CORONARY ARTERY OF NATIVE HEART WITHOUT ANGINA PECTORIS: ICD-10-CM

## 2021-04-21 DIAGNOSIS — N18.30 STAGE 3 CHRONIC KIDNEY DISEASE, UNSPECIFIED WHETHER STAGE 3A OR 3B CKD: ICD-10-CM

## 2021-04-21 DIAGNOSIS — R73.03 PREDIABETES: ICD-10-CM

## 2021-04-21 DIAGNOSIS — M25.611 DECREASED ROM OF RIGHT SHOULDER: ICD-10-CM

## 2021-04-21 DIAGNOSIS — M81.8 AGE-RELATED OSTEOPOROSIS WITHOUT FRACTURE: ICD-10-CM

## 2021-04-21 DIAGNOSIS — K21.9 GASTROESOPHAGEAL REFLUX DISEASE WITHOUT ESOPHAGITIS: ICD-10-CM

## 2021-04-21 DIAGNOSIS — L40.9 PSORIASIS: ICD-10-CM

## 2021-04-21 DIAGNOSIS — R53.1 DECREASED STRENGTH: ICD-10-CM

## 2021-04-21 PROCEDURE — 1101F PT FALLS ASSESS-DOCD LE1/YR: CPT | Mod: CPTII,S$GLB,, | Performed by: INTERNAL MEDICINE

## 2021-04-21 PROCEDURE — 1157F ADVNC CARE PLAN IN RCRD: CPT | Mod: S$GLB,,, | Performed by: INTERNAL MEDICINE

## 2021-04-21 PROCEDURE — 99499 RISK ADDL DX/OHS AUDIT: ICD-10-PCS | Mod: S$GLB,,, | Performed by: INTERNAL MEDICINE

## 2021-04-21 PROCEDURE — 1157F PR ADVANCE CARE PLAN OR EQUIV PRESENT IN MEDICAL RECORD: ICD-10-PCS | Mod: S$GLB,,, | Performed by: INTERNAL MEDICINE

## 2021-04-21 PROCEDURE — 99499 UNLISTED E&M SERVICE: CPT | Mod: S$GLB,,, | Performed by: INTERNAL MEDICINE

## 2021-04-21 PROCEDURE — 99999 PR PBB SHADOW E&M-EST. PATIENT-LVL IV: CPT | Mod: PBBFAC,,, | Performed by: INTERNAL MEDICINE

## 2021-04-21 PROCEDURE — 3077F PR MOST RECENT SYSTOLIC BLOOD PRESSURE >= 140 MM HG: ICD-10-PCS | Mod: CPTII,S$GLB,, | Performed by: INTERNAL MEDICINE

## 2021-04-21 PROCEDURE — 3288F PR FALLS RISK ASSESSMENT DOCUMENTED: ICD-10-PCS | Mod: CPTII,S$GLB,, | Performed by: INTERNAL MEDICINE

## 2021-04-21 PROCEDURE — 3078F PR MOST RECENT DIASTOLIC BLOOD PRESSURE < 80 MM HG: ICD-10-PCS | Mod: CPTII,S$GLB,, | Performed by: INTERNAL MEDICINE

## 2021-04-21 PROCEDURE — 1159F MED LIST DOCD IN RCRD: CPT | Mod: S$GLB,,, | Performed by: INTERNAL MEDICINE

## 2021-04-21 PROCEDURE — 1126F PR PAIN SEVERITY QUANTIFIED, NO PAIN PRESENT: ICD-10-PCS | Mod: S$GLB,,, | Performed by: INTERNAL MEDICINE

## 2021-04-21 PROCEDURE — 99214 PR OFFICE/OUTPT VISIT, EST, LEVL IV, 30-39 MIN: ICD-10-PCS | Mod: S$GLB,,, | Performed by: INTERNAL MEDICINE

## 2021-04-21 PROCEDURE — 1101F PR PT FALLS ASSESS DOC 0-1 FALLS W/OUT INJ PAST YR: ICD-10-PCS | Mod: CPTII,S$GLB,, | Performed by: INTERNAL MEDICINE

## 2021-04-21 PROCEDURE — 99999 PR PBB SHADOW E&M-EST. PATIENT-LVL IV: ICD-10-PCS | Mod: PBBFAC,,, | Performed by: INTERNAL MEDICINE

## 2021-04-21 PROCEDURE — 97140 MANUAL THERAPY 1/> REGIONS: CPT | Mod: PN

## 2021-04-21 PROCEDURE — 3078F DIAST BP <80 MM HG: CPT | Mod: CPTII,S$GLB,, | Performed by: INTERNAL MEDICINE

## 2021-04-21 PROCEDURE — 97110 THERAPEUTIC EXERCISES: CPT | Mod: PN

## 2021-04-21 PROCEDURE — 1126F AMNT PAIN NOTED NONE PRSNT: CPT | Mod: S$GLB,,, | Performed by: INTERNAL MEDICINE

## 2021-04-21 PROCEDURE — 3288F FALL RISK ASSESSMENT DOCD: CPT | Mod: CPTII,S$GLB,, | Performed by: INTERNAL MEDICINE

## 2021-04-21 PROCEDURE — 1159F PR MEDICATION LIST DOCUMENTED IN MEDICAL RECORD: ICD-10-PCS | Mod: S$GLB,,, | Performed by: INTERNAL MEDICINE

## 2021-04-21 PROCEDURE — 3077F SYST BP >= 140 MM HG: CPT | Mod: CPTII,S$GLB,, | Performed by: INTERNAL MEDICINE

## 2021-04-21 PROCEDURE — 99214 OFFICE O/P EST MOD 30 MIN: CPT | Mod: S$GLB,,, | Performed by: INTERNAL MEDICINE

## 2021-04-21 RX ORDER — BETAMETHASONE DIPROPIONATE 0.5 MG/G
CREAM TOPICAL 2 TIMES DAILY
Qty: 30 G | Refills: 3 | Status: SHIPPED | OUTPATIENT
Start: 2021-04-21 | End: 2021-06-10

## 2021-04-21 RX ORDER — ESOMEPRAZOLE MAGNESIUM 40 MG/1
40 CAPSULE, DELAYED RELEASE ORAL DAILY
Qty: 90 CAPSULE | Refills: 3 | Status: SHIPPED | OUTPATIENT
Start: 2021-04-21 | End: 2021-09-14 | Stop reason: SDUPTHER

## 2021-04-21 RX ORDER — ALPRAZOLAM 0.5 MG/1
0.5 TABLET ORAL 3 TIMES DAILY
Qty: 30 TABLET | Refills: 0 | Status: SHIPPED | OUTPATIENT
Start: 2021-04-21 | End: 2021-04-29

## 2021-04-28 ENCOUNTER — PATIENT OUTREACH (OUTPATIENT)
Dept: ADMINISTRATIVE | Facility: OTHER | Age: 86
End: 2021-04-28

## 2021-04-28 ENCOUNTER — CLINICAL SUPPORT (OUTPATIENT)
Dept: REHABILITATION | Facility: HOSPITAL | Age: 86
End: 2021-04-28
Payer: MEDICARE

## 2021-04-28 DIAGNOSIS — M25.611 DECREASED ROM OF RIGHT SHOULDER: ICD-10-CM

## 2021-04-28 DIAGNOSIS — R53.1 DECREASED STRENGTH: ICD-10-CM

## 2021-04-28 PROCEDURE — 97110 THERAPEUTIC EXERCISES: CPT | Mod: PN

## 2021-04-29 ENCOUNTER — OFFICE VISIT (OUTPATIENT)
Dept: ORTHOPEDICS | Facility: CLINIC | Age: 86
End: 2021-04-29
Payer: MEDICARE

## 2021-04-29 VITALS — HEIGHT: 64 IN | BODY MASS INDEX: 21.51 KG/M2 | WEIGHT: 126 LBS

## 2021-04-29 DIAGNOSIS — M25.511 RIGHT SHOULDER PAIN, UNSPECIFIED CHRONICITY: Primary | ICD-10-CM

## 2021-04-29 PROCEDURE — 99024 PR POST-OP FOLLOW-UP VISIT: ICD-10-PCS | Mod: S$GLB,,, | Performed by: ORTHOPAEDIC SURGERY

## 2021-04-29 PROCEDURE — 1101F PR PT FALLS ASSESS DOC 0-1 FALLS W/OUT INJ PAST YR: ICD-10-PCS | Mod: CPTII,S$GLB,, | Performed by: ORTHOPAEDIC SURGERY

## 2021-04-29 PROCEDURE — 3288F FALL RISK ASSESSMENT DOCD: CPT | Mod: CPTII,S$GLB,, | Performed by: ORTHOPAEDIC SURGERY

## 2021-04-29 PROCEDURE — 3288F PR FALLS RISK ASSESSMENT DOCUMENTED: ICD-10-PCS | Mod: CPTII,S$GLB,, | Performed by: ORTHOPAEDIC SURGERY

## 2021-04-29 PROCEDURE — 99024 POSTOP FOLLOW-UP VISIT: CPT | Mod: S$GLB,,, | Performed by: ORTHOPAEDIC SURGERY

## 2021-04-29 PROCEDURE — 1101F PT FALLS ASSESS-DOCD LE1/YR: CPT | Mod: CPTII,S$GLB,, | Performed by: ORTHOPAEDIC SURGERY

## 2021-04-29 PROCEDURE — 1125F PR PAIN SEVERITY QUANTIFIED, PAIN PRESENT: ICD-10-PCS | Mod: S$GLB,,, | Performed by: ORTHOPAEDIC SURGERY

## 2021-04-29 PROCEDURE — 1157F ADVNC CARE PLAN IN RCRD: CPT | Mod: S$GLB,,, | Performed by: ORTHOPAEDIC SURGERY

## 2021-04-29 PROCEDURE — 1157F PR ADVANCE CARE PLAN OR EQUIV PRESENT IN MEDICAL RECORD: ICD-10-PCS | Mod: S$GLB,,, | Performed by: ORTHOPAEDIC SURGERY

## 2021-04-29 PROCEDURE — 1125F AMNT PAIN NOTED PAIN PRSNT: CPT | Mod: S$GLB,,, | Performed by: ORTHOPAEDIC SURGERY

## 2021-04-29 PROCEDURE — 99999 PR PBB SHADOW E&M-EST. PATIENT-LVL III: ICD-10-PCS | Mod: PBBFAC,,, | Performed by: ORTHOPAEDIC SURGERY

## 2021-04-29 PROCEDURE — 99999 PR PBB SHADOW E&M-EST. PATIENT-LVL III: CPT | Mod: PBBFAC,,, | Performed by: ORTHOPAEDIC SURGERY

## 2021-04-30 ENCOUNTER — CLINICAL SUPPORT (OUTPATIENT)
Dept: REHABILITATION | Facility: HOSPITAL | Age: 86
End: 2021-04-30
Payer: MEDICARE

## 2021-04-30 DIAGNOSIS — M25.611 DECREASED ROM OF RIGHT SHOULDER: ICD-10-CM

## 2021-04-30 DIAGNOSIS — R53.1 DECREASED STRENGTH: ICD-10-CM

## 2021-04-30 PROCEDURE — 97110 THERAPEUTIC EXERCISES: CPT | Mod: PN,CQ

## 2021-05-03 ENCOUNTER — PATIENT MESSAGE (OUTPATIENT)
Dept: INTERNAL MEDICINE | Facility: CLINIC | Age: 86
End: 2021-05-03

## 2021-05-04 ENCOUNTER — OFFICE VISIT (OUTPATIENT)
Dept: FAMILY MEDICINE | Facility: CLINIC | Age: 86
End: 2021-05-04
Payer: MEDICARE

## 2021-05-04 ENCOUNTER — LAB VISIT (OUTPATIENT)
Dept: LAB | Facility: HOSPITAL | Age: 86
End: 2021-05-04
Attending: INTERNAL MEDICINE
Payer: MEDICARE

## 2021-05-04 VITALS
HEIGHT: 64 IN | BODY MASS INDEX: 21.94 KG/M2 | DIASTOLIC BLOOD PRESSURE: 62 MMHG | HEART RATE: 82 BPM | OXYGEN SATURATION: 98 % | TEMPERATURE: 98 F | SYSTOLIC BLOOD PRESSURE: 130 MMHG | WEIGHT: 128.5 LBS

## 2021-05-04 DIAGNOSIS — M25.511 CHRONIC RIGHT SHOULDER PAIN: ICD-10-CM

## 2021-05-04 DIAGNOSIS — R19.7 DIARRHEA, UNSPECIFIED TYPE: Primary | ICD-10-CM

## 2021-05-04 DIAGNOSIS — R73.03 PREDIABETES: ICD-10-CM

## 2021-05-04 DIAGNOSIS — R19.7 DIARRHEA, UNSPECIFIED TYPE: ICD-10-CM

## 2021-05-04 DIAGNOSIS — G89.29 CHRONIC RIGHT SHOULDER PAIN: ICD-10-CM

## 2021-05-04 DIAGNOSIS — I10 ESSENTIAL (PRIMARY) HYPERTENSION: ICD-10-CM

## 2021-05-04 DIAGNOSIS — N18.30 STAGE 3 CHRONIC KIDNEY DISEASE, UNSPECIFIED WHETHER STAGE 3A OR 3B CKD: ICD-10-CM

## 2021-05-04 PROCEDURE — 99999 PR PBB SHADOW E&M-EST. PATIENT-LVL IV: CPT | Mod: PBBFAC,,, | Performed by: INTERNAL MEDICINE

## 2021-05-04 PROCEDURE — 1101F PR PT FALLS ASSESS DOC 0-1 FALLS W/OUT INJ PAST YR: ICD-10-PCS | Mod: CPTII,S$GLB,, | Performed by: INTERNAL MEDICINE

## 2021-05-04 PROCEDURE — 99214 OFFICE O/P EST MOD 30 MIN: CPT | Mod: S$GLB,,, | Performed by: INTERNAL MEDICINE

## 2021-05-04 PROCEDURE — 99214 PR OFFICE/OUTPT VISIT, EST, LEVL IV, 30-39 MIN: ICD-10-PCS | Mod: S$GLB,,, | Performed by: INTERNAL MEDICINE

## 2021-05-04 PROCEDURE — 1126F PR PAIN SEVERITY QUANTIFIED, NO PAIN PRESENT: ICD-10-PCS | Mod: S$GLB,,, | Performed by: INTERNAL MEDICINE

## 2021-05-04 PROCEDURE — 1159F PR MEDICATION LIST DOCUMENTED IN MEDICAL RECORD: ICD-10-PCS | Mod: S$GLB,,, | Performed by: INTERNAL MEDICINE

## 2021-05-04 PROCEDURE — 3288F FALL RISK ASSESSMENT DOCD: CPT | Mod: CPTII,S$GLB,, | Performed by: INTERNAL MEDICINE

## 2021-05-04 PROCEDURE — 1101F PT FALLS ASSESS-DOCD LE1/YR: CPT | Mod: CPTII,S$GLB,, | Performed by: INTERNAL MEDICINE

## 2021-05-04 PROCEDURE — 1157F PR ADVANCE CARE PLAN OR EQUIV PRESENT IN MEDICAL RECORD: ICD-10-PCS | Mod: S$GLB,,, | Performed by: INTERNAL MEDICINE

## 2021-05-04 PROCEDURE — 3288F PR FALLS RISK ASSESSMENT DOCUMENTED: ICD-10-PCS | Mod: CPTII,S$GLB,, | Performed by: INTERNAL MEDICINE

## 2021-05-04 PROCEDURE — 1159F MED LIST DOCD IN RCRD: CPT | Mod: S$GLB,,, | Performed by: INTERNAL MEDICINE

## 2021-05-04 PROCEDURE — 1157F ADVNC CARE PLAN IN RCRD: CPT | Mod: S$GLB,,, | Performed by: INTERNAL MEDICINE

## 2021-05-04 PROCEDURE — 99499 RISK ADDL DX/OHS AUDIT: ICD-10-PCS | Mod: S$GLB,,, | Performed by: INTERNAL MEDICINE

## 2021-05-04 PROCEDURE — 99999 PR PBB SHADOW E&M-EST. PATIENT-LVL IV: ICD-10-PCS | Mod: PBBFAC,,, | Performed by: INTERNAL MEDICINE

## 2021-05-04 PROCEDURE — 1126F AMNT PAIN NOTED NONE PRSNT: CPT | Mod: S$GLB,,, | Performed by: INTERNAL MEDICINE

## 2021-05-04 PROCEDURE — 87209 SMEAR COMPLEX STAIN: CPT | Performed by: INTERNAL MEDICINE

## 2021-05-04 PROCEDURE — 99499 UNLISTED E&M SERVICE: CPT | Mod: S$GLB,,, | Performed by: INTERNAL MEDICINE

## 2021-05-04 RX ORDER — METRONIDAZOLE 500 MG/1
500 TABLET ORAL 3 TIMES DAILY
Qty: 21 TABLET | Refills: 0 | Status: SHIPPED | OUTPATIENT
Start: 2021-05-04 | End: 2021-05-11

## 2021-05-04 RX ORDER — HYOSCYAMINE SULFATE 0.125 MG
125 TABLET ORAL EVERY 6 HOURS PRN
Qty: 30 TABLET | Refills: 1 | Status: SHIPPED | OUTPATIENT
Start: 2021-05-04 | End: 2021-05-11

## 2021-05-04 RX ORDER — BUTYROSPERMUM PARKII(SHEA BUTTER), SIMMONDSIA CHINENSIS (JOJOBA) SEED OIL, ALOE BARBADENSIS LEAF EXTRACT .01; 1; 3.5 G/100G; G/100G; G/100G
250 LIQUID TOPICAL 2 TIMES DAILY
Qty: 30 CAPSULE | Refills: 3 | Status: SHIPPED | OUTPATIENT
Start: 2021-05-04 | End: 2021-06-10

## 2021-05-05 ENCOUNTER — CLINICAL SUPPORT (OUTPATIENT)
Dept: REHABILITATION | Facility: HOSPITAL | Age: 86
End: 2021-05-05
Payer: MEDICARE

## 2021-05-05 DIAGNOSIS — R53.1 DECREASED STRENGTH: ICD-10-CM

## 2021-05-05 DIAGNOSIS — M25.611 DECREASED ROM OF RIGHT SHOULDER: ICD-10-CM

## 2021-05-05 PROCEDURE — 97110 THERAPEUTIC EXERCISES: CPT | Mod: PN,CQ

## 2021-05-06 ENCOUNTER — PATIENT MESSAGE (OUTPATIENT)
Dept: FAMILY MEDICINE | Facility: CLINIC | Age: 86
End: 2021-05-06

## 2021-05-06 LAB — O+P STL MICRO: NORMAL

## 2021-05-08 ENCOUNTER — PATIENT MESSAGE (OUTPATIENT)
Dept: FAMILY MEDICINE | Facility: CLINIC | Age: 86
End: 2021-05-08

## 2021-05-09 ENCOUNTER — PATIENT MESSAGE (OUTPATIENT)
Dept: GASTROENTEROLOGY | Facility: CLINIC | Age: 86
End: 2021-05-09

## 2021-05-11 ENCOUNTER — OFFICE VISIT (OUTPATIENT)
Dept: GASTROENTEROLOGY | Facility: CLINIC | Age: 86
End: 2021-05-11
Payer: MEDICARE

## 2021-05-11 VITALS — BODY MASS INDEX: 21.94 KG/M2 | WEIGHT: 128.5 LBS | HEIGHT: 64 IN

## 2021-05-11 DIAGNOSIS — R19.7 DIARRHEA, UNSPECIFIED TYPE: ICD-10-CM

## 2021-05-11 DIAGNOSIS — K62.82: ICD-10-CM

## 2021-05-11 DIAGNOSIS — R10.9 ABDOMINAL PAIN, UNSPECIFIED ABDOMINAL LOCATION: Primary | ICD-10-CM

## 2021-05-11 PROCEDURE — 99999 PR PBB SHADOW E&M-EST. PATIENT-LVL III: ICD-10-PCS | Mod: PBBFAC,,, | Performed by: INTERNAL MEDICINE

## 2021-05-11 PROCEDURE — 1157F ADVNC CARE PLAN IN RCRD: CPT | Mod: S$GLB,,, | Performed by: INTERNAL MEDICINE

## 2021-05-11 PROCEDURE — 1101F PR PT FALLS ASSESS DOC 0-1 FALLS W/OUT INJ PAST YR: ICD-10-PCS | Mod: CPTII,S$GLB,, | Performed by: INTERNAL MEDICINE

## 2021-05-11 PROCEDURE — 1159F MED LIST DOCD IN RCRD: CPT | Mod: S$GLB,,, | Performed by: INTERNAL MEDICINE

## 2021-05-11 PROCEDURE — 1126F PR PAIN SEVERITY QUANTIFIED, NO PAIN PRESENT: ICD-10-PCS | Mod: S$GLB,,, | Performed by: INTERNAL MEDICINE

## 2021-05-11 PROCEDURE — 1157F PR ADVANCE CARE PLAN OR EQUIV PRESENT IN MEDICAL RECORD: ICD-10-PCS | Mod: S$GLB,,, | Performed by: INTERNAL MEDICINE

## 2021-05-11 PROCEDURE — 3288F PR FALLS RISK ASSESSMENT DOCUMENTED: ICD-10-PCS | Mod: CPTII,S$GLB,, | Performed by: INTERNAL MEDICINE

## 2021-05-11 PROCEDURE — 99214 PR OFFICE/OUTPT VISIT, EST, LEVL IV, 30-39 MIN: ICD-10-PCS | Mod: S$GLB,,, | Performed by: INTERNAL MEDICINE

## 2021-05-11 PROCEDURE — 1101F PT FALLS ASSESS-DOCD LE1/YR: CPT | Mod: CPTII,S$GLB,, | Performed by: INTERNAL MEDICINE

## 2021-05-11 PROCEDURE — 99999 PR PBB SHADOW E&M-EST. PATIENT-LVL III: CPT | Mod: PBBFAC,,, | Performed by: INTERNAL MEDICINE

## 2021-05-11 PROCEDURE — 3288F FALL RISK ASSESSMENT DOCD: CPT | Mod: CPTII,S$GLB,, | Performed by: INTERNAL MEDICINE

## 2021-05-11 PROCEDURE — 99214 OFFICE O/P EST MOD 30 MIN: CPT | Mod: S$GLB,,, | Performed by: INTERNAL MEDICINE

## 2021-05-11 PROCEDURE — 1159F PR MEDICATION LIST DOCUMENTED IN MEDICAL RECORD: ICD-10-PCS | Mod: S$GLB,,, | Performed by: INTERNAL MEDICINE

## 2021-05-11 PROCEDURE — 1126F AMNT PAIN NOTED NONE PRSNT: CPT | Mod: S$GLB,,, | Performed by: INTERNAL MEDICINE

## 2021-05-11 RX ORDER — SODIUM, POTASSIUM,MAG SULFATES 17.5-3.13G
1 SOLUTION, RECONSTITUTED, ORAL ORAL DAILY
Qty: 1 KIT | Refills: 0 | Status: SHIPPED | OUTPATIENT
Start: 2021-05-11 | End: 2021-05-13

## 2021-05-11 RX ORDER — DICYCLOMINE HYDROCHLORIDE 10 MG/1
10 CAPSULE ORAL 4 TIMES DAILY PRN
Qty: 120 CAPSULE | Refills: 3 | Status: SHIPPED | OUTPATIENT
Start: 2021-05-11 | End: 2021-06-10

## 2021-05-13 ENCOUNTER — TELEPHONE (OUTPATIENT)
Dept: GASTROENTEROLOGY | Facility: CLINIC | Age: 86
End: 2021-05-13

## 2021-05-13 ENCOUNTER — PATIENT MESSAGE (OUTPATIENT)
Dept: ENDOSCOPY | Facility: HOSPITAL | Age: 86
End: 2021-05-13

## 2021-05-13 ENCOUNTER — PATIENT MESSAGE (OUTPATIENT)
Dept: GASTROENTEROLOGY | Facility: CLINIC | Age: 86
End: 2021-05-13

## 2021-05-30 ENCOUNTER — PATIENT MESSAGE (OUTPATIENT)
Dept: ENDOSCOPY | Facility: HOSPITAL | Age: 86
End: 2021-05-30

## 2021-05-31 ENCOUNTER — PATIENT MESSAGE (OUTPATIENT)
Dept: ENDOSCOPY | Facility: HOSPITAL | Age: 86
End: 2021-05-31

## 2021-06-08 ENCOUNTER — PATIENT OUTREACH (OUTPATIENT)
Dept: ADMINISTRATIVE | Facility: HOSPITAL | Age: 86
End: 2021-06-08

## 2021-06-10 ENCOUNTER — OFFICE VISIT (OUTPATIENT)
Dept: ORTHOPEDICS | Facility: CLINIC | Age: 86
End: 2021-06-10
Payer: MEDICARE

## 2021-06-10 VITALS — HEIGHT: 64 IN | BODY MASS INDEX: 21.85 KG/M2 | WEIGHT: 128 LBS

## 2021-06-10 DIAGNOSIS — M75.121 NONTRAUMATIC COMPLETE TEAR OF RIGHT ROTATOR CUFF: Primary | ICD-10-CM

## 2021-06-10 PROCEDURE — 99999 PR PBB SHADOW E&M-EST. PATIENT-LVL III: ICD-10-PCS | Mod: PBBFAC,,, | Performed by: ORTHOPAEDIC SURGERY

## 2021-06-10 PROCEDURE — 1125F PR PAIN SEVERITY QUANTIFIED, PAIN PRESENT: ICD-10-PCS | Mod: S$GLB,,, | Performed by: ORTHOPAEDIC SURGERY

## 2021-06-10 PROCEDURE — 99024 PR POST-OP FOLLOW-UP VISIT: ICD-10-PCS | Mod: S$GLB,,, | Performed by: ORTHOPAEDIC SURGERY

## 2021-06-10 PROCEDURE — 1157F PR ADVANCE CARE PLAN OR EQUIV PRESENT IN MEDICAL RECORD: ICD-10-PCS | Mod: S$GLB,,, | Performed by: ORTHOPAEDIC SURGERY

## 2021-06-10 PROCEDURE — 99024 POSTOP FOLLOW-UP VISIT: CPT | Mod: S$GLB,,, | Performed by: ORTHOPAEDIC SURGERY

## 2021-06-10 PROCEDURE — 99999 PR PBB SHADOW E&M-EST. PATIENT-LVL III: CPT | Mod: PBBFAC,,, | Performed by: ORTHOPAEDIC SURGERY

## 2021-06-10 PROCEDURE — 3288F FALL RISK ASSESSMENT DOCD: CPT | Mod: CPTII,S$GLB,, | Performed by: ORTHOPAEDIC SURGERY

## 2021-06-10 PROCEDURE — 1101F PT FALLS ASSESS-DOCD LE1/YR: CPT | Mod: CPTII,S$GLB,, | Performed by: ORTHOPAEDIC SURGERY

## 2021-06-10 PROCEDURE — 1125F AMNT PAIN NOTED PAIN PRSNT: CPT | Mod: S$GLB,,, | Performed by: ORTHOPAEDIC SURGERY

## 2021-06-10 PROCEDURE — 3288F PR FALLS RISK ASSESSMENT DOCUMENTED: ICD-10-PCS | Mod: CPTII,S$GLB,, | Performed by: ORTHOPAEDIC SURGERY

## 2021-06-10 PROCEDURE — 1157F ADVNC CARE PLAN IN RCRD: CPT | Mod: S$GLB,,, | Performed by: ORTHOPAEDIC SURGERY

## 2021-06-10 PROCEDURE — 1101F PR PT FALLS ASSESS DOC 0-1 FALLS W/OUT INJ PAST YR: ICD-10-PCS | Mod: CPTII,S$GLB,, | Performed by: ORTHOPAEDIC SURGERY

## 2021-06-10 RX ORDER — MELOXICAM 15 MG/1
15 TABLET ORAL DAILY
Qty: 30 TABLET | Refills: 1 | Status: SHIPPED | OUTPATIENT
Start: 2021-06-10 | End: 2021-07-10

## 2021-06-15 ENCOUNTER — TELEPHONE (OUTPATIENT)
Dept: ENDOSCOPY | Facility: HOSPITAL | Age: 86
End: 2021-06-15

## 2021-06-17 ENCOUNTER — HOSPITAL ENCOUNTER (OUTPATIENT)
Facility: HOSPITAL | Age: 86
Discharge: HOME | End: 2021-06-17
Attending: INTERNAL MEDICINE | Admitting: INTERNAL MEDICINE
Payer: MEDICARE

## 2021-06-17 ENCOUNTER — ANESTHESIA (OUTPATIENT)
Dept: ENDOSCOPY | Facility: HOSPITAL | Age: 86
End: 2021-06-17
Payer: MEDICARE

## 2021-06-17 ENCOUNTER — ANESTHESIA EVENT (OUTPATIENT)
Dept: ENDOSCOPY | Facility: HOSPITAL | Age: 86
End: 2021-06-17
Payer: MEDICARE

## 2021-06-17 VITALS
HEART RATE: 56 BPM | WEIGHT: 132 LBS | RESPIRATION RATE: 15 BRPM | HEIGHT: 64 IN | SYSTOLIC BLOOD PRESSURE: 139 MMHG | OXYGEN SATURATION: 99 % | DIASTOLIC BLOOD PRESSURE: 56 MMHG | TEMPERATURE: 97 F | BODY MASS INDEX: 22.53 KG/M2

## 2021-06-17 DIAGNOSIS — Z98.890 HX OF COLONOSCOPY: ICD-10-CM

## 2021-06-17 PROCEDURE — 88305 TISSUE EXAM BY PATHOLOGIST: CPT | Performed by: PATHOLOGY

## 2021-06-17 PROCEDURE — 37000008 HC ANESTHESIA 1ST 15 MINUTES: Performed by: INTERNAL MEDICINE

## 2021-06-17 PROCEDURE — 37000009 HC ANESTHESIA EA ADD 15 MINS: Performed by: INTERNAL MEDICINE

## 2021-06-17 PROCEDURE — 88342 IMHCHEM/IMCYTCHM 1ST ANTB: CPT | Performed by: PATHOLOGY

## 2021-06-17 PROCEDURE — 45380 PR COLONOSCOPY,BIOPSY: ICD-10-PCS | Mod: ,,, | Performed by: INTERNAL MEDICINE

## 2021-06-17 PROCEDURE — 27201012 HC FORCEPS, HOT/COLD, DISP: Performed by: INTERNAL MEDICINE

## 2021-06-17 PROCEDURE — 88342 CHG IMMUNOCYTOCHEMISTRY: ICD-10-PCS | Mod: 26,,, | Performed by: PATHOLOGY

## 2021-06-17 PROCEDURE — 63600175 PHARM REV CODE 636 W HCPCS: Performed by: NURSE ANESTHETIST, CERTIFIED REGISTERED

## 2021-06-17 PROCEDURE — 45380 COLONOSCOPY AND BIOPSY: CPT | Mod: ,,, | Performed by: INTERNAL MEDICINE

## 2021-06-17 PROCEDURE — 88342 IMHCHEM/IMCYTCHM 1ST ANTB: CPT | Mod: 26,,, | Performed by: PATHOLOGY

## 2021-06-17 PROCEDURE — 88341 PR IHC OR ICC EACH ADD'L SINGLE ANTIBODY  STAINPR: ICD-10-PCS | Mod: 26,,, | Performed by: PATHOLOGY

## 2021-06-17 PROCEDURE — 88341 IMHCHEM/IMCYTCHM EA ADD ANTB: CPT | Mod: 26,,, | Performed by: PATHOLOGY

## 2021-06-17 PROCEDURE — 88341 IMHCHEM/IMCYTCHM EA ADD ANTB: CPT | Performed by: PATHOLOGY

## 2021-06-17 PROCEDURE — 45380 COLONOSCOPY AND BIOPSY: CPT | Performed by: INTERNAL MEDICINE

## 2021-06-17 PROCEDURE — 25000003 PHARM REV CODE 250: Performed by: NURSE ANESTHETIST, CERTIFIED REGISTERED

## 2021-06-17 PROCEDURE — 88305 TISSUE EXAM BY PATHOLOGIST: CPT | Mod: 26,,, | Performed by: PATHOLOGY

## 2021-06-17 PROCEDURE — 25000003 PHARM REV CODE 250: Performed by: INTERNAL MEDICINE

## 2021-06-17 PROCEDURE — 88305 TISSUE EXAM BY PATHOLOGIST: ICD-10-PCS | Mod: 26,,, | Performed by: PATHOLOGY

## 2021-06-17 RX ORDER — PROPOFOL 10 MG/ML
VIAL (ML) INTRAVENOUS
Status: DISCONTINUED | OUTPATIENT
Start: 2021-06-17 | End: 2021-06-17

## 2021-06-17 RX ORDER — SODIUM CHLORIDE 9 MG/ML
INJECTION, SOLUTION INTRAVENOUS CONTINUOUS
Status: DISCONTINUED | OUTPATIENT
Start: 2021-06-17 | End: 2021-06-17 | Stop reason: HOSPADM

## 2021-06-17 RX ORDER — PROPOFOL 10 MG/ML
VIAL (ML) INTRAVENOUS CONTINUOUS PRN
Status: DISCONTINUED | OUTPATIENT
Start: 2021-06-17 | End: 2021-06-17

## 2021-06-17 RX ORDER — LIDOCAINE HYDROCHLORIDE 20 MG/ML
INJECTION, SOLUTION EPIDURAL; INFILTRATION; INTRACAUDAL; PERINEURAL
Status: DISCONTINUED | OUTPATIENT
Start: 2021-06-17 | End: 2021-06-17

## 2021-06-17 RX ORDER — SODIUM CHLORIDE 0.9 % (FLUSH) 0.9 %
10 SYRINGE (ML) INJECTION
Status: DISCONTINUED | OUTPATIENT
Start: 2021-06-17 | End: 2021-06-17 | Stop reason: HOSPADM

## 2021-06-17 RX ADMIN — SODIUM CHLORIDE: 0.9 INJECTION, SOLUTION INTRAVENOUS at 02:06

## 2021-06-17 RX ADMIN — PROPOFOL 50 MG: 10 INJECTION, EMULSION INTRAVENOUS at 02:06

## 2021-06-17 RX ADMIN — PROPOFOL 125 MCG/KG/MIN: 10 INJECTION, EMULSION INTRAVENOUS at 02:06

## 2021-06-17 RX ADMIN — LIDOCAINE HYDROCHLORIDE 50 MG: 20 INJECTION, SOLUTION EPIDURAL; INFILTRATION; INTRACAUDAL; PERINEURAL at 02:06

## 2021-06-17 RX ADMIN — PROPOFOL 10 MG: 10 INJECTION, EMULSION INTRAVENOUS at 02:06

## 2021-06-23 LAB
FINAL PATHOLOGIC DIAGNOSIS: NORMAL
GROSS: NORMAL
Lab: NORMAL
MICROSCOPIC EXAM: NORMAL

## 2021-06-24 ENCOUNTER — TELEPHONE (OUTPATIENT)
Dept: GASTROENTEROLOGY | Facility: CLINIC | Age: 86
End: 2021-06-24

## 2021-06-25 ENCOUNTER — OFFICE VISIT (OUTPATIENT)
Dept: FAMILY MEDICINE | Facility: CLINIC | Age: 86
End: 2021-06-25
Payer: MEDICARE

## 2021-06-25 ENCOUNTER — TELEPHONE (OUTPATIENT)
Dept: FAMILY MEDICINE | Facility: CLINIC | Age: 86
End: 2021-06-25

## 2021-06-25 VITALS
HEIGHT: 64 IN | SYSTOLIC BLOOD PRESSURE: 112 MMHG | TEMPERATURE: 98 F | WEIGHT: 130.94 LBS | OXYGEN SATURATION: 99 % | HEART RATE: 81 BPM | BODY MASS INDEX: 22.35 KG/M2 | DIASTOLIC BLOOD PRESSURE: 68 MMHG

## 2021-06-25 DIAGNOSIS — R73.03 PREDIABETES: ICD-10-CM

## 2021-06-25 DIAGNOSIS — R10.10 PAIN OF UPPER ABDOMEN: Primary | ICD-10-CM

## 2021-06-25 DIAGNOSIS — R30.0 DYSURIA: ICD-10-CM

## 2021-06-25 DIAGNOSIS — K21.9 GASTROESOPHAGEAL REFLUX DISEASE WITHOUT ESOPHAGITIS: ICD-10-CM

## 2021-06-25 DIAGNOSIS — R35.0 FREQUENCY OF MICTURITION: ICD-10-CM

## 2021-06-25 DIAGNOSIS — N30.00 ACUTE CYSTITIS WITHOUT HEMATURIA: ICD-10-CM

## 2021-06-25 DIAGNOSIS — I10 ESSENTIAL HYPERTENSION: ICD-10-CM

## 2021-06-25 DIAGNOSIS — M15.9 OSTEOARTHRITIS OF MULTIPLE JOINTS, UNSPECIFIED OSTEOARTHRITIS TYPE: ICD-10-CM

## 2021-06-25 LAB
BACTERIA #/AREA URNS AUTO: NORMAL /HPF
BILIRUB SERPL-MCNC: ABNORMAL MG/DL
BILIRUB UR QL STRIP: NEGATIVE
BLOOD URINE, POC: ABNORMAL
CLARITY UR REFRACT.AUTO: CLEAR
CLARITY, POC UA: CLEAR
COLOR UR AUTO: YELLOW
COLOR, POC UA: YELLOW
GLUCOSE UR QL STRIP: NEGATIVE
GLUCOSE UR QL STRIP: NORMAL
HGB UR QL STRIP: ABNORMAL
KETONES UR QL STRIP: NEGATIVE
KETONES UR QL STRIP: NORMAL
LEUKOCYTE ESTERASE UR QL STRIP: NEGATIVE
LEUKOCYTE ESTERASE URINE, POC: ABNORMAL
MICROSCOPIC COMMENT: NORMAL
NITRITE UR QL STRIP: NEGATIVE
NITRITE, POC UA: NORMAL
PH UR STRIP: 6 [PH] (ref 5–8)
PH, POC UA: 5
PROT UR QL STRIP: NEGATIVE
PROTEIN, POC: ABNORMAL
RBC #/AREA URNS AUTO: 2 /HPF (ref 0–4)
SP GR UR STRIP: 1.01 (ref 1–1.03)
SPECIFIC GRAVITY, POC UA: 1.01
URN SPEC COLLECT METH UR: ABNORMAL
UROBILINOGEN, POC UA: NORMAL
WBC #/AREA URNS AUTO: 1 /HPF (ref 0–5)

## 2021-06-25 PROCEDURE — 1157F PR ADVANCE CARE PLAN OR EQUIV PRESENT IN MEDICAL RECORD: ICD-10-PCS | Mod: S$GLB,,, | Performed by: INTERNAL MEDICINE

## 2021-06-25 PROCEDURE — 99999 PR PBB SHADOW E&M-EST. PATIENT-LVL IV: CPT | Mod: PBBFAC,,, | Performed by: INTERNAL MEDICINE

## 2021-06-25 PROCEDURE — 81001 URINALYSIS AUTO W/SCOPE: CPT | Performed by: INTERNAL MEDICINE

## 2021-06-25 PROCEDURE — 1126F PR PAIN SEVERITY QUANTIFIED, NO PAIN PRESENT: ICD-10-PCS | Mod: S$GLB,,, | Performed by: INTERNAL MEDICINE

## 2021-06-25 PROCEDURE — 1159F MED LIST DOCD IN RCRD: CPT | Mod: S$GLB,,, | Performed by: INTERNAL MEDICINE

## 2021-06-25 PROCEDURE — 81002 URINALYSIS NONAUTO W/O SCOPE: CPT | Mod: S$GLB,,, | Performed by: INTERNAL MEDICINE

## 2021-06-25 PROCEDURE — 3288F PR FALLS RISK ASSESSMENT DOCUMENTED: ICD-10-PCS | Mod: CPTII,S$GLB,, | Performed by: INTERNAL MEDICINE

## 2021-06-25 PROCEDURE — 99214 PR OFFICE/OUTPT VISIT, EST, LEVL IV, 30-39 MIN: ICD-10-PCS | Mod: 25,S$GLB,, | Performed by: INTERNAL MEDICINE

## 2021-06-25 PROCEDURE — 99999 PR PBB SHADOW E&M-EST. PATIENT-LVL IV: ICD-10-PCS | Mod: PBBFAC,,, | Performed by: INTERNAL MEDICINE

## 2021-06-25 PROCEDURE — 1159F PR MEDICATION LIST DOCUMENTED IN MEDICAL RECORD: ICD-10-PCS | Mod: S$GLB,,, | Performed by: INTERNAL MEDICINE

## 2021-06-25 PROCEDURE — 3288F FALL RISK ASSESSMENT DOCD: CPT | Mod: CPTII,S$GLB,, | Performed by: INTERNAL MEDICINE

## 2021-06-25 PROCEDURE — 1101F PT FALLS ASSESS-DOCD LE1/YR: CPT | Mod: CPTII,S$GLB,, | Performed by: INTERNAL MEDICINE

## 2021-06-25 PROCEDURE — 1157F ADVNC CARE PLAN IN RCRD: CPT | Mod: S$GLB,,, | Performed by: INTERNAL MEDICINE

## 2021-06-25 PROCEDURE — 99214 OFFICE O/P EST MOD 30 MIN: CPT | Mod: 25,S$GLB,, | Performed by: INTERNAL MEDICINE

## 2021-06-25 PROCEDURE — 1101F PR PT FALLS ASSESS DOC 0-1 FALLS W/OUT INJ PAST YR: ICD-10-PCS | Mod: CPTII,S$GLB,, | Performed by: INTERNAL MEDICINE

## 2021-06-25 PROCEDURE — 81002 POCT URINE DIPSTICK WITHOUT MICROSCOPE: ICD-10-PCS | Mod: S$GLB,,, | Performed by: INTERNAL MEDICINE

## 2021-06-25 PROCEDURE — 1126F AMNT PAIN NOTED NONE PRSNT: CPT | Mod: S$GLB,,, | Performed by: INTERNAL MEDICINE

## 2021-06-25 RX ORDER — ACETAMINOPHEN 500 MG
500 TABLET ORAL EVERY 8 HOURS PRN
Qty: 30 TABLET | Refills: 1 | Status: SHIPPED | OUTPATIENT
Start: 2021-06-25 | End: 2022-03-18

## 2021-06-25 RX ORDER — OXYBUTYNIN CHLORIDE 5 MG/1
5 TABLET ORAL NIGHTLY
Qty: 30 TABLET | Refills: 2 | Status: SHIPPED | OUTPATIENT
Start: 2021-06-25 | End: 2021-10-06 | Stop reason: SDUPTHER

## 2021-06-25 RX ORDER — OXYBUTYNIN CHLORIDE 5 MG/1
5 TABLET ORAL 3 TIMES DAILY
Qty: 90 TABLET | Refills: 11 | Status: SHIPPED | OUTPATIENT
Start: 2021-06-25 | End: 2021-06-25

## 2021-06-25 RX ORDER — NITROFURANTOIN 25; 75 MG/1; MG/1
100 CAPSULE ORAL 2 TIMES DAILY
Qty: 6 CAPSULE | Refills: 0 | Status: SHIPPED | OUTPATIENT
Start: 2021-06-25 | End: 2021-06-28

## 2021-06-28 ENCOUNTER — TELEPHONE (OUTPATIENT)
Dept: FAMILY MEDICINE | Facility: CLINIC | Age: 86
End: 2021-06-28

## 2021-07-16 ENCOUNTER — LAB VISIT (OUTPATIENT)
Dept: LAB | Facility: HOSPITAL | Age: 86
End: 2021-07-16
Attending: INTERNAL MEDICINE
Payer: MEDICARE

## 2021-07-16 DIAGNOSIS — R73.03 PREDIABETES: ICD-10-CM

## 2021-07-16 DIAGNOSIS — M81.8 AGE-RELATED OSTEOPOROSIS WITHOUT FRACTURE: ICD-10-CM

## 2021-07-16 DIAGNOSIS — N18.30 STAGE 3 CHRONIC KIDNEY DISEASE, UNSPECIFIED WHETHER STAGE 3A OR 3B CKD: ICD-10-CM

## 2021-07-16 DIAGNOSIS — I25.10 CORONARY ARTERY DISEASE INVOLVING NATIVE CORONARY ARTERY OF NATIVE HEART WITHOUT ANGINA PECTORIS: ICD-10-CM

## 2021-07-16 DIAGNOSIS — I10 ESSENTIAL (PRIMARY) HYPERTENSION: ICD-10-CM

## 2021-07-16 LAB
25(OH)D3+25(OH)D2 SERPL-MCNC: 28 NG/ML (ref 30–96)
ALBUMIN SERPL BCP-MCNC: 3.8 G/DL (ref 3.5–5.2)
ALP SERPL-CCNC: 67 U/L (ref 55–135)
ALT SERPL W/O P-5'-P-CCNC: 9 U/L (ref 10–44)
ANION GAP SERPL CALC-SCNC: 10 MMOL/L (ref 8–16)
AST SERPL-CCNC: 20 U/L (ref 10–40)
BASOPHILS # BLD AUTO: 0.06 K/UL (ref 0–0.2)
BASOPHILS NFR BLD: 1.8 % (ref 0–1.9)
BILIRUB SERPL-MCNC: 0.4 MG/DL (ref 0.1–1)
BUN SERPL-MCNC: 16 MG/DL (ref 8–23)
CALCIUM SERPL-MCNC: 9.6 MG/DL (ref 8.7–10.5)
CHLORIDE SERPL-SCNC: 111 MMOL/L (ref 95–110)
CHOLEST SERPL-MCNC: 186 MG/DL (ref 120–199)
CHOLEST/HDLC SERPL: 3.3 {RATIO} (ref 2–5)
CO2 SERPL-SCNC: 22 MMOL/L (ref 23–29)
CREAT SERPL-MCNC: 0.9 MG/DL (ref 0.5–1.4)
DIFFERENTIAL METHOD: ABNORMAL
EOSINOPHIL # BLD AUTO: 0.1 K/UL (ref 0–0.5)
EOSINOPHIL NFR BLD: 1.5 % (ref 0–8)
ERYTHROCYTE [DISTWIDTH] IN BLOOD BY AUTOMATED COUNT: 15.5 % (ref 11.5–14.5)
EST. GFR  (AFRICAN AMERICAN): >60 ML/MIN/1.73 M^2
EST. GFR  (NON AFRICAN AMERICAN): 59 ML/MIN/1.73 M^2
ESTIMATED AVG GLUCOSE: 117 MG/DL (ref 68–131)
GLUCOSE SERPL-MCNC: 107 MG/DL (ref 70–110)
HBA1C MFR BLD: 5.7 % (ref 4–5.6)
HCT VFR BLD AUTO: 34.6 % (ref 37–48.5)
HDLC SERPL-MCNC: 56 MG/DL (ref 40–75)
HDLC SERPL: 30.1 % (ref 20–50)
HGB BLD-MCNC: 11.6 G/DL (ref 12–16)
IMM GRANULOCYTES # BLD AUTO: 0.01 K/UL (ref 0–0.04)
IMM GRANULOCYTES NFR BLD AUTO: 0.3 % (ref 0–0.5)
LDLC SERPL CALC-MCNC: 105.6 MG/DL (ref 63–159)
LYMPHOCYTES # BLD AUTO: 1.4 K/UL (ref 1–4.8)
LYMPHOCYTES NFR BLD: 40.7 % (ref 18–48)
MCH RBC QN AUTO: 28.4 PG (ref 27–31)
MCHC RBC AUTO-ENTMCNC: 33.5 G/DL (ref 32–36)
MCV RBC AUTO: 85 FL (ref 82–98)
MONOCYTES # BLD AUTO: 0.5 K/UL (ref 0.3–1)
MONOCYTES NFR BLD: 14.7 % (ref 4–15)
NEUTROPHILS # BLD AUTO: 1.4 K/UL (ref 1.8–7.7)
NEUTROPHILS NFR BLD: 41 % (ref 38–73)
NONHDLC SERPL-MCNC: 130 MG/DL
NRBC BLD-RTO: 0 /100 WBC
PLATELET # BLD AUTO: 254 K/UL (ref 150–450)
PMV BLD AUTO: 9.6 FL (ref 9.2–12.9)
POTASSIUM SERPL-SCNC: 4.3 MMOL/L (ref 3.5–5.1)
PROT SERPL-MCNC: 7.9 G/DL (ref 6–8.4)
RBC # BLD AUTO: 4.09 M/UL (ref 4–5.4)
SODIUM SERPL-SCNC: 143 MMOL/L (ref 136–145)
TRIGL SERPL-MCNC: 122 MG/DL (ref 30–150)
TSH SERPL DL<=0.005 MIU/L-ACNC: 1.27 UIU/ML (ref 0.4–4)
WBC # BLD AUTO: 3.34 K/UL (ref 3.9–12.7)

## 2021-07-16 PROCEDURE — 36415 COLL VENOUS BLD VENIPUNCTURE: CPT | Performed by: INTERNAL MEDICINE

## 2021-07-16 PROCEDURE — 80061 LIPID PANEL: CPT | Performed by: INTERNAL MEDICINE

## 2021-07-16 PROCEDURE — 84443 ASSAY THYROID STIM HORMONE: CPT | Performed by: INTERNAL MEDICINE

## 2021-07-16 PROCEDURE — 80053 COMPREHEN METABOLIC PANEL: CPT | Performed by: INTERNAL MEDICINE

## 2021-07-16 PROCEDURE — 83036 HEMOGLOBIN GLYCOSYLATED A1C: CPT | Performed by: INTERNAL MEDICINE

## 2021-07-16 PROCEDURE — 85025 COMPLETE CBC W/AUTO DIFF WBC: CPT | Performed by: INTERNAL MEDICINE

## 2021-07-16 PROCEDURE — 82306 VITAMIN D 25 HYDROXY: CPT | Performed by: INTERNAL MEDICINE

## 2021-07-21 ENCOUNTER — OFFICE VISIT (OUTPATIENT)
Dept: FAMILY MEDICINE | Facility: CLINIC | Age: 86
End: 2021-07-21
Payer: MEDICARE

## 2021-07-21 ENCOUNTER — PATIENT MESSAGE (OUTPATIENT)
Dept: FAMILY MEDICINE | Facility: CLINIC | Age: 86
End: 2021-07-21

## 2021-07-21 VITALS
HEART RATE: 85 BPM | WEIGHT: 129 LBS | SYSTOLIC BLOOD PRESSURE: 124 MMHG | HEIGHT: 64 IN | BODY MASS INDEX: 22.02 KG/M2 | DIASTOLIC BLOOD PRESSURE: 68 MMHG | OXYGEN SATURATION: 98 % | TEMPERATURE: 98 F

## 2021-07-21 DIAGNOSIS — E78.00 HYPERCHOLESTEROLEMIA: ICD-10-CM

## 2021-07-21 DIAGNOSIS — M79.672 LEFT FOOT PAIN: ICD-10-CM

## 2021-07-21 DIAGNOSIS — L03.032 PARONYCHIA OF GREAT TOE, LEFT: Primary | ICD-10-CM

## 2021-07-21 DIAGNOSIS — N18.30 STAGE 3 CHRONIC KIDNEY DISEASE, UNSPECIFIED WHETHER STAGE 3A OR 3B CKD: ICD-10-CM

## 2021-07-21 DIAGNOSIS — R73.03 PREDIABETES: ICD-10-CM

## 2021-07-21 PROCEDURE — 3288F PR FALLS RISK ASSESSMENT DOCUMENTED: ICD-10-PCS | Mod: CPTII,S$GLB,, | Performed by: INTERNAL MEDICINE

## 2021-07-21 PROCEDURE — 3288F FALL RISK ASSESSMENT DOCD: CPT | Mod: CPTII,S$GLB,, | Performed by: INTERNAL MEDICINE

## 2021-07-21 PROCEDURE — 1101F PR PT FALLS ASSESS DOC 0-1 FALLS W/OUT INJ PAST YR: ICD-10-PCS | Mod: CPTII,S$GLB,, | Performed by: INTERNAL MEDICINE

## 2021-07-21 PROCEDURE — 99999 PR PBB SHADOW E&M-EST. PATIENT-LVL IV: ICD-10-PCS | Mod: PBBFAC,,, | Performed by: INTERNAL MEDICINE

## 2021-07-21 PROCEDURE — 3074F SYST BP LT 130 MM HG: CPT | Mod: CPTII,S$GLB,, | Performed by: INTERNAL MEDICINE

## 2021-07-21 PROCEDURE — 99999 PR PBB SHADOW E&M-EST. PATIENT-LVL IV: CPT | Mod: PBBFAC,,, | Performed by: INTERNAL MEDICINE

## 2021-07-21 PROCEDURE — 3074F PR MOST RECENT SYSTOLIC BLOOD PRESSURE < 130 MM HG: ICD-10-PCS | Mod: CPTII,S$GLB,, | Performed by: INTERNAL MEDICINE

## 2021-07-21 PROCEDURE — 99214 OFFICE O/P EST MOD 30 MIN: CPT | Mod: S$GLB,,, | Performed by: INTERNAL MEDICINE

## 2021-07-21 PROCEDURE — 1157F ADVNC CARE PLAN IN RCRD: CPT | Mod: CPTII,S$GLB,, | Performed by: INTERNAL MEDICINE

## 2021-07-21 PROCEDURE — 99499 UNLISTED E&M SERVICE: CPT | Mod: HCNC,S$GLB,, | Performed by: INTERNAL MEDICINE

## 2021-07-21 PROCEDURE — 1157F PR ADVANCE CARE PLAN OR EQUIV PRESENT IN MEDICAL RECORD: ICD-10-PCS | Mod: CPTII,S$GLB,, | Performed by: INTERNAL MEDICINE

## 2021-07-21 PROCEDURE — 3078F DIAST BP <80 MM HG: CPT | Mod: CPTII,S$GLB,, | Performed by: INTERNAL MEDICINE

## 2021-07-21 PROCEDURE — 99499 RISK ADDL DX/OHS AUDIT: ICD-10-PCS | Mod: HCNC,S$GLB,, | Performed by: INTERNAL MEDICINE

## 2021-07-21 PROCEDURE — 99214 PR OFFICE/OUTPT VISIT, EST, LEVL IV, 30-39 MIN: ICD-10-PCS | Mod: S$GLB,,, | Performed by: INTERNAL MEDICINE

## 2021-07-21 PROCEDURE — 1101F PT FALLS ASSESS-DOCD LE1/YR: CPT | Mod: CPTII,S$GLB,, | Performed by: INTERNAL MEDICINE

## 2021-07-21 PROCEDURE — 1126F PR PAIN SEVERITY QUANTIFIED, NO PAIN PRESENT: ICD-10-PCS | Mod: CPTII,S$GLB,, | Performed by: INTERNAL MEDICINE

## 2021-07-21 PROCEDURE — 3078F PR MOST RECENT DIASTOLIC BLOOD PRESSURE < 80 MM HG: ICD-10-PCS | Mod: CPTII,S$GLB,, | Performed by: INTERNAL MEDICINE

## 2021-07-21 PROCEDURE — 1159F MED LIST DOCD IN RCRD: CPT | Mod: CPTII,S$GLB,, | Performed by: INTERNAL MEDICINE

## 2021-07-21 PROCEDURE — 1126F AMNT PAIN NOTED NONE PRSNT: CPT | Mod: CPTII,S$GLB,, | Performed by: INTERNAL MEDICINE

## 2021-07-21 PROCEDURE — 1159F PR MEDICATION LIST DOCUMENTED IN MEDICAL RECORD: ICD-10-PCS | Mod: CPTII,S$GLB,, | Performed by: INTERNAL MEDICINE

## 2021-07-21 RX ORDER — DOXYCYCLINE HYCLATE 100 MG
100 TABLET ORAL 2 TIMES DAILY
Qty: 14 TABLET | Refills: 1 | Status: SHIPPED | OUTPATIENT
Start: 2021-07-21 | End: 2021-07-28

## 2021-07-21 RX ORDER — SIMVASTATIN 40 MG/1
40 TABLET, FILM COATED ORAL NIGHTLY
Qty: 90 TABLET | Refills: 3 | Status: SHIPPED | OUTPATIENT
Start: 2021-07-21 | End: 2022-03-18

## 2021-07-21 RX ORDER — ALPRAZOLAM 0.5 MG/1
0.5 TABLET ORAL NIGHTLY PRN
Qty: 30 TABLET | Refills: 0 | Status: SHIPPED | OUTPATIENT
Start: 2021-07-21 | End: 2021-12-17 | Stop reason: SDUPTHER

## 2021-07-23 ENCOUNTER — PATIENT MESSAGE (OUTPATIENT)
Dept: FAMILY MEDICINE | Facility: CLINIC | Age: 86
End: 2021-07-23

## 2021-08-24 ENCOUNTER — TELEPHONE (OUTPATIENT)
Dept: FAMILY MEDICINE | Facility: CLINIC | Age: 86
End: 2021-08-24

## 2021-09-14 DIAGNOSIS — K21.9 GASTROESOPHAGEAL REFLUX DISEASE WITHOUT ESOPHAGITIS: ICD-10-CM

## 2021-09-14 RX ORDER — ESOMEPRAZOLE MAGNESIUM 40 MG/1
40 CAPSULE, DELAYED RELEASE ORAL DAILY
Qty: 90 CAPSULE | Refills: 3 | Status: SHIPPED | OUTPATIENT
Start: 2021-09-14 | End: 2021-12-20 | Stop reason: SDUPTHER

## 2021-09-18 ENCOUNTER — OFFICE VISIT (OUTPATIENT)
Dept: URGENT CARE | Facility: CLINIC | Age: 86
End: 2021-09-18
Payer: MEDICARE

## 2021-09-18 VITALS
RESPIRATION RATE: 18 BRPM | SYSTOLIC BLOOD PRESSURE: 138 MMHG | HEIGHT: 64 IN | DIASTOLIC BLOOD PRESSURE: 78 MMHG | TEMPERATURE: 98 F | HEART RATE: 73 BPM | WEIGHT: 128 LBS | OXYGEN SATURATION: 96 % | BODY MASS INDEX: 21.85 KG/M2

## 2021-09-18 DIAGNOSIS — R35.0 FREQUENCY OF URINATION: Primary | ICD-10-CM

## 2021-09-18 DIAGNOSIS — R30.0 DYSURIA: ICD-10-CM

## 2021-09-18 LAB
BILIRUB UR QL STRIP: NEGATIVE
GLUCOSE UR QL STRIP: NEGATIVE
KETONES UR QL STRIP: NEGATIVE
LEUKOCYTE ESTERASE UR QL STRIP: NEGATIVE
PH, POC UA: 5
POC BLOOD, URINE: NEGATIVE
POC NITRATES, URINE: NEGATIVE
PROT UR QL STRIP: NEGATIVE
SP GR UR STRIP: 1 (ref 1–1.03)
UROBILINOGEN UR STRIP-ACNC: NORMAL (ref 0.1–1.1)

## 2021-09-18 PROCEDURE — 3078F PR MOST RECENT DIASTOLIC BLOOD PRESSURE < 80 MM HG: ICD-10-PCS | Mod: CPTII,S$GLB,, | Performed by: NURSE PRACTITIONER

## 2021-09-18 PROCEDURE — 81003 POCT URINALYSIS, DIPSTICK, AUTOMATED, W/O SCOPE: ICD-10-PCS | Mod: QW,S$GLB,, | Performed by: NURSE PRACTITIONER

## 2021-09-18 PROCEDURE — 1157F ADVNC CARE PLAN IN RCRD: CPT | Mod: CPTII,S$GLB,, | Performed by: NURSE PRACTITIONER

## 2021-09-18 PROCEDURE — 99213 OFFICE O/P EST LOW 20 MIN: CPT | Mod: 25,S$GLB,, | Performed by: NURSE PRACTITIONER

## 2021-09-18 PROCEDURE — 1159F PR MEDICATION LIST DOCUMENTED IN MEDICAL RECORD: ICD-10-PCS | Mod: CPTII,S$GLB,, | Performed by: NURSE PRACTITIONER

## 2021-09-18 PROCEDURE — 3075F SYST BP GE 130 - 139MM HG: CPT | Mod: CPTII,S$GLB,, | Performed by: NURSE PRACTITIONER

## 2021-09-18 PROCEDURE — 3075F PR MOST RECENT SYSTOLIC BLOOD PRESS GE 130-139MM HG: ICD-10-PCS | Mod: CPTII,S$GLB,, | Performed by: NURSE PRACTITIONER

## 2021-09-18 PROCEDURE — 87086 URINE CULTURE/COLONY COUNT: CPT | Performed by: NURSE PRACTITIONER

## 2021-09-18 PROCEDURE — 1125F AMNT PAIN NOTED PAIN PRSNT: CPT | Mod: CPTII,S$GLB,, | Performed by: NURSE PRACTITIONER

## 2021-09-18 PROCEDURE — 1160F RVW MEDS BY RX/DR IN RCRD: CPT | Mod: CPTII,S$GLB,, | Performed by: NURSE PRACTITIONER

## 2021-09-18 PROCEDURE — 1160F PR REVIEW ALL MEDS BY PRESCRIBER/CLIN PHARMACIST DOCUMENTED: ICD-10-PCS | Mod: CPTII,S$GLB,, | Performed by: NURSE PRACTITIONER

## 2021-09-18 PROCEDURE — 99213 PR OFFICE/OUTPT VISIT, EST, LEVL III, 20-29 MIN: ICD-10-PCS | Mod: 25,S$GLB,, | Performed by: NURSE PRACTITIONER

## 2021-09-18 PROCEDURE — 1159F MED LIST DOCD IN RCRD: CPT | Mod: CPTII,S$GLB,, | Performed by: NURSE PRACTITIONER

## 2021-09-18 PROCEDURE — 1125F PR PAIN SEVERITY QUANTIFIED, PAIN PRESENT: ICD-10-PCS | Mod: CPTII,S$GLB,, | Performed by: NURSE PRACTITIONER

## 2021-09-18 PROCEDURE — 81003 URINALYSIS AUTO W/O SCOPE: CPT | Mod: QW,S$GLB,, | Performed by: NURSE PRACTITIONER

## 2021-09-18 PROCEDURE — 3078F DIAST BP <80 MM HG: CPT | Mod: CPTII,S$GLB,, | Performed by: NURSE PRACTITIONER

## 2021-09-18 PROCEDURE — 1157F PR ADVANCE CARE PLAN OR EQUIV PRESENT IN MEDICAL RECORD: ICD-10-PCS | Mod: CPTII,S$GLB,, | Performed by: NURSE PRACTITIONER

## 2021-09-18 RX ORDER — NITROFURANTOIN 25; 75 MG/1; MG/1
100 CAPSULE ORAL 2 TIMES DAILY
Qty: 10 CAPSULE | Refills: 0 | Status: SHIPPED | OUTPATIENT
Start: 2021-09-18 | End: 2021-09-24

## 2021-09-18 RX ORDER — BETAMETHASONE DIPROPIONATE 0.5 MG/G
1 CREAM TOPICAL 2 TIMES DAILY
COMMUNITY
Start: 2021-04-21 | End: 2022-03-18

## 2021-09-20 ENCOUNTER — TELEPHONE (OUTPATIENT)
Dept: FAMILY MEDICINE | Facility: CLINIC | Age: 86
End: 2021-09-20

## 2021-09-20 LAB — BACTERIA UR CULT: NO GROWTH

## 2021-09-22 ENCOUNTER — TELEPHONE (OUTPATIENT)
Dept: FAMILY MEDICINE | Facility: CLINIC | Age: 86
End: 2021-09-22

## 2021-09-24 ENCOUNTER — OFFICE VISIT (OUTPATIENT)
Dept: FAMILY MEDICINE | Facility: CLINIC | Age: 86
End: 2021-09-24
Payer: MEDICARE

## 2021-09-24 VITALS
OXYGEN SATURATION: 98 % | SYSTOLIC BLOOD PRESSURE: 126 MMHG | HEIGHT: 64 IN | DIASTOLIC BLOOD PRESSURE: 64 MMHG | TEMPERATURE: 99 F | WEIGHT: 128.75 LBS | HEART RATE: 87 BPM | BODY MASS INDEX: 21.98 KG/M2

## 2021-09-24 DIAGNOSIS — I10 ESSENTIAL HYPERTENSION: ICD-10-CM

## 2021-09-24 DIAGNOSIS — R33.9 URINARY RETENTION WITH INCOMPLETE BLADDER EMPTYING: ICD-10-CM

## 2021-09-24 DIAGNOSIS — M81.8 AGE-RELATED OSTEOPOROSIS WITHOUT FRACTURE: ICD-10-CM

## 2021-09-24 DIAGNOSIS — R30.0 DYSURIA: Primary | ICD-10-CM

## 2021-09-24 PROCEDURE — 3288F PR FALLS RISK ASSESSMENT DOCUMENTED: ICD-10-PCS | Mod: HCNC,CPTII,S$GLB, | Performed by: INTERNAL MEDICINE

## 2021-09-24 PROCEDURE — 3078F DIAST BP <80 MM HG: CPT | Mod: HCNC,CPTII,S$GLB, | Performed by: INTERNAL MEDICINE

## 2021-09-24 PROCEDURE — 1159F MED LIST DOCD IN RCRD: CPT | Mod: HCNC,CPTII,S$GLB, | Performed by: INTERNAL MEDICINE

## 2021-09-24 PROCEDURE — 1157F PR ADVANCE CARE PLAN OR EQUIV PRESENT IN MEDICAL RECORD: ICD-10-PCS | Mod: HCNC,CPTII,S$GLB, | Performed by: INTERNAL MEDICINE

## 2021-09-24 PROCEDURE — 1126F PR PAIN SEVERITY QUANTIFIED, NO PAIN PRESENT: ICD-10-PCS | Mod: HCNC,CPTII,S$GLB, | Performed by: INTERNAL MEDICINE

## 2021-09-24 PROCEDURE — 1160F RVW MEDS BY RX/DR IN RCRD: CPT | Mod: HCNC,CPTII,S$GLB, | Performed by: INTERNAL MEDICINE

## 2021-09-24 PROCEDURE — 1101F PR PT FALLS ASSESS DOC 0-1 FALLS W/OUT INJ PAST YR: ICD-10-PCS | Mod: HCNC,CPTII,S$GLB, | Performed by: INTERNAL MEDICINE

## 2021-09-24 PROCEDURE — 99999 PR PBB SHADOW E&M-EST. PATIENT-LVL V: ICD-10-PCS | Mod: PBBFAC,HCNC,, | Performed by: INTERNAL MEDICINE

## 2021-09-24 PROCEDURE — 3074F PR MOST RECENT SYSTOLIC BLOOD PRESSURE < 130 MM HG: ICD-10-PCS | Mod: HCNC,CPTII,S$GLB, | Performed by: INTERNAL MEDICINE

## 2021-09-24 PROCEDURE — 99214 PR OFFICE/OUTPT VISIT, EST, LEVL IV, 30-39 MIN: ICD-10-PCS | Mod: HCNC,S$GLB,, | Performed by: INTERNAL MEDICINE

## 2021-09-24 PROCEDURE — 1101F PT FALLS ASSESS-DOCD LE1/YR: CPT | Mod: HCNC,CPTII,S$GLB, | Performed by: INTERNAL MEDICINE

## 2021-09-24 PROCEDURE — 3074F SYST BP LT 130 MM HG: CPT | Mod: HCNC,CPTII,S$GLB, | Performed by: INTERNAL MEDICINE

## 2021-09-24 PROCEDURE — 1126F AMNT PAIN NOTED NONE PRSNT: CPT | Mod: HCNC,CPTII,S$GLB, | Performed by: INTERNAL MEDICINE

## 2021-09-24 PROCEDURE — 1157F ADVNC CARE PLAN IN RCRD: CPT | Mod: HCNC,CPTII,S$GLB, | Performed by: INTERNAL MEDICINE

## 2021-09-24 PROCEDURE — 1159F PR MEDICATION LIST DOCUMENTED IN MEDICAL RECORD: ICD-10-PCS | Mod: HCNC,CPTII,S$GLB, | Performed by: INTERNAL MEDICINE

## 2021-09-24 PROCEDURE — 3078F PR MOST RECENT DIASTOLIC BLOOD PRESSURE < 80 MM HG: ICD-10-PCS | Mod: HCNC,CPTII,S$GLB, | Performed by: INTERNAL MEDICINE

## 2021-09-24 PROCEDURE — 3288F FALL RISK ASSESSMENT DOCD: CPT | Mod: HCNC,CPTII,S$GLB, | Performed by: INTERNAL MEDICINE

## 2021-09-24 PROCEDURE — 99214 OFFICE O/P EST MOD 30 MIN: CPT | Mod: HCNC,S$GLB,, | Performed by: INTERNAL MEDICINE

## 2021-09-24 PROCEDURE — 99999 PR PBB SHADOW E&M-EST. PATIENT-LVL V: CPT | Mod: PBBFAC,HCNC,, | Performed by: INTERNAL MEDICINE

## 2021-09-24 PROCEDURE — 1160F PR REVIEW ALL MEDS BY PRESCRIBER/CLIN PHARMACIST DOCUMENTED: ICD-10-PCS | Mod: HCNC,CPTII,S$GLB, | Performed by: INTERNAL MEDICINE

## 2021-09-24 RX ORDER — MOMETASONE FUROATE 1 MG/G
CREAM TOPICAL DAILY
Qty: 45 G | Refills: 3 | Status: SHIPPED | OUTPATIENT
Start: 2021-09-24 | End: 2022-03-18

## 2021-09-24 RX ORDER — IBANDRONATE SODIUM 150 MG/1
150 TABLET, FILM COATED ORAL
Qty: 3 TABLET | Refills: 3 | Status: SHIPPED | OUTPATIENT
Start: 2021-09-24 | End: 2022-03-18 | Stop reason: SDUPTHER

## 2021-10-06 ENCOUNTER — OFFICE VISIT (OUTPATIENT)
Dept: UROLOGY | Facility: CLINIC | Age: 86
End: 2021-10-06
Payer: MEDICARE

## 2021-10-06 VITALS
SYSTOLIC BLOOD PRESSURE: 122 MMHG | BODY MASS INDEX: 25.52 KG/M2 | HEART RATE: 76 BPM | HEIGHT: 60 IN | DIASTOLIC BLOOD PRESSURE: 64 MMHG | WEIGHT: 130 LBS

## 2021-10-06 DIAGNOSIS — N95.2 VAGINAL ATROPHY: Primary | ICD-10-CM

## 2021-10-06 DIAGNOSIS — R30.0 DYSURIA: ICD-10-CM

## 2021-10-06 DIAGNOSIS — R33.9 URINARY RETENTION WITH INCOMPLETE BLADDER EMPTYING: ICD-10-CM

## 2021-10-06 DIAGNOSIS — R35.0 FREQUENCY OF MICTURITION: ICD-10-CM

## 2021-10-06 LAB
BILIRUB SERPL-MCNC: NORMAL MG/DL
BILIRUB UR QL STRIP: NEGATIVE
BLOOD URINE, POC: 50
CLARITY UR REFRACT.AUTO: CLEAR
CLARITY, POC UA: CLEAR
COLOR UR AUTO: ABNORMAL
COLOR, POC UA: COLORLESS
GLUCOSE UR QL STRIP: NEGATIVE
GLUCOSE UR QL STRIP: NORMAL
HGB UR QL STRIP: ABNORMAL
KETONES UR QL STRIP: NEGATIVE
KETONES UR QL STRIP: NORMAL
LEUKOCYTE ESTERASE UR QL STRIP: NEGATIVE
LEUKOCYTE ESTERASE URINE, POC: NORMAL
MICROSCOPIC COMMENT: NORMAL
NITRITE UR QL STRIP: NEGATIVE
NITRITE, POC UA: NORMAL
PH UR STRIP: 5 [PH] (ref 5–8)
PH, POC UA: 5
POC RESIDUAL URINE VOLUME: 90 ML (ref 0–100)
PROT UR QL STRIP: NEGATIVE
PROTEIN, POC: NORMAL
RBC #/AREA URNS AUTO: 0 /HPF (ref 0–4)
SP GR UR STRIP: 1 (ref 1–1.03)
SPECIFIC GRAVITY, POC UA: 1.01
URN SPEC COLLECT METH UR: ABNORMAL
UROBILINOGEN, POC UA: NORMAL
WBC #/AREA URNS AUTO: 0 /HPF (ref 0–5)

## 2021-10-06 PROCEDURE — 1101F PT FALLS ASSESS-DOCD LE1/YR: CPT | Mod: HCNC,CPTII,S$GLB, | Performed by: NURSE PRACTITIONER

## 2021-10-06 PROCEDURE — 3078F DIAST BP <80 MM HG: CPT | Mod: HCNC,CPTII,S$GLB, | Performed by: NURSE PRACTITIONER

## 2021-10-06 PROCEDURE — 3078F PR MOST RECENT DIASTOLIC BLOOD PRESSURE < 80 MM HG: ICD-10-PCS | Mod: HCNC,CPTII,S$GLB, | Performed by: NURSE PRACTITIONER

## 2021-10-06 PROCEDURE — 87481 CANDIDA DNA AMP PROBE: CPT | Mod: 59,HCNC | Performed by: NURSE PRACTITIONER

## 2021-10-06 PROCEDURE — 99999 PR PBB SHADOW E&M-EST. PATIENT-LVL IV: CPT | Mod: PBBFAC,HCNC,, | Performed by: NURSE PRACTITIONER

## 2021-10-06 PROCEDURE — 99204 PR OFFICE/OUTPT VISIT, NEW, LEVL IV, 45-59 MIN: ICD-10-PCS | Mod: HCNC,25,S$GLB, | Performed by: NURSE PRACTITIONER

## 2021-10-06 PROCEDURE — 51798 POCT BLADDER SCAN: ICD-10-PCS | Mod: HCNC,S$GLB,, | Performed by: NURSE PRACTITIONER

## 2021-10-06 PROCEDURE — 3288F PR FALLS RISK ASSESSMENT DOCUMENTED: ICD-10-PCS | Mod: HCNC,CPTII,S$GLB, | Performed by: NURSE PRACTITIONER

## 2021-10-06 PROCEDURE — 51798 US URINE CAPACITY MEASURE: CPT | Mod: HCNC,S$GLB,, | Performed by: NURSE PRACTITIONER

## 2021-10-06 PROCEDURE — 3074F PR MOST RECENT SYSTOLIC BLOOD PRESSURE < 130 MM HG: ICD-10-PCS | Mod: HCNC,CPTII,S$GLB, | Performed by: NURSE PRACTITIONER

## 2021-10-06 PROCEDURE — 1157F ADVNC CARE PLAN IN RCRD: CPT | Mod: HCNC,CPTII,S$GLB, | Performed by: NURSE PRACTITIONER

## 2021-10-06 PROCEDURE — 87086 URINE CULTURE/COLONY COUNT: CPT | Mod: HCNC | Performed by: NURSE PRACTITIONER

## 2021-10-06 PROCEDURE — 51701 PR INSERTION OF NON-INDWELLING BLADDER CATHETERIZATION FOR RESIDUAL UR: ICD-10-PCS | Mod: HCNC,S$GLB,, | Performed by: NURSE PRACTITIONER

## 2021-10-06 PROCEDURE — 81002 POCT URINE DIPSTICK WITHOUT MICROSCOPE: ICD-10-PCS | Mod: HCNC,S$GLB,, | Performed by: NURSE PRACTITIONER

## 2021-10-06 PROCEDURE — 1160F PR REVIEW ALL MEDS BY PRESCRIBER/CLIN PHARMACIST DOCUMENTED: ICD-10-PCS | Mod: HCNC,CPTII,S$GLB, | Performed by: NURSE PRACTITIONER

## 2021-10-06 PROCEDURE — 51701 INSERT BLADDER CATHETER: CPT | Mod: HCNC,S$GLB,, | Performed by: NURSE PRACTITIONER

## 2021-10-06 PROCEDURE — 1160F RVW MEDS BY RX/DR IN RCRD: CPT | Mod: HCNC,CPTII,S$GLB, | Performed by: NURSE PRACTITIONER

## 2021-10-06 PROCEDURE — 3288F FALL RISK ASSESSMENT DOCD: CPT | Mod: HCNC,CPTII,S$GLB, | Performed by: NURSE PRACTITIONER

## 2021-10-06 PROCEDURE — 1101F PR PT FALLS ASSESS DOC 0-1 FALLS W/OUT INJ PAST YR: ICD-10-PCS | Mod: HCNC,CPTII,S$GLB, | Performed by: NURSE PRACTITIONER

## 2021-10-06 PROCEDURE — 81001 URINALYSIS AUTO W/SCOPE: CPT | Mod: HCNC | Performed by: NURSE PRACTITIONER

## 2021-10-06 PROCEDURE — 1159F PR MEDICATION LIST DOCUMENTED IN MEDICAL RECORD: ICD-10-PCS | Mod: HCNC,CPTII,S$GLB, | Performed by: NURSE PRACTITIONER

## 2021-10-06 PROCEDURE — 87798 DETECT AGENT NOS DNA AMP: CPT | Mod: HCNC | Performed by: NURSE PRACTITIONER

## 2021-10-06 PROCEDURE — 81002 URINALYSIS NONAUTO W/O SCOPE: CPT | Mod: HCNC,S$GLB,, | Performed by: NURSE PRACTITIONER

## 2021-10-06 PROCEDURE — 99204 OFFICE O/P NEW MOD 45 MIN: CPT | Mod: HCNC,25,S$GLB, | Performed by: NURSE PRACTITIONER

## 2021-10-06 PROCEDURE — 87798 DETECT AGENT NOS DNA AMP: CPT | Mod: 59,HCNC | Performed by: NURSE PRACTITIONER

## 2021-10-06 PROCEDURE — 1157F PR ADVANCE CARE PLAN OR EQUIV PRESENT IN MEDICAL RECORD: ICD-10-PCS | Mod: HCNC,CPTII,S$GLB, | Performed by: NURSE PRACTITIONER

## 2021-10-06 PROCEDURE — 3074F SYST BP LT 130 MM HG: CPT | Mod: HCNC,CPTII,S$GLB, | Performed by: NURSE PRACTITIONER

## 2021-10-06 PROCEDURE — 99999 PR PBB SHADOW E&M-EST. PATIENT-LVL IV: ICD-10-PCS | Mod: PBBFAC,HCNC,, | Performed by: NURSE PRACTITIONER

## 2021-10-06 PROCEDURE — 1159F MED LIST DOCD IN RCRD: CPT | Mod: HCNC,CPTII,S$GLB, | Performed by: NURSE PRACTITIONER

## 2021-10-06 RX ORDER — ESTRADIOL 0.1 MG/G
CREAM VAGINAL
Qty: 30 G | Refills: 11 | Status: SHIPPED | OUTPATIENT
Start: 2021-10-06 | End: 2022-03-18

## 2021-10-06 RX ORDER — OXYBUTYNIN CHLORIDE 5 MG/1
5 TABLET ORAL NIGHTLY
Qty: 30 TABLET | Refills: 2 | Status: SHIPPED | OUTPATIENT
Start: 2021-10-06 | End: 2021-11-03 | Stop reason: SINTOL

## 2021-10-08 LAB
BACTERIAL VAGINOSIS DNA: NEGATIVE
CANDIDA GLABRATA DNA: NEGATIVE
CANDIDA KRUSEI DNA: NEGATIVE
CANDIDA RRNA VAG QL PROBE: NEGATIVE
T VAGINALIS RRNA GENITAL QL PROBE: NEGATIVE

## 2021-10-09 LAB — BACTERIA UR CULT: NO GROWTH

## 2021-10-11 LAB
MYCOPLASMA GENITALIUM RESULT: NEGATIVE
SPECIMEN SOURCE: NORMAL
SPECIMEN SOURCE: NORMAL
U PARVUM DNA SPEC QL NAA+PROBE: NEGATIVE
U UREALYTICUM DNA SPEC QL NAA+PROBE: NEGATIVE

## 2021-11-03 ENCOUNTER — OFFICE VISIT (OUTPATIENT)
Dept: UROLOGY | Facility: CLINIC | Age: 86
End: 2021-11-03
Payer: MEDICARE

## 2021-11-03 VITALS
HEART RATE: 85 BPM | WEIGHT: 125.25 LBS | BODY MASS INDEX: 24.59 KG/M2 | DIASTOLIC BLOOD PRESSURE: 67 MMHG | SYSTOLIC BLOOD PRESSURE: 148 MMHG | HEIGHT: 60 IN

## 2021-11-03 DIAGNOSIS — R30.0 DYSURIA: Primary | ICD-10-CM

## 2021-11-03 DIAGNOSIS — R33.9 URINARY RETENTION WITH INCOMPLETE BLADDER EMPTYING: ICD-10-CM

## 2021-11-03 DIAGNOSIS — N95.2 VAGINAL ATROPHY: ICD-10-CM

## 2021-11-03 PROCEDURE — 99999 PR PBB SHADOW E&M-EST. PATIENT-LVL III: ICD-10-PCS | Mod: PBBFAC,HCNC,, | Performed by: NURSE PRACTITIONER

## 2021-11-03 PROCEDURE — 1157F PR ADVANCE CARE PLAN OR EQUIV PRESENT IN MEDICAL RECORD: ICD-10-PCS | Mod: HCNC,CPTII,S$GLB, | Performed by: NURSE PRACTITIONER

## 2021-11-03 PROCEDURE — 1126F AMNT PAIN NOTED NONE PRSNT: CPT | Mod: HCNC,CPTII,S$GLB, | Performed by: NURSE PRACTITIONER

## 2021-11-03 PROCEDURE — 99214 OFFICE O/P EST MOD 30 MIN: CPT | Mod: HCNC,S$GLB,, | Performed by: NURSE PRACTITIONER

## 2021-11-03 PROCEDURE — 3288F FALL RISK ASSESSMENT DOCD: CPT | Mod: HCNC,CPTII,S$GLB, | Performed by: NURSE PRACTITIONER

## 2021-11-03 PROCEDURE — 1159F MED LIST DOCD IN RCRD: CPT | Mod: HCNC,CPTII,S$GLB, | Performed by: NURSE PRACTITIONER

## 2021-11-03 PROCEDURE — 3077F SYST BP >= 140 MM HG: CPT | Mod: HCNC,CPTII,S$GLB, | Performed by: NURSE PRACTITIONER

## 2021-11-03 PROCEDURE — 3078F DIAST BP <80 MM HG: CPT | Mod: HCNC,CPTII,S$GLB, | Performed by: NURSE PRACTITIONER

## 2021-11-03 PROCEDURE — 1101F PT FALLS ASSESS-DOCD LE1/YR: CPT | Mod: HCNC,CPTII,S$GLB, | Performed by: NURSE PRACTITIONER

## 2021-11-03 PROCEDURE — 1160F PR REVIEW ALL MEDS BY PRESCRIBER/CLIN PHARMACIST DOCUMENTED: ICD-10-PCS | Mod: HCNC,CPTII,S$GLB, | Performed by: NURSE PRACTITIONER

## 2021-11-03 PROCEDURE — 3288F PR FALLS RISK ASSESSMENT DOCUMENTED: ICD-10-PCS | Mod: HCNC,CPTII,S$GLB, | Performed by: NURSE PRACTITIONER

## 2021-11-03 PROCEDURE — 1159F PR MEDICATION LIST DOCUMENTED IN MEDICAL RECORD: ICD-10-PCS | Mod: HCNC,CPTII,S$GLB, | Performed by: NURSE PRACTITIONER

## 2021-11-03 PROCEDURE — 3077F PR MOST RECENT SYSTOLIC BLOOD PRESSURE >= 140 MM HG: ICD-10-PCS | Mod: HCNC,CPTII,S$GLB, | Performed by: NURSE PRACTITIONER

## 2021-11-03 PROCEDURE — 1160F RVW MEDS BY RX/DR IN RCRD: CPT | Mod: HCNC,CPTII,S$GLB, | Performed by: NURSE PRACTITIONER

## 2021-11-03 PROCEDURE — 99999 PR PBB SHADOW E&M-EST. PATIENT-LVL III: CPT | Mod: PBBFAC,HCNC,, | Performed by: NURSE PRACTITIONER

## 2021-11-03 PROCEDURE — 1101F PR PT FALLS ASSESS DOC 0-1 FALLS W/OUT INJ PAST YR: ICD-10-PCS | Mod: HCNC,CPTII,S$GLB, | Performed by: NURSE PRACTITIONER

## 2021-11-03 PROCEDURE — 99214 PR OFFICE/OUTPT VISIT, EST, LEVL IV, 30-39 MIN: ICD-10-PCS | Mod: HCNC,S$GLB,, | Performed by: NURSE PRACTITIONER

## 2021-11-03 PROCEDURE — 1126F PR PAIN SEVERITY QUANTIFIED, NO PAIN PRESENT: ICD-10-PCS | Mod: HCNC,CPTII,S$GLB, | Performed by: NURSE PRACTITIONER

## 2021-11-03 PROCEDURE — 1157F ADVNC CARE PLAN IN RCRD: CPT | Mod: HCNC,CPTII,S$GLB, | Performed by: NURSE PRACTITIONER

## 2021-11-03 PROCEDURE — 3078F PR MOST RECENT DIASTOLIC BLOOD PRESSURE < 80 MM HG: ICD-10-PCS | Mod: HCNC,CPTII,S$GLB, | Performed by: NURSE PRACTITIONER

## 2021-11-10 ENCOUNTER — LAB VISIT (OUTPATIENT)
Dept: LAB | Facility: HOSPITAL | Age: 86
End: 2021-11-10
Attending: INTERNAL MEDICINE
Payer: MEDICARE

## 2021-11-10 DIAGNOSIS — E78.00 HYPERCHOLESTEROLEMIA: ICD-10-CM

## 2021-11-10 DIAGNOSIS — I10 ESSENTIAL (PRIMARY) HYPERTENSION: ICD-10-CM

## 2021-11-10 DIAGNOSIS — N18.30 STAGE 3 CHRONIC KIDNEY DISEASE, UNSPECIFIED WHETHER STAGE 3A OR 3B CKD: ICD-10-CM

## 2021-11-10 DIAGNOSIS — R73.03 PREDIABETES: ICD-10-CM

## 2021-11-10 LAB
ALBUMIN SERPL BCP-MCNC: 3.9 G/DL (ref 3.5–5.2)
ALP SERPL-CCNC: 57 U/L (ref 55–135)
ALT SERPL W/O P-5'-P-CCNC: 14 U/L (ref 10–44)
ANION GAP SERPL CALC-SCNC: 11 MMOL/L (ref 8–16)
AST SERPL-CCNC: 23 U/L (ref 10–40)
BASOPHILS # BLD AUTO: 0.06 K/UL (ref 0–0.2)
BASOPHILS NFR BLD: 1.4 % (ref 0–1.9)
BILIRUB SERPL-MCNC: 0.4 MG/DL (ref 0.1–1)
BUN SERPL-MCNC: 30 MG/DL (ref 8–23)
CALCIUM SERPL-MCNC: 9.6 MG/DL (ref 8.7–10.5)
CHLORIDE SERPL-SCNC: 110 MMOL/L (ref 95–110)
CHOLEST SERPL-MCNC: 188 MG/DL (ref 120–199)
CHOLEST/HDLC SERPL: 3 {RATIO} (ref 2–5)
CO2 SERPL-SCNC: 20 MMOL/L (ref 23–29)
CREAT SERPL-MCNC: 1 MG/DL (ref 0.5–1.4)
DIFFERENTIAL METHOD: ABNORMAL
EOSINOPHIL # BLD AUTO: 0.3 K/UL (ref 0–0.5)
EOSINOPHIL NFR BLD: 7.4 % (ref 0–8)
ERYTHROCYTE [DISTWIDTH] IN BLOOD BY AUTOMATED COUNT: 15.9 % (ref 11.5–14.5)
EST. GFR  (AFRICAN AMERICAN): 59 ML/MIN/1.73 M^2
EST. GFR  (NON AFRICAN AMERICAN): 51 ML/MIN/1.73 M^2
ESTIMATED AVG GLUCOSE: 120 MG/DL (ref 68–131)
GLUCOSE SERPL-MCNC: 104 MG/DL (ref 70–110)
HBA1C MFR BLD: 5.8 % (ref 4–5.6)
HCT VFR BLD AUTO: 36.4 % (ref 37–48.5)
HDLC SERPL-MCNC: 62 MG/DL (ref 40–75)
HDLC SERPL: 33 % (ref 20–50)
HGB BLD-MCNC: 12.5 G/DL (ref 12–16)
IMM GRANULOCYTES # BLD AUTO: 0.01 K/UL (ref 0–0.04)
IMM GRANULOCYTES NFR BLD AUTO: 0.2 % (ref 0–0.5)
LDLC SERPL CALC-MCNC: 108.4 MG/DL (ref 63–159)
LYMPHOCYTES # BLD AUTO: 1.6 K/UL (ref 1–4.8)
LYMPHOCYTES NFR BLD: 37.5 % (ref 18–48)
MCH RBC QN AUTO: 29.6 PG (ref 27–31)
MCHC RBC AUTO-ENTMCNC: 34.3 G/DL (ref 32–36)
MCV RBC AUTO: 86 FL (ref 82–98)
MONOCYTES # BLD AUTO: 0.4 K/UL (ref 0.3–1)
MONOCYTES NFR BLD: 9.4 % (ref 4–15)
NEUTROPHILS # BLD AUTO: 1.9 K/UL (ref 1.8–7.7)
NEUTROPHILS NFR BLD: 44.1 % (ref 38–73)
NONHDLC SERPL-MCNC: 126 MG/DL
NRBC BLD-RTO: 0 /100 WBC
PLATELET # BLD AUTO: 293 K/UL (ref 150–450)
PMV BLD AUTO: 9.2 FL (ref 9.2–12.9)
POTASSIUM SERPL-SCNC: 4.3 MMOL/L (ref 3.5–5.1)
PROT SERPL-MCNC: 7.8 G/DL (ref 6–8.4)
RBC # BLD AUTO: 4.23 M/UL (ref 4–5.4)
SODIUM SERPL-SCNC: 141 MMOL/L (ref 136–145)
TRIGL SERPL-MCNC: 88 MG/DL (ref 30–150)
WBC # BLD AUTO: 4.35 K/UL (ref 3.9–12.7)

## 2021-11-10 PROCEDURE — 36415 COLL VENOUS BLD VENIPUNCTURE: CPT | Mod: HCNC | Performed by: INTERNAL MEDICINE

## 2021-11-10 PROCEDURE — 80061 LIPID PANEL: CPT | Mod: HCNC | Performed by: INTERNAL MEDICINE

## 2021-11-10 PROCEDURE — 83036 HEMOGLOBIN GLYCOSYLATED A1C: CPT | Mod: HCNC | Performed by: INTERNAL MEDICINE

## 2021-11-10 PROCEDURE — 85025 COMPLETE CBC W/AUTO DIFF WBC: CPT | Mod: HCNC | Performed by: INTERNAL MEDICINE

## 2021-11-10 PROCEDURE — 80053 COMPREHEN METABOLIC PANEL: CPT | Mod: HCNC | Performed by: INTERNAL MEDICINE

## 2021-11-11 ENCOUNTER — PATIENT MESSAGE (OUTPATIENT)
Dept: FAMILY MEDICINE | Facility: CLINIC | Age: 86
End: 2021-11-11
Payer: MEDICARE

## 2021-11-15 ENCOUNTER — PATIENT MESSAGE (OUTPATIENT)
Dept: FAMILY MEDICINE | Facility: CLINIC | Age: 86
End: 2021-11-15
Payer: MEDICARE

## 2021-11-15 ENCOUNTER — TELEPHONE (OUTPATIENT)
Dept: FAMILY MEDICINE | Facility: CLINIC | Age: 86
End: 2021-11-15
Payer: MEDICARE

## 2021-12-17 ENCOUNTER — OFFICE VISIT (OUTPATIENT)
Dept: FAMILY MEDICINE | Facility: CLINIC | Age: 86
End: 2021-12-17
Payer: MEDICARE

## 2021-12-17 VITALS
HEART RATE: 65 BPM | DIASTOLIC BLOOD PRESSURE: 62 MMHG | HEIGHT: 60 IN | OXYGEN SATURATION: 98 % | SYSTOLIC BLOOD PRESSURE: 130 MMHG | BODY MASS INDEX: 25.5 KG/M2 | WEIGHT: 129.88 LBS

## 2021-12-17 DIAGNOSIS — L28.0 LICHEN SIMPLEX CHRONICUS: ICD-10-CM

## 2021-12-17 DIAGNOSIS — E78.00 HYPERCHOLESTEROLEMIA: Primary | ICD-10-CM

## 2021-12-17 DIAGNOSIS — F41.9 ANXIETY: ICD-10-CM

## 2021-12-17 DIAGNOSIS — M81.0 AGE-RELATED OSTEOPOROSIS WITHOUT CURRENT PATHOLOGICAL FRACTURE: ICD-10-CM

## 2021-12-17 DIAGNOSIS — R73.03 PREDIABETES: ICD-10-CM

## 2021-12-17 PROCEDURE — 99214 OFFICE O/P EST MOD 30 MIN: CPT | Mod: HCNC,S$GLB,, | Performed by: INTERNAL MEDICINE

## 2021-12-17 PROCEDURE — 1157F ADVNC CARE PLAN IN RCRD: CPT | Mod: HCNC,CPTII,S$GLB, | Performed by: INTERNAL MEDICINE

## 2021-12-17 PROCEDURE — 99499 UNLISTED E&M SERVICE: CPT | Mod: HCNC,S$GLB,, | Performed by: INTERNAL MEDICINE

## 2021-12-17 PROCEDURE — 99999 PR PBB SHADOW E&M-EST. PATIENT-LVL IV: CPT | Mod: PBBFAC,HCNC,, | Performed by: INTERNAL MEDICINE

## 2021-12-17 PROCEDURE — 99499 RISK ADDL DX/OHS AUDIT: ICD-10-PCS | Mod: HCNC,S$GLB,, | Performed by: INTERNAL MEDICINE

## 2021-12-17 PROCEDURE — 99999 PR PBB SHADOW E&M-EST. PATIENT-LVL IV: ICD-10-PCS | Mod: PBBFAC,HCNC,, | Performed by: INTERNAL MEDICINE

## 2021-12-17 PROCEDURE — 99214 PR OFFICE/OUTPT VISIT, EST, LEVL IV, 30-39 MIN: ICD-10-PCS | Mod: HCNC,S$GLB,, | Performed by: INTERNAL MEDICINE

## 2021-12-17 PROCEDURE — 1157F PR ADVANCE CARE PLAN OR EQUIV PRESENT IN MEDICAL RECORD: ICD-10-PCS | Mod: HCNC,CPTII,S$GLB, | Performed by: INTERNAL MEDICINE

## 2021-12-17 RX ORDER — EZETIMIBE 10 MG/1
10 TABLET ORAL DAILY
Qty: 90 TABLET | Refills: 3 | Status: SHIPPED | OUTPATIENT
Start: 2021-12-17 | End: 2022-07-29

## 2021-12-17 RX ORDER — ALPRAZOLAM 0.5 MG/1
0.5 TABLET ORAL NIGHTLY PRN
Qty: 30 TABLET | Refills: 0 | Status: SHIPPED | OUTPATIENT
Start: 2021-12-17 | End: 2022-03-18

## 2021-12-17 RX ORDER — METFORMIN HYDROCHLORIDE 500 MG/1
500 TABLET ORAL 2 TIMES DAILY WITH MEALS
Qty: 180 TABLET | Refills: 3 | Status: SHIPPED | OUTPATIENT
Start: 2021-12-17 | End: 2022-03-18

## 2021-12-17 RX ORDER — FOAM BANDAGE 2" X 2"
1 BANDAGE TOPICAL EVERY OTHER DAY
Qty: 10 EACH | Refills: 1 | Status: SHIPPED | OUTPATIENT
Start: 2021-12-17 | End: 2022-03-18

## 2021-12-17 RX ORDER — HYDROXYZINE HYDROCHLORIDE 25 MG/1
25 TABLET, FILM COATED ORAL 3 TIMES DAILY PRN
Qty: 60 TABLET | Refills: 2 | Status: SHIPPED | OUTPATIENT
Start: 2021-12-17 | End: 2022-04-11 | Stop reason: SDUPTHER

## 2021-12-18 PROBLEM — E78.00 HYPERCHOLESTEROLEMIA: Status: ACTIVE | Noted: 2021-12-18

## 2021-12-20 ENCOUNTER — TELEPHONE (OUTPATIENT)
Dept: FAMILY MEDICINE | Facility: CLINIC | Age: 86
End: 2021-12-20
Payer: MEDICARE

## 2021-12-20 DIAGNOSIS — K21.9 GASTROESOPHAGEAL REFLUX DISEASE WITHOUT ESOPHAGITIS: ICD-10-CM

## 2021-12-20 RX ORDER — ESOMEPRAZOLE MAGNESIUM 40 MG/1
40 CAPSULE, DELAYED RELEASE ORAL DAILY
Qty: 90 CAPSULE | Refills: 3 | Status: SHIPPED | OUTPATIENT
Start: 2021-12-20 | End: 2022-03-18

## 2021-12-22 ENCOUNTER — TELEPHONE (OUTPATIENT)
Dept: FAMILY MEDICINE | Facility: CLINIC | Age: 86
End: 2021-12-22
Payer: MEDICARE

## 2022-01-12 ENCOUNTER — PATIENT OUTREACH (OUTPATIENT)
Dept: ADMINISTRATIVE | Facility: OTHER | Age: 87
End: 2022-01-12
Payer: MEDICARE

## 2022-01-12 NOTE — PROGRESS NOTES
Health Maintenance Due   Topic Date Due    COVID-19 Vaccine (1) Never done    Shingles Vaccine (2 of 3) 12/23/2016    DEXA SCAN  04/11/2021    Influenza Vaccine (1) 09/01/2021     Updates were requested from care everywhere.  Chart was reviewed for overdue Proactive [facility] Encounters (FLASH) topics (CRS, Breast Cancer Screening, Eye exam)  Health Maintenance has been updated.  LINKS immunization registry triggered.  Immunizations were reconciled.

## 2022-01-13 ENCOUNTER — OFFICE VISIT (OUTPATIENT)
Dept: CARDIOLOGY | Facility: CLINIC | Age: 87
End: 2022-01-13
Payer: MEDICARE

## 2022-01-13 VITALS
WEIGHT: 130.19 LBS | SYSTOLIC BLOOD PRESSURE: 135 MMHG | BODY MASS INDEX: 25.56 KG/M2 | HEART RATE: 87 BPM | OXYGEN SATURATION: 96 % | HEIGHT: 60 IN | DIASTOLIC BLOOD PRESSURE: 78 MMHG

## 2022-01-13 DIAGNOSIS — R73.03 PREDIABETES: ICD-10-CM

## 2022-01-13 DIAGNOSIS — I65.29 STENOSIS OF CAROTID ARTERY, UNSPECIFIED LATERALITY: ICD-10-CM

## 2022-01-13 DIAGNOSIS — I10 ESSENTIAL (PRIMARY) HYPERTENSION: ICD-10-CM

## 2022-01-13 DIAGNOSIS — E78.00 HYPERCHOLESTEROLEMIA: ICD-10-CM

## 2022-01-13 DIAGNOSIS — I25.10 CORONARY ARTERY DISEASE INVOLVING NATIVE CORONARY ARTERY OF NATIVE HEART WITHOUT ANGINA PECTORIS: Primary | ICD-10-CM

## 2022-01-13 DIAGNOSIS — I25.10 NON-OCCLUSIVE CORONARY ARTERY DISEASE: ICD-10-CM

## 2022-01-13 PROCEDURE — 99999 PR PBB SHADOW E&M-EST. PATIENT-LVL IV: CPT | Mod: PBBFAC,HCNC,, | Performed by: INTERNAL MEDICINE

## 2022-01-13 PROCEDURE — 99499 UNLISTED E&M SERVICE: CPT | Mod: S$GLB,,, | Performed by: INTERNAL MEDICINE

## 2022-01-13 PROCEDURE — 3075F PR MOST RECENT SYSTOLIC BLOOD PRESS GE 130-139MM HG: ICD-10-PCS | Mod: HCNC,CPTII,S$GLB, | Performed by: INTERNAL MEDICINE

## 2022-01-13 PROCEDURE — 99999 PR PBB SHADOW E&M-EST. PATIENT-LVL IV: ICD-10-PCS | Mod: PBBFAC,HCNC,, | Performed by: INTERNAL MEDICINE

## 2022-01-13 PROCEDURE — 3288F PR FALLS RISK ASSESSMENT DOCUMENTED: ICD-10-PCS | Mod: HCNC,CPTII,S$GLB, | Performed by: INTERNAL MEDICINE

## 2022-01-13 PROCEDURE — 3288F FALL RISK ASSESSMENT DOCD: CPT | Mod: HCNC,CPTII,S$GLB, | Performed by: INTERNAL MEDICINE

## 2022-01-13 PROCEDURE — 1125F PR PAIN SEVERITY QUANTIFIED, PAIN PRESENT: ICD-10-PCS | Mod: HCNC,CPTII,S$GLB, | Performed by: INTERNAL MEDICINE

## 2022-01-13 PROCEDURE — 1125F AMNT PAIN NOTED PAIN PRSNT: CPT | Mod: HCNC,CPTII,S$GLB, | Performed by: INTERNAL MEDICINE

## 2022-01-13 PROCEDURE — 1159F MED LIST DOCD IN RCRD: CPT | Mod: HCNC,CPTII,S$GLB, | Performed by: INTERNAL MEDICINE

## 2022-01-13 PROCEDURE — 1101F PT FALLS ASSESS-DOCD LE1/YR: CPT | Mod: HCNC,CPTII,S$GLB, | Performed by: INTERNAL MEDICINE

## 2022-01-13 PROCEDURE — 99499 RISK ADDL DX/OHS AUDIT: ICD-10-PCS | Mod: S$GLB,,, | Performed by: INTERNAL MEDICINE

## 2022-01-13 PROCEDURE — 1101F PR PT FALLS ASSESS DOC 0-1 FALLS W/OUT INJ PAST YR: ICD-10-PCS | Mod: HCNC,CPTII,S$GLB, | Performed by: INTERNAL MEDICINE

## 2022-01-13 PROCEDURE — 3078F DIAST BP <80 MM HG: CPT | Mod: HCNC,CPTII,S$GLB, | Performed by: INTERNAL MEDICINE

## 2022-01-13 PROCEDURE — 3075F SYST BP GE 130 - 139MM HG: CPT | Mod: HCNC,CPTII,S$GLB, | Performed by: INTERNAL MEDICINE

## 2022-01-13 PROCEDURE — 99214 OFFICE O/P EST MOD 30 MIN: CPT | Mod: HCNC,S$GLB,, | Performed by: INTERNAL MEDICINE

## 2022-01-13 PROCEDURE — 1160F RVW MEDS BY RX/DR IN RCRD: CPT | Mod: HCNC,CPTII,S$GLB, | Performed by: INTERNAL MEDICINE

## 2022-01-13 PROCEDURE — 1160F PR REVIEW ALL MEDS BY PRESCRIBER/CLIN PHARMACIST DOCUMENTED: ICD-10-PCS | Mod: HCNC,CPTII,S$GLB, | Performed by: INTERNAL MEDICINE

## 2022-01-13 PROCEDURE — 99214 OFFICE O/P EST MOD 30 MIN: CPT | Mod: PBBFAC,PO | Performed by: INTERNAL MEDICINE

## 2022-01-13 PROCEDURE — 1157F ADVNC CARE PLAN IN RCRD: CPT | Mod: HCNC,CPTII,S$GLB, | Performed by: INTERNAL MEDICINE

## 2022-01-13 PROCEDURE — 3078F PR MOST RECENT DIASTOLIC BLOOD PRESSURE < 80 MM HG: ICD-10-PCS | Mod: HCNC,CPTII,S$GLB, | Performed by: INTERNAL MEDICINE

## 2022-01-13 PROCEDURE — 99214 PR OFFICE/OUTPT VISIT, EST, LEVL IV, 30-39 MIN: ICD-10-PCS | Mod: HCNC,S$GLB,, | Performed by: INTERNAL MEDICINE

## 2022-01-13 PROCEDURE — 1157F PR ADVANCE CARE PLAN OR EQUIV PRESENT IN MEDICAL RECORD: ICD-10-PCS | Mod: HCNC,CPTII,S$GLB, | Performed by: INTERNAL MEDICINE

## 2022-01-13 PROCEDURE — 1159F PR MEDICATION LIST DOCUMENTED IN MEDICAL RECORD: ICD-10-PCS | Mod: HCNC,CPTII,S$GLB, | Performed by: INTERNAL MEDICINE

## 2022-01-13 NOTE — PROGRESS NOTES
Subjective:    Patient ID:  Ghada Dave is a 86 y.o. female who presents for follow-up of Coronary Artery Disease and Venous Insufficiency      HPI    85 y/o Dutch speaking female with hx of non obstructive CAD ([facility] in 2017, 2019), moderate AI per last 2DE, venous insufficiency, HTN, ROGER, psoriasis, who presents for f/u and for rosa-operative cardiac clearance prior to shoulder surgery. Formerly seen [facility] and [facility] by Dr Perez. Denies CP, SOB/DANIEL, palps, LE edema, palps, syncope, claudication. Compliant with meds. Very active and able to accomplish ADL's without cardiac symptoms (>4 METs). Previous 2DE with showed normal EF, DD, and moderate AI. Had normal NASIR's. Had previus nuclear SPECT that was abnormal (false-positive) and [facility] with nonobstructive CAD, normal EF and normal LVEDP.   Has bilateral LE intermittent edema with varicose veins and bulging varicosities. Not painful and no claudication. Has a wound on inner left ankle that has healed, but was delayed.     Review of Systems   Constitutional: Negative for malaise/fatigue.   HENT: Negative for congestion.    Eyes: Negative for blurred vision.   Cardiovascular: Positive for leg swelling. Negative for chest pain, claudication, cyanosis, dyspnea on exertion, irregular heartbeat, near-syncope, orthopnea, palpitations, paroxysmal nocturnal dyspnea and syncope.   Respiratory: Negative for shortness of breath.    Endocrine: Negative for polyuria.   Hematologic/Lymphatic: Negative for bleeding problem.   Skin: Positive for poor wound healing. Negative for itching and rash.   Musculoskeletal: Negative for joint swelling, muscle cramps and muscle weakness.   Gastrointestinal: Negative for abdominal pain, hematemesis, hematochezia, melena, nausea and vomiting.   Genitourinary: Negative for dysuria and hematuria.   Neurological: Negative for dizziness, focal weakness, headaches, light-headedness, loss of balance and weakness.    Psychiatric/Behavioral: Negative for depression. The patient is not nervous/anxious.         Objective:    Physical Exam  Constitutional:       Appearance: She is well-developed and well-nourished.   HENT:      Head: Normocephalic and atraumatic.   Neck:      Vascular: No JVD.   Cardiovascular:      Rate and Rhythm: Normal rate and regular rhythm.      Pulses:           Carotid pulses are 2+ on the right side and 2+ on the left side.       Radial pulses are 2+ on the right side and 2+ on the left side.        Femoral pulses are 2+ on the right side and 2+ on the left side.       Dorsalis pedis pulses are 2+ on the right side and 2+ on the left side.        Posterior tibial pulses are 2+ on the right side and 2+ on the left side.      Heart sounds: Normal heart sounds.   Pulmonary:      Effort: Pulmonary effort is normal.      Breath sounds: Normal breath sounds.   Abdominal:      General: Bowel sounds are normal.      Palpations: Abdomen is soft.   Musculoskeletal:      Cervical back: Neck supple.      Right lower leg: Edema present.      Left lower leg: Edema present.   Skin:     General: Skin is warm and dry.   Neurological:      Mental Status: She is alert and oriented to person, place, and time.   Psychiatric:         Mood and Affect: Mood and affect normal.         Behavior: Behavior normal.         Thought Content: Thought content normal.           Assessment:       1. Coronary artery disease involving native coronary artery of native heart without angina pectoris    2. Essential (primary) hypertension    3. Hypercholesterolemia    4. Non-occlusive coronary artery disease    5. Stenosis of carotid artery, unspecified laterality    6. Prediabetes      86 y/o pt with hx and presentation as above. Doing well from a cardiac perspective and compensated from a HF perspective. Needs to continue to stay active. Discussed at length venous insufficiency. She is asymptomatic and was counseled to report when wounds  develop on legs for close monitoring. Compression stockings PRN. Discussed the etiology, evaluation, and management of CAD, carotid artery stenosis, HTN, pre-DM, GERD. Discussed the importance of med compliance, heart healthy diet, and regular exercise.      Plan:       -Compression stockings PRN  -f/u in 1 year

## 2022-01-18 ENCOUNTER — PATIENT MESSAGE (OUTPATIENT)
Dept: FAMILY MEDICINE | Facility: CLINIC | Age: 87
End: 2022-01-18
Payer: MEDICARE

## 2022-01-18 ENCOUNTER — TELEPHONE (OUTPATIENT)
Dept: FAMILY MEDICINE | Facility: CLINIC | Age: 87
End: 2022-01-18
Payer: MEDICARE

## 2022-02-01 ENCOUNTER — PATIENT MESSAGE (OUTPATIENT)
Dept: FAMILY MEDICINE | Facility: CLINIC | Age: 87
End: 2022-02-01
Payer: MEDICARE

## 2022-02-21 ENCOUNTER — TELEPHONE (OUTPATIENT)
Dept: FAMILY MEDICINE | Facility: CLINIC | Age: 87
End: 2022-02-21
Payer: MEDICARE

## 2022-02-21 DIAGNOSIS — R35.0 FREQUENCY OF URINATION: Primary | ICD-10-CM

## 2022-02-21 NOTE — TELEPHONE ENCOUNTER
----- Message from Beau Dorado sent at 2/21/2022  3:33 PM CST -----  Type:  Needs Medical Advice    Who Called: self via the  line  Reason:been frequently urinating and wants something called in  Would the patient rather a call back or a response via [facility]? call  Best Call Back Number: [phone]  Additional Information: none

## 2022-02-22 ENCOUNTER — PATIENT MESSAGE (OUTPATIENT)
Dept: FAMILY MEDICINE | Facility: CLINIC | Age: 87
End: 2022-02-22
Payer: MEDICARE

## 2022-02-22 ENCOUNTER — LAB VISIT (OUTPATIENT)
Dept: LAB | Facility: HOSPITAL | Age: 87
End: 2022-02-22
Attending: INTERNAL MEDICINE
Payer: MEDICARE

## 2022-02-22 ENCOUNTER — TELEPHONE (OUTPATIENT)
Dept: FAMILY MEDICINE | Facility: CLINIC | Age: 87
End: 2022-02-22
Payer: MEDICARE

## 2022-02-22 DIAGNOSIS — R33.9 URINARY RETENTION WITH INCOMPLETE BLADDER EMPTYING: Primary | ICD-10-CM

## 2022-02-22 DIAGNOSIS — R35.0 FREQUENCY OF URINATION: ICD-10-CM

## 2022-02-22 LAB
BACTERIA #/AREA URNS HPF: ABNORMAL /HPF
BILIRUB UR QL STRIP: NEGATIVE
CLARITY UR: ABNORMAL
COLOR UR: YELLOW
GLUCOSE UR QL STRIP: NEGATIVE
HGB UR QL STRIP: ABNORMAL
HYALINE CASTS #/AREA URNS LPF: 0 /LPF
KETONES UR QL STRIP: NEGATIVE
LEUKOCYTE ESTERASE UR QL STRIP: ABNORMAL
MICROSCOPIC COMMENT: ABNORMAL
NITRITE UR QL STRIP: NEGATIVE
NON-SQ EPI CELLS #/AREA URNS HPF: 1 /HPF
PH UR STRIP: 6 [PH] (ref 5–8)
PROT UR QL STRIP: ABNORMAL
RBC #/AREA URNS HPF: >100 /HPF (ref 0–4)
SP GR UR STRIP: 1.01 (ref 1–1.03)
SQUAMOUS #/AREA URNS HPF: 1 /HPF
UNIDENT CRYS URNS QL MICRO: 27
URN SPEC COLLECT METH UR: ABNORMAL
UROBILINOGEN UR STRIP-ACNC: NEGATIVE EU/DL
WBC #/AREA URNS HPF: >100 /HPF (ref 0–5)
WBC CLUMPS URNS QL MICRO: ABNORMAL

## 2022-02-22 PROCEDURE — 87186 SC STD MICRODIL/AGAR DIL: CPT | Mod: HCNC | Performed by: INTERNAL MEDICINE

## 2022-02-22 PROCEDURE — 81000 URINALYSIS NONAUTO W/SCOPE: CPT | Mod: HCNC | Performed by: INTERNAL MEDICINE

## 2022-02-22 PROCEDURE — 87086 URINE CULTURE/COLONY COUNT: CPT | Mod: HCNC | Performed by: INTERNAL MEDICINE

## 2022-02-22 PROCEDURE — 87077 CULTURE AEROBIC IDENTIFY: CPT | Mod: HCNC | Performed by: INTERNAL MEDICINE

## 2022-02-22 PROCEDURE — 87088 URINE BACTERIA CULTURE: CPT | Mod: HCNC | Performed by: INTERNAL MEDICINE

## 2022-02-22 RX ORDER — NITROFURANTOIN 25; 75 MG/1; MG/1
100 CAPSULE ORAL 2 TIMES DAILY
Qty: 14 CAPSULE | Refills: 0 | Status: SHIPPED | OUTPATIENT
Start: 2022-02-22 | End: 2022-02-22

## 2022-02-22 RX ORDER — NITROFURANTOIN 25; 75 MG/1; MG/1
100 CAPSULE ORAL 2 TIMES DAILY
Qty: 14 CAPSULE | Refills: 0 | Status: SHIPPED | OUTPATIENT
Start: 2022-02-22 | End: 2022-03-01

## 2022-02-22 NOTE — TELEPHONE ENCOUNTER
Spoke with patient son to inform Mother needs to come in for an appointment  To have referral . Patient had urine done and will need results

## 2022-02-22 NOTE — TELEPHONE ENCOUNTER
Spoke to pt who reports she is having pelvic pain (8/10). Pt requests a referral to OB GYN. Please advise.

## 2022-02-23 ENCOUNTER — TELEPHONE (OUTPATIENT)
Dept: FAMILY MEDICINE | Facility: CLINIC | Age: 87
End: 2022-02-23
Payer: MEDICARE

## 2022-02-23 NOTE — TELEPHONE ENCOUNTER
The UA and was abnormal an and we will treat for urinary tract infection.  The patient reports she has burning when she urinates.  Reports she had recurring UTIs in the past and feeling off a bump in the vaginal area that was evaluated and told she had bladder problems.  She has similar sensation or discomfort.  Was on Macrobid daily for 2 years to suppress UTIs which helped.  Macrobid has been prescribed and will refer the patient to the urologist.  Advise if she feels worse or not better needs to be evaluated sooner.

## 2022-02-24 ENCOUNTER — TELEPHONE (OUTPATIENT)
Dept: FAMILY MEDICINE | Facility: CLINIC | Age: 87
End: 2022-02-24
Payer: MEDICARE

## 2022-02-24 LAB — BACTERIA UR CULT: ABNORMAL

## 2022-03-04 ENCOUNTER — PATIENT MESSAGE (OUTPATIENT)
Dept: FAMILY MEDICINE | Facility: CLINIC | Age: 87
End: 2022-03-04
Payer: MEDICARE

## 2022-03-07 NOTE — TELEPHONE ENCOUNTER
Save for Dr Hicks's Review upon her return to office-- patient's family notified via my chart.     Sita we're aware that aging is a progressive degenerative condition of the whole body specially for people over 85+, like my mother, but still we aren't trying to cure nor rejuvenate her organs, but yes we're trying to mitigate her condition the best way possible until she expires, the antibiotics prescribed to her hardly did anything for the better for the continued infection, overactive bladder, the whole nine yards, we don't know if the switch to generic medication diminished effectiveness, but we have to find an alternative medicine and I will buy the brand name for original as prescribed and see if that will help her, also her lab works is coming up on the 11th of this month, how soon after will she see Dr. Paredes? You have a nice d

## 2022-03-11 ENCOUNTER — LAB VISIT (OUTPATIENT)
Dept: LAB | Facility: HOSPITAL | Age: 87
End: 2022-03-11
Attending: INTERNAL MEDICINE
Payer: MEDICARE

## 2022-03-11 DIAGNOSIS — E78.00 HYPERCHOLESTEROLEMIA: ICD-10-CM

## 2022-03-11 DIAGNOSIS — R73.03 PREDIABETES: ICD-10-CM

## 2022-03-11 LAB
ALBUMIN SERPL BCP-MCNC: 3.8 G/DL (ref 3.5–5.2)
ALP SERPL-CCNC: 51 U/L (ref 55–135)
ALT SERPL W/O P-5'-P-CCNC: 10 U/L (ref 10–44)
ANION GAP SERPL CALC-SCNC: 8 MMOL/L (ref 8–16)
AST SERPL-CCNC: 20 U/L (ref 10–40)
BILIRUB SERPL-MCNC: 0.6 MG/DL (ref 0.1–1)
BUN SERPL-MCNC: 17 MG/DL (ref 8–23)
CALCIUM SERPL-MCNC: 9.5 MG/DL (ref 8.7–10.5)
CHLORIDE SERPL-SCNC: 109 MMOL/L (ref 95–110)
CHOLEST SERPL-MCNC: 161 MG/DL (ref 120–199)
CHOLEST/HDLC SERPL: 2.8 {RATIO} (ref 2–5)
CO2 SERPL-SCNC: 24 MMOL/L (ref 23–29)
CREAT SERPL-MCNC: 0.9 MG/DL (ref 0.5–1.4)
EST. GFR  (AFRICAN AMERICAN): >60 ML/MIN/1.73 M^2
EST. GFR  (NON AFRICAN AMERICAN): 58 ML/MIN/1.73 M^2
ESTIMATED AVG GLUCOSE: 123 MG/DL (ref 68–131)
GLUCOSE SERPL-MCNC: 101 MG/DL (ref 70–110)
HBA1C MFR BLD: 5.9 % (ref 4–5.6)
HDLC SERPL-MCNC: 58 MG/DL (ref 40–75)
HDLC SERPL: 36 % (ref 20–50)
LDLC SERPL CALC-MCNC: 88.4 MG/DL (ref 63–159)
NONHDLC SERPL-MCNC: 103 MG/DL
POTASSIUM SERPL-SCNC: 4.1 MMOL/L (ref 3.5–5.1)
PROT SERPL-MCNC: 7.4 G/DL (ref 6–8.4)
SODIUM SERPL-SCNC: 141 MMOL/L (ref 136–145)
TRIGL SERPL-MCNC: 73 MG/DL (ref 30–150)

## 2022-03-11 PROCEDURE — 80061 LIPID PANEL: CPT | Mod: HCNC | Performed by: INTERNAL MEDICINE

## 2022-03-11 PROCEDURE — 83036 HEMOGLOBIN GLYCOSYLATED A1C: CPT | Mod: HCNC | Performed by: INTERNAL MEDICINE

## 2022-03-11 PROCEDURE — 84443 ASSAY THYROID STIM HORMONE: CPT | Mod: HCNC | Performed by: INTERNAL MEDICINE

## 2022-03-11 PROCEDURE — 80053 COMPREHEN METABOLIC PANEL: CPT | Mod: HCNC | Performed by: INTERNAL MEDICINE

## 2022-03-11 PROCEDURE — 36415 COLL VENOUS BLD VENIPUNCTURE: CPT | Mod: HCNC | Performed by: INTERNAL MEDICINE

## 2022-03-12 LAB — TSH SERPL DL<=0.005 MIU/L-ACNC: 1.61 UIU/ML (ref 0.4–4)

## 2022-03-15 ENCOUNTER — TELEPHONE (OUTPATIENT)
Dept: FAMILY MEDICINE | Facility: CLINIC | Age: 87
End: 2022-03-15
Payer: MEDICARE

## 2022-03-18 ENCOUNTER — OFFICE VISIT (OUTPATIENT)
Dept: FAMILY MEDICINE | Facility: CLINIC | Age: 87
End: 2022-03-18
Payer: MEDICARE

## 2022-03-18 ENCOUNTER — TELEPHONE (OUTPATIENT)
Dept: FAMILY MEDICINE | Facility: CLINIC | Age: 87
End: 2022-03-18
Payer: MEDICARE

## 2022-03-18 VITALS
WEIGHT: 127 LBS | HEART RATE: 90 BPM | DIASTOLIC BLOOD PRESSURE: 62 MMHG | OXYGEN SATURATION: 98 % | SYSTOLIC BLOOD PRESSURE: 112 MMHG | HEIGHT: 60 IN | BODY MASS INDEX: 24.94 KG/M2

## 2022-03-18 DIAGNOSIS — R21 RASH: ICD-10-CM

## 2022-03-18 DIAGNOSIS — M81.8 AGE-RELATED OSTEOPOROSIS WITHOUT FRACTURE: ICD-10-CM

## 2022-03-18 DIAGNOSIS — N39.0 RECURRENT UTI: ICD-10-CM

## 2022-03-18 DIAGNOSIS — L29.9 ITCHING: Primary | ICD-10-CM

## 2022-03-18 DIAGNOSIS — R73.03 PREDIABETES: ICD-10-CM

## 2022-03-18 PROCEDURE — 1101F PR PT FALLS ASSESS DOC 0-1 FALLS W/OUT INJ PAST YR: ICD-10-PCS | Mod: CPTII,S$GLB,, | Performed by: INTERNAL MEDICINE

## 2022-03-18 PROCEDURE — 1157F PR ADVANCE CARE PLAN OR EQUIV PRESENT IN MEDICAL RECORD: ICD-10-PCS | Mod: CPTII,S$GLB,, | Performed by: INTERNAL MEDICINE

## 2022-03-18 PROCEDURE — 1126F AMNT PAIN NOTED NONE PRSNT: CPT | Mod: CPTII,S$GLB,, | Performed by: INTERNAL MEDICINE

## 2022-03-18 PROCEDURE — 1159F PR MEDICATION LIST DOCUMENTED IN MEDICAL RECORD: ICD-10-PCS | Mod: CPTII,S$GLB,, | Performed by: INTERNAL MEDICINE

## 2022-03-18 PROCEDURE — 1157F ADVNC CARE PLAN IN RCRD: CPT | Mod: CPTII,S$GLB,, | Performed by: INTERNAL MEDICINE

## 2022-03-18 PROCEDURE — 99214 PR OFFICE/OUTPT VISIT, EST, LEVL IV, 30-39 MIN: ICD-10-PCS | Mod: S$GLB,,, | Performed by: INTERNAL MEDICINE

## 2022-03-18 PROCEDURE — 1160F RVW MEDS BY RX/DR IN RCRD: CPT | Mod: CPTII,S$GLB,, | Performed by: INTERNAL MEDICINE

## 2022-03-18 PROCEDURE — 99215 OFFICE O/P EST HI 40 MIN: CPT | Mod: PBBFAC,PO | Performed by: INTERNAL MEDICINE

## 2022-03-18 PROCEDURE — 1160F PR REVIEW ALL MEDS BY PRESCRIBER/CLIN PHARMACIST DOCUMENTED: ICD-10-PCS | Mod: CPTII,S$GLB,, | Performed by: INTERNAL MEDICINE

## 2022-03-18 PROCEDURE — 3078F PR MOST RECENT DIASTOLIC BLOOD PRESSURE < 80 MM HG: ICD-10-PCS | Mod: CPTII,S$GLB,, | Performed by: INTERNAL MEDICINE

## 2022-03-18 PROCEDURE — 3078F DIAST BP <80 MM HG: CPT | Mod: CPTII,S$GLB,, | Performed by: INTERNAL MEDICINE

## 2022-03-18 PROCEDURE — 1101F PT FALLS ASSESS-DOCD LE1/YR: CPT | Mod: CPTII,S$GLB,, | Performed by: INTERNAL MEDICINE

## 2022-03-18 PROCEDURE — 99999 PR PBB SHADOW E&M-EST. PATIENT-LVL V: ICD-10-PCS | Mod: PBBFAC,,, | Performed by: INTERNAL MEDICINE

## 2022-03-18 PROCEDURE — 99999 PR PBB SHADOW E&M-EST. PATIENT-LVL V: CPT | Mod: PBBFAC,,, | Performed by: INTERNAL MEDICINE

## 2022-03-18 PROCEDURE — 99214 OFFICE O/P EST MOD 30 MIN: CPT | Mod: S$GLB,,, | Performed by: INTERNAL MEDICINE

## 2022-03-18 PROCEDURE — 3288F PR FALLS RISK ASSESSMENT DOCUMENTED: ICD-10-PCS | Mod: CPTII,S$GLB,, | Performed by: INTERNAL MEDICINE

## 2022-03-18 PROCEDURE — 1159F MED LIST DOCD IN RCRD: CPT | Mod: CPTII,S$GLB,, | Performed by: INTERNAL MEDICINE

## 2022-03-18 PROCEDURE — 3074F SYST BP LT 130 MM HG: CPT | Mod: CPTII,S$GLB,, | Performed by: INTERNAL MEDICINE

## 2022-03-18 PROCEDURE — 1126F PR PAIN SEVERITY QUANTIFIED, NO PAIN PRESENT: ICD-10-PCS | Mod: CPTII,S$GLB,, | Performed by: INTERNAL MEDICINE

## 2022-03-18 PROCEDURE — 3288F FALL RISK ASSESSMENT DOCD: CPT | Mod: CPTII,S$GLB,, | Performed by: INTERNAL MEDICINE

## 2022-03-18 PROCEDURE — 3074F PR MOST RECENT SYSTOLIC BLOOD PRESSURE < 130 MM HG: ICD-10-PCS | Mod: CPTII,S$GLB,, | Performed by: INTERNAL MEDICINE

## 2022-03-18 RX ORDER — CAMPHOR AND MENTHOL 5; 5 MG/ML; MG/ML
LOTION TOPICAL
Qty: 222 ML | Refills: 2 | Status: SHIPPED | OUTPATIENT
Start: 2022-03-18 | End: 2022-08-25

## 2022-03-18 RX ORDER — TRIAMCINOLONE ACETONIDE 5 MG/G
OINTMENT TOPICAL 2 TIMES DAILY PRN
Qty: 15 G | Refills: 3 | Status: SHIPPED | OUTPATIENT
Start: 2022-03-18 | End: 2022-05-25 | Stop reason: SDUPTHER

## 2022-03-18 RX ORDER — METFORMIN HYDROCHLORIDE 500 MG/1
500 TABLET ORAL
Qty: 90 TABLET | Refills: 3 | Status: SHIPPED | OUTPATIENT
Start: 2022-03-18 | End: 2022-08-25 | Stop reason: SDUPTHER

## 2022-03-18 RX ORDER — MIRTAZAPINE 7.5 MG/1
7.5 TABLET, FILM COATED ORAL NIGHTLY
Qty: 30 TABLET | Refills: 11 | Status: SHIPPED | OUTPATIENT
Start: 2022-03-18 | End: 2022-05-25

## 2022-03-18 RX ORDER — LORATADINE 10 MG/1
10 TABLET ORAL DAILY PRN
Qty: 30 TABLET | Refills: 2 | Status: SHIPPED | OUTPATIENT
Start: 2022-03-18 | End: 2022-07-29

## 2022-03-18 RX ORDER — IBANDRONATE SODIUM 150 MG/1
150 TABLET, FILM COATED ORAL
Qty: 3 TABLET | Refills: 3 | Status: SHIPPED | OUTPATIENT
Start: 2022-03-18 | End: 2022-07-29

## 2022-03-18 RX ORDER — METHENAMINE MANDELATE 500 MG/1
0.5 TABLET ORAL DAILY
Qty: 30 TABLET | Refills: 0 | Status: SHIPPED | OUTPATIENT
Start: 2022-03-18 | End: 2022-04-04

## 2022-03-18 NOTE — PROGRESS NOTES
Subjective:       Patient ID: Ghada Dave is a 86 y.o. female.    Chief Complaint: Hyperlipidemia (3 month )      HPI  Ghada Dave is a 86 y.o. female with chronic conditions of  hypertension, hyperlipidemia, prediabetes, CAD, hiatal hernia/GERD, pruritus who presents today for routine health maintenance  follow up.    Patient reports no taking any medications except for her osteoporosis medication and allergy medications.  Complains of itching that has been chronic and unclear if medication exacerbated the condition.  Itching is all over (scalp , trunk, extremities ) and has scattered scabbed lesion/papules that appear to be associated to excoriation.  Atarax, which used to help in the past as she mentioned on last visit did not help.  Metformin made her urinate more often and stop the medication.  The Zetia thinks he cause more itching.  Has tried different creams without improvement (mostly in mostly ends, steroids, and anti-itch).  Itching sometimes causes trouble sleeping.  Dermatology prescribed lotions that did not help.    She was recently diagnosed with a UTI and antibiotics may have help some but symptoms recurred.  Has not seen urologist as she has not been reached for appointment.  A message was left to family but advise to schedule before she leaves the office.  She has seen urologist in the past but did not feel helped her.    Labs with stable metabolic panel and improved lipid profile fasting blood glucose is slightly better and hemoglobin A1c mildly increased but still suggestive of prediabetes.  TSH is normal.    Health Maintenance:  Health Maintenance   Topic Date Due    DEXA Scan  04/11/2021    Aspirin/Antiplatelet Therapy  01/13/2023    Colonoscopy  06/17/2026    TETANUS VACCINE  11/14/2026    Lipid Panel  03/11/2027       Review of Systems   Constitutional: Negative for chills and fever.   HENT: Negative for ear pain, hearing loss and sore throat.    Eyes:  Negative for visual disturbance.   Respiratory: Negative for shortness of breath.    Cardiovascular: Negative for chest pain and palpitations.   Gastrointestinal: Negative for abdominal pain, change in bowel habit and change in bowel habit.   Endocrine: Positive for polyuria.   Genitourinary: Positive for frequency. Negative for hematuria.   Musculoskeletal: Negative for arthralgias.   Neurological: Negative for weakness and headaches.   Psychiatric/Behavioral: Positive for sleep disturbance. Negative for dysphoric mood. The patient is nervous/anxious.       Past Medical History:   Diagnosis Date    Abnormal Pap smear 2013    Acidosis     CAD (coronary artery disease) 06/2017     iFR of LAD and RCA performed confirmed nonobstructive disease.    Cataract     Colon polyps     Disorder of kidney and ureter     Frequent urination     Hemorrhoid     High cholesterol     Poor circulation     Postoperative abdominal pain     Psoriasis     Varicose vein        Past Surgical History:   Procedure Laterality Date    CARDIAC CATHETERIZATION  06/2017     iFR of LAD and RCA performed confirmed nonobstructive disease.    CATARACT EXTRACTION W/  INTRAOCULAR LENS IMPLANT Bilateral     COLONOSCOPY N/A 8/28/2017    Procedure: COLONOSCOPY;  Surgeon: Bertram Myers MD;  Location: [facility];  Service: Endoscopy;  Laterality: N/A;    COLONOSCOPY N/A 10/18/2019    Procedure: COLONOSCOPY;  Surgeon: Wilfred Barber MD;  Location: [facility] ([address] [place] [address]);  Service: Endoscopy;  Laterality: N/A;   needed-BB  Cardiology Clearance received from Dr. REBECA Escalona, see telephone encounter 10/1/19-BB    COLONOSCOPY N/A 6/17/2021    Procedure: COLONOSCOPY suprep Past positive;  Surgeon: Lele Barillas MD;  Location: [facility];  Service: Endoscopy;  Laterality: N/A;  past positive    COLONOSCOPY W/ BIOPSIES  06/17/2021    EXCISION OF BURSA  3/5/2021    Procedure: BURSECTOMY;  Surgeon: Jason Alicea  "MD Refugio;  Location: [facility] OR;  Service: Orthopedics;;    LEFT HEART CATHETERIZATION N/A 4/8/2019    Procedure: Left heart cath;  Surgeon: Renato Escalona MD;  Location: [facility] CATH LAB/EP;  Service: Cardiology;  Laterality: N/A;    POLYPECTOMY      SHOULDER ARTHROSCOPY Right 3/5/2021    Procedure: ARTHROSCOPY, SHOULDER;  Surgeon: Jason Alicea Jr., MD;  Location: [facility] OR;  Service: Orthopedics;  Laterality: Right;  need opus system Scientologist   video  (Scientologist notified 3/1/21, CC)  [place] confirmed 3/4/21 [place]    YAG Laser Capsulotomy Right 04/17/2017    Dr. Chavez       Family History   Problem Relation Age of Onset    Glaucoma Neg Hx     Macular degeneration Neg Hx     Strabismus Neg Hx     Retinal detachment Neg Hx     Amblyopia Neg Hx     Blindness Neg Hx     Cataracts Neg Hx     Prostate cancer Neg Hx     Kidney disease Neg Hx     Anesthesia problems Neg Hx        Social History     Socioeconomic History    Marital status: Single   Tobacco Use    Smoking status: Never Smoker    Smokeless tobacco: Never Used   Substance and Sexual Activity    Alcohol use: No     Alcohol/week: 0.0 standard drinks    Drug use: No    Sexual activity: Never   Social History Narrative    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 60 tablet 2    acetaminophen (TYLENOL) 500 MG tablet Take 1 tablet (500 mg total) by mouth every 8 (eight) hours as needed for Pain (Pain). (Patient not taking: Reported on 3/18/2022) 30 tablet 1    adhesive bandage (MPM FOAM DRESSING) 4 X 4 " Bndg Apply 1 each topically every other day. (Patient not taking: Reported on 3/18/2022) 10 each 1    ALPRAZolam (XANAX) 0.5 MG tablet Take 1 tablet (0.5 mg total) by mouth nightly as needed for Insomnia or Anxiety. 30 tablet 0 "    aspirin (ECOTRIN) 81 MG EC tablet Take 1 tablet (81 mg total) by mouth once daily. (Patient not taking: Reported on 3/18/2022) 30 tablet 3    augmented betamethasone dipropionate (DIPROLENE-AF) 0.05 % cream 1 application 2 (two) times daily. Apply to affected area      b complex vitamins capsule Take 1 capsule by mouth once daily.      esomeprazole (NEXIUM) 40 MG capsule Take 1 capsule (40 mg total) by mouth once daily. (Patient not taking: Reported on 3/18/2022) 90 capsule 3    estradioL (ESTRACE) 0.01 % (0.1 mg/gram) vaginal cream Place 1 gm vaginally daily x 2 weeks, then place 1 gm vaginally 3 x week thereafter 30 g 11    ezetimibe (ZETIA) 10 mg tablet Take 1 tablet (10 mg total) by mouth once daily. (Patient not taking: Reported on 3/18/2022) 90 tablet 3    hydrocortisone 2.5 % cream Apply topically 2 (two) times daily. for 10 days 1 Tube 5    ibandronate (BONIVA) 150 mg tablet Take 1 tablet (150 mg total) by mouth every 30 days. (Patient not taking: Reported on 3/18/2022) 3 tablet 3    metFORMIN (GLUCOPHAGE) 500 MG tablet Take 1 tablet (500 mg total) by mouth 2 (two) times daily with meals. (Patient not taking: Reported on 3/18/2022) 180 tablet 3    mirabegron (MYRBETRIQ) 25 mg Tb24 ER tablet Take 1 tablet (25 mg total) by mouth once daily. (Patient not taking: Reported on 3/18/2022) 30 tablet 11    mometasone 0.1% (ELOCON) 0.1 % cream Apply topically once daily. (Patient not taking: Reported on 3/18/2022) 45 g 3    pulse oximeter (PULSE OXIMETER) device by Apply Externally route 2 (two) times a day. Use twice daily at 8 AM and 3 PM and [facility] the value [facility] as directed. (Patient not taking: Reported on 3/18/2022) 1 each 0    simvastatin (ZOCOR) 40 MG tablet Take 1 tablet (40 mg total) by mouth every evening. 90 tablet 3     No current facility-administered medications for this visit.       Review of patient's allergies indicates:   Allergen Reactions    Penicillin Anaphylaxis     Lipitor [atorvastatin] Hives     Can take Simivastatin .     Keflex [cephalexin] Rash         Objective:       Last 3 sets of Vitals    Vitals - 1 value per visit 1/13/2022 3/18/2022 3/18/2022   SYSTOLIC 135 - 112   DIASTOLIC 78 - 62   Pulse 87 - 90   Temp - - -   Resp - - -   SPO2 96 - 98   Weight (lb) 130.18 - 126.99   Weight (kg) 59.05 - 57.6   Height 60 - 60   BMI (Calculated) 25.4 - 24.8   VISIT REPORT - - -   Pain Score  - 0 -   Some recent data might be hidden   Physical Exam  Constitutional:       General: She is not in acute distress.     Appearance: Normal appearance.   Eyes:      General: No scleral icterus.     Extraocular Movements: Extraocular movements intact.      Conjunctiva/sclera: Conjunctivae normal.   Neck:      Vascular: No carotid bruit.      Comments: No goiter.  Cardiovascular:      Rate and Rhythm: Normal rate and regular rhythm.      Pulses: Normal pulses.      Heart sounds: Normal heart sounds.   Pulmonary:      Effort: Pulmonary effort is normal.      Breath sounds: Normal breath sounds.   Abdominal:      General: Bowel sounds are normal. There is no distension.      Palpations: Abdomen is soft.   Musculoskeletal:         General: No swelling. Normal range of motion.   Lymphadenopathy:      Cervical: No cervical adenopathy.   Skin:     General: Skin is warm and dry.      Comments: Has areas of excoriation.  Occasional an scab papules in upper extremities distally and upper back.   Neurological:      General: No focal deficit present.      Mental Status: She is alert and oriented to person, place, and time.   Psychiatric:         Mood and Affect: Mood normal.         Behavior: Behavior normal.           CBC:  Recent Labs   Lab 05/04/21  1202 07/16/21  0948 11/10/21  0945   WBC 3.08 L 3.34 L 4.35   RBC 4.13 4.09 4.23   Hemoglobin 11.9 L 11.6 L 12.5   Hematocrit 36.0 L 34.6 L 36.4 L   Platelets 252 254 293   MCV 87 85 86   MCH 28.8 28.4 29.6   MCHC 33.1 33.5 34.3     CMP:  Recent Labs    Lab 07/16/21  0948 11/10/21  0945 03/11/22  0933   Glucose 107 104 101   Calcium 9.6 9.6 9.5   Albumin 3.8 3.9 3.8   Total Protein 7.9 7.8 7.4   Sodium 143 141 141   Potassium 4.3 4.3 4.1   CO2 22 L 20 L 24   Chloride 111 H 110 109   BUN 16 30 H 17   Creatinine 0.9 1.0 0.9   Alkaline Phosphatase 67 57 51 L   ALT 9 L 14 10   AST 20 23 20   Total Bilirubin 0.4 0.4 0.6     URINALYSIS:  Recent Labs   Lab 09/16/20  1751 11/11/20  0950 10/06/21  1003 02/22/22  1009   Color, UA Yellow   < > Colorless Yellow   Clarity, UA  --    < > Clear  --    Specific Gravity, UA 1.020   < >  --  1.015   Spec Grav UA  --    < > 1.010  --    pH, UA 6.0   < > 5 6.0   Protein, UA Negative   < >  --  1+ A   Bacteria Few A   < >  --  Occasional   Nitrite, UA Negative   < > n Negative   Leukocytes, UA 1+ A   < >  --  3+ A   Urobilinogen, UA Negative   < > n Negative   Hyaline Casts, UA 0  --   --  0    < > = values in this interval not displayed.      LIPIDS:  Recent Labs   Lab 11/11/20 0954 05/04/21  1202 07/16/21  0948 11/10/21  0945 03/11/22  0933   TSH 1.759  --  1.272  --  1.606   HDL 63   < > 56 62 58   Cholesterol 153   < > 186 188 161   Triglycerides 98   < > 122 88 73   LDL Cholesterol 70.4   < > 105.6 108.4 88.4   HDL/Cholesterol Ratio 41.2   < > 30.1 33.0 36.0   Non-HDL Cholesterol 90   < > 130 126 103   Total Cholesterol/HDL Ratio 2.4   < > 3.3 3.0 2.8    < > = values in this interval not displayed.     TSH:  Recent Labs   Lab 11/11/20 0954 07/16/21  0948 03/11/22  0933   TSH 1.759 1.272 1.606       A1C:  Recent Labs   Lab 11/11/20  0954 05/04/21  1202 07/16/21  0948 11/10/21  0945 03/11/22  0933   Hemoglobin A1C 5.6 5.6 5.7 H 5.8 H 5.9 H       Imaging:  X-Ray Neck Soft Tissue  Narrative: EXAMINATION:  XR NECK SOFT TISSUE    CLINICAL HISTORY:  Unspecified foreign body in respiratory tract, part unspecified causing asphyxiation, initial encounter    TECHNIQUE:  AP and lateral soft tissue views the neck were  performed.    COMPARISON:  Radiograph of the neck soft tissues from earlier the same date.    FINDINGS:  The previously identified radiopaque foreign body within the oropharynx is no longer seen.  The airway is patent.  Prevertebral soft tissues are within normal limits.  There are multilevel degenerative changes of the cervical spine.  Impression: The previously identified radiopaque foreign body within the oropharynx is no longer seen.    Electronically signed by: Martin Neely  Date:    01/31/2021  Time:    19:22  X-Ray Neck Soft Tissue  Narrative: EXAMINATION:  XR NECK SOFT TISSUE    CLINICAL HISTORY:  Unspecified foreign body in respiratory tract, part unspecified causing asphyxiation, initial encounter    TECHNIQUE:  AP and lateral soft tissue views the neck were performed.    COMPARISON:  None.    FINDINGS:  There is a linear opacity within the oropharynx, projecting just above the hyoid bone.  This may represent an ingested foreign body.  There is no prevertebral soft tissue thickening.  The visualized cervical spine demonstrates degenerative changes.  There is dental hardware.  Impression: Linear opacity within the oropharynx, likely an ingested foreign body.    Electronically signed by: Martin Neely  Date:    01/31/2021  Time:    17:02      Assessment:       1. Itching    2. Rash    3. Prediabetes    4. Recurrent UTI    5. Age-related osteoporosis without fracture          Chronic conditions status updated as per HPI. Other than changes above, cont current medications and maintain follow up with specialist.  Return after seeing consultants or as needed.  Plan:       Ghada was seen today for hyperlipidemia.    Diagnoses and all orders for this visit:    Itching/ Rash  -     camphor-menthol 0.5-0.5% (SARNA ORIGINAL) lotion; Apply topically as needed for Itching.  -     triamcinolone (KENALOG) 0.5 % ointment; Apply topically 2 (two) times daily as needed (rash).  -     mirtazapine (REMERON) 7.5 MG Tab; Take  1 tablet (7.5 mg total) by mouth every evening.  -     loratadine (CLARITIN) 10 mg tablet; Take 1 tablet (10 mg total) by mouth daily as needed for Allergies.  -     Ambulatory referral/consult to Allergy; Future  -     Ambulatory referral/consult to Dermatology; Future  -     loratadine (CLARITIN) 10 mg tablet; Take 1 tablet (10 mg total) by mouth daily as needed for Allergies.    Prediabetes  -     wants to try it once a day as her A1c is increasing.  - diet and increase activity as tolerated recommended.  - metFORMIN (GLUCOPHAGE) 500 MG tablet; Take 1 tablet (500 mg total) by mouth daily with breakfast.  - 10 year CVD risk is 19% but she is over 80 and intolerant to statins.  Lifestyle modifications recommended rather than medical treatment at this time.    Recurrent UTI  -     try low-dose methenamine mandelate (MANDELAMINE) 0.5 g tablet; Take 1 tablet (0.5 g total) by mouth once daily at 6am.  -     Ambulatory referral/consult to Urology; Future    Age-related osteoporosis without fracture  -     ibandronate (BONIVA) 150 mg tablet; Take 1 tablet (150 mg total) by mouth every 30 days.      Health Maintenance Due   Topic Date Due    COVID-19 Vaccine (1) Never done    Shingles Vaccine (2 of 3) 12/23/2016    DEXA Scan  04/11/2021    Influenza Vaccine (1) 09/01/2021        Ghada Hicks MD  [facility]  Disclaimer:  This note has been generated using voice-recognition software. There may be grammatical or spelling errors that have been missed during proof-reading

## 2022-03-18 NOTE — Clinical Note
Please assist with allergist referral.  She requested her son to be reached for her appointments.  Also make a follow-up appointment in 8 weeks.  Thanks.

## 2022-03-18 NOTE — TELEPHONE ENCOUNTER
Spoke to Carol who reports that methanamine mandelate (Mandelamine) 0.5g is not covered but 0.1mg is covered. Please advise.

## 2022-03-18 NOTE — TELEPHONE ENCOUNTER
----- Message from Lynsey Irving sent at 3/18/2022 11:58 AM CDT -----  Type:  Pharmacy Calling to Clarify an RX    Name of Caller:  Pharmacy Name:[facility]  Prescription Name:methenamine mandelate (MANDELAMINE) 0.5 g tablet  What do they need to clarify?:insurance is rejecting 0.5mg would like to know if pt can have 0.1mg  Best Call Back Number:[phone]  Additional Information:

## 2022-03-21 ENCOUNTER — HOSPITAL ENCOUNTER (EMERGENCY)
Facility: HOSPITAL | Age: 87
Discharge: HOME | End: 2022-03-21
Attending: EMERGENCY MEDICINE
Payer: MEDICARE

## 2022-03-21 VITALS
HEART RATE: 66 BPM | RESPIRATION RATE: 14 BRPM | DIASTOLIC BLOOD PRESSURE: 59 MMHG | WEIGHT: 135 LBS | OXYGEN SATURATION: 98 % | SYSTOLIC BLOOD PRESSURE: 135 MMHG | BODY MASS INDEX: 26.37 KG/M2 | TEMPERATURE: 98 F

## 2022-03-21 DIAGNOSIS — L25.9 CONTACT DERMATITIS, UNSPECIFIED CONTACT DERMATITIS TYPE, UNSPECIFIED TRIGGER: Primary | ICD-10-CM

## 2022-03-21 PROCEDURE — 63600175 PHARM REV CODE 636 W HCPCS: Performed by: NURSE PRACTITIONER

## 2022-03-21 PROCEDURE — 99284 EMERGENCY DEPT VISIT MOD MDM: CPT

## 2022-03-21 RX ORDER — CETIRIZINE HYDROCHLORIDE 10 MG/1
10 TABLET ORAL DAILY
Qty: 30 TABLET | Refills: 0 | Status: SHIPPED | OUTPATIENT
Start: 2022-03-21 | End: 2022-08-25

## 2022-03-21 RX ORDER — PREDNISONE 20 MG/1
40 TABLET ORAL
Status: COMPLETED | OUTPATIENT
Start: 2022-03-21 | End: 2022-03-21

## 2022-03-21 RX ORDER — HYDROCORTISONE 25 MG/ML
LOTION TOPICAL 2 TIMES DAILY
Qty: 60 ML | Refills: 0 | Status: SHIPPED | OUTPATIENT
Start: 2022-03-21 | End: 2022-08-25

## 2022-03-21 RX ORDER — PREDNISONE 20 MG/1
40 TABLET ORAL DAILY
Qty: 8 TABLET | Refills: 0 | Status: SHIPPED | OUTPATIENT
Start: 2022-03-21 | End: 2022-03-25

## 2022-03-21 RX ADMIN — PREDNISONE 40 MG: 20 TABLET ORAL at 11:03

## 2022-03-21 NOTE — ED PROVIDER NOTES
"Encounter Date: 3/21/2022    SCRIBE #1 NOTE: I, Samuel Nicholson, am scribing for, and in the presence of, Heaven Avilez NP.       History     Chief Complaint   Patient presents with    Rash     Pt present to the ER with a CC of a rash. Family reports the pt was treated for a UTI approx two weeks ago, pt reports that approx 6 days ago she started to develop a full body rash. Pt presents with a rash to the chest back and arms.     86 year old female presents the Emergency Department for evaluation of Rash.  Patient reports a rash to her arms and torso that started 6 days ago, described as itchy. She denies any associated symptoms at this time. Patient's son reports that she saw Dr. Hicks on 03/18 and was given Kenelog cream, Remeron, and Claritin without relief. Patient denies any recent changes in lotions or detergents. No known drug allergies. Pt did take antbx weeks ago however states "this started days after the antbx were taken"     The history is provided by the patient and a relative (son). A  was used.     Review of patient's allergies indicates:   Allergen Reactions    Penicillin Anaphylaxis    Lipitor [atorvastatin] Hives     Can take Simivastatin .     Keflex [cephalexin] Rash     Past Medical History:   Diagnosis Date    Abnormal Pap smear 2013    Acidosis     CAD (coronary artery disease) 06/2017     iFR of LAD and RCA performed confirmed nonobstructive disease.    Cataract     Colon polyps     Disorder of kidney and ureter     Frequent urination     Hemorrhoid     High cholesterol     Poor circulation     Postoperative abdominal pain     Psoriasis     Varicose vein      Past Surgical History:   Procedure Laterality Date    CARDIAC CATHETERIZATION  06/2017     iFR of LAD and RCA performed confirmed nonobstructive disease.    CATARACT EXTRACTION W/  INTRAOCULAR LENS IMPLANT Bilateral     COLONOSCOPY N/A 8/28/2017    Procedure: COLONOSCOPY;  Surgeon: Bertram Myers, " MD;  Location: [facility] ENDO;  Service: Endoscopy;  Laterality: N/A;    COLONOSCOPY N/A 10/18/2019    Procedure: COLONOSCOPY;  Surgeon: Wilfred Barber MD;  Location: [facility] ([facility] FLR);  Service: Endoscopy;  Laterality: N/A;   needed-BB  Cardiology Clearance received from Dr. REBECA Escalona, see telephone encounter 10/1/19-BB    COLONOSCOPY N/A 6/17/2021    Procedure: COLONOSCOPY suprep Past positive;  Surgeon: Lele Barillas MD;  Location: [facility];  Service: Endoscopy;  Laterality: N/A;  past positive    COLONOSCOPY W/ BIOPSIES  06/17/2021    EXCISION OF BURSA  3/5/2021    Procedure: BURSECTOMY;  Surgeon: Jason Alicea Jr., MD;  Location: [facility] OR;  Service: Orthopedics;;    LEFT HEART CATHETERIZATION N/A 4/8/2019    Procedure: Left heart cath;  Surgeon: Renato Escalona MD;  Location: [facility] CATH LAB/EP;  Service: Cardiology;  Laterality: N/A;    POLYPECTOMY      SHOULDER ARTHROSCOPY Right 3/5/2021    Procedure: ARTHROSCOPY, SHOULDER;  Surgeon: Jason Alicea Jr., MD;  Location: [facility] OR;  Service: Orthopedics;  Laterality: Right;  need opus system [place]   video  ([place]ell notified 3/1/21, CC)  [place] confirmed 3/4/21 [place]    YAG Laser Capsulotomy Right 04/17/2017    Dr. Chavez     Family History   Problem Relation Age of Onset    Glaucoma Neg Hx     Macular degeneration Neg Hx     Strabismus Neg Hx     Retinal detachment Neg Hx     Amblyopia Neg Hx     Blindness Neg Hx     Cataracts Neg Hx     Prostate cancer Neg Hx     Kidney disease Neg Hx     Anesthesia problems Neg Hx      Social History     Tobacco Use    Smoking status: Never Smoker    Smokeless tobacco: Never Used   Substance Use Topics    Alcohol use: No     Alcohol/week: 0.0 standard drinks    Drug use: No     Review of Systems   Constitutional: Negative for chills and fever.   HENT: Negative for ear pain and sore throat.    Eyes: Negative for redness.   Respiratory: Negative for shortness  of breath.    Cardiovascular: Negative for chest pain.   Gastrointestinal: Negative for abdominal pain, diarrhea and vomiting.   Genitourinary: Negative for dysuria.   Musculoskeletal: Negative for back pain, neck pain and neck stiffness.   Skin: Positive for rash.   Neurological: Negative for headaches.       Physical Exam     Initial Vitals [03/21/22 1051]   BP Pulse Resp Temp SpO2   (!) 135/59 66 14 97.8 °F (36.6 °C) 98 %      MAP       --         Physical Exam    Nursing note and vitals reviewed.  Constitutional: She appears well-developed and well-nourished.   HENT:   Head: Normocephalic and atraumatic.   Mouth/Throat: Oropharynx is clear and moist.   No oral lesions noted.  NO facial swelling.   No sloughing of skin noted.    Eyes: Conjunctivae and EOM are normal. Pupils are equal, round, and reactive to light.   Neck: Neck supple.   Normal range of motion.  Cardiovascular: Normal rate, regular rhythm, normal heart sounds and intact distal pulses. Exam reveals no gallop and no friction rub.    No murmur heard.  Pulmonary/Chest: Breath sounds normal.   Abdominal: Abdomen is soft. Bowel sounds are normal. She exhibits no distension. There is no abdominal tenderness. There is no rebound and no guarding.   Musculoskeletal:         General: No tenderness or edema. Normal range of motion.      Cervical back: Normal range of motion and neck supple. No edema or rigidity. Normal range of motion.     Lymphadenopathy:     She has no cervical adenopathy.   Neurological: She is alert and oriented to person, place, and time. She has normal strength and normal reflexes.   Skin: Skin is warm and dry. Rash noted.   Rash, pruritic in nature, with occasional scabbed papules noted to the arms and torso. Rash blanches well.   Psychiatric: She has a normal mood and affect. Her behavior is normal. Judgment and thought content normal.         ED Course   Procedures  Labs Reviewed - No data to display       Imaging Results    None           Medications   predniSONE tablet 40 mg (40 mg Oral Given 3/21/22 1139)     Medical Decision Making:   Initial Assessment:   86 year old female presents the Emergency Department for evaluation of Rash. Physical exam noted for rash, pruritic in nature, with occasional scabbed papules to the arms and torso.          Scribe Attestation:   Scribe #1: I performed the above scribed service and the documentation accurately describes the services I performed. I attest to the accuracy of the note.        ED Course as of 03/21/22 1339   Mon Mar 21, 2022   1338 Pt and family notified of the need for follow up care with derm, the meds prescribed and referral placed. In addition, strict return precautions given. Pt is stable at this time for dc  [DT]      ED Course User Index  [DT] Heaven Avilez NP             Clinical Impression:   Final diagnoses:  [L25.9] Contact dermatitis, unspecified contact dermatitis type, unspecified trigger (Primary)          ED Disposition Condition    Discharge Stable       I, Heaven Avilez NP, personally performed the services described in this documentation.All medical record entries made by the scribe were at my direction and in my presence.I have reviewed the chart and agree that the record reflects my personal performance and is accurate and complete.     ED Prescriptions     Medication Sig Dispense Start Date End Date Auth. Provider    predniSONE (DELTASONE) 20 MG tablet Take 2 tablets (40 mg total) by mouth once daily. for 4 days 8 tablet 3/21/2022 3/25/2022 Heaven Avilez NP    hydrocortisone 2.5 % lotion Apply topically 2 (two) times daily. 60 mL 3/21/2022  Heaven Avilez NP    cetirizine (ZYRTEC) 10 MG tablet Take 1 tablet (10 mg total) by mouth once daily. 30 tablet 3/21/2022 4/20/2022 Heaven Avilez NP        Follow-up Information     Follow up With Specialties Details Why Contact Info    Ghada Hicks MD Family Medicine Schedule an appointment as soon as possible for a  visit in 2 days  [address] [id number]  [phone]             Heaven Avilez, EBENEZER  03/21/22 1340       Heaven Avilez, EBENEZER  03/21/22 1345

## 2022-03-21 NOTE — DISCHARGE INSTRUCTIONS

## 2022-03-21 NOTE — ED TRIAGE NOTES
Pt complains of red itchy rash 6 days ago, rash noted to torso, back, webbing of fingers, last took benadryl last night

## 2022-03-21 NOTE — PHARMACY MED REC
"  Admission Medication History     The home medication history was taken by Alee Morejon CPhT.    Medication history obtained from, Patient's Son Verified    You may go to "Admission" then "Reconcile Home Medications" tabs to review and/or act upon these items.      The home medication list has been updated by the Pharmacy department.    Please read ALL comments highlighted in yellow.    Please address this information as you see fit.     Feel free to contact us if you have any questions or require assistance.          Alee Morejon CPhT.  Ext [phone]              .          "

## 2022-03-31 ENCOUNTER — PATIENT OUTREACH (OUTPATIENT)
Dept: ADMINISTRATIVE | Facility: OTHER | Age: 87
End: 2022-03-31
Payer: MEDICARE

## 2022-04-04 ENCOUNTER — OFFICE VISIT (OUTPATIENT)
Dept: UROLOGY | Facility: CLINIC | Age: 87
End: 2022-04-04
Payer: MEDICARE

## 2022-04-04 ENCOUNTER — PATIENT MESSAGE (OUTPATIENT)
Dept: DERMATOLOGY | Facility: CLINIC | Age: 87
End: 2022-04-04
Payer: MEDICARE

## 2022-04-04 VITALS
HEIGHT: 60 IN | SYSTOLIC BLOOD PRESSURE: 127 MMHG | DIASTOLIC BLOOD PRESSURE: 55 MMHG | BODY MASS INDEX: 25 KG/M2 | WEIGHT: 127.31 LBS | HEART RATE: 101 BPM

## 2022-04-04 DIAGNOSIS — R31.0 GROSS HEMATURIA: ICD-10-CM

## 2022-04-04 DIAGNOSIS — N39.0 RECURRENT UTI: Primary | ICD-10-CM

## 2022-04-04 PROCEDURE — 3288F FALL RISK ASSESSMENT DOCD: CPT | Mod: CPTII,S$GLB,, | Performed by: STUDENT IN AN ORGANIZED HEALTH CARE EDUCATION/TRAINING PROGRAM

## 2022-04-04 PROCEDURE — 1101F PR PT FALLS ASSESS DOC 0-1 FALLS W/OUT INJ PAST YR: ICD-10-PCS | Mod: CPTII,S$GLB,, | Performed by: STUDENT IN AN ORGANIZED HEALTH CARE EDUCATION/TRAINING PROGRAM

## 2022-04-04 PROCEDURE — 1157F PR ADVANCE CARE PLAN OR EQUIV PRESENT IN MEDICAL RECORD: ICD-10-PCS | Mod: CPTII,S$GLB,, | Performed by: STUDENT IN AN ORGANIZED HEALTH CARE EDUCATION/TRAINING PROGRAM

## 2022-04-04 PROCEDURE — 1126F AMNT PAIN NOTED NONE PRSNT: CPT | Mod: CPTII,S$GLB,, | Performed by: STUDENT IN AN ORGANIZED HEALTH CARE EDUCATION/TRAINING PROGRAM

## 2022-04-04 PROCEDURE — 99215 PR OFFICE/OUTPT VISIT, EST, LEVL V, 40-54 MIN: ICD-10-PCS | Mod: S$GLB,,, | Performed by: STUDENT IN AN ORGANIZED HEALTH CARE EDUCATION/TRAINING PROGRAM

## 2022-04-04 PROCEDURE — 3288F PR FALLS RISK ASSESSMENT DOCUMENTED: ICD-10-PCS | Mod: CPTII,S$GLB,, | Performed by: STUDENT IN AN ORGANIZED HEALTH CARE EDUCATION/TRAINING PROGRAM

## 2022-04-04 PROCEDURE — 1101F PT FALLS ASSESS-DOCD LE1/YR: CPT | Mod: CPTII,S$GLB,, | Performed by: STUDENT IN AN ORGANIZED HEALTH CARE EDUCATION/TRAINING PROGRAM

## 2022-04-04 PROCEDURE — 1157F ADVNC CARE PLAN IN RCRD: CPT | Mod: CPTII,S$GLB,, | Performed by: STUDENT IN AN ORGANIZED HEALTH CARE EDUCATION/TRAINING PROGRAM

## 2022-04-04 PROCEDURE — 1160F RVW MEDS BY RX/DR IN RCRD: CPT | Mod: CPTII,S$GLB,, | Performed by: STUDENT IN AN ORGANIZED HEALTH CARE EDUCATION/TRAINING PROGRAM

## 2022-04-04 PROCEDURE — 3078F DIAST BP <80 MM HG: CPT | Mod: CPTII,S$GLB,, | Performed by: STUDENT IN AN ORGANIZED HEALTH CARE EDUCATION/TRAINING PROGRAM

## 2022-04-04 PROCEDURE — 99215 OFFICE O/P EST HI 40 MIN: CPT | Mod: S$GLB,,, | Performed by: STUDENT IN AN ORGANIZED HEALTH CARE EDUCATION/TRAINING PROGRAM

## 2022-04-04 PROCEDURE — 3074F SYST BP LT 130 MM HG: CPT | Mod: CPTII,S$GLB,, | Performed by: STUDENT IN AN ORGANIZED HEALTH CARE EDUCATION/TRAINING PROGRAM

## 2022-04-04 PROCEDURE — 1126F PR PAIN SEVERITY QUANTIFIED, NO PAIN PRESENT: ICD-10-PCS | Mod: CPTII,S$GLB,, | Performed by: STUDENT IN AN ORGANIZED HEALTH CARE EDUCATION/TRAINING PROGRAM

## 2022-04-04 PROCEDURE — 99999 PR PBB SHADOW E&M-EST. PATIENT-LVL IV: CPT | Mod: PBBFAC,,, | Performed by: STUDENT IN AN ORGANIZED HEALTH CARE EDUCATION/TRAINING PROGRAM

## 2022-04-04 PROCEDURE — 3074F PR MOST RECENT SYSTOLIC BLOOD PRESSURE < 130 MM HG: ICD-10-PCS | Mod: CPTII,S$GLB,, | Performed by: STUDENT IN AN ORGANIZED HEALTH CARE EDUCATION/TRAINING PROGRAM

## 2022-04-04 PROCEDURE — 1159F PR MEDICATION LIST DOCUMENTED IN MEDICAL RECORD: ICD-10-PCS | Mod: CPTII,S$GLB,, | Performed by: STUDENT IN AN ORGANIZED HEALTH CARE EDUCATION/TRAINING PROGRAM

## 2022-04-04 PROCEDURE — 1159F MED LIST DOCD IN RCRD: CPT | Mod: CPTII,S$GLB,, | Performed by: STUDENT IN AN ORGANIZED HEALTH CARE EDUCATION/TRAINING PROGRAM

## 2022-04-04 PROCEDURE — 99999 PR PBB SHADOW E&M-EST. PATIENT-LVL IV: ICD-10-PCS | Mod: PBBFAC,,, | Performed by: STUDENT IN AN ORGANIZED HEALTH CARE EDUCATION/TRAINING PROGRAM

## 2022-04-04 PROCEDURE — 1160F PR REVIEW ALL MEDS BY PRESCRIBER/CLIN PHARMACIST DOCUMENTED: ICD-10-PCS | Mod: CPTII,S$GLB,, | Performed by: STUDENT IN AN ORGANIZED HEALTH CARE EDUCATION/TRAINING PROGRAM

## 2022-04-04 PROCEDURE — 3078F PR MOST RECENT DIASTOLIC BLOOD PRESSURE < 80 MM HG: ICD-10-PCS | Mod: CPTII,S$GLB,, | Performed by: STUDENT IN AN ORGANIZED HEALTH CARE EDUCATION/TRAINING PROGRAM

## 2022-04-04 RX ORDER — METHENAMINE HIPPURATE 1000 MG/1
1 TABLET ORAL DAILY
Qty: 30 TABLET | Refills: 11 | Status: SHIPPED | OUTPATIENT
Start: 2022-04-04 | End: 2022-04-04 | Stop reason: SDUPTHER

## 2022-04-04 RX ORDER — METHENAMINE HIPPURATE 1000 MG/1
1 TABLET ORAL DAILY
Qty: 30 TABLET | Refills: 11 | Status: SHIPPED | OUTPATIENT
Start: 2022-04-04 | End: 2023-01-11 | Stop reason: ALTCHOICE

## 2022-04-04 NOTE — PROGRESS NOTES
Subjective:       Patient ID: Ghada Dave is a 86 y.o. female.    Chief Complaint: Recurrent UTIs  This is a 86 y.o.  female patient that is new to me but not new to the system.  The patient was referred to me by Dr. Hicks for recurrent UTI's.   She has seen [facility] urology for numerous years, prior to that she saw Dr. Clemente. She also has experienced recurrent gross hematuria as well as recurrent UTI's. She was on daily macrobid for what sounds like UTI prophylaxis about 20 years ago which did protect her against UTI's.      In past was tried on estrace cream.  For urgency/frequency was tried on oxybutynin (had retention) and myrbetriq. Saw Dr. 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 Dr. vasquez offered botox of sphincter but she did not proceed.          Lab Results   Component Value Date    CREATININE 0.9 03/11/2022       ---  Past Medical History:   Diagnosis Date    Abnormal Pap smear 2013    Acidosis     CAD (coronary artery disease) 06/2017     iFR of LAD and RCA performed confirmed nonobstructive disease.    Cataract     Colon polyps     Disorder of kidney and ureter     Frequent urination     Hemorrhoid     High cholesterol     Poor circulation     Postoperative abdominal pain     Psoriasis     Varicose vein        Past Surgical History:   Procedure Laterality Date    CARDIAC CATHETERIZATION  06/2017     iFR of LAD and RCA performed confirmed nonobstructive disease.    CATARACT EXTRACTION W/  INTRAOCULAR LENS IMPLANT Bilateral     COLONOSCOPY N/A 8/28/2017    Procedure: COLONOSCOPY;  Surgeon: Bertram Myers MD;  Location: [facility] ENDO;  Service: Endoscopy;  Laterality: N/A;    COLONOSCOPY N/A 10/18/2019    Procedure: COLONOSCOPY;  Surgeon: Wilfred Barber MD;  Location: [address]);  Service: Endoscopy;  Laterality: N/A;   needed-BB  Cardiology Clearance received from Dr. REBECA Escalona, see telephone encounter 10/1/19-BB     COLONOSCOPY N/A 6/17/2021    Procedure: COLONOSCOPY suprep Past positive;  Surgeon: Lele Barillas MD;  Location: [facility] ENDO;  Service: Endoscopy;  Laterality: N/A;  past positive    COLONOSCOPY W/ BIOPSIES  06/17/2021    EXCISION OF BURSA  3/5/2021    Procedure: BURSECTOMY;  Surgeon: Jason Alicea Jr., MD;  Location: [facility] OR;  Service: Orthopedics;;    LEFT HEART CATHETERIZATION N/A 4/8/2019    Procedure: Left heart cath;  Surgeon: Renato Escalona MD;  Location: [facility] CATH LAB/EP;  Service: Cardiology;  Laterality: N/A;    POLYPECTOMY      SHOULDER ARTHROSCOPY Right 3/5/2021    Procedure: ARTHROSCOPY, SHOULDER;  Surgeon: Jason Alicea Jr., MD;  Location: [facility] OR;  Service: Orthopedics;  Laterality: Right;  need opus system [place]into   video  (Shinto notified 3/1/21, CC)  [place]undar[place] confirmed 3/4/21 [place]    YAG Laser Capsulotomy Right 04/17/2017    Dr. Chavez       Family History   Problem Relation Age of Onset    Glaucoma Neg Hx     Macular degeneration Neg Hx     Strabismus Neg Hx     Retinal detachment Neg Hx     Amblyopia Neg Hx     Blindness Neg Hx     Cataracts Neg Hx     Prostate cancer Neg Hx     Kidney disease Neg Hx     Anesthesia problems Neg Hx        Social History     Tobacco Use    Smoking status: Never Smoker    Smokeless tobacco: Never Used   Substance Use Topics    Alcohol use: No     Alcohol/week: 0.0 standard drinks    Drug use: No       Current Outpatient Medications on File Prior to Visit   Medication Sig Dispense Refill    aspirin (ECOTRIN) 81 MG EC tablet Take 1 tablet (81 mg total) by mouth once daily. 30 tablet 3    b complex vitamins capsule Take 1 capsule by mouth once daily.      camphor-menthol 0.5-0.5% (SARNA ORIGINAL) lotion Apply topically as needed for Itching. 222 mL 2    cetirizine (ZYRTEC) 10 MG tablet Take 1 tablet (10 mg total) by mouth once daily. 30 tablet 0    ezetimibe (ZETIA) 10 mg tablet Take 1 tablet (10 mg total) by  mouth once daily. 90 tablet 3    hydrocortisone 2.5 % lotion Apply topically 2 (two) times daily. 60 mL 0    hydrOXYzine HCL (ATARAX) 25 MG tablet Take 1 tablet (25 mg total) by mouth 3 (three) times daily as needed for Itching. 60 tablet 2    ibandronate (BONIVA) 150 mg tablet Take 1 tablet (150 mg total) by mouth every 30 days. 3 tablet 3    loratadine (CLARITIN) 10 mg tablet Take 1 tablet (10 mg total) by mouth daily as needed for Allergies. 30 tablet 2    metFORMIN (GLUCOPHAGE) 500 MG tablet Take 1 tablet (500 mg total) by mouth daily with breakfast. 90 tablet 3    mirtazapine (REMERON) 7.5 MG Tab Take 1 tablet (7.5 mg total) by mouth every evening. 30 tablet 11    triamcinolone (KENALOG) 0.5 % ointment Apply topically 2 (two) times daily as needed (rash). 15 g 3    [DISCONTINUED] methenamine mandelate (MANDELAMINE) 0.5 g tablet Take 1 tablet (0.5 g total) by mouth once daily at 6am. 30 tablet 0     No current facility-administered medications on file prior to visit.       Review of patient's allergies indicates:   Allergen Reactions    Penicillin Anaphylaxis    Lipitor [atorvastatin] Hives     Can take Simivastatin .     Keflex [cephalexin] Rash       Review of Systems   Constitutional: Negative for activity change.   HENT: Negative for congestion.    Eyes: Negative for visual disturbance.   Respiratory: Negative for shortness of breath.    Cardiovascular: Negative for chest pain.   Gastrointestinal: Negative for abdominal distention.   Musculoskeletal: Negative for gait problem.   Skin: Negative for color change.   Neurological: Negative for dizziness.   Psychiatric/Behavioral: Negative for agitation.       Objective:      Physical Exam  Constitutional:       Appearance: She is well-developed.   HENT:      Head: Normocephalic and atraumatic.   Pulmonary:      Effort: Pulmonary effort is normal.   Musculoskeletal:         General: Normal range of motion.      Cervical back: Normal range of motion.    Skin:     General: Skin is warm and dry.   Neurological:      Mental Status: She is alert and oriented to person, place, and time.         Assessment:       1. Recurrent UTI    2. Gross hematuria        Plan:           Plan:  1. Repeat gross hematuria workup.  2. Can continue methenamine for UTI prophylaxis that dr. Hicks started her on. Will refill (she prescribed methenamine mandelate I usually prescribe methenamine hippurate).   3. If still with persistent UTI can discuss Methenamine BID vs daily macrobid       Recurrent UTI  -     Ambulatory referral/consult to Urology  -     CT Abdomen With Without Contrast; Future; Expected date: 04/04/2022  -     Cystoscopy; Future; Expected date: 04/04/2022  -     Discontinue: methenamine (HIPREX) 1 gram Tab; Take 1 tablet (1 g total) by mouth once daily.  Dispense: 30 tablet; Refill: 11  -     methenamine (HIPREX) 1 gram Tab; Take 1 tablet (1 g total) by mouth once daily.  Dispense: 30 tablet; Refill: 11    Gross hematuria  -     CT Abdomen With Without Contrast; Future; Expected date: 04/04/2022  -     Cystoscopy; Future; Expected date: 04/04/2022

## 2022-04-20 ENCOUNTER — TELEPHONE (OUTPATIENT)
Dept: ALLERGY | Facility: CLINIC | Age: 87
End: 2022-04-20
Payer: MEDICARE

## 2022-04-20 NOTE — TELEPHONE ENCOUNTER
No answer. No message left because pt is Slovenian speaking only. mychart msg sent.    ----- Message from Debra Horta MD sent at 4/19/2022 10:53 AM CDT -----  This pt is on my schedule for rash. She was seen in ER and referred to derm and rash does not sound like was hives, please call pt and reschedule with derm

## 2022-04-21 ENCOUNTER — PATIENT MESSAGE (OUTPATIENT)
Dept: FAMILY MEDICINE | Facility: CLINIC | Age: 87
End: 2022-04-21
Payer: MEDICARE

## 2022-04-21 RX ORDER — ESOMEPRAZOLE MAGNESIUM 40 MG/1
40 CAPSULE, DELAYED RELEASE ORAL
COMMUNITY
End: 2022-04-21 | Stop reason: SDUPTHER

## 2022-04-21 RX ORDER — ESOMEPRAZOLE MAGNESIUM 40 MG/1
40 CAPSULE, DELAYED RELEASE ORAL
Qty: 90 CAPSULE | Refills: 0 | Status: SHIPPED | OUTPATIENT
Start: 2022-04-21 | End: 2022-07-25 | Stop reason: SDUPTHER

## 2022-04-21 NOTE — TELEPHONE ENCOUNTER
PT. Was called using  Robert# [id number], pt. Was notified that her appointment scheduled on 4/28/22 was scheduled with the wrong doctor and her new appointment is with Dermatology on 7/7/22

## 2022-04-21 NOTE — TELEPHONE ENCOUNTER
----- Message from Debra Horta MD sent at 4/19/2022 10:53 AM CDT -----  This pt is on my schedule for rash. She was seen in ER and referred to derm and rash does not sound like was hives, please call pt and reschedule with derm

## 2022-04-21 NOTE — TELEPHONE ENCOUNTER
No new care gaps identified.  Powered by [employer]. Reference number: [id number].   4/21/2022 11:48:13 AM CDT

## 2022-05-09 ENCOUNTER — PATIENT MESSAGE (OUTPATIENT)
Dept: FAMILY MEDICINE | Facility: CLINIC | Age: 87
End: 2022-05-09
Payer: MEDICARE

## 2022-05-25 ENCOUNTER — OFFICE VISIT (OUTPATIENT)
Dept: FAMILY MEDICINE | Facility: CLINIC | Age: 87
End: 2022-05-25
Payer: MEDICARE

## 2022-05-25 VITALS
BODY MASS INDEX: 25.19 KG/M2 | HEIGHT: 60 IN | HEART RATE: 89 BPM | SYSTOLIC BLOOD PRESSURE: 132 MMHG | DIASTOLIC BLOOD PRESSURE: 62 MMHG | TEMPERATURE: 99 F | OXYGEN SATURATION: 99 % | WEIGHT: 128.31 LBS

## 2022-05-25 DIAGNOSIS — R21 RASH: ICD-10-CM

## 2022-05-25 DIAGNOSIS — F41.9 ANXIETY: ICD-10-CM

## 2022-05-25 DIAGNOSIS — I10 ESSENTIAL HYPERTENSION: Primary | ICD-10-CM

## 2022-05-25 DIAGNOSIS — L29.9 ITCHING: ICD-10-CM

## 2022-05-25 DIAGNOSIS — R73.03 PREDIABETES: ICD-10-CM

## 2022-05-25 DIAGNOSIS — G47.09 OTHER INSOMNIA: ICD-10-CM

## 2022-05-25 DIAGNOSIS — R10.13 DYSPEPSIA: ICD-10-CM

## 2022-05-25 DIAGNOSIS — N39.0 RECURRENT UTI: ICD-10-CM

## 2022-05-25 DIAGNOSIS — E78.00 HYPERCHOLESTEROLEMIA: ICD-10-CM

## 2022-05-25 PROCEDURE — 1160F PR REVIEW ALL MEDS BY PRESCRIBER/CLIN PHARMACIST DOCUMENTED: ICD-10-PCS | Mod: CPTII,S$GLB,, | Performed by: INTERNAL MEDICINE

## 2022-05-25 PROCEDURE — 3078F PR MOST RECENT DIASTOLIC BLOOD PRESSURE < 80 MM HG: ICD-10-PCS | Mod: CPTII,S$GLB,, | Performed by: INTERNAL MEDICINE

## 2022-05-25 PROCEDURE — 1159F PR MEDICATION LIST DOCUMENTED IN MEDICAL RECORD: ICD-10-PCS | Mod: CPTII,S$GLB,, | Performed by: INTERNAL MEDICINE

## 2022-05-25 PROCEDURE — 3078F DIAST BP <80 MM HG: CPT | Mod: CPTII,S$GLB,, | Performed by: INTERNAL MEDICINE

## 2022-05-25 PROCEDURE — 1159F MED LIST DOCD IN RCRD: CPT | Mod: CPTII,S$GLB,, | Performed by: INTERNAL MEDICINE

## 2022-05-25 PROCEDURE — 3288F FALL RISK ASSESSMENT DOCD: CPT | Mod: CPTII,S$GLB,, | Performed by: INTERNAL MEDICINE

## 2022-05-25 PROCEDURE — 1157F ADVNC CARE PLAN IN RCRD: CPT | Mod: CPTII,S$GLB,, | Performed by: INTERNAL MEDICINE

## 2022-05-25 PROCEDURE — 1160F RVW MEDS BY RX/DR IN RCRD: CPT | Mod: CPTII,S$GLB,, | Performed by: INTERNAL MEDICINE

## 2022-05-25 PROCEDURE — 99999 PR PBB SHADOW E&M-EST. PATIENT-LVL IV: CPT | Mod: PBBFAC,,, | Performed by: INTERNAL MEDICINE

## 2022-05-25 PROCEDURE — 1101F PR PT FALLS ASSESS DOC 0-1 FALLS W/OUT INJ PAST YR: ICD-10-PCS | Mod: CPTII,S$GLB,, | Performed by: INTERNAL MEDICINE

## 2022-05-25 PROCEDURE — 3288F PR FALLS RISK ASSESSMENT DOCUMENTED: ICD-10-PCS | Mod: CPTII,S$GLB,, | Performed by: INTERNAL MEDICINE

## 2022-05-25 PROCEDURE — 3075F PR MOST RECENT SYSTOLIC BLOOD PRESS GE 130-139MM HG: ICD-10-PCS | Mod: CPTII,S$GLB,, | Performed by: INTERNAL MEDICINE

## 2022-05-25 PROCEDURE — 1157F PR ADVANCE CARE PLAN OR EQUIV PRESENT IN MEDICAL RECORD: ICD-10-PCS | Mod: CPTII,S$GLB,, | Performed by: INTERNAL MEDICINE

## 2022-05-25 PROCEDURE — 1126F AMNT PAIN NOTED NONE PRSNT: CPT | Mod: CPTII,S$GLB,, | Performed by: INTERNAL MEDICINE

## 2022-05-25 PROCEDURE — 99999 PR PBB SHADOW E&M-EST. PATIENT-LVL IV: ICD-10-PCS | Mod: PBBFAC,,, | Performed by: INTERNAL MEDICINE

## 2022-05-25 PROCEDURE — 99214 OFFICE O/P EST MOD 30 MIN: CPT | Mod: S$GLB,,, | Performed by: INTERNAL MEDICINE

## 2022-05-25 PROCEDURE — 1101F PT FALLS ASSESS-DOCD LE1/YR: CPT | Mod: CPTII,S$GLB,, | Performed by: INTERNAL MEDICINE

## 2022-05-25 PROCEDURE — 99214 PR OFFICE/OUTPT VISIT, EST, LEVL IV, 30-39 MIN: ICD-10-PCS | Mod: S$GLB,,, | Performed by: INTERNAL MEDICINE

## 2022-05-25 PROCEDURE — 3075F SYST BP GE 130 - 139MM HG: CPT | Mod: CPTII,S$GLB,, | Performed by: INTERNAL MEDICINE

## 2022-05-25 PROCEDURE — 1126F PR PAIN SEVERITY QUANTIFIED, NO PAIN PRESENT: ICD-10-PCS | Mod: CPTII,S$GLB,, | Performed by: INTERNAL MEDICINE

## 2022-05-25 RX ORDER — LORAZEPAM 0.5 MG/1
0.25 TABLET ORAL NIGHTLY PRN
Qty: 30 TABLET | Refills: 0 | Status: SHIPPED | OUTPATIENT
Start: 2022-05-25 | End: 2022-08-25 | Stop reason: SDUPTHER

## 2022-05-25 RX ORDER — TRIAMCINOLONE ACETONIDE 5 MG/G
OINTMENT TOPICAL 2 TIMES DAILY PRN
Qty: 60 G | Refills: 3 | Status: SHIPPED | OUTPATIENT
Start: 2022-05-25 | End: 2023-01-09 | Stop reason: SDUPTHER

## 2022-05-25 NOTE — PROGRESS NOTES
Subjective:       Patient ID: Ghada Dave is a 86 y.o. female.    Chief Complaint: No chief complaint on file.      HPI  Ghada Dave is a 86 y.o. female with chronic conditions of hypertension, hyperlipidemia, prediabetes, CAD, hiatal hernia/GERD, pruritus who presents today for health maintenance visit.    The patient reports she has been feeling okay.  Occasional bloating after eating.  Has been taking [employer] which usually helps.  Has been trying to eat normal food and needs low-fat.  Tries to avoid Pepto-Bismol.    Had urologist evaluation and reports methenamine has helped with infections.  Did not do CT scan or follow-up for cystoscopy since she had does test before and did not help.    Itching of the skin improved with triamcinolone cream.    Last labs with stable A1c, kidney function, and liver function.  Lipid profile stable.  Normal TSH.    Health Maintenance:  Health Maintenance   Topic Date Due    DEXA Scan  04/11/2021    Aspirin/Antiplatelet Therapy  04/04/2023    Colonoscopy  06/17/2026    TETANUS VACCINE  11/14/2026    Lipid Panel  03/11/2027       Review of Systems   Constitutional: Negative for chills, fever and unexpected weight change.   HENT: Negative for nasal congestion and sore throat.    Eyes: Negative for visual disturbance.   Respiratory: Negative for shortness of breath.    Cardiovascular: Negative for chest pain and palpitations.   Gastrointestinal: Positive for abdominal distention (Bloating). Negative for change in bowel habit, constipation, diarrhea, nausea and change in bowel habit.   Genitourinary: Negative for difficulty urinating and dysuria.   Musculoskeletal: Negative.    Integumentary:  Positive for rash.   Neurological: Negative for weakness and headaches.   Psychiatric/Behavioral: Positive for sleep disturbance.      Past Medical History:   Diagnosis Date    Abnormal Pap smear 2013    Acidosis     CAD (coronary artery disease)  06/2017     iFR of LAD and RCA performed confirmed nonobstructive disease.    Cataract     Colon polyps     Disorder of kidney and ureter     Frequent urination     Hemorrhoid     High cholesterol     Poor circulation     Postoperative abdominal pain     Psoriasis     Varicose vein        Past Surgical History:   Procedure Laterality Date    CARDIAC CATHETERIZATION  06/2017     iFR of LAD and RCA performed confirmed nonobstructive disease.    CATARACT EXTRACTION W/  INTRAOCULAR LENS IMPLANT Bilateral     COLONOSCOPY N/A 8/28/2017    Procedure: COLONOSCOPY;  Surgeon: Bertram Myers MD;  Location: [facility] ENDO;  Service: Endoscopy;  Laterality: N/A;    COLONOSCOPY N/A 10/18/2019    Procedure: COLONOSCOPY;  Surgeon: Wilfred Barber MD;  Location: [facility] ENDO (4TH FLR);  Service: Endoscopy;  Laterality: N/A;   needed-BB  Cardiology Clearance received from Dr. REBECA Escalona, see telephone encounter 10/1/19-BB    COLONOSCOPY N/A 6/17/2021    Procedure: COLONOSCOPY suprep Past positive;  Surgeon: Lele Barillas MD;  Location: [facility] ENDO;  Service: Endoscopy;  Laterality: N/A;  past positive    COLONOSCOPY W/ BIOPSIES  06/17/2021    EXCISION OF BURSA  3/5/2021    Procedure: BURSECTOMY;  Surgeon: Jason Alicea Jr., MD;  Location: [facility] OR;  Service: Orthopedics;;    LEFT HEART CATHETERIZATION N/A 4/8/2019    Procedure: Left heart cath;  Surgeon: Renato Escalona MD;  Location: [facility] CATH LAB/EP;  Service: Cardiology;  Laterality: N/A;    POLYPECTOMY      SHOULDER ARTHROSCOPY Right 3/5/2021    Procedure: ARTHROSCOPY, SHOULDER;  Surgeon: Jason Alicea Jr., MD;  Location: [facility] OR;  Service: Orthopedics;  Laterality: Right;  need opus system Anglican   video  (Anglican notified 3/1/21, CC)  [place]ulwinder confirmed 3/4/21 [place]    YAG Laser Capsulotomy Right 04/17/2017    Dr. Chavez       Family History   Problem Relation Age of Onset    Glaucoma Neg Hx     Macular  degeneration Neg Hx     Strabismus Neg Hx     Retinal detachment Neg Hx     Amblyopia Neg Hx     Blindness Neg Hx     Cataracts Neg Hx     Prostate cancer Neg Hx     Kidney disease Neg Hx     Anesthesia problems Neg Hx        Social History     Socioeconomic History    Marital status: Single   Tobacco Use    Smoking status: Never Smoker    Smokeless tobacco: Never Used   Substance and Sexual Activity    Alcohol use: No     Alcohol/week: 0.0 standard drinks    Drug use: No    Sexual activity: Never   Social History Narrative    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 30 tablet 3    b complex vitamins capsule Take 1 capsule by mouth once daily.      camphor-menthol 0.5-0.5% (SARNA ORIGINAL) lotion Apply topically as needed for Itching. 222 mL 2    cetirizine (ZYRTEC) 10 MG tablet Take 1 tablet (10 mg total) by mouth once daily. 30 tablet 0    esomeprazole (NEXIUM) 40 MG capsule Take 1 capsule (40 mg total) by mouth before breakfast. 90 capsule 0    ezetimibe (ZETIA) 10 mg tablet Take 1 tablet (10 mg total) by mouth once daily. 90 tablet 3    hydrocortisone 2.5 % lotion Apply topically 2 (two) times daily. 60 mL 0    hydrOXYzine HCL (ATARAX) 25 MG tablet Take 1 tablet (25 mg total) by mouth 3 (three) times daily as needed for Itching. 60 tablet 2    ibandronate (BONIVA) 150 mg tablet Take 1 tablet (150 mg total) by mouth every 30 days. 3 tablet 3    loratadine (CLARITIN) 10 mg tablet Take 1 tablet (10 mg total) by mouth daily as needed for Allergies. 30 tablet 2    metFORMIN (GLUCOPHAGE) 500 MG tablet Take 1 tablet (500 mg total) by mouth daily with breakfast. 90 tablet 3    methenamine (HIPREX) 1 gram Tab Take 1 tablet (1 g total) by mouth once daily. 30 tablet 11    mirtazapine (REMERON) 7.5 MG Tab Take 1 tablet  (7.5 mg total) by mouth every evening. 30 tablet 11    triamcinolone (KENALOG) 0.5 % ointment Apply topically 2 (two) times daily as needed (rash). 15 g 3     No current facility-administered medications for this visit.       Review of patient's allergies indicates:   Allergen Reactions    Penicillin Anaphylaxis    Lipitor [atorvastatin] Hives     Can take Simivastatin .     Keflex [cephalexin] Rash         Objective:       Last 3 sets of Vitals    Vitals - 1 value per visit 3/21/2022 4/4/2022 4/4/2022   SYSTOLIC 135 - 127   DIASTOLIC 59 - 55   Pulse 66 - 101   Temp 97.8 - -   Resp 14 - -   SPO2 98 - -   Weight (lb) 135 - 127.32   Weight (kg) 61.236 - 57.75   Height - - 60   BMI (Calculated) - - 24.9   VISIT REPORT - - -   Pain Score  - 0 -   Some recent data might be hidden   Physical Exam  Constitutional:       General: She is not in acute distress.     Appearance: Normal appearance.   HENT:      Head: Normocephalic.      Right Ear: Tympanic membrane, ear canal and external ear normal.      Left Ear: Tympanic membrane, ear canal and external ear normal.      Nose: Nose normal.      Mouth/Throat:      Mouth: Mucous membranes are moist.      Pharynx: Oropharynx is clear.   Eyes:      General: No scleral icterus.     Extraocular Movements: Extraocular movements intact.      Conjunctiva/sclera: Conjunctivae normal.      Pupils: Pupils are equal, round, and reactive to light.   Neck:      Vascular: No carotid bruit.      Comments: No goiter.  Cardiovascular:      Rate and Rhythm: Normal rate and regular rhythm.      Pulses: Normal pulses.      Heart sounds: Normal heart sounds.   Pulmonary:      Effort: Pulmonary effort is normal.      Breath sounds: Normal breath sounds.   Abdominal:      General: Bowel sounds are normal. There is no distension.      Palpations: Abdomen is soft. There is no mass.      Tenderness: There is no abdominal tenderness. There is no right CVA tenderness or left CVA tenderness.    Musculoskeletal:         General: No swelling. Normal range of motion.      Cervical back: Neck supple.   Lymphadenopathy:      Cervical: No cervical adenopathy.   Skin:     General: Skin is warm and dry.      Findings: No rash (Has skin discoloration patches from previous rash).   Neurological:      General: No focal deficit present.      Mental Status: She is alert and oriented to person, place, and time.   Psychiatric:         Mood and Affect: Mood normal.         Behavior: Behavior normal.           CBC:  Recent Labs   Lab 05/04/21  1202 07/16/21  0948 11/10/21  0945   WBC 3.08 L 3.34 L 4.35   RBC 4.13 4.09 4.23   Hemoglobin 11.9 L 11.6 L 12.5   Hematocrit 36.0 L 34.6 L 36.4 L   Platelets 252 254 293   MCV 87 85 86   MCH 28.8 28.4 29.6   MCHC 33.1 33.5 34.3     CMP:  Recent Labs   Lab 07/16/21  0948 11/10/21  0945 03/11/22  0933   Glucose 107 104 101   Calcium 9.6 9.6 9.5   Albumin 3.8 3.9 3.8   Total Protein 7.9 7.8 7.4   Sodium 143 141 141   Potassium 4.3 4.3 4.1   CO2 22 L 20 L 24   Chloride 111 H 110 109   BUN 16 30 H 17   Creatinine 0.9 1.0 0.9   Alkaline Phosphatase 67 57 51 L   ALT 9 L 14 10   AST 20 23 20   Total Bilirubin 0.4 0.4 0.6     URINALYSIS:  Recent Labs   Lab 09/16/20  1751 11/11/20  0950 10/06/21  1003 02/22/22  1009   Color, UA Yellow   < > Colorless Yellow   Clarity, UA  --    < > Clear  --    Specific Gravity, UA 1.020   < >  --  1.015   Spec Grav UA  --    < > 1.010  --    pH, UA 6.0   < > 5 6.0   Protein, UA Negative   < >  --  1+ A   Bacteria Few A   < >  --  Occasional   Nitrite, UA Negative   < > n Negative   Leukocytes, UA 1+ A   < >  --  3+ A   Urobilinogen, UA Negative   < > n Negative   Hyaline Casts, UA 0  --   --  0    < > = values in this interval not displayed.      LIPIDS:  Recent Labs   Lab 11/11/20  0954 05/04/21  1202 07/16/21  0948 11/10/21  0945 03/11/22  0933   TSH 1.759  --  1.272  --  1.606   HDL 63   < > 56 62 58   Cholesterol 153   < > 186 188 161   Triglycerides  98   < > 122 88 73   LDL Cholesterol 70.4   < > 105.6 108.4 88.4   HDL/Cholesterol Ratio 41.2   < > 30.1 33.0 36.0   Non-HDL Cholesterol 90   < > 130 126 103   Total Cholesterol/HDL Ratio 2.4   < > 3.3 3.0 2.8    < > = values in this interval not displayed.     TSH:  Recent Labs   Lab 11/11/20  0954 07/16/21  0948 03/11/22  0933   TSH 1.759 1.272 1.606       A1C:  Recent Labs   Lab 11/11/20  0954 05/04/21  1202 07/16/21  0948 11/10/21  0945 03/11/22  0933   Hemoglobin A1C 5.6 5.6 5.7 H 5.8 H 5.9 H       Imaging:  X-Ray Neck Soft Tissue  Narrative: EXAMINATION:  XR NECK SOFT TISSUE    CLINICAL HISTORY:  Unspecified foreign body in respiratory tract, part unspecified causing asphyxiation, initial encounter    TECHNIQUE:  AP and lateral soft tissue views the neck were performed.    COMPARISON:  Radiograph of the neck soft tissues from earlier the same date.    FINDINGS:  The previously identified radiopaque foreign body within the oropharynx is no longer seen.  The airway is patent.  Prevertebral soft tissues are within normal limits.  There are multilevel degenerative changes of the cervical spine.  Impression: The previously identified radiopaque foreign body within the oropharynx is no longer seen.    Electronically signed by: Martin Neely  Date:    01/31/2021  Time:    19:22  X-Ray Neck Soft Tissue  Narrative: EXAMINATION:  XR NECK SOFT TISSUE    CLINICAL HISTORY:  Unspecified foreign body in respiratory tract, part unspecified causing asphyxiation, initial encounter    TECHNIQUE:  AP and lateral soft tissue views the neck were performed.    COMPARISON:  None.    FINDINGS:  There is a linear opacity within the oropharynx, projecting just above the hyoid bone.  This may represent an ingested foreign body.  There is no prevertebral soft tissue thickening.  The visualized cervical spine demonstrates degenerative changes.  There is dental hardware.  Impression: Linear opacity within the oropharynx, likely an ingested  foreign body.    Electronically signed by: Martin Neely  Date:    01/31/2021  Time:    17:02      Assessment:       1. Essential hypertension    2. Prediabetes    3. Hypercholesterolemia    4. Dyspepsia    5. Anxiety    6. Other insomnia    7. Rash    8. Itching    9. Recurrent UTI          Chronic conditions status updated as per HPI. Other than changes above, cont current medications and maintain follow up with specialist. Return to clinic in 3-4 months.  Plan:       Ghada was seen today for follow-up.    Diagnoses and all orders for this visit:    Essential hypertension   - controlled    Prediabetes  -     Comprehensive Metabolic Panel; Future  -     Hemoglobin A1C; Future  - stable on metformin    Hypercholesterolemia  -     Lipid Panel; Future  - good lipid profile on Zetia    Dyspepsia  -     Try FD ronaldo.    Anxiety  -     LORazepam (ATIVAN) 0.5 MG tablet; Take 0.5 tablets (0.25 mg total) by mouth nightly as needed for Anxiety (Insomnia).    Other insomnia  -     TSH; Future  -     LORazepam (ATIVAN) 0.5 MG tablet; Take 0.5 tablets (0.25 mg total) by mouth nightly as needed for Anxiety (Insomnia).    Rash/ Itching  -     triamcinolone (KENALOG) 0.5 % ointment; Apply topically 2 (two) times daily as needed (rash).    Recurrent UTI   - continue methenamine      Health Maintenance Due   Topic Date Due    COVID-19 Vaccine (1) Never done    Shingles Vaccine (2 of 3) 12/23/2016    DEXA Scan  04/11/2021        Ghada Hicks MD  [facility]  Disclaimer:  This note has been generated using voice-recognition software. There may be grammatical or spelling errors that have been missed during proof-reading

## 2022-07-06 ENCOUNTER — PATIENT MESSAGE (OUTPATIENT)
Dept: UROLOGY | Facility: CLINIC | Age: 87
End: 2022-07-06
Payer: MEDICARE

## 2022-07-06 DIAGNOSIS — R31.0 GROSS HEMATURIA: Primary | ICD-10-CM

## 2022-07-13 ENCOUNTER — PATIENT MESSAGE (OUTPATIENT)
Dept: UROLOGY | Facility: CLINIC | Age: 87
End: 2022-07-13
Payer: MEDICARE

## 2022-07-19 ENCOUNTER — PATIENT MESSAGE (OUTPATIENT)
Dept: RESEARCH | Facility: CLINIC | Age: 87
End: 2022-07-19
Payer: MEDICARE

## 2022-07-22 ENCOUNTER — LAB VISIT (OUTPATIENT)
Dept: LAB | Facility: HOSPITAL | Age: 87
End: 2022-07-22
Attending: STUDENT IN AN ORGANIZED HEALTH CARE EDUCATION/TRAINING PROGRAM
Payer: MEDICARE

## 2022-07-22 DIAGNOSIS — R31.0 GROSS HEMATURIA: ICD-10-CM

## 2022-07-22 LAB
ANION GAP SERPL CALC-SCNC: 9 MMOL/L (ref 8–16)
BUN SERPL-MCNC: 14 MG/DL (ref 8–23)
CALCIUM SERPL-MCNC: 9.5 MG/DL (ref 8.7–10.5)
CHLORIDE SERPL-SCNC: 109 MMOL/L (ref 95–110)
CO2 SERPL-SCNC: 24 MMOL/L (ref 23–29)
CREAT SERPL-MCNC: 0.8 MG/DL (ref 0.5–1.4)
EST. GFR  (AFRICAN AMERICAN): >60 ML/MIN/1.73 M^2
EST. GFR  (NON AFRICAN AMERICAN): >60 ML/MIN/1.73 M^2
GLUCOSE SERPL-MCNC: 100 MG/DL (ref 70–110)
POTASSIUM SERPL-SCNC: 4.1 MMOL/L (ref 3.5–5.1)
SODIUM SERPL-SCNC: 142 MMOL/L (ref 136–145)

## 2022-07-22 PROCEDURE — 36415 COLL VENOUS BLD VENIPUNCTURE: CPT | Performed by: STUDENT IN AN ORGANIZED HEALTH CARE EDUCATION/TRAINING PROGRAM

## 2022-07-22 PROCEDURE — 80048 BASIC METABOLIC PNL TOTAL CA: CPT | Performed by: STUDENT IN AN ORGANIZED HEALTH CARE EDUCATION/TRAINING PROGRAM

## 2022-07-25 ENCOUNTER — HOSPITAL ENCOUNTER (OUTPATIENT)
Dept: RADIOLOGY | Facility: HOSPITAL | Age: 87
Discharge: HOME | End: 2022-07-25
Attending: STUDENT IN AN ORGANIZED HEALTH CARE EDUCATION/TRAINING PROGRAM
Payer: MEDICARE

## 2022-07-25 DIAGNOSIS — R31.0 GROSS HEMATURIA: ICD-10-CM

## 2022-07-25 PROCEDURE — 74178 CT ABD&PLV WO CNTR FLWD CNTR: CPT | Mod: 26,,, | Performed by: RADIOLOGY

## 2022-07-25 PROCEDURE — 74178 CT ABD&PLV WO CNTR FLWD CNTR: CPT | Mod: TC

## 2022-07-25 PROCEDURE — 25500020 PHARM REV CODE 255: Performed by: STUDENT IN AN ORGANIZED HEALTH CARE EDUCATION/TRAINING PROGRAM

## 2022-07-25 PROCEDURE — 74178 CT UROGRAM ABD PELVIS W WO: ICD-10-PCS | Mod: 26,,, | Performed by: RADIOLOGY

## 2022-07-25 RX ADMIN — IOHEXOL 150 ML: 350 INJECTION, SOLUTION INTRAVENOUS at 09:07

## 2022-07-29 ENCOUNTER — PATIENT MESSAGE (OUTPATIENT)
Dept: UROLOGY | Facility: CLINIC | Age: 87
End: 2022-07-29

## 2022-07-29 ENCOUNTER — PROCEDURE VISIT (OUTPATIENT)
Dept: UROLOGY | Facility: CLINIC | Age: 87
End: 2022-07-29
Payer: MEDICARE

## 2022-07-29 VITALS — SYSTOLIC BLOOD PRESSURE: 138 MMHG | DIASTOLIC BLOOD PRESSURE: 72 MMHG | HEART RATE: 85 BPM

## 2022-07-29 DIAGNOSIS — R31.0 GROSS HEMATURIA: ICD-10-CM

## 2022-07-29 DIAGNOSIS — N39.41 URGE INCONTINENCE: Primary | ICD-10-CM

## 2022-07-29 NOTE — PROCEDURES
"Procedures     No procedure performed today. Patient's son wanted to discuss the cystoscopy just before the procedure and that his mother has been quite "stubborn" (his exact word) about numerous issues including her goals of care and expectations. I understand where her son is coming from as the patient has been somewhat difficult to interact with and communicate with. I informed the pt and her son upon a chart review that she used to see Dr. Vasquez and her note on the day of the urodynamics she performed on 6/4/14 documented:    9.  Recommendations:       A.  Will suggest Botox of the sphincter.  Possible need for CIC       B.  The procedure was performed with the aid of a .  Because of the increased time needed will have her return to be counselled and possible consent.         Dr. Vasquez also saw her back on 6/9/14 and recommended:   1.  Discussed at length the pathology  2.  Recommended Botox of the sphincter  3.  Discussed she may still need to do CIC but will see how she does with the Botox  4.  Plan to leave a catheter in place x 3 days.    5.  Consent obtained.      I read the notes and recommendations to her again after her son exited the room and the patient exclaimed "you're lying, you're lying." At which point I informed the patient I have no gain to lie, I am simply reading the notes documented in 2014. She continued to exclaim that I was lying over and over again and at this point via the  I informed her I do not feel comfortable performing her procedure today with her insinuating that I am lying. Will recommend that she continues her care elsewhere.    This visit was conducted using a(n) [place]ian language devorah  #[id number] Mai.    "

## 2022-08-03 ENCOUNTER — CLINICAL SUPPORT (OUTPATIENT)
Dept: UROGYNECOLOGY | Facility: CLINIC | Age: 87
End: 2022-08-03
Payer: MEDICARE

## 2022-08-03 DIAGNOSIS — N39.41 URGE INCONTINENCE: ICD-10-CM

## 2022-08-03 NOTE — PROGRESS NOTES
Established Patient - Audio Only Telehealth Visit     The patient location is: home  The chief complaint leading to consultation is: UUI  Visit type: Virtual visit with audio only (telephone)  Total time spent with patient:       Patient referred for UUI/DESD f/u- previously saw Dr. Vasquez. DESD is not a current pathway for the pelvic floor program- needs to be scheduled w/Dr. Vasquez.  Language line used-  Josy [id number]. Unable to reach to schedule- [facility].

## 2022-08-07 ENCOUNTER — OFFICE VISIT (OUTPATIENT)
Dept: URGENT CARE | Facility: CLINIC | Age: 87
End: 2022-08-07
Payer: MEDICARE

## 2022-08-07 ENCOUNTER — PATIENT MESSAGE (OUTPATIENT)
Dept: FAMILY MEDICINE | Facility: CLINIC | Age: 87
End: 2022-08-07
Payer: MEDICARE

## 2022-08-07 VITALS
RESPIRATION RATE: 18 BRPM | HEIGHT: 60 IN | SYSTOLIC BLOOD PRESSURE: 121 MMHG | HEART RATE: 105 BPM | BODY MASS INDEX: 25.13 KG/M2 | DIASTOLIC BLOOD PRESSURE: 70 MMHG | WEIGHT: 128 LBS | TEMPERATURE: 98 F | OXYGEN SATURATION: 96 %

## 2022-08-07 DIAGNOSIS — R30.0 DYSURIA: Primary | ICD-10-CM

## 2022-08-07 DIAGNOSIS — R31.9 HEMATURIA, UNSPECIFIED TYPE: ICD-10-CM

## 2022-08-07 DIAGNOSIS — N32.89 BLADDER SPASM: ICD-10-CM

## 2022-08-07 DIAGNOSIS — N30.90 CYSTITIS: ICD-10-CM

## 2022-08-07 LAB
BILIRUB UR QL STRIP: NEGATIVE
GLUCOSE UR QL STRIP: NEGATIVE
KETONES UR QL STRIP: NEGATIVE
LEUKOCYTE ESTERASE UR QL STRIP: NEGATIVE
PH, POC UA: 5.5
POC BLOOD, URINE: POSITIVE
POC NITRATES, URINE: NEGATIVE
PROT UR QL STRIP: NEGATIVE
SP GR UR STRIP: 1.01 (ref 1–1.03)
UROBILINOGEN UR STRIP-ACNC: ABNORMAL (ref 0.1–1.1)

## 2022-08-07 PROCEDURE — 3078F DIAST BP <80 MM HG: CPT | Mod: CPTII,S$GLB,, | Performed by: PHYSICIAN ASSISTANT

## 2022-08-07 PROCEDURE — 81003 URINALYSIS AUTO W/O SCOPE: CPT | Mod: QW,S$GLB,, | Performed by: PHYSICIAN ASSISTANT

## 2022-08-07 PROCEDURE — 1157F ADVNC CARE PLAN IN RCRD: CPT | Mod: CPTII,S$GLB,, | Performed by: PHYSICIAN ASSISTANT

## 2022-08-07 PROCEDURE — 3074F PR MOST RECENT SYSTOLIC BLOOD PRESSURE < 130 MM HG: ICD-10-PCS | Mod: CPTII,S$GLB,, | Performed by: PHYSICIAN ASSISTANT

## 2022-08-07 PROCEDURE — 99215 OFFICE O/P EST HI 40 MIN: CPT | Mod: S$GLB,,, | Performed by: PHYSICIAN ASSISTANT

## 2022-08-07 PROCEDURE — 1159F MED LIST DOCD IN RCRD: CPT | Mod: CPTII,S$GLB,, | Performed by: PHYSICIAN ASSISTANT

## 2022-08-07 PROCEDURE — 1125F AMNT PAIN NOTED PAIN PRSNT: CPT | Mod: CPTII,S$GLB,, | Performed by: PHYSICIAN ASSISTANT

## 2022-08-07 PROCEDURE — 3078F PR MOST RECENT DIASTOLIC BLOOD PRESSURE < 80 MM HG: ICD-10-PCS | Mod: CPTII,S$GLB,, | Performed by: PHYSICIAN ASSISTANT

## 2022-08-07 PROCEDURE — 81003 POCT URINALYSIS, DIPSTICK, AUTOMATED, W/O SCOPE: ICD-10-PCS | Mod: QW,S$GLB,, | Performed by: PHYSICIAN ASSISTANT

## 2022-08-07 PROCEDURE — 1125F PR PAIN SEVERITY QUANTIFIED, PAIN PRESENT: ICD-10-PCS | Mod: CPTII,S$GLB,, | Performed by: PHYSICIAN ASSISTANT

## 2022-08-07 PROCEDURE — 1159F PR MEDICATION LIST DOCUMENTED IN MEDICAL RECORD: ICD-10-PCS | Mod: CPTII,S$GLB,, | Performed by: PHYSICIAN ASSISTANT

## 2022-08-07 PROCEDURE — 1160F PR REVIEW ALL MEDS BY PRESCRIBER/CLIN PHARMACIST DOCUMENTED: ICD-10-PCS | Mod: CPTII,S$GLB,, | Performed by: PHYSICIAN ASSISTANT

## 2022-08-07 PROCEDURE — 3074F SYST BP LT 130 MM HG: CPT | Mod: CPTII,S$GLB,, | Performed by: PHYSICIAN ASSISTANT

## 2022-08-07 PROCEDURE — 1157F PR ADVANCE CARE PLAN OR EQUIV PRESENT IN MEDICAL RECORD: ICD-10-PCS | Mod: CPTII,S$GLB,, | Performed by: PHYSICIAN ASSISTANT

## 2022-08-07 PROCEDURE — 99215 PR OFFICE/OUTPT VISIT, EST, LEVL V, 40-54 MIN: ICD-10-PCS | Mod: S$GLB,,, | Performed by: PHYSICIAN ASSISTANT

## 2022-08-07 PROCEDURE — 1160F RVW MEDS BY RX/DR IN RCRD: CPT | Mod: CPTII,S$GLB,, | Performed by: PHYSICIAN ASSISTANT

## 2022-08-07 RX ORDER — PHENAZOPYRIDINE HYDROCHLORIDE 100 MG/1
100 TABLET, FILM COATED ORAL 2 TIMES DAILY PRN
Qty: 20 TABLET | Refills: 0 | Status: SHIPPED | OUTPATIENT
Start: 2022-08-07 | End: 2022-08-17

## 2022-08-07 RX ORDER — LEVOFLOXACIN 250 MG/1
250 TABLET ORAL DAILY
Qty: 10 TABLET | Refills: 0 | Status: SHIPPED | OUTPATIENT
Start: 2022-08-07 | End: 2022-08-17

## 2022-08-07 NOTE — PROGRESS NOTES
Subjective:       Patient ID: Ghada Dave is a 86 y.o. female.    Vitals:  height is 5' (1.524 m) and weight is 58.1 kg (128 lb). Her temperature is 98.2 °F (36.8 °C). Her blood pressure is 121/70 and her pulse is 105. Her respiration is 18 and oxygen saturation is 96%.     Chief Complaint: Urinary Tract Infection    Pt has been having recurrent UTI's and she has been in constant discomfort from them .    interperter name pushpa #[id number]    Patient with recurrent UTIs in history recurrence of hematuria who comes in complaining of frequent trips to the bathroom with minimal urine output.  She also complains of discomfort in the bladder area to the point was keeping her up at night.  I reviewed her chart and she is shown to be scheduled for cystoscopy which was canceled    Urinary Tract Infection   This is a recurrent problem. The current episode started 1 to 4 weeks ago. The problem occurs every urination. The problem has been unchanged. The quality of the pain is described as aching. The pain is at a severity of 5/10. The pain is moderate. There has been no fever. Associated symptoms include frequency and urgency. Pertinent negatives include no flank pain. Treatments tried: prescribed meds. The treatment provided no relief. Her past medical history is significant for recurrent UTIs.       Genitourinary: Positive for frequency and urgency. Negative for flank pain.       Objective:      Physical Exam   Constitutional: She is oriented to person, place, and time. She appears well-developed.   HENT:   Head: Normocephalic and atraumatic.   Ears:   Right Ear: External ear normal.   Left Ear: External ear normal.   Nose: Nose normal.   Mouth/Throat: Mucous membranes are normal.   Eyes: Conjunctivae and lids are normal.   Neck: Trachea normal. Neck supple.   Cardiovascular: Normal rate, regular rhythm and normal heart sounds.   Pulmonary/Chest: Effort normal and breath sounds normal. No respiratory distress.  She has no wheezes. She has no rhonchi.   Abdominal: Normal appearance and bowel sounds are normal. She exhibits no distension, no abdominal bruit, no pulsatile midline mass and no mass. Soft. There is no abdominal tenderness. There is no rebound, no guarding, no left CVA tenderness and no right CVA tenderness.   Musculoskeletal: Normal range of motion.         General: Normal range of motion.   Neurological: She is alert and oriented to person, place, and time. She has normal strength.   Skin: Skin is warm, dry, intact, not diaphoretic and not pale.   Psychiatric: Her speech is normal and behavior is normal. Judgment and thought content normal.   Nursing note and vitals reviewed.     Results for orders placed or performed in visit on 08/07/22   POCT Urinalysis, Dipstick, Automated, W/O Scope   Result Value Ref Range    POC Blood, Urine Positive (A) Negative    POC Bilirubin, Urine Negative Negative    POC Urobilinogen, Urine Norm 0.1 - 1.1    POC Ketones, Urine Negative Negative    POC Protein, Urine Negative Negative    POC Nitrates, Urine Negative Negative    POC Glucose, Urine Negative Negative    pH, UA 5.5     POC Specific Gravity, Urine 1.015 1.003 - 1.029    POC Leukocytes, Urine Negative Negative    CT Urogram Abd Pelvis W WO    Result Date: 7/25/2022  EXAMINATION: CT UROGRAM ABD PELVIS W WO CLINICAL HISTORY: gross hematuria;  Gross hematuria FINDINGS: Comparison is 09/18/2018 CT abdomen and pelvis with contrast, ultrasound retroperitoneal complete dated 05/23/2019. 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  No definite biliary, renal, ureter, or bladder calculi appreciated.  There is a hiatal hernia with organo-axial volvulus of the stomach.  There is are multiple liver cysts 1 dominant cyst measuring 4.6 cm.  Other cysts are tiny.  Spleen, pancreas, biliary tree, gallbladder, and duodenum demonstrate nothing unusual.  The adrenal glands are not enlarged.  There are tiny renal  lesions bilaterally too small to characterize but most likely cysts.  No para-aortic or retroperitoneal adenopathy is seen.  No obstruction, ileus, or perforation seen.  No ascites or adenopathy is seen.  The pelvic organs are unremarkable.  The ureters are normal in course and caliber.  There renal and abdominal vasculature is unremarkable.  There is mild constipation.  There is diverticulosis.  There is no evidence of diverticulitis.  No acute process seen.  Definite cause of hematuria is seen.  The lungs are clear.  The bones demonstrate DJD and scoliosis.  There is a levoconvex curvature of the spine.     Malrotated right kidney. Hiatal hernia with organo-axial volvulus of the stomach. Multiple liver cysts. Tiny renal lesions bilaterally most likely small cysts. Constipation. Diverticulosis without diverticulitis. Scoliosis and DJD. Constipation. Electronically signed by: Tino Perez MD Date:    07/25/2022 Time:    09:25       review of the chart shows that the CT above confirm there multiple kidney cyst.  She has occasional/episodic flank pain but none today and has recurrent hematuria with bladder spasms which could indicate the possibility of an infected renal cyst.  For plan today will be to send urine for culture, put her on 10 days of levofloxacin 250 which will penetrate cyst if renal cyst is infected.  Plan was interpreted tosed understanding and her questions were answered.  Assessment:       1. Dysuria    2. Hematuria, unspecified type    3. Cystitis    4. Bladder spasm          Plan:         Dysuria  -     POCT Urinalysis, Dipstick, Automated, W/O Scope  -     levoFLOXacin (LEVAQUIN) 250 MG tablet; Take 1 tablet (250 mg total) by mouth once daily. for 10 days  Dispense: 10 tablet; Refill: 0  -     phenazopyridine (PYRIDIUM) 100 MG tablet; Take 1 tablet (100 mg total) by mouth 2 (two) times daily as needed for Pain (Bladder discomfort).  Dispense: 20 tablet; Refill: 0    Hematuria, unspecified  type  -     levoFLOXacin (LEVAQUIN) 250 MG tablet; Take 1 tablet (250 mg total) by mouth once daily. for 10 days  Dispense: 10 tablet; Refill: 0  -     phenazopyridine (PYRIDIUM) 100 MG tablet; Take 1 tablet (100 mg total) by mouth 2 (two) times daily as needed for Pain (Bladder discomfort).  Dispense: 20 tablet; Refill: 0    Cystitis  -     levoFLOXacin (LEVAQUIN) 250 MG tablet; Take 1 tablet (250 mg total) by mouth once daily. for 10 days  Dispense: 10 tablet; Refill: 0  -     phenazopyridine (PYRIDIUM) 100 MG tablet; Take 1 tablet (100 mg total) by mouth 2 (two) times daily as needed for Pain (Bladder discomfort).  Dispense: 20 tablet; Refill: 0    Bladder spasm  -     phenazopyridine (PYRIDIUM) 100 MG tablet; Take 1 tablet (100 mg total) by mouth 2 (two) times daily as needed for Pain (Bladder discomfort).  Dispense: 20 tablet; Refill: 0    Follow up if symptoms worsen or fail to improve, for F/U with PCP or ED. There are no Patient Instructions on file for this visit.

## 2022-08-25 ENCOUNTER — LAB VISIT (OUTPATIENT)
Dept: LAB | Facility: HOSPITAL | Age: 87
End: 2022-08-25
Attending: INTERNAL MEDICINE
Payer: MEDICARE

## 2022-08-25 ENCOUNTER — OFFICE VISIT (OUTPATIENT)
Dept: FAMILY MEDICINE | Facility: CLINIC | Age: 87
End: 2022-08-25
Payer: MEDICARE

## 2022-08-25 VITALS
HEIGHT: 60 IN | HEART RATE: 74 BPM | BODY MASS INDEX: 24.68 KG/M2 | WEIGHT: 125.69 LBS | OXYGEN SATURATION: 98 % | SYSTOLIC BLOOD PRESSURE: 126 MMHG | DIASTOLIC BLOOD PRESSURE: 68 MMHG

## 2022-08-25 DIAGNOSIS — E78.00 HYPERCHOLESTEROLEMIA: ICD-10-CM

## 2022-08-25 DIAGNOSIS — I67.2 CEREBRAL ATHEROSCLEROSIS: ICD-10-CM

## 2022-08-25 DIAGNOSIS — R73.03 PREDIABETES: ICD-10-CM

## 2022-08-25 DIAGNOSIS — G47.09 OTHER INSOMNIA: ICD-10-CM

## 2022-08-25 DIAGNOSIS — R10.13 DYSPEPSIA: ICD-10-CM

## 2022-08-25 DIAGNOSIS — R41.3 MEMORY CHANGES: ICD-10-CM

## 2022-08-25 DIAGNOSIS — N39.0 RECURRENT UTI: Primary | ICD-10-CM

## 2022-08-25 DIAGNOSIS — F41.9 ANXIETY: ICD-10-CM

## 2022-08-25 LAB
ALBUMIN SERPL BCP-MCNC: 3.6 G/DL (ref 3.5–5.2)
ALP SERPL-CCNC: 58 U/L (ref 55–135)
ALT SERPL W/O P-5'-P-CCNC: 11 U/L (ref 10–44)
ANION GAP SERPL CALC-SCNC: 10 MMOL/L (ref 8–16)
AST SERPL-CCNC: 21 U/L (ref 10–40)
BASOPHILS # BLD AUTO: 0.06 K/UL (ref 0–0.2)
BASOPHILS NFR BLD: 1.7 % (ref 0–1.9)
BILIRUB SERPL-MCNC: 0.5 MG/DL (ref 0.1–1)
BUN SERPL-MCNC: 21 MG/DL (ref 8–23)
CALCIUM SERPL-MCNC: 9.6 MG/DL (ref 8.7–10.5)
CHLORIDE SERPL-SCNC: 108 MMOL/L (ref 95–110)
CHOLEST SERPL-MCNC: 188 MG/DL (ref 120–199)
CHOLEST/HDLC SERPL: 3 {RATIO} (ref 2–5)
CO2 SERPL-SCNC: 25 MMOL/L (ref 23–29)
CREAT SERPL-MCNC: 0.9 MG/DL (ref 0.5–1.4)
DIFFERENTIAL METHOD: ABNORMAL
EOSINOPHIL # BLD AUTO: 0.2 K/UL (ref 0–0.5)
EOSINOPHIL NFR BLD: 4.5 % (ref 0–8)
ERYTHROCYTE [DISTWIDTH] IN BLOOD BY AUTOMATED COUNT: 14.4 % (ref 11.5–14.5)
EST. GFR  (NO RACE VARIABLE): >60 ML/MIN/1.73 M^2
ESTIMATED AVG GLUCOSE: 120 MG/DL (ref 68–131)
GLUCOSE SERPL-MCNC: 102 MG/DL (ref 70–110)
HBA1C MFR BLD: 5.8 % (ref 4–5.6)
HCT VFR BLD AUTO: 35.1 % (ref 37–48.5)
HDLC SERPL-MCNC: 63 MG/DL (ref 40–75)
HDLC SERPL: 33.5 % (ref 20–50)
HGB BLD-MCNC: 12 G/DL (ref 12–16)
IMM GRANULOCYTES # BLD AUTO: 0.01 K/UL (ref 0–0.04)
IMM GRANULOCYTES NFR BLD AUTO: 0.3 % (ref 0–0.5)
LDLC SERPL CALC-MCNC: 111.6 MG/DL (ref 63–159)
LYMPHOCYTES # BLD AUTO: 1.3 K/UL (ref 1–4.8)
LYMPHOCYTES NFR BLD: 37.4 % (ref 18–48)
MCH RBC QN AUTO: 29.7 PG (ref 27–31)
MCHC RBC AUTO-ENTMCNC: 34.2 G/DL (ref 32–36)
MCV RBC AUTO: 87 FL (ref 82–98)
MONOCYTES # BLD AUTO: 0.4 K/UL (ref 0.3–1)
MONOCYTES NFR BLD: 10.6 % (ref 4–15)
NEUTROPHILS # BLD AUTO: 1.6 K/UL (ref 1.8–7.7)
NEUTROPHILS NFR BLD: 45.5 % (ref 38–73)
NONHDLC SERPL-MCNC: 125 MG/DL
NRBC BLD-RTO: 0 /100 WBC
PLATELET # BLD AUTO: 251 K/UL (ref 150–450)
PMV BLD AUTO: 9.6 FL (ref 9.2–12.9)
POTASSIUM SERPL-SCNC: 4.2 MMOL/L (ref 3.5–5.1)
PROT SERPL-MCNC: 7.3 G/DL (ref 6–8.4)
RBC # BLD AUTO: 4.04 M/UL (ref 4–5.4)
SODIUM SERPL-SCNC: 143 MMOL/L (ref 136–145)
TRIGL SERPL-MCNC: 67 MG/DL (ref 30–150)
TSH SERPL DL<=0.005 MIU/L-ACNC: 1.29 UIU/ML (ref 0.4–4)
WBC # BLD AUTO: 3.58 K/UL (ref 3.9–12.7)

## 2022-08-25 PROCEDURE — 1126F PR PAIN SEVERITY QUANTIFIED, NO PAIN PRESENT: ICD-10-PCS | Mod: CPTII,S$GLB,, | Performed by: INTERNAL MEDICINE

## 2022-08-25 PROCEDURE — 85025 COMPLETE CBC W/AUTO DIFF WBC: CPT | Performed by: INTERNAL MEDICINE

## 2022-08-25 PROCEDURE — 36415 COLL VENOUS BLD VENIPUNCTURE: CPT | Performed by: INTERNAL MEDICINE

## 2022-08-25 PROCEDURE — 3288F PR FALLS RISK ASSESSMENT DOCUMENTED: ICD-10-PCS | Mod: CPTII,S$GLB,, | Performed by: INTERNAL MEDICINE

## 2022-08-25 PROCEDURE — 1159F MED LIST DOCD IN RCRD: CPT | Mod: CPTII,S$GLB,, | Performed by: INTERNAL MEDICINE

## 2022-08-25 PROCEDURE — 1101F PT FALLS ASSESS-DOCD LE1/YR: CPT | Mod: CPTII,S$GLB,, | Performed by: INTERNAL MEDICINE

## 2022-08-25 PROCEDURE — 99999 PR PBB SHADOW E&M-EST. PATIENT-LVL IV: CPT | Mod: PBBFAC,,, | Performed by: INTERNAL MEDICINE

## 2022-08-25 PROCEDURE — 80061 LIPID PANEL: CPT | Performed by: INTERNAL MEDICINE

## 2022-08-25 PROCEDURE — 1160F RVW MEDS BY RX/DR IN RCRD: CPT | Mod: CPTII,S$GLB,, | Performed by: INTERNAL MEDICINE

## 2022-08-25 PROCEDURE — 99499 UNLISTED E&M SERVICE: CPT | Mod: S$GLB,,, | Performed by: INTERNAL MEDICINE

## 2022-08-25 PROCEDURE — 1160F PR REVIEW ALL MEDS BY PRESCRIBER/CLIN PHARMACIST DOCUMENTED: ICD-10-PCS | Mod: CPTII,S$GLB,, | Performed by: INTERNAL MEDICINE

## 2022-08-25 PROCEDURE — 1101F PR PT FALLS ASSESS DOC 0-1 FALLS W/OUT INJ PAST YR: ICD-10-PCS | Mod: CPTII,S$GLB,, | Performed by: INTERNAL MEDICINE

## 2022-08-25 PROCEDURE — 83036 HEMOGLOBIN GLYCOSYLATED A1C: CPT | Performed by: INTERNAL MEDICINE

## 2022-08-25 PROCEDURE — 1157F ADVNC CARE PLAN IN RCRD: CPT | Mod: CPTII,S$GLB,, | Performed by: INTERNAL MEDICINE

## 2022-08-25 PROCEDURE — 99999 PR PBB SHADOW E&M-EST. PATIENT-LVL IV: ICD-10-PCS | Mod: PBBFAC,,, | Performed by: INTERNAL MEDICINE

## 2022-08-25 PROCEDURE — 99499 RISK ADDL DX/OHS AUDIT: ICD-10-PCS | Mod: S$GLB,,, | Performed by: INTERNAL MEDICINE

## 2022-08-25 PROCEDURE — 84443 ASSAY THYROID STIM HORMONE: CPT | Performed by: INTERNAL MEDICINE

## 2022-08-25 PROCEDURE — 99214 OFFICE O/P EST MOD 30 MIN: CPT | Mod: S$GLB,,, | Performed by: INTERNAL MEDICINE

## 2022-08-25 PROCEDURE — 1126F AMNT PAIN NOTED NONE PRSNT: CPT | Mod: CPTII,S$GLB,, | Performed by: INTERNAL MEDICINE

## 2022-08-25 PROCEDURE — 3288F FALL RISK ASSESSMENT DOCD: CPT | Mod: CPTII,S$GLB,, | Performed by: INTERNAL MEDICINE

## 2022-08-25 PROCEDURE — 99214 PR OFFICE/OUTPT VISIT, EST, LEVL IV, 30-39 MIN: ICD-10-PCS | Mod: S$GLB,,, | Performed by: INTERNAL MEDICINE

## 2022-08-25 PROCEDURE — 1159F PR MEDICATION LIST DOCUMENTED IN MEDICAL RECORD: ICD-10-PCS | Mod: CPTII,S$GLB,, | Performed by: INTERNAL MEDICINE

## 2022-08-25 PROCEDURE — 80053 COMPREHEN METABOLIC PANEL: CPT | Performed by: INTERNAL MEDICINE

## 2022-08-25 PROCEDURE — 1157F PR ADVANCE CARE PLAN OR EQUIV PRESENT IN MEDICAL RECORD: ICD-10-PCS | Mod: CPTII,S$GLB,, | Performed by: INTERNAL MEDICINE

## 2022-08-25 RX ORDER — METFORMIN HYDROCHLORIDE 500 MG/1
500 TABLET ORAL
Qty: 90 TABLET | Refills: 3 | Status: SHIPPED | OUTPATIENT
Start: 2022-08-25 | End: 2023-01-11 | Stop reason: ALTCHOICE

## 2022-08-25 RX ORDER — MIRTAZAPINE 7.5 MG/1
7.5 TABLET, FILM COATED ORAL NIGHTLY
Qty: 30 TABLET | Refills: 11 | Status: SHIPPED | OUTPATIENT
Start: 2022-08-25 | End: 2023-01-11 | Stop reason: SDUPTHER

## 2022-08-25 RX ORDER — ESOMEPRAZOLE MAGNESIUM 40 MG/1
40 CAPSULE, DELAYED RELEASE ORAL
Qty: 90 CAPSULE | Refills: 0 | Status: SHIPPED | OUTPATIENT
Start: 2022-08-25 | End: 2022-12-02

## 2022-08-25 RX ORDER — LORAZEPAM 0.5 MG/1
0.25 TABLET ORAL NIGHTLY PRN
Qty: 15 TABLET | Refills: 0 | Status: SHIPPED | OUTPATIENT
Start: 2022-08-25 | End: 2022-12-02

## 2022-08-25 NOTE — PROGRESS NOTES
Subjective:       Patient ID: Ghada Dave is a 87 y.o. female.    Chief Complaint: Hypertension (3 month )      HPI  Ghada Dave is a 87 y.o. female with chronic conditions of hypertension, hyperlipidemia, prediabetes, CAD, hiatal hernia/GERD, pruritus who presents today for routine follow up.    She presents to the appointment with her son. They are concerned of recurrent UTIs for years. Has had cystoscopes and voiding evaluations. No significant abnormality that they are aware of and  have tried medications with no help. They are interested to know if surgical procedure is an option with Uro/gyn service. On our last visit patient reported the medication was helping and compliance is not clear. Currently without fever or dysuria.     There are concerns of memory changes and compliance with medications. She is anxious and has trouble sleeping. Says she has used Lorazepam for years and nothing else has worked. In the past we have discussed concerns of the medication, dependence, and memory loss. She is willing to try another medication.    Her blood pressure has been controlled and tolerates her medication. Her labs show stable prediabetes. She asks about cholesterol medication, tends to hold medications for unclear reason (pruritus, gi symptoms, no refills?). Labs stable, was on Zetia as statins caused rash or itching. We could monitor with diet and exercise (says she is active).    Health Maintenance:  Health Maintenance   Topic Date Due    Aspirin/Antiplatelet Therapy  07/29/2023    Colonoscopy  06/17/2026    TETANUS VACCINE  11/14/2026    Lipid Panel  03/11/2027       Review of Systems   Constitutional:  Negative for chills, fever and unexpected weight change.   HENT:  Negative for nasal congestion and sore throat.    Eyes:  Negative for visual disturbance.   Respiratory:  Negative for shortness of breath.    Cardiovascular:  Negative for chest pain and palpitations.    Gastrointestinal:  Positive for abdominal distention (Bloating). Negative for change in bowel habit, constipation, diarrhea, nausea and change in bowel habit.   Genitourinary:  Negative for difficulty urinating and dysuria.   Musculoskeletal: Negative.    Integumentary:  Positive for rash.   Neurological:  Negative for weakness and headaches.   Psychiatric/Behavioral:  Positive for sleep disturbance.     Past Medical History:   Diagnosis Date    Abnormal Pap smear 2013    Acidosis     CAD (coronary artery disease) 06/2017     iFR of LAD and RCA performed confirmed nonobstructive disease.    Cataract     Colon polyps     Disorder of kidney and ureter     Frequent urination     Hemorrhoid     High cholesterol     Poor circulation     Postoperative abdominal pain     Psoriasis     Varicose vein        Past Surgical History:   Procedure Laterality Date    CARDIAC CATHETERIZATION  06/2017     iFR of LAD and RCA performed confirmed nonobstructive disease.    CATARACT EXTRACTION W/  INTRAOCULAR LENS IMPLANT Bilateral     COLONOSCOPY N/A 8/28/2017    Procedure: COLONOSCOPY;  Surgeon: Bertram Myers MD;  Location: [facility];  Service: Endoscopy;  Laterality: N/A;    COLONOSCOPY N/A 10/18/2019    Procedure: COLONOSCOPY;  Surgeon: Wilfred Barber MD;  Location: [facility] ([address] [place] [address]);  Service: Endoscopy;  Laterality: N/A;   needed-BB  Cardiology Clearance received from Dr. REBECA Escalona, see telephone encounter 10/1/19-BB    COLONOSCOPY N/A 6/17/2021    Procedure: COLONOSCOPY suprep Past positive;  Surgeon: Lele Barillas MD;  Location: [facility];  Service: Endoscopy;  Laterality: N/A;  past positive    COLONOSCOPY W/ BIOPSIES  06/17/2021    EXCISION OF BURSA  3/5/2021    Procedure: BURSECTOMY;  Surgeon: Jason Alicea Jr., MD;  Location: [facility] OR;  Service: Orthopedics;;    LEFT HEART CATHETERIZATION N/A 4/8/2019    Procedure: Left heart cath;  Surgeon: Renato Escalona MD;  Location: [facility]  CATH LAB/EP;  Service: Cardiology;  Laterality: N/A;    POLYPECTOMY      SHOULDER ARTHROSCOPY Right 3/5/2021    Procedure: ARTHROSCOPY, SHOULDER;  Surgeon: Jason Alicea Jr., MD;  Location: [facility];  Service: Orthopedics;  Laterality: Right;  need opus system [facility]   video  ([facility] notified 3/1/21, CC)  [place] confirmed 3/4/21 [place]    YAG Laser Capsulotomy Right 04/17/2017    Dr. Chavez       Family History   Problem Relation Age of Onset    Glaucoma Neg Hx     Macular degeneration Neg Hx     Strabismus Neg Hx     Retinal detachment Neg Hx     Amblyopia Neg Hx     Blindness Neg Hx     Cataracts Neg Hx     Prostate cancer Neg Hx     Kidney disease Neg Hx     Anesthesia problems Neg Hx        Social History     Socioeconomic History    Marital status: Single   Tobacco Use    Smoking status: Never Smoker    Smokeless tobacco: Never Used   Substance and Sexual Activity    Alcohol use: No     Alcohol/week: 0.0 standard drinks    Drug use: No    Sexual activity: Never   Social History Narrative    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 30 tablet 3    b complex vitamins capsule Take 1 capsule by mouth once daily.      esomeprazole (NEXIUM) 40 MG capsule Take 1 capsule (40 mg total) by mouth before breakfast. 90 capsule 0    metFORMIN (GLUCOPHAGE) 500 MG tablet Take 1 tablet (500 mg total) by mouth daily with breakfast. 90 tablet 3    methenamine (HIPREX) 1 gram Tab Take 1 tablet (1 g total) by mouth once daily. 30 tablet 11    triamcinolone (KENALOG) 0.5 % ointment Apply topically 2 (two) times daily as needed (rash). 60 g 3    camphor-menthol 0.5-0.5% (SARNA ORIGINAL) lotion Apply topically as needed for Itching. (Patient not taking: Reported on 8/25/2022) 222 mL 2    cetirizine (ZYRTEC) 10 MG tablet Take 1 tablet (10 mg total)  by mouth once daily. (Patient not taking: Reported on 8/25/2022) 30 tablet 0    hydrocortisone 2.5 % lotion Apply topically 2 (two) times daily. (Patient not taking: Reported on 8/25/2022) 60 mL 0    LORazepam (ATIVAN) 0.5 MG tablet Take 0.5 tablets (0.25 mg total) by mouth nightly as needed for Anxiety (Insomnia). (Patient not taking: Reported on 8/25/2022) 30 tablet 0     No current facility-administered medications for this visit.       Review of patient's allergies indicates:   Allergen Reactions    Penicillin Anaphylaxis    Lipitor [atorvastatin] Hives     Can take Simivastatin .     Keflex [cephalexin] Rash         Objective:       Last 3 sets of Vitals    Vitals - 1 value per visit 8/7/2022 8/25/2022 8/25/2022   SYSTOLIC 121 - 126   DIASTOLIC 70 - 68   Pulse 105 - 74   Temp 98.2 - -   Resp 18 - -   SPO2 96 - 98   Weight (lb) 128 - 125.66   Weight (kg) 58.06 - 57   Height 60 - 60   BMI (Calculated) 25 - 24.5   VISIT REPORT - - -   Pain Score  - 0 -   Some recent data might be hidden   Physical Exam      CBC:  Recent Labs   Lab 05/04/21  1202 07/16/21  0948 11/10/21  0945   WBC 3.08 L 3.34 L 4.35   RBC 4.13 4.09 4.23   Hemoglobin 11.9 L 11.6 L 12.5   Hematocrit 36.0 L 34.6 L 36.4 L   Platelets 252 254 293   MCV 87 85 86   MCH 28.8 28.4 29.6   MCHC 33.1 33.5 34.3     CMP:  Recent Labs   Lab 07/16/21  0948 11/10/21  0945 03/11/22  0933 07/22/22  0847   Glucose 107 104 101 100   Calcium 9.6 9.6 9.5 9.5   Albumin 3.8 3.9 3.8  --    Total Protein 7.9 7.8 7.4  --    Sodium 143 141 141 142   Potassium 4.3 4.3 4.1 4.1   CO2 22 L 20 L 24 24   Chloride 111 H 110 109 109   BUN 16 30 H 17 14   Creatinine 0.9 1.0 0.9 0.8   Alkaline Phosphatase 67 57 51 L  --    ALT 9 L 14 10  --    AST 20 23 20  --    Total Bilirubin 0.4 0.4 0.6  --      URINALYSIS:  Recent Labs   Lab 09/16/20  1751 11/11/20  0950 10/06/21  1003 02/22/22  1009 08/07/22  1157   Color, UA Yellow   < > Colorless Yellow  --    Clarity, UA  --    < > Clear  --    --    Specific Gravity, UA 1.020   < >  --  1.015  --    Spec Grav UA  --    < > 1.010  --   --    pH, UA 6.0   < > 5 6.0 5.5   Protein, UA Negative   < >  --  1+ A  --    Bacteria Few A   < >  --  Occasional  --    Nitrite, UA Negative   < > n Negative  --    Leukocytes, UA 1+ A   < >  --  3+ A  --    Urobilinogen, UA Negative   < > n Negative  --    Hyaline Casts, UA 0  --   --  0  --     < > = values in this interval not displayed.      LIPIDS:  Recent Labs   Lab 11/11/20  0954 05/04/21  1202 07/16/21  0948 11/10/21  0945 03/11/22  0933   TSH 1.759  --  1.272  --  1.606   HDL 63   < > 56 62 58   Cholesterol 153   < > 186 188 161   Triglycerides 98   < > 122 88 73   LDL Cholesterol 70.4   < > 105.6 108.4 88.4   HDL/Cholesterol Ratio 41.2   < > 30.1 33.0 36.0   Non-HDL Cholesterol 90   < > 130 126 103   Total Cholesterol/HDL Ratio 2.4   < > 3.3 3.0 2.8    < > = values in this interval not displayed.     TSH:  Recent Labs   Lab 11/11/20  0954 07/16/21  0948 03/11/22  0933   TSH 1.759 1.272 1.606       A1C:  Recent Labs   Lab 11/11/20  0954 05/04/21  1202 07/16/21  0948 11/10/21  0945 03/11/22  0933   Hemoglobin A1C 5.6 5.6 5.7 H 5.8 H 5.9 H       Imaging:  CT Urogram Abd Pelvis W WO  Narrative: EXAMINATION:  CT UROGRAM ABD PELVIS W WO    CLINICAL HISTORY:  gross hematuria;  Gross hematuria    FINDINGS:  Comparison is 09/18/2018 CT abdomen and pelvis with contrast, ultrasound retroperitoneal complete dated 05/23/2019.    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  No definite biliary, renal, ureter, or bladder calculi appreciated.  There is a hiatal hernia with organo-axial volvulus of the stomach.  There is are multiple liver cysts 1 dominant cyst measuring 4.6 cm.  Other cysts are tiny.  Spleen, pancreas, biliary tree, gallbladder, and duodenum demonstrate nothing unusual.  The adrenal glands are not enlarged.  There are tiny renal lesions bilaterally too small to characterize but most  likely cysts.  No para-aortic or retroperitoneal adenopathy is seen.  No obstruction, ileus, or perforation seen.  No ascites or adenopathy is seen.  The pelvic organs are unremarkable.  The ureters are normal in course and caliber.  There renal and abdominal vasculature is unremarkable.  There is mild constipation.  There is diverticulosis.  There is no evidence of diverticulitis.  No acute process seen.  Definite cause of hematuria is seen.  The lungs are clear.  The bones demonstrate DJD  and scoliosis.  There is a levoconvex curvature of the spine.  Impression: Malrotated right kidney.    Hiatal hernia with organo-axial volvulus of the stomach.    Multiple liver cysts.    Tiny renal lesions bilaterally most likely small cysts.    Constipation.    Diverticulosis without diverticulitis.    Scoliosis and DJD.    Constipation.    Electronically signed by: Tino Perez MD  Date:    07/25/2022  Time:    09:25      Assessment:       1. Recurrent UTI    2. Prediabetes    3. Other insomnia    4. Anxiety    5. Memory changes    6. Cerebral atherosclerosis             Plan:       Ghada was seen today for hypertension.    Diagnoses and all orders for this visit:    Recurrent UTI  -     Ambulatory referral/consult to Urogynecology; Future  -     Ambulatory referral/consult to Adult Neuropsychology; Future  -     CBC Auto Differential; Future  - Interested in surgical options.3    Prediabetes  -     metFORMIN (GLUCOPHAGE) 500 MG tablet; Take 1 tablet (500 mg total) by mouth daily with breakfast.  -     Hemoglobin A1C; Future  -     Comprehensive Metabolic Panel; Future    Other insomnia  -     mirtazapine (REMERON) 7.5 MG Tab; Take 1 tablet (7.5 mg total) by mouth every evening.  -     LORazepam (ATIVAN) 0.5 MG tablet; Take 0.5 tablets (0.25 mg total) by mouth nightly as needed for Anxiety (Insomnia).    Anxiety  -     mirtazapine (REMERON) 7.5 MG Tab; Take 1 tablet (7.5 mg total) by mouth every evening.  -     LORazepam  (ATIVAN) 0.5 MG tablet; Take 0.5 tablets (0.25 mg total) by mouth nightly as needed for Anxiety (Insomnia).    Memory changes  -     Ambulatory referral/consult to Adult Neuropsychology; Future  -     Lipid Panel; Future  -     TSH; Future    Cerebral atherosclerosis   -     Lipid Panel; Future    Other orders  -     esomeprazole (NEXIUM) 40 MG capsule; Take 1 capsule (40 mg total) by mouth before breakfast.    Health Maintenance Due   Topic Date Due    COVID-19 Vaccine (1) Never done    Shingles Vaccine (2 of 3) 12/23/2016        Return to clinic in 3 months.    Ghada Hicks MD  [facility]  Disclaimer:  This note has been generated using voice-recognition software. There may be grammatical or spelling errors that have been missed during proof-reading

## 2022-08-27 ENCOUNTER — PATIENT MESSAGE (OUTPATIENT)
Dept: FAMILY MEDICINE | Facility: CLINIC | Age: 87
End: 2022-08-27
Payer: MEDICARE

## 2022-09-28 ENCOUNTER — PATIENT MESSAGE (OUTPATIENT)
Dept: UROGYNECOLOGY | Facility: CLINIC | Age: 87
End: 2022-09-28
Payer: MEDICARE

## 2022-10-11 ENCOUNTER — PATIENT OUTREACH (OUTPATIENT)
Dept: ADMINISTRATIVE | Facility: HOSPITAL | Age: 87
End: 2022-10-11
Payer: MEDICARE

## 2022-10-11 ENCOUNTER — OFFICE VISIT (OUTPATIENT)
Dept: UROGYNECOLOGY | Facility: CLINIC | Age: 87
End: 2022-10-11
Payer: MEDICARE

## 2022-10-11 VITALS
SYSTOLIC BLOOD PRESSURE: 138 MMHG | WEIGHT: 125.69 LBS | BODY MASS INDEX: 24.68 KG/M2 | DIASTOLIC BLOOD PRESSURE: 69 MMHG | HEIGHT: 60 IN

## 2022-10-11 DIAGNOSIS — R35.1 NOCTURIA: ICD-10-CM

## 2022-10-11 DIAGNOSIS — N36.2 URETHRAL CARUNCLE: ICD-10-CM

## 2022-10-11 DIAGNOSIS — N39.0 RECURRENT UTI: Primary | ICD-10-CM

## 2022-10-11 DIAGNOSIS — M79.18 MYOFASCIAL PAIN: ICD-10-CM

## 2022-10-11 DIAGNOSIS — N95.2 VAGINAL ATROPHY: ICD-10-CM

## 2022-10-11 LAB
BILIRUB UR QL STRIP: NEGATIVE
GLUCOSE UR QL STRIP: NEGATIVE
KETONES UR QL STRIP: NEGATIVE
LEUKOCYTE ESTERASE UR QL STRIP: NEGATIVE
PH, POC UA: 6.5
POC BLOOD, URINE: NEGATIVE
POC NITRATES, URINE: NEGATIVE
PROT UR QL STRIP: NEGATIVE
SP GR UR STRIP: 1.01 (ref 1–1.03)
UROBILINOGEN UR STRIP-ACNC: 0.1 (ref 0.1–1.1)

## 2022-10-11 PROCEDURE — 1126F AMNT PAIN NOTED NONE PRSNT: CPT | Mod: CPTII,S$GLB,, | Performed by: OBSTETRICS & GYNECOLOGY

## 2022-10-11 PROCEDURE — 99205 OFFICE O/P NEW HI 60 MIN: CPT | Mod: S$GLB,,, | Performed by: OBSTETRICS & GYNECOLOGY

## 2022-10-11 PROCEDURE — 1101F PT FALLS ASSESS-DOCD LE1/YR: CPT | Mod: CPTII,S$GLB,, | Performed by: OBSTETRICS & GYNECOLOGY

## 2022-10-11 PROCEDURE — 1126F PR PAIN SEVERITY QUANTIFIED, NO PAIN PRESENT: ICD-10-PCS | Mod: CPTII,S$GLB,, | Performed by: OBSTETRICS & GYNECOLOGY

## 2022-10-11 PROCEDURE — 1159F PR MEDICATION LIST DOCUMENTED IN MEDICAL RECORD: ICD-10-PCS | Mod: CPTII,S$GLB,, | Performed by: OBSTETRICS & GYNECOLOGY

## 2022-10-11 PROCEDURE — 1159F MED LIST DOCD IN RCRD: CPT | Mod: CPTII,S$GLB,, | Performed by: OBSTETRICS & GYNECOLOGY

## 2022-10-11 PROCEDURE — 3288F FALL RISK ASSESSMENT DOCD: CPT | Mod: CPTII,S$GLB,, | Performed by: OBSTETRICS & GYNECOLOGY

## 2022-10-11 PROCEDURE — 99999 PR PBB SHADOW E&M-EST. PATIENT-LVL IV: ICD-10-PCS | Mod: PBBFAC,,, | Performed by: OBSTETRICS & GYNECOLOGY

## 2022-10-11 PROCEDURE — 1157F PR ADVANCE CARE PLAN OR EQUIV PRESENT IN MEDICAL RECORD: ICD-10-PCS | Mod: CPTII,S$GLB,, | Performed by: OBSTETRICS & GYNECOLOGY

## 2022-10-11 PROCEDURE — 1157F ADVNC CARE PLAN IN RCRD: CPT | Mod: CPTII,S$GLB,, | Performed by: OBSTETRICS & GYNECOLOGY

## 2022-10-11 PROCEDURE — 3288F PR FALLS RISK ASSESSMENT DOCUMENTED: ICD-10-PCS | Mod: CPTII,S$GLB,, | Performed by: OBSTETRICS & GYNECOLOGY

## 2022-10-11 PROCEDURE — 1101F PR PT FALLS ASSESS DOC 0-1 FALLS W/OUT INJ PAST YR: ICD-10-PCS | Mod: CPTII,S$GLB,, | Performed by: OBSTETRICS & GYNECOLOGY

## 2022-10-11 PROCEDURE — 99999 PR PBB SHADOW E&M-EST. PATIENT-LVL IV: CPT | Mod: PBBFAC,,, | Performed by: OBSTETRICS & GYNECOLOGY

## 2022-10-11 PROCEDURE — 99205 PR OFFICE/OUTPT VISIT, NEW, LEVL V, 60-74 MIN: ICD-10-PCS | Mod: S$GLB,,, | Performed by: OBSTETRICS & GYNECOLOGY

## 2022-10-11 RX ORDER — ESTRADIOL 0.1 MG/G
0.5 CREAM VAGINAL
Qty: 45 G | Refills: 4 | Status: SHIPPED | OUTPATIENT
Start: 2022-10-11 | End: 2023-05-11

## 2022-10-11 NOTE — PROGRESS NOTES
Chief Complaint   Patient presents with    Consult    Urinary Tract Infection    Hematuria     Gross      Nocturia        HPI:   Patient notified  services are available at no cost to them. Patient agreed to/ declined services.  services contacted for patient's preferred language. Provider # [id number] (Unique). Patient's son is present with the patient and also translating.     Patient is a 87 y.o. female  presents today with c/o urinary tract infections and nocturia. Patient is voiding about 7 times per night. The urge to void wakes her from sleep. She feels that each time she is voiding it is only small amounts and her stream is a trickle. She has a constant sensation of urgency. During the daytime she voids about 4-5 times. The urgency is not as severe during the daytime. She denies urinary incontinence with urgency and coughing, sneezing or laughing.     When she has a bladder infection symptoms include burning with urination, discomfort around the urethra and blood in the urine.     She has tried multiple antibiotics for treatment as well as suppression therapy. She tried vaginal estrogen cream but it did not seem to change symptoms. She continued to have urinary tract infections. She does not believe that she has tried D-Mannose or Methenamine. However review of records shows that she at some point was prescribed Methenamine. It is unclear how long the patient took the medication or if it helped with UTI's.     Patient has seen multiple providers in the city and her last CT Urogram was 2022 which demonstrated a malrotated and ptotic right kidney but was otherwise unremarkable. She reports a cystoscopy in the last year which was normal.     For her nocturia she does not believe that she has tried medication. She drinks a lot of fruit juices and water. Drinks twelve ounces of coffee in the morning.     Review of Systems   Constitutional:  Negative for activity change, appetite change,  chills, fatigue, fever and unexpected weight change.   HENT:  Negative for nasal congestion, dental problem, hearing loss and sore throat.    Eyes:  Negative for pain and eye dryness.   Respiratory:  Negative for cough, shortness of breath and wheezing.    Cardiovascular:  Negative for chest pain, palpitations and leg swelling.   Gastrointestinal:  Negative for abdominal distention, abdominal pain, blood in stool, constipation and rectal pain.   Endocrine: Negative for cold intolerance and heat intolerance.   Genitourinary:  Negative for dyspareunia, hot flashes and vaginal dryness.   Musculoskeletal:  Negative for arthralgias, back pain, gait problem, joint swelling, myalgias, neck pain and neck stiffness.   Integumentary:  Negative for rash.   Neurological:  Negative for dizziness, tremors, seizures, speech difficulty, weakness, light-headedness, numbness and headaches.   Psychiatric/Behavioral:  Negative for confusion, dysphoric mood and sleep disturbance. The patient is not nervous/anxious.       Past Medical History:   Diagnosis Date    Abnormal Pap smear 2013    Acidosis     CAD (coronary artery disease) 06/2017     iFR of LAD and RCA performed confirmed nonobstructive disease.    Cataract     Colon polyps     Disorder of kidney and ureter     Frequent urination     Hemorrhoid     High cholesterol     Poor circulation     Postoperative abdominal pain     Psoriasis     Varicose vein        Past Surgical History:   Procedure Laterality Date    CARDIAC CATHETERIZATION  06/2017     iFR of LAD and RCA performed confirmed nonobstructive disease.    CATARACT EXTRACTION W/  INTRAOCULAR LENS IMPLANT Bilateral     COLONOSCOPY N/A 8/28/2017    Procedure: COLONOSCOPY;  Surgeon: Bertram Myers MD;  Location: [facility];  Service: Endoscopy;  Laterality: N/A;    COLONOSCOPY N/A 10/18/2019    Procedure: COLONOSCOPY;  Surgeon: Wilfred Barber MD;  Location: [address];  Service: Endoscopy;  Laterality: N/A;    needed-BB  Cardiology Clearance received from Dr. REBECA Escalona, see telephone encounter 10/1/19-BB    COLONOSCOPY N/A 6/17/2021    Procedure: COLONOSCOPY suprep Past positive;  Surgeon: Lele Barillas MD;  Location: [facility] ENDO;  Service: Endoscopy;  Laterality: N/A;  past positive    COLONOSCOPY W/ BIOPSIES  06/17/2021    EXCISION OF BURSA  3/5/2021    Procedure: BURSECTOMY;  Surgeon: Jason Alicea Jr., MD;  Location: [facility] OR;  Service: Orthopedics;;    LEFT HEART CATHETERIZATION N/A 4/8/2019    Procedure: Left heart cath;  Surgeon: Renato Escalona MD;  Location: [facility] CATH LAB/EP;  Service: Cardiology;  Laterality: N/A;    POLYPECTOMY      SHOULDER ARTHROSCOPY Right 3/5/2021    Procedure: ARTHROSCOPY, SHOULDER;  Surgeon: Jason Alicea Jr., MD;  Location: [facility] OR;  Service: Orthopedics;  Laterality: Right;  need opus system [place]   video  ([facility] notified 3/1/21, CC)  Kulwinder confirmed 3/4/21 [place]    YAG Laser Capsulotomy Right 04/17/2017    Dr. Chavez       Current Outpatient Medications:     aspirin (ECOTRIN) 81 MG EC tablet, Take 1 tablet (81 mg total) by mouth once daily., Disp: 30 tablet, Rfl: 3    b complex vitamins capsule, Take 1 capsule by mouth once daily., Disp: , Rfl:     esomeprazole (NEXIUM) 40 MG capsule, Take 1 capsule (40 mg total) by mouth before breakfast., Disp: 90 capsule, Rfl: 0    LORazepam (ATIVAN) 0.5 MG tablet, Take 0.5 tablets (0.25 mg total) by mouth nightly as needed for Anxiety (Insomnia)., Disp: 15 tablet, Rfl: 0    metFORMIN (GLUCOPHAGE) 500 MG tablet, Take 1 tablet (500 mg total) by mouth daily with breakfast., Disp: 90 tablet, Rfl: 3    methenamine (HIPREX) 1 gram Tab, Take 1 tablet (1 g total) by mouth once daily., Disp: 30 tablet, Rfl: 11    mirtazapine (REMERON) 7.5 MG Tab, Take 1 tablet (7.5 mg total) by mouth every evening., Disp: 30 tablet, Rfl: 11    triamcinolone (KENALOG) 0.5 % ointment, Apply topically 2 (two) times daily as  needed (rash)., Disp: 60 g, Rfl: 3    estradioL (ESTRACE) 0.01 % (0.1 mg/gram) vaginal cream, Place 0.5 g vaginally 3 (three) times a week. Apply to the vagina daily for two weeks then three times a week. BIN#[id number] N#CN  [facility]#[id number] ID#[id number], Disp: 45 g, Rfl: 4    Review of patient's allergies indicates:   Allergen Reactions    Penicillin Anaphylaxis    Lipitor [atorvastatin] Hives     Can take Simivastatin .     Keflex [cephalexin] Rash       Family History   Problem Relation Age of Onset    Kidney disease Mother     Glaucoma Neg Hx     Macular degeneration Neg Hx     Strabismus Neg Hx     Retinal detachment Neg Hx     Amblyopia Neg Hx     Blindness Neg Hx     Cataracts Neg Hx     Prostate cancer Neg Hx     Anesthesia problems Neg Hx        Social History     Socioeconomic History    Marital status: Single   Tobacco Use    Smoking status: Never    Smokeless tobacco: Never    Tobacco comments:     Never smoked   Substance and Sexual Activity    Alcohol use: Never    Drug use: Never    Sexual activity: Never   Social History Narrative    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 Patient is postmenopausal.   Postmenopausal bleeding: denies      INITIAL PHYSICAL EXAMINATION    Vitals:    10/11/22 0913   BP: 138/69        General: Healthy in appearance, Well nourished, Affect Normal, NAD.  Neck: Supple, No masses, Trachea midline, Thyroid normal size,  non-tender, no masses or nodules.  Nodes: No clavicular/cervical adenopathy.  Skin: Normal temperature, No atypical lesions or rash.  Heart: Normal sounds, no murmurs  Lungs: Normal respiratory effort.  Gastrointestinal: Non tender, Non distended, No masses, guarding or  rebound, No hepatosplenomegally, No  hernia.  Ext: No clubbing, cyanosis, edema or varicosities.  DTR's: 2+ bilaterally  Strength 5/5 bilateral upper and lower extremities    Genitourinary-  Vulva: normal without lesions, masses, atrophy or pain  Urethra: meatus central and 1 cm urethral caruncle, no masses or discharge. Empty supine stress test was negative.  Bladder: non-tender, no masses  Vagina: No discharge or lesions, severe atrophy, no masses appreciated.  [See POP-Q]  Cervix: no lesions, no discharge  Uterus:  non-tender, anteverted, approximately 4 weeks size  Adnexa: no masses, non tender.  Rectal: deferred  Neuro: S2-4- pin prick and light touch intact, Anal wink present  Levator strength: 2/5  Levator tenderness: left 4-5/10 and right 3/10    POP-Q Exam- pelvic organ prolapse quantitative    Aa -3  Anterior Wall Ba -3  Anterior wall C -5  Cervix or cuff   Gh 2  Genital hiatus pb 2  perineal body tvl 8  Total vaginal length   Ap -3  Posterior wall Bp -3  Posterior wall D -8  Posterior formix     with Uterus    POP-Q Stage:1      CT Urogram Abd/PV: 7/25/22  Malrotated right kidney.  Hiatal hernia with organo-axial volvulus of the stomach.  Multiple liver cysts.  Tiny renal lesions bilaterally most likely small cysts.  Constipation.  Diverticulosis without diverticulitis.  Scoliosis and DJD.    Office Visit on 10/11/2022   Component Date Value Ref Range Status    POC Blood, Urine 10/11/2022 Negative  Negative Final    POC Bilirubin, Urine 10/11/2022 Negative  Negative Final    POC Urobilinogen, Urine 10/11/2022 0.1  0.1 - 1.1 Final    POC Ketones, Urine 10/11/2022 Negative  Negative Final    POC Protein, Urine 10/11/2022 Negative  Negative Final    POC Nitrates, Urine 10/11/2022 Negative  Negative Final    POC Glucose, Urine 10/11/2022 Negative  Negative Final    pH, UA 10/11/2022 6.5   Final    POC Specific Gravity, Urine 10/11/2022 1.010  1.003 - 1.029 Final    POC Leukocytes, Urine 10/11/2022 Negative  Negative Final          Post void  residual: 62 ml by bladder scan      ASSESSMENT & PLAN:    Recurrent UTI  -     Ambulatory referral/consult to Urogynecology  -     Urinalysis, Reflex to Urine Culture Urine, Clean Catch; Standing  -     POCT urinalysis, dipstick or tablet reag    Nocturia  -     POCT Bladder Scan    Myofascial pain  -     PT evaluate and treat; Future    Vaginal atrophy  -     estradioL (ESTRACE) 0.01 % (0.1 mg/gram) vaginal cream; Place 0.5 g vaginally 3 (three) times a week. Apply to the vagina daily for two weeks then three times a week. BIN#[id number] PCN#CN  [facility]#[id number] ID#[id number]  Dispense: 45 g; Refill: 4    Urethral caruncle     Exam findings and treatment options were discussed in detail with the patient. Her history is consistent with recurrent bladder infections. We discussed various strategies to reduce her risk for infection and I have asked her to provide a sample for UA, C&S whenever she has symptoms of an infection. She has already undergone upper urinary tract imaging and a cystoscopy and therefore I would not repeat those at this time.     We reviewed that she has a couple of different problems occurring. The first being her recurrent UTI's which are related to her vaginal atrophy and the urethral caruncle. Discussed restarting the vaginal estrogen therapy and adding in D-Mannose. Reviewed that we can try Methenamine again as it wasn't clear how long she took the medication for and whether it helped to prevent the UTI's as she also equates her UTI's with her nocturia.   Discussed that her nocturia is a separate problem with OAB and myofascial pain of the pelvic floor being the two dominant diagnoses contributing to the issue. Recommended that she try PFPT to start before we attempt medications that may have potentially harmful side effects at her age.     All questions were answered today. The patient was encouraged to contact the office as needed with any additional questions or concerns.      Counseling/coordination of care composed greater than 50% of this visit. The total time spent with the patient was 60 minutes.    Shruthi Mccullough MD

## 2022-10-20 DIAGNOSIS — M79.18 MYOFASCIAL PAIN: Primary | ICD-10-CM

## 2022-11-01 DIAGNOSIS — F41.9 ANXIETY: ICD-10-CM

## 2022-11-01 NOTE — TELEPHONE ENCOUNTER
No new care gaps identified.  [facility] Embedded Care Gaps. Reference number: [id number]. 11/01/2022   10:12:21 AM CDT

## 2022-11-03 RX ORDER — ALPRAZOLAM 0.5 MG/1
TABLET ORAL
Qty: 30 TABLET | Refills: 0 | OUTPATIENT
Start: 2022-11-03

## 2022-11-10 NOTE — PROGRESS NOTES
Health Maintenance Due   Topic Date Due    COVID-19 Vaccine (1) Never done    Shingles Vaccine (2 of 3) 12/23/2016    DEXA Scan  04/11/2021    Influenza Vaccine (1) 09/01/2021     Updates were requested from care everywhere.  Chart was reviewed for overdue Proactive [facility] Encounters (FLASH) topics (CRS, Breast Cancer Screening, Eye exam)  Health Maintenance has been updated.  LINKS immunization registry triggered.  Immunizations were reconciled.      
36.4

## 2022-12-02 ENCOUNTER — OFFICE VISIT (OUTPATIENT)
Dept: PODIATRY | Facility: CLINIC | Age: 87
End: 2022-12-02
Payer: MEDICARE

## 2022-12-02 VITALS — HEIGHT: 60 IN | WEIGHT: 125.69 LBS | BODY MASS INDEX: 24.68 KG/M2

## 2022-12-02 DIAGNOSIS — L03.031 ONYCHIA OF TOE OF RIGHT FOOT: ICD-10-CM

## 2022-12-02 DIAGNOSIS — L03.032 ONYCHIA OF TOE OF LEFT FOOT: Primary | ICD-10-CM

## 2022-12-02 PROCEDURE — 1125F PR PAIN SEVERITY QUANTIFIED, PAIN PRESENT: ICD-10-PCS | Mod: CPTII,S$GLB,, | Performed by: PODIATRIST

## 2022-12-02 PROCEDURE — 3288F FALL RISK ASSESSMENT DOCD: CPT | Mod: CPTII,S$GLB,, | Performed by: PODIATRIST

## 2022-12-02 PROCEDURE — 3288F PR FALLS RISK ASSESSMENT DOCUMENTED: ICD-10-PCS | Mod: CPTII,S$GLB,, | Performed by: PODIATRIST

## 2022-12-02 PROCEDURE — 1125F AMNT PAIN NOTED PAIN PRSNT: CPT | Mod: CPTII,S$GLB,, | Performed by: PODIATRIST

## 2022-12-02 PROCEDURE — 99999 PR PBB SHADOW E&M-EST. PATIENT-LVL III: ICD-10-PCS | Mod: PBBFAC,,, | Performed by: PODIATRIST

## 2022-12-02 PROCEDURE — 1159F MED LIST DOCD IN RCRD: CPT | Mod: CPTII,S$GLB,, | Performed by: PODIATRIST

## 2022-12-02 PROCEDURE — 1160F PR REVIEW ALL MEDS BY PRESCRIBER/CLIN PHARMACIST DOCUMENTED: ICD-10-PCS | Mod: CPTII,S$GLB,, | Performed by: PODIATRIST

## 2022-12-02 PROCEDURE — 99999 PR PBB SHADOW E&M-EST. PATIENT-LVL III: CPT | Mod: PBBFAC,,, | Performed by: PODIATRIST

## 2022-12-02 PROCEDURE — 99203 OFFICE O/P NEW LOW 30 MIN: CPT | Mod: S$GLB,,, | Performed by: PODIATRIST

## 2022-12-02 PROCEDURE — 1101F PT FALLS ASSESS-DOCD LE1/YR: CPT | Mod: CPTII,S$GLB,, | Performed by: PODIATRIST

## 2022-12-02 PROCEDURE — 99203 PR OFFICE/OUTPT VISIT, NEW, LEVL III, 30-44 MIN: ICD-10-PCS | Mod: S$GLB,,, | Performed by: PODIATRIST

## 2022-12-02 PROCEDURE — 1159F PR MEDICATION LIST DOCUMENTED IN MEDICAL RECORD: ICD-10-PCS | Mod: CPTII,S$GLB,, | Performed by: PODIATRIST

## 2022-12-02 PROCEDURE — 1157F ADVNC CARE PLAN IN RCRD: CPT | Mod: CPTII,S$GLB,, | Performed by: PODIATRIST

## 2022-12-02 PROCEDURE — 1157F PR ADVANCE CARE PLAN OR EQUIV PRESENT IN MEDICAL RECORD: ICD-10-PCS | Mod: CPTII,S$GLB,, | Performed by: PODIATRIST

## 2022-12-02 PROCEDURE — 1160F RVW MEDS BY RX/DR IN RCRD: CPT | Mod: CPTII,S$GLB,, | Performed by: PODIATRIST

## 2022-12-02 PROCEDURE — 1101F PR PT FALLS ASSESS DOC 0-1 FALLS W/OUT INJ PAST YR: ICD-10-PCS | Mod: CPTII,S$GLB,, | Performed by: PODIATRIST

## 2022-12-02 NOTE — PROGRESS NOTES
Subjective:       Patient ID: Ghada Dave is a 87 y.o. female.    Chief Complaint: Ingrown Toenail (C/o ingrown toenail, (pt explains that they pulled her ingrown out three weeks ago but it seems to be still infected) rates pain 5/10, nondiabetic, wearing socks and shoes,last seen PCP Dr. Hicks on 08/25/2022.)      HPI: Ghada Dave presents to the office today with her son who is able to provide English to .  A  via telemedicine was also in the room at time of visit.  Patient complains of having a history of ingrown toenail which was removed approximately 3 weeks ago but patient is concerned that may still be infected or there may be a residual piece present.  She states 5/10 pain.  Relates she was put on antibiotics this was subsequently reduced in regards of redness and swelling.  States pain is only associated with the toenail as there may be a residual piece.  Patient's Primary Care Provider is Ghada Hicks MD.     Review of patient's allergies indicates:   Allergen Reactions    Penicillin Anaphylaxis    Lipitor [atorvastatin] Hives     Can take Simivastatin .     Keflex [cephalexin] Rash       Past Medical History:   Diagnosis Date    Abnormal Pap smear 2013    Acidosis     CAD (coronary artery disease) 06/2017     iFR of LAD and RCA performed confirmed nonobstructive disease.    Cataract     Colon polyps     Disorder of kidney and ureter     Frequent urination     Hemorrhoid     High cholesterol     Poor circulation     Postoperative abdominal pain     Psoriasis     Varicose vein        Family History   Problem Relation Age of Onset    Kidney disease Mother     Glaucoma Neg Hx     Macular degeneration Neg Hx     Strabismus Neg Hx     Retinal detachment Neg Hx     Amblyopia Neg Hx     Blindness Neg Hx     Cataracts Neg Hx     Prostate cancer Neg Hx     Anesthesia problems Neg Hx        Social History     Socioeconomic History    Marital status:  Single   Tobacco Use    Smoking status: Never    Smokeless tobacco: Never    Tobacco comments:     Never smoked   Substance and Sexual Activity    Alcohol use: Never    Drug use: Never    Sexual activity: Never   Social History Narrative    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    CATARACT EXTRACTION W/  INTRAOCULAR LENS IMPLANT Bilateral     COLONOSCOPY N/A 8/28/2017    Procedure: COLONOSCOPY;  Surgeon: Bertram Myers MD;  Location: [facility] ENDO;  Service: Endoscopy;  Laterality: N/A;    COLONOSCOPY N/A 10/18/2019    Procedure: COLONOSCOPY;  Surgeon: Wilfred Barber MD;  [facility]  Service: Endoscopy;  Laterality: N/A;   needed-BB  Cardiology Clearance received from Dr. REBECA Escalona, see telephone encounter 10/1/19-BB    COLONOSCOPY N/A 6/17/2021    Procedure: COLONOSCOPY suprep Past positive;  Surgeon: Lele Barillas MD;  Location: [facility] ENDO;  Service: Endoscopy;  Laterality: N/A;  past positive    COLONOSCOPY W/ BIOPSIES  06/17/2021    EXCISION OF BURSA  3/5/2021    Procedure: BURSECTOMY;  Surgeon: Jason Alicea Jr., MD;  Location: [facility] OR;  Service: Orthopedics;;    LEFT HEART CATHETERIZATION N/A 4/8/2019    Procedure: Left heart cath;  Surgeon: Renato Escalona MD;  Location: [facility] CATH LAB/EP;  Service: Cardiology;  Laterality: N/A;    POLYPECTOMY      SHOULDER ARTHROSCOPY Right 3/5/2021    Procedure: ARTHROSCOPY, SHOULDER;  Surgeon: Jason Alicea Jr., MD;  Location: [facility] OR;  Service: Orthopedics;  Laterality: Right;  need opus system Anglican   video  (Anglican notified 3/1/21, CC)  Kulwinder confirmed 3/4/21 [place]    YAG Laser Capsulotomy Right 04/17/2017    Dr. Chavez       Review of Systems      Objective:   Ht 5' (1.524 m)   Wt 57 kg (125 lb 10.6 oz)   BMI  24.54 kg/m²     Physical Exam  LOWER EXTREMITY PHYSICAL EXAMINATION    NEUROLOGY: Sensation to light touch is intact. Proprioception is intact.     VASCULAR:  The right dorsalis pedis pulse 2/4 and the right posterior tibial pulse 2/4.  The left dorsalis pedis pulse 2/4 and posterior tibial pulse on the left is 2/4.  Capillary refill is intact.  Pedal hair growth intact    DERMATOLOGY:  Slight ingrowing nail present to the right hallux on both the medial and lateral borders as well as the left hallux on the medial and lateral borders.  Small spicule present.  No signs of acute infection.  No signs edema or erythema.  No underlying purulence or fluctuance.  No cellulitis.  No granuloma formation.    ORTHOPEDIC: Manual Muscle Testing is 5/5 in all planes on the  right and left , without pains, with and without resistance. Gait pattern is slightly antalgic.    Assessment:     1. Onychia of toe of left foot    2. Onychia of toe of right foot        Plan:     Onychia of toe of left foot    Onychia of toe of right foot      Tajik to English interpretation was provided by the patient's son as well as a license Tajik to English  via telemedicine service      We discussed patient's options for treating the ingrown toenail.  We discussed slant back procedure to remove the distal offending edge, we discussed temporary partial avulsion, temporary complete avulsion, and attempted permanent matricectomy.  Patient would like to proceed with slant back.  Discussed the risk and benefits of the procedure.  Discussed risk of recurrence.  Patient did give verbal consent to proceed with procedure.    Patient tolerated procedure well without complications.  Large spicule was removed without complications.   Patient will call if there is any acute signs of infection associated with increased redness, swelling, abnormal drainage, increased pain.        No future appointments.

## 2022-12-06 ENCOUNTER — HOSPITAL ENCOUNTER (EMERGENCY)
Facility: HOSPITAL | Age: 87
Discharge: HOME | End: 2022-12-06
Attending: EMERGENCY MEDICINE
Payer: MEDICARE

## 2022-12-06 VITALS
HEART RATE: 82 BPM | TEMPERATURE: 98 F | RESPIRATION RATE: 18 BRPM | DIASTOLIC BLOOD PRESSURE: 61 MMHG | OXYGEN SATURATION: 96 % | SYSTOLIC BLOOD PRESSURE: 134 MMHG

## 2022-12-06 DIAGNOSIS — K59.00 CONSTIPATION, UNSPECIFIED CONSTIPATION TYPE: Primary | ICD-10-CM

## 2022-12-06 DIAGNOSIS — R10.30 LOWER ABDOMINAL PAIN: ICD-10-CM

## 2022-12-06 LAB
ALBUMIN SERPL BCP-MCNC: 4 G/DL (ref 3.5–5.2)
ALP SERPL-CCNC: 58 U/L (ref 55–135)
ALT SERPL W/O P-5'-P-CCNC: 16 U/L (ref 10–44)
ANION GAP SERPL CALC-SCNC: 10 MMOL/L (ref 8–16)
AST SERPL-CCNC: 24 U/L (ref 10–40)
BASOPHILS # BLD AUTO: 0.06 K/UL (ref 0–0.2)
BASOPHILS NFR BLD: 1.6 % (ref 0–1.9)
BILIRUB SERPL-MCNC: 0.4 MG/DL (ref 0.1–1)
BILIRUB UR QL STRIP: NEGATIVE
BUN SERPL-MCNC: 15 MG/DL (ref 8–23)
CALCIUM SERPL-MCNC: 9.3 MG/DL (ref 8.7–10.5)
CHLORIDE SERPL-SCNC: 107 MMOL/L (ref 95–110)
CLARITY UR: CLEAR
CO2 SERPL-SCNC: 24 MMOL/L (ref 23–29)
COLOR UR: YELLOW
CREAT SERPL-MCNC: 0.9 MG/DL (ref 0.5–1.4)
DIFFERENTIAL METHOD: ABNORMAL
EOSINOPHIL # BLD AUTO: 0.1 K/UL (ref 0–0.5)
EOSINOPHIL NFR BLD: 3 % (ref 0–8)
ERYTHROCYTE [DISTWIDTH] IN BLOOD BY AUTOMATED COUNT: 14.2 % (ref 11.5–14.5)
EST. GFR  (NO RACE VARIABLE): >60 ML/MIN/1.73 M^2
GLUCOSE SERPL-MCNC: 95 MG/DL (ref 70–110)
GLUCOSE UR QL STRIP: NEGATIVE
HCT VFR BLD AUTO: 34.9 % (ref 37–48.5)
HGB BLD-MCNC: 11.8 G/DL (ref 12–16)
HGB UR QL STRIP: ABNORMAL
IMM GRANULOCYTES # BLD AUTO: 0.01 K/UL (ref 0–0.04)
IMM GRANULOCYTES NFR BLD AUTO: 0.3 % (ref 0–0.5)
KETONES UR QL STRIP: NEGATIVE
LEUKOCYTE ESTERASE UR QL STRIP: ABNORMAL
LIPASE SERPL-CCNC: 15 U/L (ref 4–60)
LYMPHOCYTES # BLD AUTO: 1.2 K/UL (ref 1–4.8)
LYMPHOCYTES NFR BLD: 33.6 % (ref 18–48)
MAGNESIUM SERPL-MCNC: 2.2 MG/DL (ref 1.6–2.6)
MCH RBC QN AUTO: 30 PG (ref 27–31)
MCHC RBC AUTO-ENTMCNC: 33.8 G/DL (ref 32–36)
MCV RBC AUTO: 89 FL (ref 82–98)
MICROSCOPIC COMMENT: ABNORMAL
MONOCYTES # BLD AUTO: 0.4 K/UL (ref 0.3–1)
MONOCYTES NFR BLD: 11.5 % (ref 4–15)
NEUTROPHILS # BLD AUTO: 1.8 K/UL (ref 1.8–7.7)
NEUTROPHILS NFR BLD: 50 % (ref 38–73)
NITRITE UR QL STRIP: NEGATIVE
NRBC BLD-RTO: 0 /100 WBC
PH UR STRIP: 6 [PH] (ref 5–8)
PLATELET # BLD AUTO: 264 K/UL (ref 150–450)
PMV BLD AUTO: 9.4 FL (ref 9.2–12.9)
POTASSIUM SERPL-SCNC: 4.1 MMOL/L (ref 3.5–5.1)
PROT SERPL-MCNC: 7.7 G/DL (ref 6–8.4)
PROT UR QL STRIP: ABNORMAL
RBC # BLD AUTO: 3.93 M/UL (ref 4–5.4)
RBC #/AREA URNS HPF: 5 /HPF (ref 0–4)
SODIUM SERPL-SCNC: 141 MMOL/L (ref 136–145)
SP GR UR STRIP: 1.01 (ref 1–1.03)
SQUAMOUS #/AREA URNS HPF: 2 /HPF
URN SPEC COLLECT METH UR: ABNORMAL
UROBILINOGEN UR STRIP-ACNC: NEGATIVE EU/DL
WBC # BLD AUTO: 3.66 K/UL (ref 3.9–12.7)
WBC #/AREA URNS HPF: 8 /HPF (ref 0–5)
YEAST URNS QL MICRO: ABNORMAL

## 2022-12-06 PROCEDURE — 83690 ASSAY OF LIPASE: CPT | Performed by: EMERGENCY MEDICINE

## 2022-12-06 PROCEDURE — 81000 URINALYSIS NONAUTO W/SCOPE: CPT | Performed by: EMERGENCY MEDICINE

## 2022-12-06 PROCEDURE — 25500020 PHARM REV CODE 255: Performed by: EMERGENCY MEDICINE

## 2022-12-06 PROCEDURE — 83735 ASSAY OF MAGNESIUM: CPT | Performed by: EMERGENCY MEDICINE

## 2022-12-06 PROCEDURE — 99285 EMERGENCY DEPT VISIT HI MDM: CPT | Mod: 25

## 2022-12-06 PROCEDURE — 25000003 PHARM REV CODE 250: Performed by: EMERGENCY MEDICINE

## 2022-12-06 PROCEDURE — 85025 COMPLETE CBC W/AUTO DIFF WBC: CPT | Performed by: EMERGENCY MEDICINE

## 2022-12-06 PROCEDURE — 80053 COMPREHEN METABOLIC PANEL: CPT | Performed by: EMERGENCY MEDICINE

## 2022-12-06 RX ORDER — NITROFURANTOIN 25; 75 MG/1; MG/1
100 CAPSULE ORAL 2 TIMES DAILY
Qty: 10 CAPSULE | Refills: 0 | Status: SHIPPED | OUTPATIENT
Start: 2022-12-06 | End: 2022-12-11

## 2022-12-06 RX ORDER — POLYETHYLENE GLYCOL 3350 17 G/17G
17 POWDER, FOR SOLUTION ORAL DAILY
Qty: 20 EACH | Refills: 0 | Status: SHIPPED | OUTPATIENT
Start: 2022-12-06 | End: 2023-01-11 | Stop reason: ALTCHOICE

## 2022-12-06 RX ORDER — POLYETHYLENE GLYCOL 3350 17 G/17G
17 POWDER, FOR SOLUTION ORAL ONCE
Status: COMPLETED | OUTPATIENT
Start: 2022-12-06 | End: 2022-12-06

## 2022-12-06 RX ADMIN — IOHEXOL 75 ML: 350 INJECTION, SOLUTION INTRAVENOUS at 04:12

## 2022-12-06 RX ADMIN — POLYETHYLENE GLYCOL 3350 17 G: 17 POWDER, FOR SOLUTION ORAL at 05:12

## 2022-12-06 NOTE — ED TRIAGE NOTES
Patient arrived to ED with complaints of lower abdominal pain x 1 week. Worse on L side. Denies flank pain and dysuria. Feels like she needs to have a BM. Only having small little BMs. Taking OTC constipation medication at home. Intermittent nausea. Denies vomiting, CP, SOB.

## 2022-12-06 NOTE — ED PROVIDER NOTES
Encounter Date: 12/6/2022    SCRIBE #1 NOTE: I, Emmie Chu, am scribing for, and in the presence of,  Nya Grijalva MD. I have scribed the following portions of the note - Other sections scribed: HPI, ROS, PE.     History     Chief Complaint   Patient presents with    Abdominal Pain     Lower abd pain x 5 days, + fatigue, denies pain with urination, + nausea      Ghada Dave is a 87 y.o. female who  has a past medical history of Abnormal Pap smear (2013), Acidosis, CAD (coronary artery disease) (06/2017), Cataract, Colon polyps, Disorder of kidney and ureter, Frequent urination, Hemorrhoid, High cholesterol, Poor circulation, Postoperative abdominal pain, Psoriasis, and Varicose vein.    The patient presents to the ED due to lower abdominal pain that onset 5 days ago.  Says that it feels like she has to use the bathroom and so she is been going frequently.  Says that she is not having diarrhea but notes frequent small amounts of hard stool.  Her associated symptoms include nausea, but no vomiting. She denies chest pain. She tried taking Metamucil without relief. She has not had any abdominal surgeries. She has a history of UTI but denies any back pain, dysuria or hematuria. Patient is Colombian speaking so her son translated her medical history. Offered video interpretation services but they declined.        The history is provided by a relative and the patient. No  was used.   Review of patient's allergies indicates:   Allergen Reactions    Penicillin Anaphylaxis    Lipitor [atorvastatin] Hives     Can take Simivastatin .     Keflex [cephalexin] Rash     Past Medical History:   Diagnosis Date    Abnormal Pap smear 2013    Acidosis     CAD (coronary artery disease) 06/2017     iFR of LAD and RCA performed confirmed nonobstructive disease.    Cataract     Colon polyps     Disorder of kidney and ureter     Frequent urination     Hemorrhoid     High cholesterol     Poor  circulation     Postoperative abdominal pain     Psoriasis     Varicose vein      Past Surgical History:   Procedure Laterality Date    CARDIAC CATHETERIZATION  06/2017     iFR of LAD and RCA performed confirmed nonobstructive disease.    CATARACT EXTRACTION W/  INTRAOCULAR LENS IMPLANT Bilateral     COLONOSCOPY N/A 8/28/2017    Procedure: COLONOSCOPY;  Surgeon: Bertram Myers MD;  Location: [facility] ENDO;  Service: Endoscopy;  Laterality: N/A;    COLONOSCOPY N/A 10/18/2019    Procedure: COLONOSCOPY;  Surgeon: Wilfred Barber MD;  [facility]  Service: Endoscopy;  Laterality: N/A;   needed-BB  Cardiology Clearance received from Dr. REBECA Escalona, see telephone encounter 10/1/19-BB    COLONOSCOPY N/A 6/17/2021    Procedure: COLONOSCOPY suprep Past positive;  Surgeon: Lele Barillas MD;  Location: [facility];  Service: Endoscopy;  Laterality: N/A;  past positive    COLONOSCOPY W/ BIOPSIES  06/17/2021    EXCISION OF BURSA  3/5/2021    Procedure: BURSECTOMY;  Surgeon: Jason Alicea Jr., MD;  Location: [facility] OR;  Service: Orthopedics;;    LEFT HEART CATHETERIZATION N/A 4/8/2019    Procedure: Left heart cath;  Surgeon: Renato Escalona MD;  Location: [facility] CATH LAB/EP;  Service: Cardiology;  Laterality: N/A;    POLYPECTOMY      SHOULDER ARTHROSCOPY Right 3/5/2021    Procedure: ARTHROSCOPY, SHOULDER;  Surgeon: Jason Alicea Jr., MD;  Location: [facility] OR;  Service: Orthopedics;  Laterality: Right;  need opus system Religion   video  (Religion notified 3/1/21, CC)  [place] confirmed 3/4/21 [place]    YAG Laser Capsulotomy Right 04/17/2017    Dr. Chavez     Family History   Problem Relation Age of Onset    Kidney disease Mother     Glaucoma Neg Hx     Macular degeneration Neg Hx     Strabismus Neg Hx     Retinal detachment Neg Hx     Amblyopia Neg Hx     Blindness Neg Hx     Cataracts Neg Hx     Prostate cancer Neg Hx     Anesthesia problems Neg Hx      Social History      Tobacco Use    Smoking status: Never    Smokeless tobacco: Never    Tobacco comments:     Never smoked   Substance Use Topics    Alcohol use: Never    Drug use: Never     Review of Systems   Constitutional:  Positive for chills. Negative for activity change, fatigue and fever.   HENT:  Negative for sore throat.    Eyes:  Negative for visual disturbance.   Respiratory:  Negative for shortness of breath.    Cardiovascular:  Negative for chest pain.   Gastrointestinal:  Positive for abdominal pain and nausea. Negative for diarrhea and vomiting.   Genitourinary:  Negative for dysuria and hematuria.   Musculoskeletal:  Negative for back pain.   Skin:  Negative for rash.   Neurological:  Negative for weakness.   Hematological:  Does not bruise/bleed easily.   Psychiatric/Behavioral:  Negative for confusion.      Physical Exam     Initial Vitals [12/06/22 1342]   BP Pulse Resp Temp SpO2   134/61 82 18 98.3 °F (36.8 °C) 96 %      MAP       --         Physical Exam    Nursing note and vitals reviewed.  Constitutional: She appears well-developed and well-nourished. She is cooperative.  Non-toxic appearance. No distress.   HENT:   Head: Normocephalic and atraumatic.   Mouth/Throat: Oropharynx is clear and moist.   Eyes: Conjunctivae and EOM are normal. Pupils are equal, round, and reactive to light.   Neck: Neck supple. No thyromegaly present.   Normal range of motion.   Full passive range of motion without pain.     Cardiovascular:  Normal rate and normal pulses.           Pulmonary/Chest: Effort normal and breath sounds normal. No respiratory distress. She exhibits no tenderness.   Abdominal: Abdomen is soft. Bowel sounds are normal. She exhibits no distension. There is abdominal tenderness (mild lower abdomen tenderness).   No CVAT.   Musculoskeletal:         General: No edema. Normal range of motion.      Cervical back: Full passive range of motion without pain, normal range of motion and neck supple. No tenderness or  bony tenderness.      Right lower leg: No swelling.      Left lower leg: No swelling.     Neurological: She is alert and oriented to person, place, and time. She has normal strength. No cranial nerve deficit or sensory deficit.   Skin: Skin is warm, dry and intact. No rash noted.   Psychiatric: She has a normal mood and affect. Her speech is normal.     ED Course   Procedures  Labs Reviewed   CBC W/ AUTO DIFFERENTIAL - Abnormal; Notable for the following components:       Result Value    WBC 3.66 (*)     RBC 3.93 (*)     Hemoglobin 11.8 (*)     Hematocrit 34.9 (*)     All other components within normal limits   URINALYSIS, REFLEX TO URINE CULTURE - Abnormal; Notable for the following components:    Protein, UA Trace (*)     Occult Blood UA Trace (*)     Leukocytes, UA 2+ (*)     All other components within normal limits    Narrative:     Specimen Source->Urine   URINALYSIS MICROSCOPIC - Abnormal; Notable for the following components:    RBC, UA 5 (*)     WBC, UA 8 (*)     Yeast, UA Rare (*)     All other components within normal limits    Narrative:     Specimen Source->Urine   COMPREHENSIVE METABOLIC PANEL   LIPASE   MAGNESIUM          Imaging Results               CT Abdomen Pelvis With Contrast (Final result)  Result time 12/06/22 16:28:25      Final result by Kanu Andres MD (12/06/22 16:28:25)                   Impression:      This report was flagged in Epic as abnormal.    1. Multiple scattered colonic diverticula, no convincing inflammation to suggest diverticulitis.  There is a moderate to large amount of stool throughout the colon, may reflect developing constipation.  2. Stable configuration of the right kidney, better evaluated 07/25/2022.  3. Findings suggesting hepatic steatosis, correlation with LFTs recommended.  4. Prominent common duct without intraluminal filling defect.  Findings could be further evaluated with ultrasound or ERCP/MRCP on a nonemergent basis as warranted.  5. Please see  above for additional findings.      Electronically signed by: Kanu Andres MD  Date:    12/06/2022  Time:    16:28               Narrative:    EXAMINATION:  CT ABDOMEN PELVIS WITH CONTRAST    CLINICAL HISTORY:  Abdominal pain, acute, nonlocalized;lower abdominal pain eval for constipation vs diverticulitis;    TECHNIQUE:  Low dose axial images, sagittal and coronal reformations were obtained from the lung bases to the pubic symphysis following the IV administration of 75 mL of Omnipaque 350 .  Oral contrast was not given.    COMPARISON:  CT 07/25/2022    FINDINGS:  Images of the lower thorax are remarkable for bilateral dependent atelectasis/scarring.    There is a large hiatal hernia.  The liver is hypoattenuating, suggesting steatosis, correlation with LFTs recommended.  There is a punctate focus of low attenuation within the lateral aspect of the left hepatic lobe, too small for characterization.  There is a low attenuating lesion within the posterior aspect of the right hepatic lobe measuring 5.1 cm, attenuation of which suggests cyst, similar to the previous examination.  The spleen and adrenal glands are unremarkable.  The gallbladder is mildly distended without wall thickening.  There is prominence of the common duct without intraluminal filling defect, the common duct measures up to 8 mm.  There is atrophic change of the pancreas without significant pancreatic ductal dilation.  The portal vein, splenic vein, SMV, celiac axis and SMA all are patent.  No significant abdominal lymphadenopathy.    There is no hydronephrosis or nephrolithiasis.  The kidneys enhance symmetrically.  There is stable configuration of the right kidney from the prior examination, better evaluated at that time noting ptotic positioning.  The bilateral ureters are unable to be followed in their entirety to the urinary bladder, no definite calculi seen.  The configuration of the right renal collecting system is stable as compared to  the prior examination.  The urinary bladder is unremarkable.  The uterus and adnexa are grossly unremarkable.  No significant free fluid in the pelvis.    There are several scattered colonic diverticula without convincing surrounding inflammation to suggest acute diverticulitis.  There is moderate to large amount of stool throughout the colon.  The terminal ileum and appendix are unremarkable.  The small bowel is grossly unremarkable.  There are several scattered shotty periaortic and paracaval lymph nodes.  There is atherosclerotic calcification of the aorta and its branches.  No focal organized pelvic fluid collection.    There is osteopenia.  There are degenerative changes of the spine.  There are bilateral fat containing inguinal hernias without inflammation.                                       Medications   iohexoL (OMNIPAQUE 350) injection 75 mL (75 mLs Intravenous Given 12/6/22 1601)   polyethylene glycol packet 17 g (17 g Oral Given 12/6/22 1717)     Medical Decision Making:   Clinical Tests:   Lab Tests: Ordered and Reviewed  Radiological Study: Ordered and Reviewed  Medical Tests: Ordered and Reviewed  ED Management:  87-year-old female with past medical history as above presents with her son due to complaint of 5 days lower abdominal pain.  Upon arrival she is afebrile with stable vital signs.  She is comfortable and well-appearing with mild lower abdominal tenderness on exam.  No acute lab abnormalities including no leukocytosis, normal LFTs, normal electrolytes and kidney function.  CT abdomen pelvis without acute intra-abdominal pathology, discussed other nonemergent findings with son and patient (using in -person translation for this). Given Miralax to assist with therapy for constipation. Ordered macrobid for patient directly to pharmacy as they were accidentally discharged prior to giving them script. Discussed plan for treatment of UTI with son on the phone.    The patient is comfortable with  this plan and comfortable going home at this time. After taking into careful account the historical factors and physical exam findings of the patient's presentation today, in conjunction with the empirical and objective data obtained on ED workup, no acute emergent medical condition has been identified. The patient appears to be low risk for an emergent medical condition and I feel it is safe and appropriate at this time for the patient to be discharged to follow-up as detailed in their discharge instructions for reevaluation and possible continued outpatient workup and management. I have discussed the specifics of the workup with the patient and the patient has verbalized understanding of the details of the workup, the diagnosis, the treatment plan, and the need for outpatient follow-up.  Although the patient has no emergent etiology today this does not preclude the development of an emergent condition so in addition, I have advised the patient that they can return to the ED and/or activate EMS at any time with worsening of their symptoms, change of their symptoms, or with any other medical complaint.  The patient remained comfortable and stable during their visit in the ED.  Discharge and follow-up instructions discussed with the patient who expressed understanding and willingness to comply with my recommendations.          Scribe Attestation:   Scribe #1: I performed the above scribed service and the documentation accurately describes the services I performed. I attest to the accuracy of the note.      ED Course as of 12/06/22 2251   Tue Dec 06, 2022   1552 Creatinine: 0.9 [AT]   1552 HEMOGLOBIN(!): 11.8 [AT]   1641 NITRITE UA: Negative [AT]   1642 WBC, UA(!): 8 [AT]   2251 Leukocytes, UA(!): 2+ [AT]   2251 WBC, UA(!): 8 [AT]      ED Course User Index  [AT] Nya Grijalva MD        I, Nya Grijalva, personally performed the services described in this documentation. All medical record entries made by the  aline were at my direction and in my presence.  I have reviewed the chart and agree that the record reflects my personal performance and is accurate and complete. Nya Grijalva M.D. 10:51 PM12/06/2022           Clinical Impression:   Final diagnoses:  [K59.00] Constipation, unspecified constipation type (Primary)  [R10.30] Lower abdominal pain      ED Disposition Condition    Discharge Stable          ED Prescriptions       Medication Sig Dispense Start Date End Date Auth. Provider    polyethylene glycol (GLYCOLAX) 17 gram PwPk Take 17 g by mouth once daily. 20 each 12/6/2022 -- Nya Grijalva MD    nitrofurantoin, macrocrystal-monohydrate, (MACROBID) 100 MG capsule Take 1 capsule (100 mg total) by mouth 2 (two) times daily. for 5 days 10 capsule 12/6/2022 12/11/2022 Nya Grijalva MD          Follow-up Information       Follow up With Specialties Details Why Contact Info    [place] - Emergency Dept Emergency Medicine  As needed, If symptoms worsen [address]  [facility] [id number] [phone]    Ghada Hicks MD Family Medicine Schedule an appointment as soon as possible for a visit   [address]  Count includes the [facility] [address]  [facility] [address]  [phone]               Nya Grijalva MD  12/06/22 [id number]

## 2022-12-06 NOTE — DISCHARGE INSTRUCTIONS

## 2023-01-11 ENCOUNTER — LAB VISIT (OUTPATIENT)
Dept: LAB | Facility: HOSPITAL | Age: 88
End: 2023-01-11
Payer: MEDICARE

## 2023-01-11 ENCOUNTER — TELEPHONE (OUTPATIENT)
Dept: PRIMARY CARE CLINIC | Facility: CLINIC | Age: 88
End: 2023-01-11
Payer: MEDICARE

## 2023-01-11 ENCOUNTER — OFFICE VISIT (OUTPATIENT)
Dept: PRIMARY CARE CLINIC | Facility: CLINIC | Age: 88
End: 2023-01-11
Payer: MEDICARE

## 2023-01-11 VITALS
SYSTOLIC BLOOD PRESSURE: 142 MMHG | HEIGHT: 60 IN | BODY MASS INDEX: 24.28 KG/M2 | TEMPERATURE: 98 F | WEIGHT: 123.69 LBS | HEART RATE: 78 BPM | DIASTOLIC BLOOD PRESSURE: 80 MMHG

## 2023-01-11 DIAGNOSIS — Z79.899 LONG-TERM USE OF HIGH-RISK MEDICATION: ICD-10-CM

## 2023-01-11 DIAGNOSIS — K21.9 HIATAL HERNIA WITH GERD: ICD-10-CM

## 2023-01-11 DIAGNOSIS — N39.0 RECURRENT UTI: ICD-10-CM

## 2023-01-11 DIAGNOSIS — K44.9 HIATAL HERNIA: ICD-10-CM

## 2023-01-11 DIAGNOSIS — Z76.89 ENCOUNTER TO ESTABLISH CARE: ICD-10-CM

## 2023-01-11 DIAGNOSIS — E78.5 HYPERLIPIDEMIA, UNSPECIFIED HYPERLIPIDEMIA TYPE: ICD-10-CM

## 2023-01-11 DIAGNOSIS — Z76.89 ENCOUNTER TO ESTABLISH CARE: Primary | ICD-10-CM

## 2023-01-11 DIAGNOSIS — R73.03 PREDIABETES: ICD-10-CM

## 2023-01-11 DIAGNOSIS — G47.09 OTHER INSOMNIA: ICD-10-CM

## 2023-01-11 DIAGNOSIS — R30.0 DYSURIA: ICD-10-CM

## 2023-01-11 DIAGNOSIS — F41.9 ANXIETY: ICD-10-CM

## 2023-01-11 DIAGNOSIS — K44.9 HIATAL HERNIA WITH GERD: ICD-10-CM

## 2023-01-11 LAB
BASOPHILS # BLD AUTO: 0.08 K/UL (ref 0–0.2)
BASOPHILS NFR BLD: 1.9 % (ref 0–1.9)
DIFFERENTIAL METHOD: ABNORMAL
EOSINOPHIL # BLD AUTO: 0.2 K/UL (ref 0–0.5)
EOSINOPHIL NFR BLD: 3.5 % (ref 0–8)
ERYTHROCYTE [DISTWIDTH] IN BLOOD BY AUTOMATED COUNT: 14.7 % (ref 11.5–14.5)
HCT VFR BLD AUTO: 38.9 % (ref 37–48.5)
HGB BLD-MCNC: 12.9 G/DL (ref 12–16)
IMM GRANULOCYTES # BLD AUTO: 0.02 K/UL (ref 0–0.04)
IMM GRANULOCYTES NFR BLD AUTO: 0.5 % (ref 0–0.5)
LYMPHOCYTES # BLD AUTO: 1.3 K/UL (ref 1–4.8)
LYMPHOCYTES NFR BLD: 29.4 % (ref 18–48)
MCH RBC QN AUTO: 30.2 PG (ref 27–31)
MCHC RBC AUTO-ENTMCNC: 33.2 G/DL (ref 32–36)
MCV RBC AUTO: 91 FL (ref 82–98)
MONOCYTES # BLD AUTO: 0.4 K/UL (ref 0.3–1)
MONOCYTES NFR BLD: 8.9 % (ref 4–15)
NEUTROPHILS # BLD AUTO: 2.4 K/UL (ref 1.8–7.7)
NEUTROPHILS NFR BLD: 55.8 % (ref 38–73)
NRBC BLD-RTO: 0 /100 WBC
PLATELET # BLD AUTO: 296 K/UL (ref 150–450)
PMV BLD AUTO: 10.6 FL (ref 9.2–12.9)
RBC # BLD AUTO: 4.27 M/UL (ref 4–5.4)
WBC # BLD AUTO: 4.25 K/UL (ref 3.9–12.7)

## 2023-01-11 PROCEDURE — 99999 PR PBB SHADOW E&M-EST. PATIENT-LVL III: CPT | Mod: PBBFAC,HCNC,, | Performed by: FAMILY MEDICINE

## 2023-01-11 PROCEDURE — 80061 LIPID PANEL: CPT | Mod: HCNC | Performed by: FAMILY MEDICINE

## 2023-01-11 PROCEDURE — 1159F PR MEDICATION LIST DOCUMENTED IN MEDICAL RECORD: ICD-10-PCS | Mod: HCNC,CPTII,S$GLB, | Performed by: FAMILY MEDICINE

## 2023-01-11 PROCEDURE — 85025 COMPLETE CBC W/AUTO DIFF WBC: CPT | Mod: HCNC | Performed by: FAMILY MEDICINE

## 2023-01-11 PROCEDURE — 84443 ASSAY THYROID STIM HORMONE: CPT | Mod: HCNC | Performed by: FAMILY MEDICINE

## 2023-01-11 PROCEDURE — 1101F PT FALLS ASSESS-DOCD LE1/YR: CPT | Mod: HCNC,CPTII,S$GLB, | Performed by: FAMILY MEDICINE

## 2023-01-11 PROCEDURE — 36415 COLL VENOUS BLD VENIPUNCTURE: CPT | Mod: HCNC,PN | Performed by: FAMILY MEDICINE

## 2023-01-11 PROCEDURE — 1101F PR PT FALLS ASSESS DOC 0-1 FALLS W/OUT INJ PAST YR: ICD-10-PCS | Mod: HCNC,CPTII,S$GLB, | Performed by: FAMILY MEDICINE

## 2023-01-11 PROCEDURE — 80053 COMPREHEN METABOLIC PANEL: CPT | Mod: HCNC | Performed by: FAMILY MEDICINE

## 2023-01-11 PROCEDURE — 1159F MED LIST DOCD IN RCRD: CPT | Mod: HCNC,CPTII,S$GLB, | Performed by: FAMILY MEDICINE

## 2023-01-11 PROCEDURE — 99215 PR OFFICE/OUTPT VISIT, EST, LEVL V, 40-54 MIN: ICD-10-PCS | Mod: HCNC,S$GLB,, | Performed by: FAMILY MEDICINE

## 2023-01-11 PROCEDURE — 1157F PR ADVANCE CARE PLAN OR EQUIV PRESENT IN MEDICAL RECORD: ICD-10-PCS | Mod: HCNC,CPTII,S$GLB, | Performed by: FAMILY MEDICINE

## 2023-01-11 PROCEDURE — 99215 OFFICE O/P EST HI 40 MIN: CPT | Mod: HCNC,S$GLB,, | Performed by: FAMILY MEDICINE

## 2023-01-11 PROCEDURE — 3288F FALL RISK ASSESSMENT DOCD: CPT | Mod: HCNC,CPTII,S$GLB, | Performed by: FAMILY MEDICINE

## 2023-01-11 PROCEDURE — 99999 PR PBB SHADOW E&M-EST. PATIENT-LVL III: ICD-10-PCS | Mod: PBBFAC,HCNC,, | Performed by: FAMILY MEDICINE

## 2023-01-11 PROCEDURE — 83036 HEMOGLOBIN GLYCOSYLATED A1C: CPT | Mod: HCNC | Performed by: FAMILY MEDICINE

## 2023-01-11 PROCEDURE — 3288F PR FALLS RISK ASSESSMENT DOCUMENTED: ICD-10-PCS | Mod: HCNC,CPTII,S$GLB, | Performed by: FAMILY MEDICINE

## 2023-01-11 PROCEDURE — 1157F ADVNC CARE PLAN IN RCRD: CPT | Mod: HCNC,CPTII,S$GLB, | Performed by: FAMILY MEDICINE

## 2023-01-11 RX ORDER — METFORMIN HYDROCHLORIDE 500 MG/1
500 TABLET, EXTENDED RELEASE ORAL
Qty: 90 TABLET | Refills: 3 | Status: SHIPPED | OUTPATIENT
Start: 2023-01-11 | End: 2023-05-11 | Stop reason: SDUPTHER

## 2023-01-11 RX ORDER — MIRTAZAPINE 7.5 MG/1
7.5 TABLET, FILM COATED ORAL NIGHTLY
Qty: 30 TABLET | Refills: 11 | Status: SHIPPED | OUTPATIENT
Start: 2023-01-11 | End: 2023-05-11

## 2023-01-11 RX ORDER — ESOMEPRAZOLE MAGNESIUM 40 MG/1
40 CAPSULE, DELAYED RELEASE ORAL
Qty: 90 CAPSULE | Refills: 3 | Status: SHIPPED | OUTPATIENT
Start: 2023-01-11 | End: 2023-05-11 | Stop reason: SDUPTHER

## 2023-01-11 RX ORDER — ESOMEPRAZOLE MAGNESIUM 40 MG/1
CAPSULE, DELAYED RELEASE ORAL
COMMUNITY
Start: 2022-10-11 | End: 2023-01-11 | Stop reason: SDUPTHER

## 2023-01-11 RX ORDER — LORAZEPAM 0.5 MG/1
0.25 TABLET ORAL EVERY 12 HOURS PRN
Qty: 90 TABLET | Refills: 0 | Status: SHIPPED | OUTPATIENT
Start: 2023-01-11 | End: 2023-04-17 | Stop reason: SDUPTHER

## 2023-01-11 RX ORDER — NITROFURANTOIN 25; 75 MG/1; MG/1
100 CAPSULE ORAL DAILY
Qty: 90 CAPSULE | Refills: 3 | Status: SHIPPED | OUTPATIENT
Start: 2023-01-11 | End: 2023-04-17 | Stop reason: SDUPTHER

## 2023-01-11 NOTE — TELEPHONE ENCOUNTER
----- Message from Kindra Payan sent at 1/11/2023 12:06 PM CST -----  Pts son is requesting a call back concerning the medications sent to the pharmacy. Call back number [phone] x

## 2023-01-11 NOTE — PATIENT INSTRUCTIONS
Physically, everything looks pretty good today.      Let us collect a urine sample and some blood work to get some screening labs done.  We will contact you with those results as soon as they are available.      Medication refills sent to pharmacy today.  Please continue taking them, as directed.      For the recurrent bladder issues, let us start by treating with Macrobid today.  Take it twice daily for the next five days.  Then, continue by taking it once daily moving forward.      If you get a flare up, please let me know.  We can always place an order to check your urine and will change the Macrobid to a stronger antibiotic temporarily to get you through the episode.  Then, we will go back to the daily Macrobid.  Hopefully, this will prevent frequent flare-ups.      Continue to eat a healthy diet.  Be careful with portion sizes.  Includes lots of fresh fruits, vegetables, whole grains, lean proteins.  See info below.    Keep hydrated.  Be sure to drink at least 8-10, 8 oz, glasses of water every day.    Stay active.  Try to do some sort of physical activity every day.  Nothing outrageous, just try walking for 10-15 minutes each day.

## 2023-01-12 LAB
ALBUMIN SERPL BCP-MCNC: 4 G/DL (ref 3.5–5.2)
ALP SERPL-CCNC: 67 U/L (ref 55–135)
ALT SERPL W/O P-5'-P-CCNC: 9 U/L (ref 10–44)
ANION GAP SERPL CALC-SCNC: 10 MMOL/L (ref 8–16)
AST SERPL-CCNC: 23 U/L (ref 10–40)
BILIRUB SERPL-MCNC: 0.5 MG/DL (ref 0.1–1)
BUN SERPL-MCNC: 23 MG/DL (ref 8–23)
CALCIUM SERPL-MCNC: 9.9 MG/DL (ref 8.7–10.5)
CHLORIDE SERPL-SCNC: 109 MMOL/L (ref 95–110)
CHOLEST SERPL-MCNC: 203 MG/DL (ref 120–199)
CHOLEST/HDLC SERPL: 3.1 {RATIO} (ref 2–5)
CO2 SERPL-SCNC: 24 MMOL/L (ref 23–29)
CREAT SERPL-MCNC: 0.8 MG/DL (ref 0.5–1.4)
EST. GFR  (NO RACE VARIABLE): >60 ML/MIN/1.73 M^2
ESTIMATED AVG GLUCOSE: 111 MG/DL (ref 68–131)
GLUCOSE SERPL-MCNC: 99 MG/DL (ref 70–110)
HBA1C MFR BLD: 5.5 % (ref 4–5.6)
HDLC SERPL-MCNC: 66 MG/DL (ref 40–75)
HDLC SERPL: 32.5 % (ref 20–50)
LDLC SERPL CALC-MCNC: 122.2 MG/DL (ref 63–159)
NONHDLC SERPL-MCNC: 137 MG/DL
POTASSIUM SERPL-SCNC: 4.7 MMOL/L (ref 3.5–5.1)
PROT SERPL-MCNC: 7.7 G/DL (ref 6–8.4)
SODIUM SERPL-SCNC: 143 MMOL/L (ref 136–145)
TRIGL SERPL-MCNC: 74 MG/DL (ref 30–150)
TSH SERPL DL<=0.005 MIU/L-ACNC: 1.59 UIU/ML (ref 0.4–4)

## 2023-01-12 NOTE — PROGRESS NOTES
Ms. Flanagan,    You should be able to see the results of your recent blood work in [facility].  Everything looks really good!    Blood counts all look normal.  Electrolytes, kidney function, liver function, and thyroid function are also normal.  Your A1c is 5.5%, which is perfect!  Cholesterol levels look fine.    Still waiting to get the results of the urine tests.  As soon as I get results, I will forward them to you.  Hope you are feeling better.

## 2023-01-17 NOTE — PROGRESS NOTES
"    [facility]       [address] [place] [address]      Phone: [phone]      Fax: [phone]    Javed Basilio MD                Office Visit  01/11/2023        Subjective      HPI:  Ghada Dave is a 87 y.o. female presents today in clinic for "Establish Care  ."     87-year-old female presents today to establish care, discuss multiple issues.      Her son, Mr. Viera, is present with her.  He provides portions of the history, some translation services.   also available via telephone.      Patient states she has been having some burning with urination for the last couple of weeks.  Pain radiates to her left, lower quadrant of her abdomen.  This happens frequently with her.  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  Because of this, she tends to get recurrent bladder infections.  Frequently gets blood in her urine.  Has seen multiple urologists, been treated with multiple rounds of antibiotics.  In the past, took Macrobid daily to prevent recurrent UTIs.  Back then, she was only getting two or three per year.  For some reason this medication was discontinued, now she sees tends to get them more frequently.  Every couple of weeks she feels discomfort.  Now gets five or more per year.    She also has history of hypertension.  Not currently on medication for this.  Blood pressure generally under good control at home.  Tends to flare up when she develops her bladder issues.      Has also had elevated cholesterols in the past.  Unable to tolerate statins, such as atorvastatin.  Has been able to take simvastatin in the past.  Not currently on medication for cholesterol.    Has GERD, hiatal hernia.  Takes Nexium 40 mg daily for this.      Has anxiety, sleep issues.  Takes Ativan twice daily, Remeron 7.5 mg at bedtime for this.  Seems to be helping.      Also has prediabetes.  Has been taking metformin 500 mg daily.  No " issues with this medication.    PMH: HTN.  HLD.  Pre DM.  CAD.  Hiatal hernia/GERD.  Anxiety.  Recurrent bladder issues.    PSH: Right shoulder x2.    Allergies:  Statins cause rash.  Penicillin causes rash.  Keflex causes rash.    Social:  Disabled.  Lives with her son.    T: Denies  A:  Denies   D:  Denies     Exercise:  Walks frequently.  Very active at home.      Colon:  06/17/2021    MMG:  Was told she could discontinue these.      Podiatry: Dr. Nowak      The following were updated and reviewed by myself in the chart: medications, past medical history, past surgical history, family history, social history, and allergies.     Medications:  Current Outpatient Medications on File Prior to Visit   Medication Sig Dispense Refill    aspirin (ECOTRIN) 81 MG EC tablet Take 1 tablet (81 mg total) by mouth once daily. 30 tablet 3    b complex vitamins capsule Take 1 capsule by mouth once daily.      estradioL (ESTRACE) 0.01 % (0.1 mg/gram) vaginal cream Place 0.5 g vaginally 3 (three) times a week. Apply to the vagina daily for two weeks then three times a week. BIN#[id number] N#[id number]  GRP#[id number] ID#[id number] 45 g 4    triamcinolone (KENALOG) 0.5 % ointment Apply topically 2 (two) times daily as needed (rash). 60 g 3     No current facility-administered medications on file prior to visit.        PMHx:  Past Medical History:   Diagnosis Date    Abnormal Pap smear 2013    Acidosis     CAD (coronary artery disease) 06/2017     iFR of LAD and RCA performed confirmed nonobstructive disease.    Cataract     Colon polyps     Disorder of kidney and ureter     Frequent urination     Hemorrhoid     High cholesterol     Poor circulation     Postoperative abdominal pain     Psoriasis     Varicose vein       Patient Active Problem List    Diagnosis Date Noted    Hypercholesterolemia 12/18/2021    Hx of colonoscopy 06/17/2021    Decreased strength 03/22/2021    Decreased ROM of right shoulder 03/22/2021    Nontraumatic complete  tear of right rotator cuff 12/03/2020    Prediabetes 11/04/2020    Chronic right shoulder pain 11/04/2020    Arthropathies in other specified diseases classified elsewhere, vertebrae  11/04/2020    Age-related osteoporosis without fracture 11/04/2020    Osteoarthritis of multiple joints 11/04/2020    Gastroesophageal reflux disease without esophagitis 11/04/2020    Anal polyp 10/18/2019    SOB (shortness of breath) 03/21/2019    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    Cerumen debris on tympanic membrane of both ears 03/05/2018    Stenosis of carotid artery 03/05/2018    Nausea 03/05/2018    Essential (primary) hypertension 03/05/2018    Dizziness 03/05/2018    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), 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    Lichen simplex chronicus of the heel 07/12/2017    Non-occlusive coronary artery disease 07/06/2017    Psoriasis 07/06/2017    Chronic midline low back pain without sciatica 07/06/2017    Scoliosis of thoracolumbar spine 07/06/2017    Right subscapular pain 07/06/2017    Insomnia 07/06/2017    Overflow incontinence 07/06/2017    Urinary retention with incomplete bladder emptying 07/06/2017    Non-cardiac chest pain 06/22/2017    Abnormal stress test 06/22/2017    Anal or rectal pain 06/05/2017    CAD (coronary artery disease) 06/01/2017    Pseudophakia 04/17/2017    Right posterior capsular opacification 04/17/2017    Left lower quadrant pain 05/17/2016    Personal history of colonic polyps 05/12/2015    Leukopenia 04/15/2015    Post herpetic neuralgia 12/31/2014    Heartburn 11/18/2014    Diverticulosis 10/27/2014    Ureteral stone 06/03/2014    Rectal polyp 09/12/2013    Urge incontinence 07/08/2013        PSHx:  Past Surgical History:   Procedure Laterality Date    CARDIAC CATHETERIZATION  06/2017     iFR of LAD and RCA performed confirmed nonobstructive disease.    CATARACT EXTRACTION W/  INTRAOCULAR LENS IMPLANT Bilateral     COLONOSCOPY N/A 8/28/2017    Procedure:  COLONOSCOPY;  Surgeon: Bertram Myers MD;  Location: [facility] ENDO;  Service: Endoscopy;  Laterality: N/A;    COLONOSCOPY N/A 10/18/2019    Procedure: COLONOSCOPY;  Surgeon: Wilfred Barber MD;  Location: [facility] ENDO (4TH FLR);  Service: Endoscopy;  Laterality: N/A;   needed-BB  Cardiology Clearance received from Dr. REBECA Escalona, see telephone encounter 10/1/19-BB    COLONOSCOPY N/A 6/17/2021    Procedure: COLONOSCOPY suprep Past positive;  Surgeon: Lele Barillas MD;  Location: [facility] ENDO;  Service: Endoscopy;  Laterality: N/A;  past positive    COLONOSCOPY W/ BIOPSIES  06/17/2021    EXCISION OF BURSA  3/5/2021    Procedure: BURSECTOMY;  Surgeon: Jason Alicea Jr., MD;  Location: [facility] OR;  Service: Orthopedics;;    LEFT HEART CATHETERIZATION N/A 4/8/2019    Procedure: Left heart cath;  Surgeon: Renato Escalona MD;  Location: [facility] CATH LAB/EP;  Service: Cardiology;  Laterality: N/A;    POLYPECTOMY      SHOULDER ARTHROSCOPY Right 3/5/2021    Procedure: ARTHROSCOPY, SHOULDER;  Surgeon: Jason Alicea Jr., MD;  Location: [facility] OR;  Service: Orthopedics;  Laterality: Right;  need opus system Mosque   video  (Mosque notified 3/1/21, CC)  Kulwinder confirmed 3/4/21 [place]    YAG Laser Capsulotomy Right 04/17/2017    Dr. Chavez        FHx:  Family History   Problem Relation Age of Onset    Kidney disease Mother     Glaucoma Neg Hx     Macular degeneration Neg Hx     Strabismus Neg Hx     Retinal detachment Neg Hx     Amblyopia Neg Hx     Blindness Neg Hx     Cataracts Neg Hx     Prostate cancer Neg Hx     Anesthesia problems Neg Hx         Social:  Social History     Socioeconomic History    Marital status: Single   Tobacco Use    Smoking status: Never    Smokeless tobacco: Never    Tobacco comments:     Never smoked   Substance and Sexual Activity    Alcohol use: Never    Drug use: Never    Sexual activity: Never   Social History Narrative    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, [place]         Allergies:  Review of patient's allergies indicates:   Allergen Reactions    Penicillin Anaphylaxis    Lipitor [atorvastatin] Hives     Can take Simivastatin .     Keflex [cephalexin] Rash        ROS:  Review of Systems   Constitutional:  Negative for activity change, appetite change, chills and fever.   HENT:  Negative for congestion, postnasal drip, rhinorrhea, sore throat and trouble swallowing.    Respiratory:  Negative for cough, shortness of breath and wheezing.    Cardiovascular:  Negative for chest pain and palpitations.   Gastrointestinal:  Positive for abdominal pain. Negative for constipation, diarrhea, nausea and vomiting.   Genitourinary:  Positive for dysuria. Negative for difficulty urinating.   Musculoskeletal:  Negative for arthralgias and myalgias.   Skin:  Negative for color change and rash.   Neurological:  Negative for speech difficulty and headaches.   All other systems reviewed and are negative.       Objective      BP (!) 142/80   Pulse 78   Temp 98.2 °F (36.8 °C)   Ht 5' (1.524 m)   Wt 56.1 kg (123 lb 10.9 oz)   BMI 24.15 kg/m²   Ht Readings from Last 3 Encounters:   01/11/23 5' (1.524 m)   12/02/22 5' (1.524 m)   10/11/22 5' (1.524 m)     Wt Readings from Last 3 Encounters:   01/11/23 56.1 kg (123 lb 10.9 oz)   12/02/22 57 kg (125 lb 10.6 oz)   10/11/22 57 kg (125 lb 10.6 oz)       PHYSICAL EXAM:  Physical Exam  Vitals and nursing note reviewed.   Constitutional:       General: She is not in acute distress.     Appearance: Normal appearance.   HENT:      Head: Normocephalic and atraumatic.      Right Ear: Tympanic membrane, ear canal and external ear normal.      Left Ear: Tympanic membrane, ear canal and external ear normal.      Nose: Nose normal. No congestion or rhinorrhea.      Mouth/Throat:      Mouth: Mucous membranes are moist.      Pharynx: Oropharynx is clear. No oropharyngeal exudate or posterior oropharyngeal erythema.   Eyes:       Extraocular Movements: Extraocular movements intact.      Conjunctiva/sclera: Conjunctivae normal.      Pupils: Pupils are equal, round, and reactive to light.   Cardiovascular:      Rate and Rhythm: Normal rate and regular rhythm.   Pulmonary:      Effort: Pulmonary effort is normal. No respiratory distress.      Breath sounds: No wheezing, rhonchi or rales.   Musculoskeletal:         General: Normal range of motion.      Cervical back: Normal range of motion.   Lymphadenopathy:      Cervical: No cervical adenopathy.   Skin:     General: Skin is warm and dry.      Findings: No rash.   Neurological:      Mental Status: She is alert.            LABS / IMAGING:  Recent Results (from the past 4368 hour(s))   Basic Metabolic Panel    Collection Time: 07/22/22  8:47 AM   Result Value Ref Range    Sodium 142 136 - 145 mmol/L    Potassium 4.1 3.5 - 5.1 mmol/L    Chloride 109 95 - 110 mmol/L    CO2 24 23 - 29 mmol/L    Glucose 100 70 - 110 mg/dL    BUN 14 8 - 23 mg/dL    Creatinine 0.8 0.5 - 1.4 mg/dL    Calcium 9.5 8.7 - 10.5 mg/dL    Anion Gap 9 8 - 16 mmol/L    eGFR if African American >60 >60 mL/min/1.73 m^2    eGFR if non African American >60 >60 mL/min/1.73 m^2   POCT Urinalysis, Dipstick, Automated, W/O Scope    Collection Time: 08/07/22 11:57 AM   Result Value Ref Range    POC Blood, Urine Positive (A) Negative    POC Bilirubin, Urine Negative Negative    POC Urobilinogen, Urine Norm 0.1 - 1.1    POC Ketones, Urine Negative Negative    POC Protein, Urine Negative Negative    POC Nitrates, Urine Negative Negative    POC Glucose, Urine Negative Negative    pH, UA 5.5     POC Specific Gravity, Urine 1.015 1.003 - 1.029    POC Leukocytes, Urine Negative Negative   CBC Auto Differential    Collection Time: 08/25/22 10:55 AM   Result Value Ref Range    WBC 3.58 (L) 3.90 - 12.70 K/uL    RBC 4.04 4.00 - 5.40 M/uL    Hemoglobin 12.0 12.0 - 16.0 g/dL    Hematocrit 35.1 (L) 37.0 - 48.5 %    MCV 87 82 - 98 fL    MCH 29.7 27.0 -  31.0 pg    MCHC 34.2 32.0 - 36.0 g/dL    RDW 14.4 11.5 - 14.5 %    Platelets 251 150 - 450 K/uL    MPV 9.6 9.2 - 12.9 fL    Immature Granulocytes 0.3 0.0 - 0.5 %    Gran # (ANC) 1.6 (L) 1.8 - 7.7 K/uL    Immature Grans (Abs) 0.01 0.00 - 0.04 K/uL    Lymph # 1.3 1.0 - 4.8 K/uL    Mono # 0.4 0.3 - 1.0 K/uL    Eos # 0.2 0.0 - 0.5 K/uL    Baso # 0.06 0.00 - 0.20 K/uL    nRBC 0 0 /100 WBC    Gran % 45.5 38.0 - 73.0 %    Lymph % 37.4 18.0 - 48.0 %    Mono % 10.6 4.0 - 15.0 %    Eosinophil % 4.5 0.0 - 8.0 %    Basophil % 1.7 0.0 - 1.9 %    Differential Method Automated    Comprehensive Metabolic Panel    Collection Time: 08/25/22 10:55 AM   Result Value Ref Range    Sodium 143 136 - 145 mmol/L    Potassium 4.2 3.5 - 5.1 mmol/L    Chloride 108 95 - 110 mmol/L    CO2 25 23 - 29 mmol/L    Glucose 102 70 - 110 mg/dL    BUN 21 8 - 23 mg/dL    Creatinine 0.9 0.5 - 1.4 mg/dL    Calcium 9.6 8.7 - 10.5 mg/dL    Total Protein 7.3 6.0 - 8.4 g/dL    Albumin 3.6 3.5 - 5.2 g/dL    Total Bilirubin 0.5 0.1 - 1.0 mg/dL    Alkaline Phosphatase 58 55 - 135 U/L    AST 21 10 - 40 U/L    ALT 11 10 - 44 U/L    Anion Gap 10 8 - 16 mmol/L    eGFR >60 >60 mL/min/1.73 m^2   Hemoglobin A1C    Collection Time: 08/25/22 10:55 AM   Result Value Ref Range    Hemoglobin A1C 5.8 (H) 4.0 - 5.6 %    Estimated Avg Glucose 120 68 - 131 mg/dL   Lipid Panel    Collection Time: 08/25/22 10:55 AM   Result Value Ref Range    Cholesterol 188 120 - 199 mg/dL    Triglycerides 67 30 - 150 mg/dL    HDL 63 40 - 75 mg/dL    LDL Cholesterol 111.6 63.0 - 159.0 mg/dL    HDL/Cholesterol Ratio 33.5 20.0 - 50.0 %    Total Cholesterol/HDL Ratio 3.0 2.0 - 5.0    Non-HDL Cholesterol 125 mg/dL   TSH    Collection Time: 08/25/22 10:55 AM   Result Value Ref Range    TSH 1.289 0.400 - 4.000 uIU/mL   POCT Urinalysis, Dipstick, Manual, w/o Scope    Collection Time: 10/11/22 11:33 AM   Result Value Ref Range    POC Blood, Urine Negative Negative    POC Bilirubin, Urine Negative Negative     POC Urobilinogen, Urine 0.1 0.1 - 1.1    POC Ketones, Urine Negative Negative    POC Protein, Urine Negative Negative    POC Nitrates, Urine Negative Negative    POC Glucose, Urine Negative Negative    pH, UA 6.5     POC Specific Gravity, Urine 1.010 1.003 - 1.029    POC Leukocytes, Urine Negative Negative   CBC auto differential    Collection Time: 12/06/22  3:16 PM   Result Value Ref Range    WBC 3.66 (L) 3.90 - 12.70 K/uL    RBC 3.93 (L) 4.00 - 5.40 M/uL    Hemoglobin 11.8 (L) 12.0 - 16.0 g/dL    Hematocrit 34.9 (L) 37.0 - 48.5 %    MCV 89 82 - 98 fL    MCH 30.0 27.0 - 31.0 pg    MCHC 33.8 32.0 - 36.0 g/dL    RDW 14.2 11.5 - 14.5 %    Platelets 264 150 - 450 K/uL    MPV 9.4 9.2 - 12.9 fL    Immature Granulocytes 0.3 0.0 - 0.5 %    Gran # (ANC) 1.8 1.8 - 7.7 K/uL    Immature Grans (Abs) 0.01 0.00 - 0.04 K/uL    Lymph # 1.2 1.0 - 4.8 K/uL    Mono # 0.4 0.3 - 1.0 K/uL    Eos # 0.1 0.0 - 0.5 K/uL    Baso # 0.06 0.00 - 0.20 K/uL    nRBC 0 0 /100 WBC    Gran % 50.0 38.0 - 73.0 %    Lymph % 33.6 18.0 - 48.0 %    Mono % 11.5 4.0 - 15.0 %    Eosinophil % 3.0 0.0 - 8.0 %    Basophil % 1.6 0.0 - 1.9 %    Differential Method Automated    Comprehensive metabolic panel    Collection Time: 12/06/22  3:16 PM   Result Value Ref Range    Sodium 141 136 - 145 mmol/L    Potassium 4.1 3.5 - 5.1 mmol/L    Chloride 107 95 - 110 mmol/L    CO2 24 23 - 29 mmol/L    Glucose 95 70 - 110 mg/dL    BUN 15 8 - 23 mg/dL    Creatinine 0.9 0.5 - 1.4 mg/dL    Calcium 9.3 8.7 - 10.5 mg/dL    Total Protein 7.7 6.0 - 8.4 g/dL    Albumin 4.0 3.5 - 5.2 g/dL    Total Bilirubin 0.4 0.1 - 1.0 mg/dL    Alkaline Phosphatase 58 55 - 135 U/L    AST 24 10 - 40 U/L    ALT 16 10 - 44 U/L    Anion Gap 10 8 - 16 mmol/L    eGFR >60 >60 mL/min/1.73 m^2   Lipase    Collection Time: 12/06/22  3:16 PM   Result Value Ref Range    Lipase 15 4 - 60 U/L   Magnesium    Collection Time: 12/06/22  3:16 PM   Result Value Ref Range    Magnesium 2.2 1.6 - 2.6 mg/dL    Urinalysis, Reflex to Urine Culture Urine, Clean Catch    Collection Time: 12/06/22  3:25 PM    Specimen: Urine   Result Value Ref Range    Specimen UA Urine, Clean Catch     Color, UA Yellow Yellow, Straw, Lucretia    Appearance, UA Clear Clear    pH, UA 6.0 5.0 - 8.0    Specific Gravity, UA 1.015 1.005 - 1.030    Protein, UA Trace (A) Negative    Glucose, UA Negative Negative    Ketones, UA Negative Negative    Bilirubin (UA) Negative Negative    Occult Blood UA Trace (A) Negative    Nitrite, UA Negative Negative    Urobilinogen, UA Negative <2.0 EU/dL    Leukocytes, UA 2+ (A) Negative   Urinalysis Microscopic    Collection Time: 12/06/22  3:25 PM   Result Value Ref Range    RBC, UA 5 (H) 0 - 4 /hpf    WBC, UA 8 (H) 0 - 5 /hpf    Yeast, UA Rare (A) None    Squam Epithel, UA 2 /hpf    Microscopic Comment SEE COMMENT    CBC Auto Differential    Collection Time: 01/11/23 10:06 AM   Result Value Ref Range    WBC 4.25 3.90 - 12.70 K/uL    RBC 4.27 4.00 - 5.40 M/uL    Hemoglobin 12.9 12.0 - 16.0 g/dL    Hematocrit 38.9 37.0 - 48.5 %    MCV 91 82 - 98 fL    MCH 30.2 27.0 - 31.0 pg    MCHC 33.2 32.0 - 36.0 g/dL    RDW 14.7 (H) 11.5 - 14.5 %    Platelets 296 150 - 450 K/uL    MPV 10.6 9.2 - 12.9 fL    Immature Granulocytes 0.5 0.0 - 0.5 %    Gran # (ANC) 2.4 1.8 - 7.7 K/uL    Immature Grans (Abs) 0.02 0.00 - 0.04 K/uL    Lymph # 1.3 1.0 - 4.8 K/uL    Mono # 0.4 0.3 - 1.0 K/uL    Eos # 0.2 0.0 - 0.5 K/uL    Baso # 0.08 0.00 - 0.20 K/uL    nRBC 0 0 /100 WBC    Gran % 55.8 38.0 - 73.0 %    Lymph % 29.4 18.0 - 48.0 %    Mono % 8.9 4.0 - 15.0 %    Eosinophil % 3.5 0.0 - 8.0 %    Basophil % 1.9 0.0 - 1.9 %    Differential Method Automated    Comprehensive Metabolic Panel    Collection Time: 01/11/23 10:06 AM   Result Value Ref Range    Sodium 143 136 - 145 mmol/L    Potassium 4.7 3.5 - 5.1 mmol/L    Chloride 109 95 - 110 mmol/L    CO2 24 23 - 29 mmol/L    Glucose 99 70 - 110 mg/dL    BUN 23 8 - 23 mg/dL    Creatinine 0.8 0.5 -  1.4 mg/dL    Calcium 9.9 8.7 - 10.5 mg/dL    Total Protein 7.7 6.0 - 8.4 g/dL    Albumin 4.0 3.5 - 5.2 g/dL    Total Bilirubin 0.5 0.1 - 1.0 mg/dL    Alkaline Phosphatase 67 55 - 135 U/L    AST 23 10 - 40 U/L    ALT 9 (L) 10 - 44 U/L    Anion Gap 10 8 - 16 mmol/L    eGFR >60.0 >60 mL/min/1.73 m^2   TSH    Collection Time: 01/11/23 10:06 AM   Result Value Ref Range    TSH 1.595 0.400 - 4.000 uIU/mL   Lipid Panel    Collection Time: 01/11/23 10:06 AM   Result Value Ref Range    Cholesterol 203 (H) 120 - 199 mg/dL    Triglycerides 74 30 - 150 mg/dL    HDL 66 40 - 75 mg/dL    LDL Cholesterol 122.2 63.0 - 159.0 mg/dL    HDL/Cholesterol Ratio 32.5 20.0 - 50.0 %    Total Cholesterol/HDL Ratio 3.1 2.0 - 5.0    Non-HDL Cholesterol 137 mg/dL   Hemoglobin A1C    Collection Time: 01/11/23 10:06 AM   Result Value Ref Range    Hemoglobin A1C 5.5 4.0 - 5.6 %    Estimated Avg Glucose 111 68 - 131 mg/dL   CULTURE, URINE    Collection Time: 01/11/23 10:49 AM    Specimen: Urine, Clean Catch   Result Value Ref Range    Urine Culture, Routine No significant growth    Urinalysis    Collection Time: 01/11/23 10:49 AM   Result Value Ref Range    Specimen UA Urine, Clean Catch     Color, UA Yellow Yellow, Straw, Lucretia    Appearance, UA Clear Clear    pH, UA 5.0 5.0 - 8.0    Specific Gravity, UA 1.010 1.005 - 1.030    Protein, UA Negative Negative    Glucose, UA Negative Negative    Ketones, UA Negative Negative    Bilirubin (UA) Negative Negative    Occult Blood UA 1+ (A) Negative    Nitrite, UA Negative Negative    Leukocytes, UA 2+ (A) Negative   Urinalysis Microscopic    Collection Time: 01/11/23 10:49 AM   Result Value Ref Range    RBC, UA 4 0 - 4 /hpf    WBC, UA 3 0 - 5 /hpf    Bacteria Rare None-Occ /hpf    Squam Epithel, UA 2 /hpf    Non-Squam Epith 1 (A) <1/hpf /hpf    Microscopic Comment SEE COMMENT          Assessment    1. Encounter to establish care    2. Dysuria    3. Recurrent UTI    4. Other insomnia    5. Anxiety    6.  Hiatal hernia    7. Hiatal hernia with GERD    8. Prediabetes    9. Hyperlipidemia, unspecified hyperlipidemia type    10. Long-term use of high-risk medication          Plan    Ghada was seen today for establish care.    Diagnoses and all orders for this visit:    Encounter to establish care  -     CBC Auto Differential; Future  -     Comprehensive Metabolic Panel; Future  -     TSH; Future  -     Lipid Panel; Future  -     Hemoglobin A1C; Future    Dysuria  -     nitrofurantoin, macrocrystal-monohydrate, (MACROBID) 100 MG capsule; Take 1 capsule (100 mg total) by mouth once daily.  -     CULTURE, URINE; Future  -     Urinalysis; Future    Recurrent UTI  -     nitrofurantoin, macrocrystal-monohydrate, (MACROBID) 100 MG capsule; Take 1 capsule (100 mg total) by mouth once daily.  -     CULTURE, URINE; Future  -     Urinalysis; Future    Other insomnia  -     mirtazapine (REMERON) 7.5 MG Tab; Take 1 tablet (7.5 mg total) by mouth every evening.    Anxiety  -     LORazepam (ATIVAN) 0.5 MG tablet; Take 0.5 tablets (0.25 mg total) by mouth every 12 (twelve) hours as needed for Anxiety.  -     mirtazapine (REMERON) 7.5 MG Tab; Take 1 tablet (7.5 mg total) by mouth every evening.  -     CBC Auto Differential; Future  -     Comprehensive Metabolic Panel; Future  -     TSH; Future    Hiatal hernia    Hiatal hernia with GERD  -     esomeprazole (NEXIUM) 40 MG capsule; Take 1 capsule (40 mg total) by mouth before breakfast.    Prediabetes  -     metFORMIN (GLUCOPHAGE-XR) 500 MG ER 24hr tablet; Take 1 tablet (500 mg total) by mouth daily with breakfast.  -     Hemoglobin A1C; Future    Hyperlipidemia, unspecified hyperlipidemia type  -     Lipid Panel; Future    Long-term use of high-risk medication  -     CBC Auto Differential; Future    Physically, she looks pretty good today.      Will get screening labs, as above.      Will also shows restart her on Macrobid twice daily for the next five days.  This should treat current  infection.  Will have her continue the Macrobid once daily as a prophylactic.  In the future, if she develops another infection, will hold the Macrobid while we treat with different antibiotic, then resume Macrobid after.      Also recommended possible referral to our urologist for 2nd opinion.      FOLLOW-UP:  Follow up in about 4 months (around 5/11/2023) for check up.    I spent a total of 45 minutes face to face and non-face to face on the date of this visit.This includes time preparing to see the patient (eg, review of tests, notes), obtaining and/or reviewing additional history from an independent historian and/or outside medical records, documenting clinical information in the electronic health record, independently interpreting results and/or communicating results to the patient/family/caregiver, or care coordinator.    Signed by:  Javed Basilio MD

## 2023-02-07 DIAGNOSIS — Z00.00 ENCOUNTER FOR MEDICARE ANNUAL WELLNESS EXAM: ICD-10-CM

## 2023-02-09 DIAGNOSIS — Z00.00 ENCOUNTER FOR MEDICARE ANNUAL WELLNESS EXAM: ICD-10-CM

## 2023-03-09 ENCOUNTER — PATIENT MESSAGE (OUTPATIENT)
Dept: PRIMARY CARE CLINIC | Facility: CLINIC | Age: 88
End: 2023-03-09
Payer: MEDICARE

## 2023-04-17 ENCOUNTER — PATIENT MESSAGE (OUTPATIENT)
Dept: PRIMARY CARE CLINIC | Facility: CLINIC | Age: 88
End: 2023-04-17
Payer: MEDICARE

## 2023-05-11 ENCOUNTER — OFFICE VISIT (OUTPATIENT)
Dept: PRIMARY CARE CLINIC | Facility: CLINIC | Age: 88
End: 2023-05-11
Payer: MEDICARE

## 2023-05-11 ENCOUNTER — HOSPITAL ENCOUNTER (OUTPATIENT)
Dept: RADIOLOGY | Facility: HOSPITAL | Age: 88
Discharge: HOME | End: 2023-05-11
Attending: FAMILY MEDICINE
Payer: MEDICARE

## 2023-05-11 VITALS
DIASTOLIC BLOOD PRESSURE: 82 MMHG | HEIGHT: 60 IN | WEIGHT: 123 LBS | HEART RATE: 70 BPM | TEMPERATURE: 97 F | BODY MASS INDEX: 24.15 KG/M2 | RESPIRATION RATE: 15 BRPM | OXYGEN SATURATION: 95 % | SYSTOLIC BLOOD PRESSURE: 134 MMHG

## 2023-05-11 DIAGNOSIS — G47.62 NOCTURNAL LEG CRAMPS: ICD-10-CM

## 2023-05-11 DIAGNOSIS — M25.511 CHRONIC PAIN OF BOTH SHOULDERS: ICD-10-CM

## 2023-05-11 DIAGNOSIS — M54.9 UPPER BACK PAIN ON RIGHT SIDE: ICD-10-CM

## 2023-05-11 DIAGNOSIS — F41.9 ANXIETY: ICD-10-CM

## 2023-05-11 DIAGNOSIS — G89.29 CHRONIC PAIN OF BOTH SHOULDERS: ICD-10-CM

## 2023-05-11 DIAGNOSIS — R73.03 PREDIABETES: ICD-10-CM

## 2023-05-11 DIAGNOSIS — K44.9 HIATAL HERNIA WITH GERD: ICD-10-CM

## 2023-05-11 DIAGNOSIS — K21.9 HIATAL HERNIA WITH GERD: ICD-10-CM

## 2023-05-11 DIAGNOSIS — R30.0 DYSURIA: ICD-10-CM

## 2023-05-11 DIAGNOSIS — N39.0 RECURRENT UTI: ICD-10-CM

## 2023-05-11 DIAGNOSIS — M25.512 CHRONIC PAIN OF BOTH SHOULDERS: ICD-10-CM

## 2023-05-11 DIAGNOSIS — I83.11 VARICOSE VEINS OF BOTH LOWER EXTREMITIES WITH INFLAMMATION: ICD-10-CM

## 2023-05-11 DIAGNOSIS — L29.9 ITCHING: Primary | ICD-10-CM

## 2023-05-11 DIAGNOSIS — I83.12 VARICOSE VEINS OF BOTH LOWER EXTREMITIES WITH INFLAMMATION: ICD-10-CM

## 2023-05-11 PROCEDURE — 71046 XR CHEST PA AND LATERAL: ICD-10-PCS | Mod: 26,,, | Performed by: RADIOLOGY

## 2023-05-11 PROCEDURE — 1157F PR ADVANCE CARE PLAN OR EQUIV PRESENT IN MEDICAL RECORD: ICD-10-PCS | Mod: CPTII,S$GLB,, | Performed by: FAMILY MEDICINE

## 2023-05-11 PROCEDURE — 1101F PT FALLS ASSESS-DOCD LE1/YR: CPT | Mod: CPTII,S$GLB,, | Performed by: FAMILY MEDICINE

## 2023-05-11 PROCEDURE — 99215 OFFICE O/P EST HI 40 MIN: CPT | Mod: S$GLB,,, | Performed by: FAMILY MEDICINE

## 2023-05-11 PROCEDURE — 99215 PR OFFICE/OUTPT VISIT, EST, LEVL V, 40-54 MIN: ICD-10-PCS | Mod: S$GLB,,, | Performed by: FAMILY MEDICINE

## 2023-05-11 PROCEDURE — 1159F PR MEDICATION LIST DOCUMENTED IN MEDICAL RECORD: ICD-10-PCS | Mod: CPTII,S$GLB,, | Performed by: FAMILY MEDICINE

## 2023-05-11 PROCEDURE — 71046 X-RAY EXAM CHEST 2 VIEWS: CPT | Mod: TC,PN

## 2023-05-11 PROCEDURE — 3288F PR FALLS RISK ASSESSMENT DOCUMENTED: ICD-10-PCS | Mod: CPTII,S$GLB,, | Performed by: FAMILY MEDICINE

## 2023-05-11 PROCEDURE — 3288F FALL RISK ASSESSMENT DOCD: CPT | Mod: CPTII,S$GLB,, | Performed by: FAMILY MEDICINE

## 2023-05-11 PROCEDURE — 1159F MED LIST DOCD IN RCRD: CPT | Mod: CPTII,S$GLB,, | Performed by: FAMILY MEDICINE

## 2023-05-11 PROCEDURE — 1160F PR REVIEW ALL MEDS BY PRESCRIBER/CLIN PHARMACIST DOCUMENTED: ICD-10-PCS | Mod: CPTII,S$GLB,, | Performed by: FAMILY MEDICINE

## 2023-05-11 PROCEDURE — 1126F PR PAIN SEVERITY QUANTIFIED, NO PAIN PRESENT: ICD-10-PCS | Mod: CPTII,S$GLB,, | Performed by: FAMILY MEDICINE

## 2023-05-11 PROCEDURE — 1101F PR PT FALLS ASSESS DOC 0-1 FALLS W/OUT INJ PAST YR: ICD-10-PCS | Mod: CPTII,S$GLB,, | Performed by: FAMILY MEDICINE

## 2023-05-11 PROCEDURE — 1157F ADVNC CARE PLAN IN RCRD: CPT | Mod: CPTII,S$GLB,, | Performed by: FAMILY MEDICINE

## 2023-05-11 PROCEDURE — 1126F AMNT PAIN NOTED NONE PRSNT: CPT | Mod: CPTII,S$GLB,, | Performed by: FAMILY MEDICINE

## 2023-05-11 PROCEDURE — 1160F RVW MEDS BY RX/DR IN RCRD: CPT | Mod: CPTII,S$GLB,, | Performed by: FAMILY MEDICINE

## 2023-05-11 PROCEDURE — 99999 PR PBB SHADOW E&M-EST. PATIENT-LVL V: CPT | Mod: PBBFAC,,, | Performed by: FAMILY MEDICINE

## 2023-05-11 PROCEDURE — 99999 PR PBB SHADOW E&M-EST. PATIENT-LVL V: ICD-10-PCS | Mod: PBBFAC,,, | Performed by: FAMILY MEDICINE

## 2023-05-11 PROCEDURE — 71046 X-RAY EXAM CHEST 2 VIEWS: CPT | Mod: 26,,, | Performed by: RADIOLOGY

## 2023-05-11 RX ORDER — TRIAMCINOLONE ACETONIDE 5 MG/G
OINTMENT TOPICAL 2 TIMES DAILY PRN
Qty: 60 G | Refills: 3 | Status: SHIPPED | OUTPATIENT
Start: 2023-05-11 | End: 2023-08-25

## 2023-05-11 RX ORDER — MELOXICAM 7.5 MG/1
7.5 TABLET ORAL DAILY
Qty: 90 TABLET | Refills: 3 | Status: SHIPPED | OUTPATIENT
Start: 2023-05-11 | End: 2023-08-25

## 2023-05-11 RX ORDER — NITROFURANTOIN 25; 75 MG/1; MG/1
100 CAPSULE ORAL DAILY
Qty: 90 CAPSULE | Refills: 3 | Status: SHIPPED | OUTPATIENT
Start: 2023-05-11 | End: 2023-10-23 | Stop reason: SDUPTHER

## 2023-05-11 RX ORDER — METFORMIN HYDROCHLORIDE 500 MG/1
500 TABLET, EXTENDED RELEASE ORAL
Qty: 90 TABLET | Refills: 3 | Status: SHIPPED | OUTPATIENT
Start: 2023-05-11 | End: 2023-11-27 | Stop reason: SDUPTHER

## 2023-05-11 RX ORDER — ESOMEPRAZOLE MAGNESIUM 40 MG/1
40 CAPSULE, DELAYED RELEASE ORAL
Qty: 90 CAPSULE | Refills: 3 | Status: SHIPPED | OUTPATIENT
Start: 2023-05-11 | End: 2023-07-21 | Stop reason: SDUPTHER

## 2023-05-11 RX ORDER — MUPIROCIN 20 MG/G
OINTMENT TOPICAL
COMMUNITY
Start: 2022-11-23 | End: 2023-05-11

## 2023-05-11 RX ORDER — ROPINIROLE 0.5 MG/1
0.5 TABLET, FILM COATED ORAL NIGHTLY
Qty: 90 TABLET | Refills: 3 | Status: SHIPPED | OUTPATIENT
Start: 2023-05-11 | End: 2023-08-25

## 2023-05-11 RX ORDER — LORAZEPAM 0.5 MG/1
0.25 TABLET ORAL EVERY 12 HOURS PRN
Qty: 90 TABLET | Refills: 2 | Status: SHIPPED | OUTPATIENT
Start: 2023-05-11 | End: 2023-07-21 | Stop reason: SDUPTHER

## 2023-05-11 NOTE — PATIENT INSTRUCTIONS
Physically, everything looks pretty good today!     Let us get a chest x-ray to make sure lungs are clear.  With the upper back pains, we want to make sure we are not missing anything on the inside of the chest.      If the x-ray comes back normal, we can always have you see our sports Medicine/joint specialist, Dr. Mares, and/or our back specialist, Dr. Garcia.  Between the two of them, they should be able to offer long-term relief for the shoulders and the back.  Just let us know whenever you are ready to go see them and we can schedule an appointment.  Dr. Mares comes here on Mondays, Dr. Garcia comes here on Tuesdays.    For the discomfort, try using OTC Tylenol - two, 500mg tablets every 8 hours.  You can also try my prescription meloxicam once daily if the Tylenol does not provide significant relief.  Apply ice or cold compress 3-4 times per day, 10-15 minutes at a time.  Alternate cold compress with warm, moist heat.    Refill of triamcinolone ointment sent to pharmacy for the rash, itching of the legs.  I really suspect that the itchy spots are due to inflammation from the varicose veins underneath them.  Let us have you see a vascular specialist to discuss getting rid of the inflamed vessels.  I am placing a referral to Dr. Odin Pradhan, vascular surgery, today.  When you call her office, be sure to ask for the [place] location. Her number is [phone].    When you see her, also ask her about the leg cramps.  Would like her to do an ultrasound to check the blood flow to and from the legs.  If she finds a blockage, she can discuss treatment to restore blood flow and get rid of the cramps.  In the meantime, it sounds like it could be coming from the back or just a case of restless legs.  Let us try using Requip (ropinirole) at bedtime to see if that helps.    Continue taking all of your other medications, as directed.  Refills have been sent to the pharmacy.

## 2023-05-11 NOTE — PROGRESS NOTES
"    [facility]       [address] [place] [address]      Phone: [phone]      Fax: [phone]    Javed Basilio MD                Office Visit  05/11/2023        Subjective      HPI:  Ghada Dave is a 87 y.o. female presents today in clinic for "Follow-up (4 month follow up)  ."     87-year-old female presents today to discuss multiple issues.      Her son, Mr. Viera, is present with her.  He provides portions of the history, some translation services.   also available via telephone.      Patient states that her urinary issues have improved dramatically since last visit.  She was given prescription for Macrobid.  She took it twice a day for five days.  Then, started taking it once daily.  She takes it for 5-7 days, then stops for 5-7 days, then restarts.  Has not been having any urinary symptoms since then.    They report that she is been having significant itching to her lower legs.  Has seen dermatology in the past, tried various creams, ointments.  Has also tried light therapy.  Someone thought it was psoriasis.  She wants refill of triamcinolone ointment.    Also reports that she gets Charley horses, leg cramps at bedtime.  Both legs.  Tries using Gatorade, other home remedies.  No significant improvement.    Has also been having some upper back pains.  Right side.  States that she has had scans, MRI, of the back.  Was told her back is twisted.  Scoliosis?  States it hurts her to lay down.  She has also had problems with both shoulders.  Has had surgery on the shoulders.    She also has history of hypertension.  Not currently on medication for this.  Blood pressure generally under good control at home.  Tends to flare up when she develops her bladder issues.      Has also had elevated cholesterols in the past.  Unable to tolerate statins, such as atorvastatin.  Has been able to take simvastatin in the past.  Not currently on " medication for cholesterol.    Has GERD, hiatal hernia.  Takes Nexium 40 mg daily for this.      Has anxiety, sleep issues.  Takes Ativan twice daily for this.  Seems to be helping.      Also has prediabetes.  Has been taking metformin 500 mg daily.  No issues with this medication.    PMH: HTN.  HLD.  Pre DM.  CAD.  Hiatal hernia/GERD.  Anxiety.  Recurrent bladder issues.    PSH: Right shoulder x2.    Allergies:  Statins cause rash.  Penicillin causes rash.  Keflex causes rash.    Social:  Disabled.  Lives with her son.    T: Denies  A:  Denies   D:  Denies     Exercise:  Walks frequently.  Very active at home.      Colon:  06/17/2021    MMG:  Was told she could discontinue these.      Podiatry: Dr. Nowak      The following were updated and reviewed by myself in the chart: medications, past medical history, past surgical history, family history, social history, and allergies.     Medications:  Current Outpatient Medications on File Prior to Visit   Medication Sig Dispense Refill    aspirin (ECOTRIN) 81 MG EC tablet Take 1 tablet (81 mg total) by mouth once daily. 30 tablet 3    b complex vitamins capsule Take 1 capsule by mouth once daily.      [DISCONTINUED] esomeprazole (NEXIUM) 40 MG capsule Take 1 capsule (40 mg total) by mouth before breakfast. 90 capsule 3    [DISCONTINUED] LORazepam (ATIVAN) 0.5 MG tablet Take 0.5 tablets (0.25 mg total) by mouth every 12 (twelve) hours as needed for Anxiety. 90 tablet 0    [DISCONTINUED] metFORMIN (GLUCOPHAGE-XR) 500 MG ER 24hr tablet Take 1 tablet (500 mg total) by mouth daily with breakfast. 90 tablet 3    [DISCONTINUED] nitrofurantoin, macrocrystal-monohydrate, (MACROBID) 100 MG capsule Take 1 capsule (100 mg total) by mouth once daily. 90 capsule 3    [DISCONTINUED] estradioL (ESTRACE) 0.01 % (0.1 mg/gram) vaginal cream Place 0.5 g vaginally 3 (three) times a week. Apply to the vagina daily for two weeks then three times a week. BIN#[id number] PCN#[id number]  GRP#[id number]  ID#[id number] (Patient not taking: Reported on 5/11/2023) 45 g 4    [DISCONTINUED] mirtazapine (REMERON) 7.5 MG Tab Take 1 tablet (7.5 mg total) by mouth every evening. (Patient not taking: Reported on 5/11/2023) 30 tablet 11    [DISCONTINUED] mupirocin (BACTROBAN) 2 % ointment SMARTSIG:Patch(s) Topical 3 Times Daily      [DISCONTINUED] triamcinolone (KENALOG) 0.5 % ointment Apply topically 2 (two) times daily as needed (rash). (Patient not taking: Reported on 5/11/2023) 60 g 3     No current facility-administered medications on file prior to visit.        PMHx:  Past Medical History:   Diagnosis Date    Abnormal Pap smear 2013    Acidosis     CAD (coronary artery disease) 06/2017     iFR of LAD and RCA performed confirmed nonobstructive disease.    Cataract     Colon polyps     Disorder of kidney and ureter     Frequent urination     Hemorrhoid     High cholesterol     Poor circulation     Postoperative abdominal pain     Psoriasis     Varicose vein       Patient Active Problem List    Diagnosis Date Noted    Hypercholesterolemia 12/18/2021    Hx of colonoscopy 06/17/2021    Decreased strength 03/22/2021    Decreased ROM of right shoulder 03/22/2021    Nontraumatic complete tear of right rotator cuff 12/03/2020    Prediabetes 11/04/2020    Chronic right shoulder pain 11/04/2020    Arthropathies in other specified diseases classified elsewhere, vertebrae  11/04/2020    Age-related osteoporosis without fracture 11/04/2020    Osteoarthritis of multiple joints 11/04/2020    Gastroesophageal reflux disease without esophagitis 11/04/2020    Anal polyp 10/18/2019    SOB (shortness of breath) 03/21/2019    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    Cerumen debris on tympanic membrane of both ears 03/05/2018    Stenosis of carotid artery 03/05/2018    Nausea 03/05/2018    Essential (primary) hypertension 03/05/2018    Dizziness 03/05/2018    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), 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     Lichen simplex chronicus of the heel 07/12/2017    Non-occlusive coronary artery disease 07/06/2017    Psoriasis 07/06/2017    Chronic midline low back pain without sciatica 07/06/2017    Scoliosis of thoracolumbar spine 07/06/2017    Right subscapular pain 07/06/2017    Insomnia 07/06/2017    Overflow incontinence 07/06/2017    Urinary retention with incomplete bladder emptying 07/06/2017    Non-cardiac chest pain 06/22/2017    Abnormal stress test 06/22/2017    Anal or rectal pain 06/05/2017    CAD (coronary artery disease) 06/01/2017    Pseudophakia 04/17/2017    Right posterior capsular opacification 04/17/2017    Left lower quadrant pain 05/17/2016    Personal history of colonic polyps 05/12/2015    Leukopenia 04/15/2015    Post herpetic neuralgia 12/31/2014    Heartburn 11/18/2014    Diverticulosis 10/27/2014    Ureteral stone 06/03/2014    Rectal polyp 09/12/2013    Urge incontinence 07/08/2013        PSHx:  Past Surgical History:   Procedure Laterality Date    CARDIAC CATHETERIZATION  06/2017     iFR of LAD and RCA performed confirmed nonobstructive disease.    CATARACT EXTRACTION W/  INTRAOCULAR LENS IMPLANT Bilateral     COLONOSCOPY N/A 8/28/2017    Procedure: COLONOSCOPY;  Surgeon: Bertram Myers MD;  Location: [facility] ENDO;  Service: Endoscopy;  Laterality: N/A;    COLONOSCOPY N/A 10/18/2019    Procedure: COLONOSCOPY;  Surgeon: Wilfred Barber MD;  Location: [facility] ([address] [place] [address]);  Service: Endoscopy;  Laterality: N/A;   needed-BB  Cardiology Clearance received from Dr. REBECA Escalona, see telephone encounter 10/1/19-BB    COLONOSCOPY N/A 6/17/2021    Procedure: COLONOSCOPY suprep Past positive;  Surgeon: Lele Barillas MD;  Location: [facility] ENDO;  Service: Endoscopy;  Laterality: N/A;  past positive    COLONOSCOPY W/ BIOPSIES  06/17/2021    EXCISION OF BURSA  3/5/2021    Procedure: BURSECTOMY;  Surgeon: Jason Alicea Jr., MD;  Location: [facility] OR;  Service: Orthopedics;;     LEFT HEART CATHETERIZATION N/A 4/8/2019    Procedure: Left heart cath;  Surgeon: Renato Escalona MD;  Location: [facility] CATH LAB/EP;  Service: Cardiology;  Laterality: N/A;    POLYPECTOMY      SHOULDER ARTHROSCOPY Right 3/5/2021    Procedure: ARTHROSCOPY, SHOULDER;  Surgeon: Jason Alicea Jr., MD;  Location: [facility] OR;  Service: Orthopedics;  Laterality: Right;  need opus system Mandaeism   video  (Mandaeism notified 3/1/21, CC)  Kulwinder confirmed 3/4/21 [place]    YAG Laser Capsulotomy Right 04/17/2017    Dr. Chavez        FHx:  Family History   Problem Relation Age of Onset    Kidney disease Mother     Glaucoma Neg Hx     Macular degeneration Neg Hx     Strabismus Neg Hx     Retinal detachment Neg Hx     Amblyopia Neg Hx     Blindness Neg Hx     Cataracts Neg Hx     Prostate cancer Neg Hx     Anesthesia problems Neg Hx         Social:  Social History     Socioeconomic History    Marital status: Single   Tobacco Use    Smoking status: Never    Smokeless tobacco: Never    Tobacco comments:     Never smoked   Substance and Sexual Activity    Alcohol use: Never    Drug use: Never    Sexual activity: Never   Social History Narrative    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     Keflex [cephalexin] Rash        ROS:  Review of Systems   Constitutional:  Negative for activity change, appetite change, chills and fever.   HENT:  Negative for congestion, postnasal drip, rhinorrhea, sore throat and trouble swallowing.    Respiratory:  Negative for cough, shortness of breath and wheezing.    Cardiovascular:  Negative for chest pain and palpitations.   Gastrointestinal:  Negative for abdominal pain, constipation, diarrhea, nausea and vomiting.   Genitourinary:  Negative for difficulty urinating.   Musculoskeletal:  Positive for  arthralgias, back pain and myalgias.   Skin:  Negative for color change and rash.   Neurological:  Negative for speech difficulty and headaches.   All other systems reviewed and are negative.       Objective      /82   Pulse 70   Temp 97.4 °F (36.3 °C) (Temporal)   Resp 15   Ht 5' (1.524 m)   Wt 55.8 kg (123 lb 0.3 oz)   SpO2 95%   BMI 24.03 kg/m²   Ht Readings from Last 3 Encounters:   05/11/23 5' (1.524 m)   01/11/23 5' (1.524 m)   12/02/22 5' (1.524 m)     Wt Readings from Last 3 Encounters:   05/11/23 55.8 kg (123 lb 0.3 oz)   01/11/23 56.1 kg (123 lb 10.9 oz)   12/02/22 57 kg (125 lb 10.6 oz)       PHYSICAL EXAM:  Physical Exam  Vitals and nursing note reviewed.   Constitutional:       General: She is not in acute distress.     Appearance: Normal appearance.   HENT:      Head: Normocephalic and atraumatic.      Right Ear: Tympanic membrane, ear canal and external ear normal.      Left Ear: Tympanic membrane, ear canal and external ear normal.      Nose: Nose normal. No congestion or rhinorrhea.      Mouth/Throat:      Mouth: Mucous membranes are moist.      Pharynx: Oropharynx is clear. No oropharyngeal exudate or posterior oropharyngeal erythema.   Eyes:      Extraocular Movements: Extraocular movements intact.      Conjunctiva/sclera: Conjunctivae normal.      Pupils: Pupils are equal, round, and reactive to light.   Cardiovascular:      Rate and Rhythm: Normal rate and regular rhythm.   Pulmonary:      Effort: Pulmonary effort is normal. No respiratory distress.      Breath sounds: No wheezing, rhonchi or rales.   Musculoskeletal:         General: Normal range of motion.      Cervical back: Normal range of motion.   Lymphadenopathy:      Cervical: No cervical adenopathy.   Skin:     General: Skin is warm and dry.      Findings: No rash.      Comments: Multiple varicosities on both legs.  Skin overlying most of the varicosities have smaller, spider like veins.  Some discoloration,  hyperpigmentation directly over these areas.  Some of the areas appear excoriated.   Neurological:      Mental Status: She is alert.            LABS / IMAGING:  Recent Results (from the past 4368 hour(s))   CBC auto differential    Collection Time: 12/06/22  3:16 PM   Result Value Ref Range    WBC 3.66 (L) 3.90 - 12.70 K/uL    RBC 3.93 (L) 4.00 - 5.40 M/uL    Hemoglobin 11.8 (L) 12.0 - 16.0 g/dL    Hematocrit 34.9 (L) 37.0 - 48.5 %    MCV 89 82 - 98 fL    MCH 30.0 27.0 - 31.0 pg    MCHC 33.8 32.0 - 36.0 g/dL    RDW 14.2 11.5 - 14.5 %    Platelets 264 150 - 450 K/uL    MPV 9.4 9.2 - 12.9 fL    Immature Granulocytes 0.3 0.0 - 0.5 %    Gran # (ANC) 1.8 1.8 - 7.7 K/uL    Immature Grans (Abs) 0.01 0.00 - 0.04 K/uL    Lymph # 1.2 1.0 - 4.8 K/uL    Mono # 0.4 0.3 - 1.0 K/uL    Eos # 0.1 0.0 - 0.5 K/uL    Baso # 0.06 0.00 - 0.20 K/uL    nRBC 0 0 /100 WBC    Gran % 50.0 38.0 - 73.0 %    Lymph % 33.6 18.0 - 48.0 %    Mono % 11.5 4.0 - 15.0 %    Eosinophil % 3.0 0.0 - 8.0 %    Basophil % 1.6 0.0 - 1.9 %    Differential Method Automated    Comprehensive metabolic panel    Collection Time: 12/06/22  3:16 PM   Result Value Ref Range    Sodium 141 136 - 145 mmol/L    Potassium 4.1 3.5 - 5.1 mmol/L    Chloride 107 95 - 110 mmol/L    CO2 24 23 - 29 mmol/L    Glucose 95 70 - 110 mg/dL    BUN 15 8 - 23 mg/dL    Creatinine 0.9 0.5 - 1.4 mg/dL    Calcium 9.3 8.7 - 10.5 mg/dL    Total Protein 7.7 6.0 - 8.4 g/dL    Albumin 4.0 3.5 - 5.2 g/dL    Total Bilirubin 0.4 0.1 - 1.0 mg/dL    Alkaline Phosphatase 58 55 - 135 U/L    AST 24 10 - 40 U/L    ALT 16 10 - 44 U/L    Anion Gap 10 8 - 16 mmol/L    eGFR >60 >60 mL/min/1.73 m^2   Lipase    Collection Time: 12/06/22  3:16 PM   Result Value Ref Range    Lipase 15 4 - 60 U/L   Magnesium    Collection Time: 12/06/22  3:16 PM   Result Value Ref Range    Magnesium 2.2 1.6 - 2.6 mg/dL   Urinalysis, Reflex to Urine Culture Urine, Clean Catch    Collection Time: 12/06/22  3:25 PM    Specimen: Urine    Result Value Ref Range    Specimen UA Urine, Clean Catch     Color, UA Yellow Yellow, Straw, Lucretia    Appearance, UA Clear Clear    pH, UA 6.0 5.0 - 8.0    Specific Gravity, UA 1.015 1.005 - 1.030    Protein, UA Trace (A) Negative    Glucose, UA Negative Negative    Ketones, UA Negative Negative    Bilirubin (UA) Negative Negative    Occult Blood UA Trace (A) Negative    Nitrite, UA Negative Negative    Urobilinogen, UA Negative <2.0 EU/dL    Leukocytes, UA 2+ (A) Negative   Urinalysis Microscopic    Collection Time: 12/06/22  3:25 PM   Result Value Ref Range    RBC, UA 5 (H) 0 - 4 /hpf    WBC, UA 8 (H) 0 - 5 /hpf    Yeast, UA Rare (A) None    Squam Epithel, UA 2 /hpf    Microscopic Comment SEE COMMENT    CBC Auto Differential    Collection Time: 01/11/23 10:06 AM   Result Value Ref Range    WBC 4.25 3.90 - 12.70 K/uL    RBC 4.27 4.00 - 5.40 M/uL    Hemoglobin 12.9 12.0 - 16.0 g/dL    Hematocrit 38.9 37.0 - 48.5 %    MCV 91 82 - 98 fL    MCH 30.2 27.0 - 31.0 pg    MCHC 33.2 32.0 - 36.0 g/dL    RDW 14.7 (H) 11.5 - 14.5 %    Platelets 296 150 - 450 K/uL    MPV 10.6 9.2 - 12.9 fL    Immature Granulocytes 0.5 0.0 - 0.5 %    Gran # (ANC) 2.4 1.8 - 7.7 K/uL    Immature Grans (Abs) 0.02 0.00 - 0.04 K/uL    Lymph # 1.3 1.0 - 4.8 K/uL    Mono # 0.4 0.3 - 1.0 K/uL    Eos # 0.2 0.0 - 0.5 K/uL    Baso # 0.08 0.00 - 0.20 K/uL    nRBC 0 0 /100 WBC    Gran % 55.8 38.0 - 73.0 %    Lymph % 29.4 18.0 - 48.0 %    Mono % 8.9 4.0 - 15.0 %    Eosinophil % 3.5 0.0 - 8.0 %    Basophil % 1.9 0.0 - 1.9 %    Differential Method Automated    Comprehensive Metabolic Panel    Collection Time: 01/11/23 10:06 AM   Result Value Ref Range    Sodium 143 136 - 145 mmol/L    Potassium 4.7 3.5 - 5.1 mmol/L    Chloride 109 95 - 110 mmol/L    CO2 24 23 - 29 mmol/L    Glucose 99 70 - 110 mg/dL    BUN 23 8 - 23 mg/dL    Creatinine 0.8 0.5 - 1.4 mg/dL    Calcium 9.9 8.7 - 10.5 mg/dL    Total Protein 7.7 6.0 - 8.4 g/dL    Albumin 4.0 3.5 - 5.2 g/dL    Total  Bilirubin 0.5 0.1 - 1.0 mg/dL    Alkaline Phosphatase 67 55 - 135 U/L    AST 23 10 - 40 U/L    ALT 9 (L) 10 - 44 U/L    Anion Gap 10 8 - 16 mmol/L    eGFR >60.0 >60 mL/min/1.73 m^2   TSH    Collection Time: 01/11/23 10:06 AM   Result Value Ref Range    TSH 1.595 0.400 - 4.000 uIU/mL   Lipid Panel    Collection Time: 01/11/23 10:06 AM   Result Value Ref Range    Cholesterol 203 (H) 120 - 199 mg/dL    Triglycerides 74 30 - 150 mg/dL    HDL 66 40 - 75 mg/dL    LDL Cholesterol 122.2 63.0 - 159.0 mg/dL    HDL/Cholesterol Ratio 32.5 20.0 - 50.0 %    Total Cholesterol/HDL Ratio 3.1 2.0 - 5.0    Non-HDL Cholesterol 137 mg/dL   Hemoglobin A1C    Collection Time: 01/11/23 10:06 AM   Result Value Ref Range    Hemoglobin A1C 5.5 4.0 - 5.6 %    Estimated Avg Glucose 111 68 - 131 mg/dL   CULTURE, URINE    Collection Time: 01/11/23 10:49 AM    Specimen: Urine, Clean Catch   Result Value Ref Range    Urine Culture, Routine No significant growth    Urinalysis    Collection Time: 01/11/23 10:49 AM   Result Value Ref Range    Specimen UA Urine, Clean Catch     Color, UA Yellow Yellow, Straw, Lucretia    Appearance, UA Clear Clear    pH, UA 5.0 5.0 - 8.0    Specific Gravity, UA 1.010 1.005 - 1.030    Protein, UA Negative Negative    Glucose, UA Negative Negative    Ketones, UA Negative Negative    Bilirubin (UA) Negative Negative    Occult Blood UA 1+ (A) Negative    Nitrite, UA Negative Negative    Leukocytes, UA 2+ (A) Negative   Urinalysis Microscopic    Collection Time: 01/11/23 10:49 AM   Result Value Ref Range    RBC, UA 4 0 - 4 /hpf    WBC, UA 3 0 - 5 /hpf    Bacteria Rare None-Occ /hpf    Squam Epithel, UA 2 /hpf    Non-Squam Epith 1 (A) <1/hpf /hpf    Microscopic Comment SEE COMMENT          Assessment    1. 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 Nocturnal leg cramps    4. Hiatal hernia with GERD    5. Anxiety    6. Prediabetes    7. Dysuria    8. Recurrent UTI    9. Chronic pain of both  shoulders    10. Upper back pain on right side          Kady Urrutia was seen today for follow-up.    Diagnoses and all orders for this visit:    Itching  -     triamcinolone (KENALOG) 0.5 % ointment; Apply topically 2 (two) times daily as needed (rash).    Varicose veins of both lower extremities with inflammation  -     Ambulatory referral/consult to Vascular Surgery; Future    Nocturnal leg cramps  -     rOPINIRole (REQUIP) 0.5 MG tablet; Take 1 tablet (0.5 mg total) by mouth every evening. For leg cramps.  -     Ambulatory referral/consult to Vascular Surgery; Future    Hiatal hernia with GERD  -     esomeprazole (NEXIUM) 40 MG capsule; Take 1 capsule (40 mg total) by mouth before breakfast.    Anxiety  -     LORazepam (ATIVAN) 0.5 MG tablet; Take 0.5 tablets (0.25 mg total) by mouth every 12 (twelve) hours as needed for Anxiety.    Prediabetes  -     metFORMIN (GLUCOPHAGE-XR) 500 MG ER 24hr tablet; Take 1 tablet (500 mg total) by mouth daily with breakfast.    Dysuria  -     nitrofurantoin, macrocrystal-monohydrate, (MACROBID) 100 MG capsule; Take 1 capsule (100 mg total) by mouth once daily.    Recurrent UTI  -     nitrofurantoin, macrocrystal-monohydrate, (MACROBID) 100 MG capsule; Take 1 capsule (100 mg total) by mouth once daily.    Chronic pain of both shoulders  -     meloxicam (MOBIC) 7.5 MG tablet; Take 1 tablet (7.5 mg total) by mouth once daily.    Upper back pain on right side  -     X-Ray Chest PA And Lateral; Future  -     meloxicam (MOBIC) 7.5 MG tablet; Take 1 tablet (7.5 mg total) by mouth once daily.    Patient and son thought the itchy areas on the legs were psoriasis, but she does not have any other lesions on the extensor surfaces of the upper arms.  Given that the specific lesions are directly over what appears to be varicose veins and their appear to be new, early, lesions developing over other varicosities, I wonder if this is some sort of inflammation from the varicose veins.  Will have  her see vascular to get their opinion.  They want to stay in [place], so will have them see Dr. Odin Pradhan.  Referral placed today.      Suspect that the leg cramps are more restless leg, then actual claudication, but since she is going to see Dr. Pradhan anyway recommended they ask her about the cramps and to see if an ultrasound (arterial/venous) would be indicated.  In the meantime, will try Requip to see if that helps.    Doing well with the bladder issues.  Continue Macrobid.  Okay if she wants to use it week on, week off.    For her shoulder pains, will have her try meloxicam.  Can always follow up with Dr. Mares to discuss joint injection if no improvement.      For her back pains, will also try the meloxicam.  Can have her see Dr. LUNA if no improvement.      FOLLOW-UP:  Follow up in about 3 months (around 8/11/2023) for check up.    I spent a total of 45 minutes face to face and non-face to face on the date of this visit.This includes time preparing to see the patient (eg, review of tests, notes), obtaining and/or reviewing additional history from an independent historian and/or outside medical records, documenting clinical information in the electronic health record, independently interpreting results and/or communicating results to the patient/family/caregiver, or care coordinator.    Signed by:  Javed Basilio MD

## 2023-05-11 NOTE — PROGRESS NOTES
Chest x-ray looks good.  No signs of any pneumonia, lung cancer, nor anything else worrisome in the lungs.      It does appear that the hiatal hernia is quite large.  I almost wonder if that is causing some of her discomfort?  If interested in getting it repaired, we can always have you see one of our surgeons.  Please let me know.

## 2023-05-22 ENCOUNTER — PATIENT MESSAGE (OUTPATIENT)
Dept: PRIMARY CARE CLINIC | Facility: CLINIC | Age: 88
End: 2023-05-22
Payer: MEDICARE

## 2023-05-22 DIAGNOSIS — K21.9 HIATAL HERNIA WITH GERD: Primary | ICD-10-CM

## 2023-05-22 DIAGNOSIS — K44.9 HIATAL HERNIA WITH GERD: Primary | ICD-10-CM

## 2023-05-31 ENCOUNTER — OFFICE VISIT (OUTPATIENT)
Dept: SURGERY | Facility: CLINIC | Age: 88
End: 2023-05-31
Payer: MEDICARE

## 2023-05-31 VITALS
HEART RATE: 78 BPM | WEIGHT: 125.25 LBS | SYSTOLIC BLOOD PRESSURE: 140 MMHG | BODY MASS INDEX: 24.59 KG/M2 | DIASTOLIC BLOOD PRESSURE: 72 MMHG | HEIGHT: 60 IN | TEMPERATURE: 98 F

## 2023-05-31 DIAGNOSIS — K44.9 HIATAL HERNIA WITH GERD: ICD-10-CM

## 2023-05-31 DIAGNOSIS — K21.9 HIATAL HERNIA WITH GERD: ICD-10-CM

## 2023-05-31 DIAGNOSIS — R13.14 PHARYNGOESOPHAGEAL DYSPHAGIA: Primary | ICD-10-CM

## 2023-05-31 PROCEDURE — 1125F PR PAIN SEVERITY QUANTIFIED, PAIN PRESENT: ICD-10-PCS | Mod: CPTII,S$GLB,, | Performed by: SURGERY

## 2023-05-31 PROCEDURE — 99204 PR OFFICE/OUTPT VISIT, NEW, LEVL IV, 45-59 MIN: ICD-10-PCS | Mod: S$GLB,,, | Performed by: SURGERY

## 2023-05-31 PROCEDURE — 1157F PR ADVANCE CARE PLAN OR EQUIV PRESENT IN MEDICAL RECORD: ICD-10-PCS | Mod: CPTII,S$GLB,, | Performed by: SURGERY

## 2023-05-31 PROCEDURE — 1159F PR MEDICATION LIST DOCUMENTED IN MEDICAL RECORD: ICD-10-PCS | Mod: CPTII,S$GLB,, | Performed by: SURGERY

## 2023-05-31 PROCEDURE — 99999 PR PBB SHADOW E&M-EST. PATIENT-LVL V: ICD-10-PCS | Mod: PBBFAC,,, | Performed by: SURGERY

## 2023-05-31 PROCEDURE — 1157F ADVNC CARE PLAN IN RCRD: CPT | Mod: CPTII,S$GLB,, | Performed by: SURGERY

## 2023-05-31 PROCEDURE — 1160F RVW MEDS BY RX/DR IN RCRD: CPT | Mod: CPTII,S$GLB,, | Performed by: SURGERY

## 2023-05-31 PROCEDURE — 1160F PR REVIEW ALL MEDS BY PRESCRIBER/CLIN PHARMACIST DOCUMENTED: ICD-10-PCS | Mod: CPTII,S$GLB,, | Performed by: SURGERY

## 2023-05-31 PROCEDURE — 99999 PR PBB SHADOW E&M-EST. PATIENT-LVL V: CPT | Mod: PBBFAC,,, | Performed by: SURGERY

## 2023-05-31 PROCEDURE — 1159F MED LIST DOCD IN RCRD: CPT | Mod: CPTII,S$GLB,, | Performed by: SURGERY

## 2023-05-31 PROCEDURE — 1125F AMNT PAIN NOTED PAIN PRSNT: CPT | Mod: CPTII,S$GLB,, | Performed by: SURGERY

## 2023-05-31 PROCEDURE — 99204 OFFICE O/P NEW MOD 45 MIN: CPT | Mod: S$GLB,,, | Performed by: SURGERY

## 2023-05-31 NOTE — PROGRESS NOTES
History & Physical    SUBJECTIVE:     History of Present Illness:  Patient is a 87 y.o. female referred for hiatal hernia with dysphagia and reflux.  She reports progressive food sticking and regurgitation of food as well as heartburn.  She originally underwent evaluation for hiatal hernia repair in [place] during [facility] however things were put on hold at that time.  Since this time she is been able to maintain her weight but mostly sticks with pureed foods.  She does report some upper chest neck swallowing dysphagia as well.  Denies any prior abdominal surgeries.  She stays very active and is independent.    No chief complaint on file.      Review of patient's allergies indicates:   Allergen Reactions    Penicillin Anaphylaxis    Lipitor [atorvastatin] Hives     Can take Simivastatin .     Keflex [cephalexin] Rash       Current Outpatient Medications   Medication Sig Dispense Refill    aspirin (ECOTRIN) 81 MG EC tablet Take 1 tablet (81 mg total) by mouth once daily. 30 tablet 3    b complex vitamins capsule Take 1 capsule by mouth once daily.      esomeprazole (NEXIUM) 40 MG capsule Take 1 capsule (40 mg total) by mouth before breakfast. 90 capsule 3    LORazepam (ATIVAN) 0.5 MG tablet Take 0.5 tablets (0.25 mg total) by mouth every 12 (twelve) hours as needed for Anxiety. 90 tablet 2    meloxicam (MOBIC) 7.5 MG tablet Take 1 tablet (7.5 mg total) by mouth once daily. 90 tablet 3    metFORMIN (GLUCOPHAGE-XR) 500 MG ER 24hr tablet Take 1 tablet (500 mg total) by mouth daily with breakfast. 90 tablet 3    nitrofurantoin, macrocrystal-monohydrate, (MACROBID) 100 MG capsule Take 1 capsule (100 mg total) by mouth once daily. 90 capsule 3    rOPINIRole (REQUIP) 0.5 MG tablet Take 1 tablet (0.5 mg total) by mouth every evening. For leg cramps. 90 tablet 3    triamcinolone (KENALOG) 0.5 % ointment Apply topically 2 (two) times daily as needed (rash). 60 g 3     No current facility-administered medications for this  visit.       Past Medical History:   Diagnosis Date    Abnormal Pap smear 2013    Acidosis     CAD (coronary artery disease) 06/2017     iFR of LAD and RCA performed confirmed nonobstructive disease.    Cataract     Colon polyps     Disorder of kidney and ureter     Frequent urination     Hemorrhoid     High cholesterol     Poor circulation     Postoperative abdominal pain     Psoriasis     Varicose vein      Past Surgical History:   Procedure Laterality Date    CARDIAC CATHETERIZATION  06/2017     iFR of LAD and RCA performed confirmed nonobstructive disease.    CATARACT EXTRACTION W/  INTRAOCULAR LENS IMPLANT Bilateral     COLONOSCOPY N/A 8/28/2017    Procedure: COLONOSCOPY;  Surgeon: Bertram Myers MD;  Location: [facility] ENDO;  Service: Endoscopy;  Laterality: N/A;    COLONOSCOPY N/A 10/18/2019    Procedure: COLONOSCOPY;  Surgeon: Wilfred Barber MD;  [facility]  Service: Endoscopy;  Laterality: N/A;   needed-BB  Cardiology Clearance received from Dr. REBECA Escalona, see telephone encounter 10/1/19-BB    COLONOSCOPY N/A 6/17/2021    Procedure: COLONOSCOPY suprep Past positive;  Surgeon: Lele Barillas MD;  Location: [facility] ENDO;  Service: Endoscopy;  Laterality: N/A;  past positive    COLONOSCOPY W/ BIOPSIES  06/17/2021    EXCISION OF BURSA  3/5/2021    Procedure: BURSECTOMY;  Surgeon: Jason Alicea Jr., MD;  Location: [facility] OR;  Service: Orthopedics;;    LEFT HEART CATHETERIZATION N/A 4/8/2019    Procedure: Left heart cath;  Surgeon: Renato Escalona MD;  Location: [facility] CATH LAB/EP;  Service: Cardiology;  Laterality: N/A;    POLYPECTOMY      SHOULDER ARTHROSCOPY Right 3/5/2021    Procedure: ARTHROSCOPY, SHOULDER;  Surgeon: Jason Alicea Jr., MD;  Location: [facility] OR;  Service: Orthopedics;  Laterality: Right;  need [employer] system [employer]   video  ([employer] notified 3/1/21, CC)  [place] confirmed 3/4/21 [place]    YAG Laser Capsulotomy Right 04/17/2017      [place]     Family History   Problem Relation Age of Onset    Kidney disease Mother     Glaucoma Neg Hx     Macular degeneration Neg Hx     Strabismus Neg Hx     Retinal detachment Neg Hx     Amblyopia Neg Hx     Blindness Neg Hx     Cataracts Neg Hx     Prostate cancer Neg Hx     Anesthesia problems Neg Hx      Social History     Tobacco Use    Smoking status: Never    Smokeless tobacco: Never    Tobacco comments:     Never smoked   Substance Use Topics    Alcohol use: Never    Drug use: Never        Review of Systems:  Review of Systems   Constitutional:  Negative for chills, fatigue, fever and unexpected weight change.   Respiratory:  Negative for cough, shortness of breath, wheezing and stridor.    Cardiovascular:  Negative for chest pain, palpitations and leg swelling.   Gastrointestinal:  Negative for abdominal distention, abdominal pain, constipation, diarrhea, nausea and vomiting.   Genitourinary:  Negative for difficulty urinating, dysuria, frequency, hematuria and urgency.   Skin:  Negative for color change, pallor, rash and wound.   Hematological:  Does not bruise/bleed easily.     OBJECTIVE:     Vital Signs (Most Recent)  Temp: 98.1 °F (36.7 °C) (05/31/23 0935)  Pulse: 78 (05/31/23 0935)  BP: (!) 140/72 (05/31/23 0935)  5' (1.524 m)  56.8 kg (125 lb 3.5 oz)     Physical Exam:  Physical Exam  Vitals reviewed.   Constitutional:       Appearance: She is well-developed.   HENT:      Head: Normocephalic and atraumatic.   Cardiovascular:      Rate and Rhythm: Normal rate and regular rhythm.   Pulmonary:      Effort: Pulmonary effort is normal.      Breath sounds: Normal breath sounds.   Abdominal:      General: Bowel sounds are normal. There is no distension.      Palpations: Abdomen is soft.      Tenderness: There is no abdominal tenderness.   Musculoskeletal:      Cervical back: Neck supple.   Skin:     General: Skin is warm and dry.   Neurological:      Mental Status: She is alert and oriented to person,  place, and time.         Diagnostic Results:  CT 2022   Large hiatal hernia and possibly some component of gastric volvulus    ASSESSMENT/PLAN:     87-year-old female with large hiatal hernia, dysphagia, reflux    PLAN:Plan     Prior CT reviewed, symptoms discuss with patient and her son.  Will get EGD for further evaluation of esophagus and stomach  Modified barium swallow study for upper esophageal dysphagia as well as upper GI to assess the esophageal motility    Initial discussions on hiatal hernia repair as well as fundoplication surgery.  Patient will follow-up after completion of her testing to further discuss recommendations    Offered the patient a  however they declined the use of a  at the time of her appointment.   Will need  available for any preop/consent discussion    45 minutes of total time spent on the encounter, which includes face to face time and non-face to face time preparing to see the patient (eg, review of tests), Obtaining and/or reviewing separately obtained history, Documenting clinical information in the electronic or other health record, Independently interpreting results (not separately reported) and communicating results to the patient/family/caregiver, or Care coordination (not separately reported).

## 2023-06-05 ENCOUNTER — TELEPHONE (OUTPATIENT)
Dept: SURGERY | Facility: CLINIC | Age: 88
End: 2023-06-05
Payer: MEDICARE

## 2023-06-06 ENCOUNTER — PATIENT MESSAGE (OUTPATIENT)
Dept: PRIMARY CARE CLINIC | Facility: CLINIC | Age: 88
End: 2023-06-06
Payer: MEDICARE

## 2023-06-06 ENCOUNTER — HOSPITAL ENCOUNTER (OUTPATIENT)
Dept: RADIOLOGY | Facility: HOSPITAL | Age: 88
Discharge: HOME | End: 2023-06-06
Attending: SURGERY
Payer: MEDICARE

## 2023-06-06 DIAGNOSIS — K21.9 HIATAL HERNIA WITH GERD: ICD-10-CM

## 2023-06-06 DIAGNOSIS — R13.14 PHARYNGOESOPHAGEAL DYSPHAGIA: ICD-10-CM

## 2023-06-06 DIAGNOSIS — K44.9 HIATAL HERNIA WITH GERD: ICD-10-CM

## 2023-06-06 PROCEDURE — 74240 FL UPPER GI: ICD-10-PCS | Mod: 26,,, | Performed by: STUDENT IN AN ORGANIZED HEALTH CARE EDUCATION/TRAINING PROGRAM

## 2023-06-06 PROCEDURE — A9698 NON-RAD CONTRAST MATERIALNOC: HCPCS | Performed by: SURGERY

## 2023-06-06 PROCEDURE — 25500020 PHARM REV CODE 255: Performed by: SURGERY

## 2023-06-06 PROCEDURE — 74240 X-RAY XM UPR GI TRC 1CNTRST: CPT | Mod: 26,,, | Performed by: STUDENT IN AN ORGANIZED HEALTH CARE EDUCATION/TRAINING PROGRAM

## 2023-06-06 PROCEDURE — 74240 X-RAY XM UPR GI TRC 1CNTRST: CPT | Mod: TC

## 2023-06-06 RX ADMIN — BARIUM SULFATE 340 G: 980 POWDER, FOR SUSPENSION ORAL at 10:06

## 2023-06-06 RX ADMIN — Medication 176 G: at 10:06

## 2023-06-12 ENCOUNTER — TELEPHONE (OUTPATIENT)
Dept: SURGERY | Facility: CLINIC | Age: 88
End: 2023-06-12
Payer: MEDICARE

## 2023-06-12 NOTE — TELEPHONE ENCOUNTER
Sent staff message to endo dept again requesting to contact pt to schedule. This dept has been having difficulty getting a hold of this pt

## 2023-06-14 ENCOUNTER — TELEPHONE (OUTPATIENT)
Dept: SURGERY | Facility: CLINIC | Age: 88
End: 2023-06-14
Payer: MEDICARE

## 2023-06-20 ENCOUNTER — HOSPITAL ENCOUNTER (OUTPATIENT)
Dept: PREADMISSION TESTING | Facility: HOSPITAL | Age: 88
Discharge: HOME | End: 2023-06-20
Attending: SURGERY
Payer: MEDICARE

## 2023-06-20 DIAGNOSIS — K21.9 HIATAL HERNIA WITH GERD: Primary | ICD-10-CM

## 2023-06-20 DIAGNOSIS — K44.9 HIATAL HERNIA WITH GERD: Primary | ICD-10-CM

## 2023-06-20 DIAGNOSIS — R13.14 PHARYNGOESOPHAGEAL DYSPHAGIA: ICD-10-CM

## 2023-06-21 ENCOUNTER — HOSPITAL ENCOUNTER (OUTPATIENT)
Dept: RADIOLOGY | Facility: HOSPITAL | Age: 88
Discharge: HOME | End: 2023-06-21
Attending: SURGERY
Payer: MEDICARE

## 2023-06-21 DIAGNOSIS — K21.9 HIATAL HERNIA WITH GERD: ICD-10-CM

## 2023-06-21 DIAGNOSIS — K44.9 HIATAL HERNIA WITH GERD: ICD-10-CM

## 2023-06-21 DIAGNOSIS — R13.14 PHARYNGOESOPHAGEAL DYSPHAGIA: ICD-10-CM

## 2023-06-21 PROCEDURE — 25500020 PHARM REV CODE 255: Performed by: SURGERY

## 2023-06-21 PROCEDURE — 74230 X-RAY XM SWLNG FUNCJ C+: CPT | Mod: TC

## 2023-06-21 PROCEDURE — 97535 SELF CARE MNGMENT TRAINING: CPT

## 2023-06-21 PROCEDURE — 74230 X-RAY XM SWLNG FUNCJ C+: CPT | Mod: 26,,, | Performed by: RADIOLOGY

## 2023-06-21 PROCEDURE — 92611 MOTION FLUOROSCOPY/SWALLOW: CPT

## 2023-06-21 PROCEDURE — 74230 FL MODIFIED BARIUM SWALLOW SPEECH STUDY: ICD-10-PCS | Mod: 26,,, | Performed by: RADIOLOGY

## 2023-06-21 PROCEDURE — A9698 NON-RAD CONTRAST MATERIALNOC: HCPCS | Performed by: SURGERY

## 2023-06-21 RX ADMIN — BARIUM SULFATE 40 ML: 0.81 POWDER, FOR SUSPENSION ORAL at 01:06

## 2023-06-21 NOTE — PROCEDURES
Modified Barium Swallow    Patient Name:  Ghada Dave   MRN:  [id number]      Recommendations:     Recommendations:                General Recommendations:  GI evaluation & Continued surgical evaluation as previously established; No further SLP intervention indicated  Diet recommendations:  IDDSI 7-regular solids, IDDSI 0-thin liquids  Aspiration Precautions:  GERD precautions, Remain upright atleast 1-3 hours post-meals, consider multiple/small meals throughout the day, Double swallow with each bite/sip, Frequent oral care, HOB to 90 degrees, Small bites/sips, and Standard aspiration precautions   General Precautions: Standard aspiration, GERD  Communication strategies:   [place] speaking/ needed    Referral     Reason for Referral  Patient was referred for a Modified Barium Swallow Study to assess the efficiency of his/her swallow function, rule out aspiration and make recommendations regarding safe dietary consistencies, effective compensatory strategies, and safe eating environment.     Diagnosis: Dysphagia reported AFTER THE SWALLOW with solids, early satiety, globus sensation/pharyngeus and oral regurgitation/emesis    History:     Past Medical History:   Diagnosis Date    Abnormal Pap smear 2013    Acidosis     CAD (coronary artery disease) 06/2017     iFR of LAD and RCA performed confirmed nonobstructive disease.    Cataract     Colon polyps     Disorder of kidney and ureter     Frequent urination     Hemorrhoid     High cholesterol     Poor circulation     Postoperative abdominal pain     Psoriasis     Varicose vein        Objective:     Current Respiratory Status: room air    Alert: yes    Cooperative: yes    Follows Directions: yes  *Translation services offered; however, pt's son preferred to translate and was present during the MBSS and following for discussion of results.    Visualization  Patient was seen in the lateral view  Patient was seen in the anterior view  Anatomical  changes: -   Known Large hiatal hernia as suspected cause of pt esophageal dysphagia symptoms  Suspected cervical osteophytes without significant narrowing or bolus obstruction  Calcifications suspected within pharynx and trachea prior to barium administration    Oral Peripheral Examination  OM WFL    In consideration of the interrelationships between body systems and swallowing, History was provided by patient, family, and/or taken from chart review. The following are medical conditions present in the patient's history which can result in or be attributed to dysphagia:  Respiratory None noted in this category   Cardiovascular CAD  Cardiac Cath   Digestive GERD  Esophageal dysmotility   Large hiatal hernia  Pharyngoesophageal dysphagia  Constipation  Duodenal diverticulum   Infections  None noted in this category   Urinary Disorder of kidney and ureter  UTI   Endocrine None noted in this category   Nervous None noted in this category   Skeletal cervical osteophyte/degenerative changes of cervical spine   Immune None noted in this category   Cancer None noted in this category   Psychiatric  None noted in this category   Congenital  None noted in this category   Other None noted in this category     Relevant Imaging:  [place] UPPER GI 6/6/2023     CLINICAL HISTORY:  Hiatal hernia with concern for volvulus, dysphagia;Diaphragmatic hernia without obstruction or gangrene, GERD     TECHNIQUE:  Contrast material: Dual contrast with barium sulfate suspension     Fluoroscopy time: 2.2 minutes     Radiation dose: 68.246 mGy     Images obtained: 55     COMPARISON:  Abdominal radiograph 09/22/2017, CT abdomen pelvis with contrast 12/06/2022, chest radiograph 05/11/2023     FINDINGS:  Preliminary radiograph: Moderate volume stool present with unremarkable bowel gas pattern.  Large hiatal hernia, similar to prior radiograph.  There is prominent aortic calcification and postsurgical change of the right proximal humerus.     Swallowing:  The laryngeal penetration without subglottic tracheal aspiration.     Esophagus: Mildly prominent caliber with tertiary contractions, compatible with mild dysmotility.     Gastroesophageal reflux: Gastroesophageal reflux elicited upon stress maneuvers to the level of the mid esophagus.     Stomach: Large hiatal hernia.  No delay in gastric emptying to suggest volvulus.     Duodenum: Normal duodenal sweep, noting presence of a diverticulum off the 3rd portion of the duodenum.     Other findings: None.     Impression:     Large hiatal hernia without delayed gastric emptying to suggest volvulus.     Gastroesophageal reflux to the level of the midesophagus with stress maneuvers.     Laryngeal penetration without subglottic tracheal aspiration.     Duodenal diverticulum.        Electronically signed by: Diana Bernal  Date:                                            06/06/2023  Time:                                           11:55    XR CHEST PA AND LATERAL 5/11/2023     CLINICAL HISTORY:  Dorsalgia, unspecified     FINDINGS:  Comparison is made with the most recent prior chest x-ray. There is a large hiatal hernia, increased in size since 2017.  Heart size is within normal limits.  Stable tiny calcified granuloma right lower lobe.  The lungs appear clear of active disease.  No acute appearing infiltrate, pleural effusion or pneumothorax identified.  No acute osseous abnormality identified.     Impression:     No acute disease identified in the chest.  Large hiatal hernia.        Electronically signed by: Hansel Stephenson MD  Date:                                            05/11/2023  Time:                                           13:04    Objective     Modified Barium Swallow Study  Purpose: to evaluate anatomy and physiology of the oropharyngeal swallow, to determine effectiveness of rehabilitation strategies, and to determine diet consistency and intervention recommendations. The study was performed using the  ""Gold Standard" of 30 fps with as low as reasonably achievable (ALARA) exposure.     The patient was seen in radiology seated in High Crowell's position in a video imaging chair for lateral views of the larynx and an A/P view. The study was conducted using Varibar thin liquid (IDDSI 0), Varibar pudding (IDDSI 4), Peaches mixed with Varibar thin liquid (IDDSI 6/0), and solid coated in Varibar pudding (IDDSI 7). Ms. Flanagan tolerated the procedure well and denied c/o dysphagia, globus sensation or N/V post-procedure.  Pt's son opted to translate instead of using the Cat and was present during the study.    A cranial nerve evaluation revealed:   OM WFL    Consistency  Presentation  Findings Strategy Attempted Rosenbeck's Penetration/Aspiration Scale (PAS)   Thin (IDDSI 0) Method: Self-fed    Volume: single cup, straw and sequential sips    Projection: lateral view; AP view, esophageal sweep  Oral phase: WFL    Pharyngeal phase: Transient posterior laryngeal penetration during the swallow.  No aspiration.  Mild pharyngeal residue along BOT, valleculae, posterior pharyngeal wall and pyriform sinuses in which cleared with cued double swallow strategy    Esophageal screen: Trace residue present within upper, mid and distal esophagus post deglutition.  Refer to AP image 31 Double swallow (CUED) per sip improved efficiency by completely clearing pharyngeal residue Best: (1) Material does not enter the airway    Worst: (2) Material enters the airway, remains above the vocal folds, and is ejected from the airway   (Transient posterior laryngeal penetration)     Puree (extremely thick/ IDDSI 4) Method: Self-fed    Volume: tsp/tbsp bite     Projection: lateral view, esophageal sweep Oral phase: WFL    Pharyngeal phase: No laryngeal penetration or aspiration. Trace pharyngeal residue, cleared with double swallow.    Esophageal screen: Increased residue within mid-distal esophagus post deglutition with suspected dysmotility. " Refer to image 11 lateral/sweep  Double swallow (CUED) per bite improved efficiency by completely clearing pharyngeal residue Best: (1) Material does not enter the airway    Worst: (1) Material does not enter the airway     Solid (regular/ IDDSI 7) Method: Self-fed    Volume: Lornadoone cookie x2     Projection: lateral & AP view Oral phase: WFL    Pharyngeal phase: No laryngeal penetration or aspiration. Trace pharyngeal residue, cleared with double swallow.    Esophageal screen: Increased residue within mid-distal esophagus post deglutition with suspected dysmotility.  Refer to AP image 35 Double swallow (CUED) per bite improved efficiency by completely clearing pharyngeal residue Best: (1) Material does not enter the airway    Worst: (1) Material does not enter the airway     Mixed consistency (thin/ IDDSI 0 + soft and bite sized/ IDDSI 6) Method: Self-fed    Volume: bite/fruit cocktail     Projection: lateral view Oral phase: WFL    Pharyngeal phase: No laryngeal penetration or aspiration. Trace pharyngeal residue, cleared with double swallow. Double swallow (CUED) per bite improved efficiency by completely clearing pharyngeal residue Best: (1) Material does not enter the airway    Worst: (1) Material does not enter the airway     Barium tablet  Method: Self-fed    Volume: single tablet with water bolus(es)    Projection: AP view Timely and efficient oropharyngeal clearance. Esophageal dysmotility requiring continue liquid intake, which eventually cleared via GE junction (Refer to AP images 28-30) Additional sips of water assisted with bolus transit via esophagus        Treatment   Treatment Time In: 13:30  Treatment Time Out: 14:30  Total Treatment Time: 60 minutes  Patient and son educated regarding results and recommendations of the evaluation. See the recommendations section below.    Education: Plan of Care, aspiration/GERD precautions and anatomy and physiology of swallow mechanism as it relates to MBSS  findings and recommendations were discussed with the patient and patients son. They both expressed understanding. All questions were answered.   Pt scheduled for EGD 6/23/2023 with sx follow up.    Assessment     Ms. Ghada Dave was referred for Modified Barium Swallow Study with a medical diagnosis of large hiatal hernia pending possible surgical intervention. The patient presents with mild pharyngeal-esophageal dysphagia as determined by the Dysphagia Outcome and Severity Scale (CARLOS). Level 5: Mild or presbyphagia (65+).    Modified Barium Swallow Study (MBSS) revealed oral phase characterized by adequate lingual and labial strength and range of motion for tongue control, bolus preparation and transport. Lip closure was adequate with no labial escape. Bolus prep and mastication was timely and efficient. Lingual motion was brisk for adequate bolus transport. There was no oral residue remaining post-deglutition. The swallow was initiated when the head of the bolus entered the vallecula.    Pharyngeal phase characterized by timely initiation of swallow across consistencies. The soft palate elevated for complete of the velopharyngeal port. During pharyngeal swallow, reduced base of tongue retraction, anterior hyoid excursion, laryngeal elevation, and pharyngeal stripping wave resulted in transient posterior laryngeal penetration of thin liquids during the swallow and mild pharyngeal residue, which cleared with a cued double swallow.  Pt exhibited complete epiglottic inversion and UES opening.     On esophageal screen, dysmotility and aerophagia were noted. Further esophageal imaging including EGD and/or barium esophagram as well as follow-up with GI is recommended.  See Upper GI 6/6/2023.      Impressions: Patient presents with mild pharyngeal-esophageal dysphagia, likely chronic related to presbyphagia and large hiatal hernia.  In consideration of the Dynamic Imaging Grade of Swallowing Toxicity  (DIGEST) (Martín et al, 2017), patient presents with preserved safety of swallow and mild efficiency impairment. Patient appears to be at low risk for aspiration related PNA in consideration of three pillars of aspiration pneumonia (Nickie, 2005) including oral health status, overall health/immune status, and laryngeal vestibule closure/severity of dysphagia; However, unable to assess the risk related to aspiration pneumonia caused by the aspiration of gastric content or GERD.  Behavioral swallow rehabilitation is not clinically indicated at this time; however, compensatory strategies recommended ,and pt/family educated following study.     Functional Oral Intake Scale (FOIS)  The Functional Oral Intake Scale (FOIS) is an ordinal scale that is used to assess the current status and meaningful change in the oral intake. FOIS levels include:    TUBE DEPENDENT (levels 1-3) 1. No oral intake  2. Tube dependent with minimal/inconsistent oral intake  3. Tube supplements with consistent oral intake      TOTAL ORAL INTAKE (levels 4-7) 4. Total oral intake of a single consistency  5. Total oral intake of multiple consistencies requiring special preparation  6. Total oral intake with no special preparation, but must avoid specific foods or liquid items  7. Total oral intake with no restrictions     Patient is currently judged to be at FOIS level 7.      References:  REBECA Fu (2005, March). Pneumonia: Factors Beyond Aspiration. Perspectives in Swallowing and Swallowing Disorders   (Dysphagia), 14, 10-16.    Jae KA, Giovanna MP, Meera DA, Simona CHAVISK, Heaven HY, Mona RS, Man CD, Steffen SY, Hitesh CP, Bety J, Lazarus CL,   May A, Ryan J, Kal JW, Rohini HM, Kerri JS. Dynamic Imaging Grade of Swallowing Toxicity (DIGEST): Scale   development and validation. Cancer. 2017 Jan 1;123(1):62-70. doi: 10.1002/cncr.44274. Epub 2016 Aug 26. PMID:   [id number]; PMCID: [id number].    Recommendations:     Consistency  Recommendations: Thin liquids (IDDSI 0) and Regular solid consistencies (IDDSI 7).  Medications should be taken Whole in thin liquids.   Risk Management/Swallow Guidelines: use good oral hygiene , sit upright for all PO intake, increase physical mobility as tolerated, behavioral reflux precautions, alternate bites and sips, small bites and sips, multiple swallows per bolus, allow extra time for meals, remain upright for at least 1-3 hours following any PO intake, and eat small meals throughout the day to reduce discomfort associated with delayed emptying of the esophagus  Specialist Referrals: GI, Surgeon as currently established  Ancillary Tests: Consider Barium Esophagram with Radiologist, EGD (scheduled 6/23/23)  Therapy: Dysphagia therapy is not recommended at this time.  Follow-up exam: Follow up swallow study is not indicated at this time.    Please contact [facility] at [phone] if there are questions re: the above or if we can be of additional service to this patient.    [place]O [place], CCC-SLP  Speech Language Pathologist      06/21/2023

## 2023-06-22 ENCOUNTER — ANESTHESIA EVENT (OUTPATIENT)
Dept: ENDOSCOPY | Facility: HOSPITAL | Age: 88
End: 2023-06-22
Payer: MEDICARE

## 2023-06-22 NOTE — ANESTHESIA PREPROCEDURE EVALUATION
06/22/2023  Ghada Dave is a 87 y.o., female.    Past Medical History:   Diagnosis Date    Abnormal Pap smear 2013    Acidosis     CAD (coronary artery disease) 06/2017     iFR of LAD and RCA performed confirmed nonobstructive disease.    Cataract     Colon polyps     Disorder of kidney and ureter     Frequent urination     Hemorrhoid     High cholesterol     Poor circulation     Postoperative abdominal pain     Psoriasis     Varicose vein      Past Surgical History:   Procedure Laterality Date    CARDIAC CATHETERIZATION  06/2017     iFR of LAD and RCA performed confirmed nonobstructive disease.    CATARACT EXTRACTION W/  INTRAOCULAR LENS IMPLANT Bilateral     COLONOSCOPY N/A 8/28/2017    Procedure: COLONOSCOPY;  Surgeon: Bertram Myers MD;  Location: [facility];  Service: Endoscopy;  Laterality: N/A;    COLONOSCOPY N/A 10/18/2019    Procedure: COLONOSCOPY;  Surgeon: Wilfred Barber MD;  [facility]  Service: Endoscopy;  Laterality: N/A;   needed-BB  Cardiology Clearance received from Dr. REBECA Escalona, see telephone encounter 10/1/19-BB    COLONOSCOPY N/A 6/17/2021    Procedure: COLONOSCOPY suprep Past positive;  Surgeon: Lele Barillas MD;  Location: [facility];  Service: Endoscopy;  Laterality: N/A;  past positive    COLONOSCOPY W/ BIOPSIES  06/17/2021    EXCISION OF BURSA  3/5/2021    Procedure: BURSECTOMY;  Surgeon: Jason Alicea Jr., MD;  Location: [facility];  Service: Orthopedics;;    LEFT HEART CATHETERIZATION N/A 4/8/2019    Procedure: Left heart cath;  Surgeon: Renato Escalona MD;  Location: [facility] CATH LAB/EP;  Service: Cardiology;  Laterality: N/A;    POLYPECTOMY      SHOULDER ARTHROSCOPY Right 3/5/2021    Procedure: ARTHROSCOPY, SHOULDER;  Surgeon: Jason Alicea Jr., MD;  Location: [facility] OR;  Service:  Orthopedics;  Laterality: Right;  need opus system Scientology   video  (Scientology notified 3/1/21, CC)  Kulwinder confirmed 3/4/21 [place]    YAG Laser Capsulotomy Right 04/17/2017    Dr. Chavez     Patient Active Problem List   Diagnosis    Urge incontinence    Rectal polyp    Ureteral stone    Diverticulosis    Heartburn    Post herpetic neuralgia    Leukopenia    Personal history of colonic polyps    Left lower quadrant pain    Pseudophakia    Right posterior capsular opacification    Anal or rectal pain    Non-cardiac chest pain    Abnormal stress test    Non-occlusive coronary artery disease    Psoriasis    Chronic midline low back pain without sciatica    Scoliosis of thoracolumbar spine    Right subscapular pain    Insomnia    Overflow incontinence    Urinary retention with incomplete bladder emptying    Lichen simplex chronicus of the heel    CAD (coronary artery disease)    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), histologically confirmed    Cerumen debris on tympanic membrane of both ears    Stenosis of carotid artery    Nausea    Essential (primary) hypertension    Dizziness    Anal dysplasia    SOB (shortness of breath)    Anal polyp    Prediabetes    Chronic right shoulder pain    Arthropathies in other specified diseases classified elsewhere, vertebrae     Age-related osteoporosis without fracture    Osteoarthritis of multiple joints    Gastroesophageal reflux disease without esophagitis    Nontraumatic complete tear of right rotator cuff    Decreased strength    Decreased ROM of right shoulder    Hx of colonoscopy    Hypercholesterolemia     Sinus bradycardia   T wave abnormality, consider anterolateral ischemia   Abnormal ECG   When compared with ECG of 21-JAN-2021 11:41,   Vent. rate has decreased BY  28 BPM   Confirmed by Horacio Uriarte MD ([id number]) on 3/8/2021   Pre-op Assessment    I have reviewed the Patient Summary Reports.     I have  reviewed the Nursing Notes. I have reviewed the NPO Status.   I have reviewed the Medications.     Review of Systems  Anesthesia Hx:  No problems with previous Anesthesia  History of prior surgery of interest to airway management or planning: Previous anesthesia: General Denies Family Hx of Anesthesia complications.   Denies Personal Hx of Anesthesia complications.   Social:  Non-Smoker, No Alcohol Use    Cardiovascular:   Hypertension Valvular problems/Murmurs, AI CAD   PVD hyperlipidemia ECG has been reviewed. CAD, followed by cards.  Asymptomatic, good activity for age.    Pulmonary:   Shortness of breath    Renal/:   Chronic Renal Disease, CKD    Hepatic/GI:   GERD Hiatal hernia. Dysphagia.    Musculoskeletal:   Arthritis         Physical Exam  General: Alert and Oriented    Airway:  Mallampati: II   Mouth Opening: Normal  TM Distance: Normal  Tongue: Normal  Neck ROM: Normal ROM    Dental:  Intact, Periodontal disease    Chest/Lungs:  Normal Respiratory Rate    Heart:  Rate: Normal  Rhythm: Regular Rhythm        Anesthesia Plan  Type of Anesthesia, risks & benefits discussed:    Anesthesia Type: Gen Natural Airway  Intra-op Monitoring Plan: Standard ASA Monitors  Post Op Pain Control Plan: multimodal analgesia  Induction:  IV  Informed Consent: Informed consent signed with the Patient and all parties understand the risks and agree with anesthesia plan.  All questions answered.   ASA Score: 3  Day of Surgery Review of History & Physical: H&P Update referred to the surgeon/provider.    Ready For Surgery From Anesthesia Perspective.     .

## 2023-06-23 ENCOUNTER — ANESTHESIA (OUTPATIENT)
Dept: ENDOSCOPY | Facility: HOSPITAL | Age: 88
End: 2023-06-23
Payer: MEDICARE

## 2023-06-23 ENCOUNTER — HOSPITAL ENCOUNTER (OUTPATIENT)
Facility: HOSPITAL | Age: 88
Discharge: HOME | End: 2023-06-23
Attending: INTERNAL MEDICINE | Admitting: INTERNAL MEDICINE
Payer: MEDICARE

## 2023-06-23 VITALS
DIASTOLIC BLOOD PRESSURE: 64 MMHG | WEIGHT: 122.56 LBS | HEART RATE: 56 BPM | RESPIRATION RATE: 18 BRPM | OXYGEN SATURATION: 98 % | HEIGHT: 60 IN | TEMPERATURE: 97 F | BODY MASS INDEX: 24.06 KG/M2 | SYSTOLIC BLOOD PRESSURE: 136 MMHG

## 2023-06-23 DIAGNOSIS — R13.10 DYSPHAGIA, UNSPECIFIED TYPE: Primary | ICD-10-CM

## 2023-06-23 DIAGNOSIS — R13.10 DYSPHAGIA: ICD-10-CM

## 2023-06-23 LAB — POCT GLUCOSE: 101 MG/DL (ref 70–110)

## 2023-06-23 PROCEDURE — 88305 TISSUE EXAM BY PATHOLOGIST: CPT | Mod: 26,,, | Performed by: PATHOLOGY

## 2023-06-23 PROCEDURE — 63600175 PHARM REV CODE 636 W HCPCS: Performed by: INTERNAL MEDICINE

## 2023-06-23 PROCEDURE — 25000003 PHARM REV CODE 250: Performed by: NURSE ANESTHETIST, CERTIFIED REGISTERED

## 2023-06-23 PROCEDURE — 27201012 HC FORCEPS, HOT/COLD, DISP: Performed by: INTERNAL MEDICINE

## 2023-06-23 PROCEDURE — D9220A PRA ANESTHESIA: Mod: ,,, | Performed by: NURSE ANESTHETIST, CERTIFIED REGISTERED

## 2023-06-23 PROCEDURE — D9220A PRA ANESTHESIA: ICD-10-PCS | Mod: ,,, | Performed by: NURSE ANESTHETIST, CERTIFIED REGISTERED

## 2023-06-23 PROCEDURE — 37000008 HC ANESTHESIA 1ST 15 MINUTES: Performed by: INTERNAL MEDICINE

## 2023-06-23 PROCEDURE — 43239 EGD BIOPSY SINGLE/MULTIPLE: CPT | Mod: 53,,, | Performed by: INTERNAL MEDICINE

## 2023-06-23 PROCEDURE — 43239 EGD BIOPSY SINGLE/MULTIPLE: CPT | Mod: 74 | Performed by: INTERNAL MEDICINE

## 2023-06-23 PROCEDURE — 82962 GLUCOSE BLOOD TEST: CPT | Performed by: INTERNAL MEDICINE

## 2023-06-23 PROCEDURE — 37000009 HC ANESTHESIA EA ADD 15 MINS: Performed by: INTERNAL MEDICINE

## 2023-06-23 PROCEDURE — 63600175 PHARM REV CODE 636 W HCPCS: Performed by: NURSE ANESTHETIST, CERTIFIED REGISTERED

## 2023-06-23 PROCEDURE — 43239 PR EGD, FLEX, W/BIOPSY, SGL/MULTI: ICD-10-PCS | Mod: 53,,, | Performed by: INTERNAL MEDICINE

## 2023-06-23 PROCEDURE — 88305 TISSUE EXAM BY PATHOLOGIST: CPT | Mod: 59 | Performed by: PATHOLOGY

## 2023-06-23 PROCEDURE — 88305 TISSUE EXAM BY PATHOLOGIST: ICD-10-PCS | Mod: 26,,, | Performed by: PATHOLOGY

## 2023-06-23 RX ORDER — SODIUM CHLORIDE, SODIUM LACTATE, POTASSIUM CHLORIDE, CALCIUM CHLORIDE 600; 310; 30; 20 MG/100ML; MG/100ML; MG/100ML; MG/100ML
INJECTION, SOLUTION INTRAVENOUS CONTINUOUS
Status: DISCONTINUED | OUTPATIENT
Start: 2023-06-23 | End: 2023-06-23 | Stop reason: HOSPADM

## 2023-06-23 RX ORDER — PROPOFOL 10 MG/ML
VIAL (ML) INTRAVENOUS
Status: DISCONTINUED | OUTPATIENT
Start: 2023-06-23 | End: 2023-06-23

## 2023-06-23 RX ORDER — LIDOCAINE HYDROCHLORIDE 20 MG/ML
INJECTION, SOLUTION EPIDURAL; INFILTRATION; INTRACAUDAL; PERINEURAL
Status: DISCONTINUED | OUTPATIENT
Start: 2023-06-23 | End: 2023-06-23

## 2023-06-23 RX ADMIN — PROPOFOL 100 MG: 10 INJECTION, EMULSION INTRAVENOUS at 10:06

## 2023-06-23 RX ADMIN — SODIUM CHLORIDE, POTASSIUM CHLORIDE, SODIUM LACTATE AND CALCIUM CHLORIDE: 600; 310; 30; 20 INJECTION, SOLUTION INTRAVENOUS at 09:06

## 2023-06-23 RX ADMIN — LIDOCAINE HYDROCHLORIDE 40 MG: 20 INJECTION, SOLUTION EPIDURAL; INFILTRATION; INTRACAUDAL; PERINEURAL at 10:06

## 2023-06-23 RX ADMIN — PROPOFOL 40 MG: 10 INJECTION, EMULSION INTRAVENOUS at 11:06

## 2023-06-23 RX ADMIN — PROPOFOL 20 MG: 10 INJECTION, EMULSION INTRAVENOUS at 11:06

## 2023-06-23 NOTE — PROVATION PATIENT INSTRUCTIONS
Discharge Summary/Instructions after an Endoscopic Procedure  Patient Name: Ghada Flanagan  Patient MRN: [id number]  Patient YOB: 1935 Friday, June 23, 2023  Seun Burt MD  Dear patient,  As a result of recent federal legislation (The Federal Cures Act), you may   receive lab or pathology results from your procedure in your [facility]   account before your physician is able to contact you. Your physician or   their representative will relay the results to you with their   recommendations at their soonest availability.  Thank you,  RESTRICTIONS:  During your procedure today, you received medications for sedation.  These   medications may affect your judgment, balance and coordination.  Therefore,   for 24 hours, you have the following restrictions:   - DO NOT drive a car, operate machinery, make legal/financial decisions,   sign important papers or drink alcohol.    ACTIVITY:  Today: no heavy lifting, straining or running due to procedural   sedation/anesthesia.  The following day: return to full activity including work.  DIET:  Eat and drink normally unless instructed otherwise.     TREATMENT FOR COMMON SIDE EFFECTS:  - Mild abdominal pain, nausea, belching, bloating or excessive gas:  rest,   eat lightly and use a heating pad.  - Sore Throat: treat with throat lozenges and/or gargle with warm salt   water.  - Because air was used during the procedure, expelling large amounts of air   from your rectum or belching is normal.  - If a bowel prep was taken, you may not have a bowel movement for 1-3 days.    This is normal.  SYMPTOMS TO WATCH FOR AND REPORT TO YOUR PHYSICIAN:  1. Abdominal pain or bloating, other than gas cramps.  2. Chest pain.  3. Back pain.  4. Signs of infection such as: chills or fever occurring within 24 hours   after the procedure.  5. Rectal bleeding, which would show as bright red, maroon, or black stools.   (A tablespoon of blood from the rectum is not serious, especially  if   hemorrhoids are present.)  6. Vomiting.  7. Weakness or dizziness.  GO DIRECTLY TO THE NEAREST EMERGENCY ROOM IF YOU HAVE ANY OF THE FOLLOWING:      Difficulty breathing              Chills and/or fever over 101 F   Persistent vomiting and/or vomiting blood   Severe abdominal pain   Severe chest pain   Black, tarry stools   Bleeding- more than one tablespoon   Any other symptom or condition that you feel may need urgent attention  Your doctor recommends these additional instructions:  If any biopsies were taken, your doctors clinic will contact you in 1 to 2   weeks with any results.  - Discharge patient to home.   - Patient will need surgical repair of gastric volvulus and hiatal hernia.   - Resume previous diet.   - Continue present medications.   - Await pathology results.   - Return to referring physician.  For questions, problems or results please call your physician Seun Burt MD at Work:  [phone]  If you have any questions about the above instructions, call the GI   department at [phone] or call the endoscopy unit at [phone]   from 7am until 3 pm.  [facility], EMERGENCY ROOM PHONE NUMBER:   [phone]  IF A COMPLICATION OR EMERGENCY SITUATION ARISES AND YOU ARE UNABLE TO REACH   YOUR PHYSICIAN - GO DIRECTLY TO THE EMERGENCY ROOM.  I have read or have had read to me these discharge instructions for my   procedure and have received a written copy.  I understand these   instructions and will follow-up with my physician if I have any questions.     __________________________________       _____________________________________  Nurse Signature                                          Patient/Designated   Responsible Party Signature  Seun Burt MD  6/23/2023 11:12:49 AM  This report has been verified and signed electronically.  Dear patient,  As a result of recent federal legislation (The Federal Cures Act), you may   receive lab or  pathology results from your procedure in your [facility]   account before your physician is able to contact you. Your physician or   their representative will relay the results to you with their   recommendations at their soonest availability.  Thank you,  PROVATION

## 2023-06-23 NOTE — H&P
PRE PROCEDURE H&P    Patient Name: Ghada Dave  MRN: [id number]  : 1935  Date of Procedure:  2023  Referring Physician: Elvis Malcolm MD  Primary Physician: Javed Basilio MD  Procedure Physician: Seun Burt MD       Planned Procedure: EGD  Diagnosis: dysphagia and reflux  Chief Complaint: Same as above    HPI: Patient is an 87 y.o. female is here for the above.       Anticoagulation: None     Past Medical History:   Past Medical History:   Diagnosis Date    Abnormal Pap smear     Acidosis     CAD (coronary artery disease) 2017     iFR of LAD and RCA performed confirmed nonobstructive disease.    Cataract     Colon polyps     Disorder of kidney and ureter     Frequent urination     Hemorrhoid     High cholesterol     Poor circulation     Postoperative abdominal pain     Psoriasis     Varicose vein         Past Surgical History:  Past Surgical History:   Procedure Laterality Date    CARDIAC CATHETERIZATION  2017     iFR of LAD and RCA performed confirmed nonobstructive disease.    CATARACT EXTRACTION W/  INTRAOCULAR LENS IMPLANT Bilateral     COLONOSCOPY N/A 2017    Procedure: COLONOSCOPY;  Surgeon: Bertram Myers MD;  Location: [facility];  Service: Endoscopy;  Laterality: N/A;    COLONOSCOPY N/A 10/18/2019    Procedure: COLONOSCOPY;  Surgeon: Wilfred Barber MD;  Location: [facility] ([address] [place] [address]);  Service: Endoscopy;  Laterality: N/A;   needed-BB  Cardiology Clearance received from Dr. REBECA Escalona, see telephone encounter 10/1/19-BB    COLONOSCOPY N/A 2021    Procedure: COLONOSCOPY suprep Past positive;  Surgeon: Lele Barillas MD;  Location: [facility] ENDO;  Service: Endoscopy;  Laterality: N/A;  past positive    COLONOSCOPY W/ BIOPSIES  2021    EXCISION OF BURSA  3/5/2021    Procedure: BURSECTOMY;  Surgeon: Jason Alicea Jr., MD;  Location: [facility] OR;  Service: Orthopedics;;    LEFT HEART CATHETERIZATION N/A 2019     Procedure: Left heart cath;  Surgeon: Renato Escalona MD;  Location: [facility] CATH LAB/EP;  Service: Cardiology;  Laterality: N/A;    POLYPECTOMY      SHOULDER ARTHROSCOPY Right 3/5/2021    Procedure: ARTHROSCOPY, SHOULDER;  Surgeon: Jason Alicea Jr., MD;  Location: [facility] OR;  Service: Orthopedics;  Laterality: Right;  need opus system Sikh   video  (Sikh notified 3/1/21, CC)  Kulwinder confirmed 3/4/21 [place]    YAG Laser Capsulotomy Right 04/17/2017    Dr. Chavez        Home Medications:  Prior to Admission medications    Medication Sig Start Date End Date Taking? Authorizing Provider   aspirin (ECOTRIN) 81 MG EC tablet Take 1 tablet (81 mg total) by mouth once daily. 12/23/20  Yes Ghada Hicks MD   b complex vitamins capsule Take 1 capsule by mouth once daily.   Yes Historical Provider   esomeprazole (NEXIUM) 40 MG capsule Take 1 capsule (40 mg total) by mouth before breakfast. 5/11/23  Yes Javed Basilio MD   meloxicam (MOBIC) 7.5 MG tablet Take 1 tablet (7.5 mg total) by mouth once daily. 5/11/23  Yes Javed Basilio MD   metFORMIN (GLUCOPHAGE-XR) 500 MG ER 24hr tablet Take 1 tablet (500 mg total) by mouth daily with breakfast. 5/11/23 5/10/24 Yes Javed Basilio MD   nitrofurantoin, macrocrystal-monohydrate, (MACROBID) 100 MG capsule Take 1 capsule (100 mg total) by mouth once daily. 5/11/23  Yes Javed Basilio MD   rOPINIRole (REQUIP) 0.5 MG tablet Take 1 tablet (0.5 mg total) by mouth every evening. For leg cramps. 5/11/23 5/10/24 Yes Javed Basilio MD   LORazepam (ATIVAN) 0.5 MG tablet Take 0.5 tablets (0.25 mg total) by mouth every 12 (twelve) hours as needed for Anxiety. 5/11/23   Javed Basilio MD   triamcinolone (KENALOG) 0.5 % ointment Apply topically 2 (two) times daily as needed (rash). 5/11/23   Javed Basilio MD        Allergies:  Review of patient's allergies indicates:   Allergen Reactions    Penicillin Anaphylaxis    Lipitor [atorvastatin] Hives      Can take Simivastatin .     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     Physical Exam (all patients):    BP (!) 140/64 (BP Location: Right arm, Patient Position: Sitting)   Pulse 69   Temp 97.6 °F (36.4 °C) (Temporal)   Resp 18   Ht 5' (1.524 m)   Wt 55.6 kg (122 lb 9.2 oz)   SpO2 99%   Breastfeeding No   BMI 23.94 kg/m²   Lungs: Clear to auscultation bilaterally, respirations unlabored  Heart: Regular rate and rhythm, S1 and S2 normal, no obvious murmurs  Abdomen:         Soft, non-tender, bowel sounds normal, no masses, no organomegaly    Lab Results   Component Value Date    WBC 4.25 01/11/2023    MCV 91 01/11/2023    RDW 14.7 (H) 01/11/2023     01/11/2023    INR 1.0 06/26/2017    GLU 99 01/11/2023    HGBA1C 5.5 01/11/2023    BUN 23 01/11/2023     01/11/2023    K 4.7 01/11/2023     01/11/2023        SEDATION PLAN: per anesthesia      History reviewed, vital signs satisfactory, cardiopulmonary status satisfactory, sedation options, risks and plans have been discussed with the patient  All their questions were answered and the patient agrees to the sedation procedures as planned and the patient is deemed an appropriate candidate for the  sedation as planned.    Procedure explained to patient, informed consent obtained and placed in chart.    Seun Burt  6/23/2023  10:50 AM

## 2023-06-23 NOTE — ANESTHESIA POSTPROCEDURE EVALUATION
Anesthesia Post Evaluation    Patient: Ghada Dave    Procedure(s) Performed: Procedure(s) (LRB):  EGD (ESOPHAGOGASTRODUODENOSCOPY) (N/A)    Final Anesthesia Type: general      Patient location during evaluation: GI PACU  Patient participation: Yes- Able to Participate  Level of consciousness: awake  Post-procedure vital signs: reviewed and stable  Pain management: adequate  Airway patency: patent    PONV status at discharge: No PONV  Anesthetic complications: no      Cardiovascular status: stable  Respiratory status: unassisted  Hydration status: euvolemic  Follow-up not needed.          Vitals Value Taken Time   /63 06/23/23 1125   Temp 36.2 °C (97.1 °F) 06/23/23 1113   Pulse 60 06/23/23 1125   Resp 18 06/23/23 1125   SpO2 97 % 06/23/23 1125         No case tracking events are documented in the log.      Pain/Erin Score: Erin Score: 9 (6/23/2023 11:14 AM)

## 2023-06-23 NOTE — PLAN OF CARE
Discharge instructions reviewed with pt and spouse, handouts given, verbalized understanding with no further questions at this time. Dr. Burt spoke to pt at bedside, reviewed procedure and answered questions aware they are awaiting biopsy results with MD telephone number provided per AVS sheet. VSS on RA, no pain or nausea noted, tolerating po fluids without difficulty, no other complaints noted. Fall precautions reviewed, consents in chart, PIV to be removed at discharge.

## 2023-06-23 NOTE — TRANSFER OF CARE
Anesthesia Transfer of Care Note    Patient: Ghada Dave    Procedure(s) Performed: Procedure(s) (LRB):  EGD (ESOPHAGOGASTRODUODENOSCOPY) (N/A)    Patient location: PACU    Anesthesia Type: general    Transport from OR: Transported from OR on room air with adequate spontaneous ventilation    Post pain: adequate analgesia    Post assessment: no apparent anesthetic complications and tolerated procedure well    Post vital signs: stable    Level of consciousness: awake    Nausea/Vomiting: no nausea/vomiting    Complications: none    Transfer of care protocol was followed      Last vitals:   Visit Vitals  BP (!) 140/64 (BP Location: Right arm, Patient Position: Sitting)   Pulse 69   Temp 36.4 °C (97.6 °F) (Temporal)   Resp 18   Ht 5' (1.524 m)   Wt 55.6 kg (122 lb 9.2 oz)   SpO2 99%   Breastfeeding No   BMI 23.94 kg/m²

## 2023-06-29 LAB
FINAL PATHOLOGIC DIAGNOSIS: NORMAL
Lab: NORMAL

## 2023-07-12 ENCOUNTER — TELEPHONE (OUTPATIENT)
Dept: SURGERY | Facility: CLINIC | Age: 88
End: 2023-07-12
Payer: MEDICARE

## 2023-07-12 NOTE — TELEPHONE ENCOUNTER
----- Message from Iris Lombardi MA sent at 7/12/2023 11:15 AM CDT -----  Contact: Maximiliano/son    ----- Message -----  From: Ngoc Carver PA-C  Sent: 7/12/2023   9:11 AM CDT  To: Rex Marion MA, Iris Lombardi MA      ----- Message -----  From: Shara Hamm  Sent: 7/12/2023   9:04 AM CDT  To: [facility] Staff    Pt son is calling in regards to the pt transportation have not pick her up and appt needs to be rescheduled.please call back at [phone]      Thanks  KURTIS

## 2023-07-21 DIAGNOSIS — K44.9 HIATAL HERNIA WITH GERD: ICD-10-CM

## 2023-07-21 DIAGNOSIS — K21.9 HIATAL HERNIA WITH GERD: ICD-10-CM

## 2023-07-21 DIAGNOSIS — F41.9 ANXIETY: ICD-10-CM

## 2023-07-21 RX ORDER — ESOMEPRAZOLE MAGNESIUM 40 MG/1
40 CAPSULE, DELAYED RELEASE ORAL
Qty: 90 CAPSULE | Refills: 3 | Status: SHIPPED | OUTPATIENT
Start: 2023-07-21 | End: 2023-08-25

## 2023-07-21 RX ORDER — LORAZEPAM 0.5 MG/1
0.25 TABLET ORAL EVERY 12 HOURS PRN
Qty: 90 TABLET | Refills: 2 | Status: SHIPPED | OUTPATIENT
Start: 2023-07-21 | End: 2023-10-23 | Stop reason: SDUPTHER

## 2023-07-21 NOTE — TELEPHONE ENCOUNTER
Refill Routing Note   Medication(s) are not appropriate for processing by [facility] for the following reason(s):      Medication outside of protocol: non-delegated medication    [facility] action(s):  Route  Approve Care Due:  Labs due   Medication Therapy Plan: Lab (a1c) outdated      Appointments  past 12m or future 3m with PCP    Date Provider   Last Visit   5/11/2023 Javed Basilio MD   Next Visit   8/11/2023 Javed Basilio MD   ED visits in past 90 days: 0        Note composed:10:14 AM 07/21/2023

## 2023-07-21 NOTE — TELEPHONE ENCOUNTER
Care Due:                  Date            Visit Type   Department     Provider  --------------------------------------------------------------------------------                                EP -                              PRIMARY      [facility] PRIMARY  Last Visit: 05-      CARE ([facility])   UMER Basilio                              EP -                              PRIMARY      [facility] PRIMARY  Next Visit: 08-      CARE ([facility])   UMER Basilio                                                            Last  Test          Frequency    Reason                     Performed    Due Date  --------------------------------------------------------------------------------    HBA1C.......  6 months...  metFORMIN................  01- 07-    [facility] Embedded Care Due Messages. Reference number: [id number].   7/21/2023 2:29:50 AM CDT

## 2023-07-26 ENCOUNTER — OFFICE VISIT (OUTPATIENT)
Dept: SURGERY | Facility: CLINIC | Age: 88
End: 2023-07-26
Payer: MEDICAID

## 2023-07-26 ENCOUNTER — HOSPITAL ENCOUNTER (OUTPATIENT)
Dept: CARDIOLOGY | Facility: HOSPITAL | Age: 88
Discharge: HOME | End: 2023-07-26
Attending: SURGERY
Payer: MEDICARE

## 2023-07-26 VITALS
DIASTOLIC BLOOD PRESSURE: 67 MMHG | WEIGHT: 121.06 LBS | TEMPERATURE: 98 F | SYSTOLIC BLOOD PRESSURE: 126 MMHG | HEART RATE: 84 BPM | HEIGHT: 60 IN | BODY MASS INDEX: 23.77 KG/M2

## 2023-07-26 DIAGNOSIS — K44.9 HIATAL HERNIA WITH GERD: ICD-10-CM

## 2023-07-26 DIAGNOSIS — K44.9 HIATAL HERNIA WITH GERD: Primary | ICD-10-CM

## 2023-07-26 DIAGNOSIS — K21.9 HIATAL HERNIA WITH GERD: ICD-10-CM

## 2023-07-26 DIAGNOSIS — K21.9 HIATAL HERNIA WITH GERD: Primary | ICD-10-CM

## 2023-07-26 PROCEDURE — 1160F RVW MEDS BY RX/DR IN RCRD: CPT | Mod: CPTII,S$GLB,, | Performed by: SURGERY

## 2023-07-26 PROCEDURE — 1157F ADVNC CARE PLAN IN RCRD: CPT | Mod: CPTII,S$GLB,, | Performed by: SURGERY

## 2023-07-26 PROCEDURE — 1159F MED LIST DOCD IN RCRD: CPT | Mod: CPTII,S$GLB,, | Performed by: SURGERY

## 2023-07-26 PROCEDURE — 93010 ELECTROCARDIOGRAM REPORT: CPT | Mod: HCNC,,, | Performed by: INTERNAL MEDICINE

## 2023-07-26 PROCEDURE — 1157F PR ADVANCE CARE PLAN OR EQUIV PRESENT IN MEDICAL RECORD: ICD-10-PCS | Mod: CPTII,S$GLB,, | Performed by: SURGERY

## 2023-07-26 PROCEDURE — 1126F AMNT PAIN NOTED NONE PRSNT: CPT | Mod: CPTII,S$GLB,, | Performed by: SURGERY

## 2023-07-26 PROCEDURE — 1159F PR MEDICATION LIST DOCUMENTED IN MEDICAL RECORD: ICD-10-PCS | Mod: CPTII,S$GLB,, | Performed by: SURGERY

## 2023-07-26 PROCEDURE — 99215 PR OFFICE/OUTPT VISIT, EST, LEVL V, 40-54 MIN: ICD-10-PCS | Mod: S$GLB,,, | Performed by: SURGERY

## 2023-07-26 PROCEDURE — 93005 ELECTROCARDIOGRAM TRACING: CPT | Mod: HCNC

## 2023-07-26 PROCEDURE — 99215 OFFICE O/P EST HI 40 MIN: CPT | Mod: S$GLB,,, | Performed by: SURGERY

## 2023-07-26 PROCEDURE — 93010 EKG 12-LEAD: ICD-10-PCS | Mod: HCNC,,, | Performed by: INTERNAL MEDICINE

## 2023-07-26 PROCEDURE — 1160F PR REVIEW ALL MEDS BY PRESCRIBER/CLIN PHARMACIST DOCUMENTED: ICD-10-PCS | Mod: CPTII,S$GLB,, | Performed by: SURGERY

## 2023-07-26 PROCEDURE — 1126F PR PAIN SEVERITY QUANTIFIED, NO PAIN PRESENT: ICD-10-PCS | Mod: CPTII,S$GLB,, | Performed by: SURGERY

## 2023-07-26 PROCEDURE — 99999 PR PBB SHADOW E&M-EST. PATIENT-LVL IV: ICD-10-PCS | Mod: PBBFAC,,, | Performed by: SURGERY

## 2023-07-26 PROCEDURE — 99999 PR PBB SHADOW E&M-EST. PATIENT-LVL IV: CPT | Mod: PBBFAC,,, | Performed by: SURGERY

## 2023-07-26 RX ORDER — LIDOCAINE HYDROCHLORIDE 10 MG/ML
1 INJECTION, SOLUTION EPIDURAL; INFILTRATION; INTRACAUDAL; PERINEURAL ONCE
Status: CANCELLED | OUTPATIENT
Start: 2023-07-26 | End: 2023-07-26

## 2023-07-26 RX ORDER — SODIUM CHLORIDE 9 MG/ML
INJECTION, SOLUTION INTRAVENOUS CONTINUOUS
Status: CANCELLED | OUTPATIENT
Start: 2023-07-26

## 2023-07-27 ENCOUNTER — TELEPHONE (OUTPATIENT)
Dept: PREADMISSION TESTING | Facility: HOSPITAL | Age: 88
End: 2023-07-27
Payer: MEDICARE

## 2023-07-27 NOTE — TELEPHONE ENCOUNTER
Pre op instructions reviewed with Pt's son,verbalized understanding.    To confirm, Surgery is scheduled on 8/01/23. We will call you late afternoon the business day prior to surgery with your arrival time.    *Please report to the [facility] Lobby (1st Floor) located off of Count includes the [facility] (2nd Entrance/Building on the left, in front of the flag pole).  Address: [address] [place] [address] [place]. [address]        INSTRUCTIONS IMPORTANT!!!  Do Not Eat, Drink, or Smoke after 12 midnight unless instructed otherwise by your Surgeon. OK to brush teeth, no gum, candy or mints!      *Take Only these medications with a small sip of water Morning of Surgery:  -Nexium      ____  HOLD all vitamins, herbal supplements, aspirin products & NSAIDS 7 days prior to surgery, as these can thin the blood.  ____  Avoid Alcoholic beverages 3 days prior to surgery, as it can thin the blood.  ____  NO Acrylic/fake nails or nail polish worn day of surgery (specifically hand/arm & foot surgeries).  ____  NO powder, lotions, deodorants, oils or cream on body.  ____  Remove all jewelry & piercings prior to surgery.  ____  Remove Dentures, Hearing Aids & Contact Lens prior to surgery.  ____  Bring photo ID and insurance information to hospital (Leave Valuables at Home).  ____  If going home the same day, arrange for a ride home. You will not be able to drive for 24 hrs if Anesthesia was used.   ____  Females (ages 11-60): may need to give a urine sample the morning of surgery; please see Pre op Nurse prior to using the restroom.  ____  Males: Stop ED medications (Viagra, Cialis) 24 hrs prior to surgery.  ____  Wear clean, loose fitting clothing to allow for dressings/ bandages.            Diabetic Patients: If you take diabetic medication, do NOT take morning of surgery unless instructed by Doctor. Metformin to be stopped 24 hrs prior to surgery time. DO NOT take long-acting insulin the evening before surgery. Blood sugars will be checked  in pre-op by Nurse.    Bathing Instructions:    -Shower with anti-bacterial Soap (Hibiclens or Dial) the night before surgery and the morning of.   -Do not use Hibiclens on your face or genitals.   -Apply clean clothes after shower.  -Do not shave your face or body 2 days prior to surgery unless instructed otherwise by your Surgeon.  -Do not shave pubic hair 7 days prior to surgery (gyn pt's).    [facility]Ride Policy:  Only 2 adults allowed in pre op/recovery area during your procedure. You MUST HAVE A RIDE HOME from a responsible adult that you know and trust. Medical Transport, Uber or Lyft can ONLY be used if patient has a responsible adult to accompany them during ride home.    Discharge Instructions: You will receive Post-op/Discharge instructions by your Discharge Nurse prior to going home.   *Prevention of surgical site infections:   -Keep incisions clean and dry.   -Do not soak/submerge incisions in water until completely healed.   -Do not apply lotions, powders, creams, or deodorants to site.   -Always make sure hands are cleaned with antibacterial soap/ alcohol-based  prior to touching the surgical site.        *Signs and symptoms:               -Redness and pain around the area where you had surgery               -Drainage of cloudy fluid from your surgical wound               -Fever over 100.4 or chills      >>>Call Surgeon office/on-call Surgeon if you experience any of these signs & symptoms post-surgery @ [phone].       *If you are running late or have questions the morning of surgery, please call the [facility] Surgery Dept @ [phone].    *Payment questions:  [phone]     *Billing questions:  [phone]       Thank you,  -[facility] Pre [facility]mit [facility].  [phone] or [phone]  M-F 7:30 am-4:00 pm (Closed Major Holidays)

## 2023-07-28 NOTE — PROGRESS NOTES
History & Physical    SUBJECTIVE:     History of Present Illness:  Patient is a 87 y.o. female presents to discuss upper GI, modified barium swallow study and EGD.  Patient continues to have early satiety with food sticking feeling.  and regurgitation/reflux.    Initially referred for hiatal hernia with dysphagia and reflux.  She reports progressive food sticking and regurgitation of food as well as heartburn.  She originally underwent evaluation for hiatal hernia repair in [place] during [facility] however things were put on hold at that time.  Since this time she is been able to maintain her weight but mostly sticks with pureed foods.  She does report some upper chest neck swallowing dysphagia as well.  Denies any prior abdominal surgeries.  She stays very active and is independent.    No chief complaint on file.      Review of patient's allergies indicates:   Allergen Reactions    Penicillin Anaphylaxis    Lipitor [atorvastatin] Hives     Can take Simivastatin .     Keflex [cephalexin] Rash       Current Outpatient Medications   Medication Sig Dispense Refill    aspirin (ECOTRIN) 81 MG EC tablet Take 1 tablet (81 mg total) by mouth once daily. 30 tablet 3    b complex vitamins capsule Take 1 capsule by mouth once daily.      esomeprazole (NEXIUM) 40 MG capsule Take 1 capsule (40 mg total) by mouth before breakfast. 90 capsule 3    LORazepam (ATIVAN) 0.5 MG tablet Take 0.5 tablets (0.25 mg total) by mouth every 12 (twelve) hours as needed for Anxiety. 90 tablet 2    meloxicam (MOBIC) 7.5 MG tablet Take 1 tablet (7.5 mg total) by mouth once daily. 90 tablet 3    metFORMIN (GLUCOPHAGE-XR) 500 MG ER 24hr tablet Take 1 tablet (500 mg total) by mouth daily with breakfast. 90 tablet 3    nitrofurantoin, macrocrystal-monohydrate, (MACROBID) 100 MG capsule Take 1 capsule (100 mg total) by mouth once daily. 90 capsule 3    rOPINIRole (REQUIP) 0.5 MG tablet Take 1 tablet (0.5 mg total) by mouth every evening. For leg cramps.  90 tablet 3    triamcinolone (KENALOG) 0.5 % ointment Apply topically 2 (two) times daily as needed (rash). 60 g 3     No current facility-administered medications for this visit.       Past Medical History:   Diagnosis Date    Abnormal Pap smear 2013    Acidosis     CAD (coronary artery disease) 06/2017     iFR of LAD and RCA performed confirmed nonobstructive disease.    Cataract     Colon polyps     Disorder of kidney and ureter     Frequent urination     General anesthetics causing adverse effect in therapeutic use     Hemorrhoid     High cholesterol     Poor circulation     Postoperative abdominal pain     Psoriasis     Varicose vein      Past Surgical History:   Procedure Laterality Date    CARDIAC CATHETERIZATION  06/2017     iFR of LAD and RCA performed confirmed nonobstructive disease.    CATARACT EXTRACTION W/  INTRAOCULAR LENS IMPLANT Bilateral     COLONOSCOPY N/A 8/28/2017    Procedure: COLONOSCOPY;  Surgeon: Bertram Myers MD;  Location: [facility];  Service: Endoscopy;  Laterality: N/A;    COLONOSCOPY N/A 10/18/2019    Procedure: COLONOSCOPY;  Surgeon: Wilfred Barber MD;  Location: [facility] ([address] [place] [address]);  Service: Endoscopy;  Laterality: N/A;   needed-BB  Cardiology Clearance received from Dr. REBECA Escalona, see telephone encounter 10/1/19-BB    COLONOSCOPY N/A 6/17/2021    Procedure: COLONOSCOPY suprep Past positive;  Surgeon: Lele Barillas MD;  Location: [facility];  Service: Endoscopy;  Laterality: N/A;  past positive    COLONOSCOPY W/ BIOPSIES  06/17/2021    ESOPHAGOGASTRODUODENOSCOPY N/A 6/23/2023    Procedure: EGD (ESOPHAGOGASTRODUODENOSCOPY);  Surgeon: Seun Burt MD;  Location: [facility];  Service: Endoscopy;  Laterality: N/A;    EXCISION OF BURSA  3/5/2021    Procedure: BURSECTOMY;  Surgeon: Jason Alicea Jr., MD;  Location: [facility] OR;  Service: Orthopedics;;    LEFT HEART CATHETERIZATION N/A 4/8/2019    Procedure: Left heart cath;  Surgeon: Renato  MD Pola;  Location: [facility] CATH LAB/EP;  Service: Cardiology;  Laterality: N/A;    POLYPECTOMY      SHOULDER ARTHROSCOPY Right 3/5/2021    Procedure: ARTHROSCOPY, SHOULDER;  Surgeon: Jason Alicea Jr., MD;  Location: [facility] OR;  Service: Orthopedics;  Laterality: Right;  need opus system [employer]   video  ([facility] notified 3/1/21, CC)  Kul[facility]inder confirmed 3/4/21 [place]    YAG Laser Capsulotomy Right 04/17/2017    Dr. Chavez     Family History   Problem Relation Age of Onset    Kidney disease Mother     Glaucoma Neg Hx     Macular degeneration Neg Hx     Strabismus Neg Hx     Retinal detachment Neg Hx     Amblyopia Neg Hx     Blindness Neg Hx     Cataracts Neg Hx     Prostate cancer Neg Hx     Anesthesia problems Neg Hx      Social History     Tobacco Use    Smoking status: Never    Smokeless tobacco: Never    Tobacco comments:     Never smoked   Substance Use Topics    Alcohol use: Never    Drug use: Never        Review of Systems:  Review of Systems   Constitutional:  Negative for chills, fatigue, fever and unexpected weight change.   Respiratory:  Negative for cough, shortness of breath, wheezing and stridor.    Cardiovascular:  Negative for chest pain, palpitations and leg swelling.   Gastrointestinal:  Negative for abdominal distention, abdominal pain, constipation, diarrhea, nausea and vomiting.   Genitourinary:  Negative for difficulty urinating, dysuria, frequency, hematuria and urgency.   Skin:  Negative for color change, pallor, rash and wound.   Hematological:  Does not bruise/bleed easily.     OBJECTIVE:     Vital Signs (Most Recent)  Temp: 97.8 °F (36.6 °C) (07/26/23 1045)  Pulse: 84 (07/26/23 1045)  BP: 126/67 (07/26/23 1045)  5' (1.524 m)  54.9 kg (121 lb 0.5 oz)     Physical Exam:  Physical Exam  Vitals reviewed.   Constitutional:       Appearance: She is well-developed.   HENT:      Head: Normocephalic and atraumatic.   Cardiovascular:      Rate and Rhythm: Normal rate and regular rhythm.    Pulmonary:      Effort: Pulmonary effort is normal.      Breath sounds: Normal breath sounds.   Abdominal:      General: Bowel sounds are normal. There is no distension.      Palpations: Abdomen is soft.      Tenderness: There is no abdominal tenderness.   Musculoskeletal:      Cervical back: Neck supple.   Skin:     General: Skin is warm and dry.   Neurological:      Mental Status: She is alert and oriented to person, place, and time.         Diagnostic Results:  CT 2022   Large hiatal hernia and possibly some component of gastric volvulus    EGD:   Impression:            - Gastric volvulus.                          - Large hiatal hernia.                          - Normal esophagus.                          - Biopsies were taken with a cold forceps for                          evaluation of eosinophilic esophagitis.      Upper GI:   Impression:  Large hiatal hernia without delayed gastric emptying to suggest volvulus.  Gastroesophageal reflux to the level of the midesophagus with stress maneuvers.  Laryngeal penetration without subglottic tracheal aspiration.  Duodenal diverticulum.    MBSS reviewed        ASSESSMENT/PLAN:     87-year-old female with large hiatal hernia, dysphagia, reflux    PLAN:Plan     Reviewed EGD and imaging studies with patient.  She is symptomatic from her large hiatal hernia.  Robotic hiatal hernia repair with Nissen fundoplication 08/01/2023  Preop:  CBC, CMP, EKG  Risks and benefits discussed with patient including but not limited to:  Scarring, pain, bleeding, infection, injury to underlying abdominal organs, gas bloat, inability to vomit or belch, recurrent hiatal hernia, recurrent reflux, difficulty swallowing, continued symptoms, possible open surgery, possible esophageal lengthening, need for further procedure      used for visit    45 minutes of total time spent on the encounter, which includes face to face time and non-face to face time preparing to see the patient (eg,  review of tests), Obtaining and/or reviewing separately obtained history, Documenting clinical information in the electronic or other health record, Independently interpreting results (not separately reported) and communicating results to the patient/family/caregiver, or Care coordination (not separately reported).

## 2023-07-28 NOTE — H&P (VIEW-ONLY)
History & Physical    SUBJECTIVE:     History of Present Illness:  Patient is a 87 y.o. female presents to discuss upper GI, modified barium swallow study and EGD.  Patient continues to have early satiety with food sticking feeling.  and regurgitation/reflux.    Initially referred for hiatal hernia with dysphagia and reflux.  She reports progressive food sticking and regurgitation of food as well as heartburn.  She originally underwent evaluation for hiatal hernia repair in [place] during [facility] however things were put on hold at that time.  Since this time she is been able to maintain her weight but mostly sticks with pureed foods.  She does report some upper chest neck swallowing dysphagia as well.  Denies any prior abdominal surgeries.  She stays very active and is independent.    No chief complaint on file.      Review of patient's allergies indicates:   Allergen Reactions    Penicillin Anaphylaxis    Lipitor [atorvastatin] Hives     Can take Simivastatin .     Keflex [cephalexin] Rash       Current Outpatient Medications   Medication Sig Dispense Refill    aspirin (ECOTRIN) 81 MG EC tablet Take 1 tablet (81 mg total) by mouth once daily. 30 tablet 3    b complex vitamins capsule Take 1 capsule by mouth once daily.      esomeprazole (NEXIUM) 40 MG capsule Take 1 capsule (40 mg total) by mouth before breakfast. 90 capsule 3    LORazepam (ATIVAN) 0.5 MG tablet Take 0.5 tablets (0.25 mg total) by mouth every 12 (twelve) hours as needed for Anxiety. 90 tablet 2    meloxicam (MOBIC) 7.5 MG tablet Take 1 tablet (7.5 mg total) by mouth once daily. 90 tablet 3    metFORMIN (GLUCOPHAGE-XR) 500 MG ER 24hr tablet Take 1 tablet (500 mg total) by mouth daily with breakfast. 90 tablet 3    nitrofurantoin, macrocrystal-monohydrate, (MACROBID) 100 MG capsule Take 1 capsule (100 mg total) by mouth once daily. 90 capsule 3    rOPINIRole (REQUIP) 0.5 MG tablet Take 1 tablet (0.5 mg total) by mouth every evening. For leg cramps.  90 tablet 3    triamcinolone (KENALOG) 0.5 % ointment Apply topically 2 (two) times daily as needed (rash). 60 g 3     No current facility-administered medications for this visit.       Past Medical History:   Diagnosis Date    Abnormal Pap smear 2013    Acidosis     CAD (coronary artery disease) 06/2017     iFR of LAD and RCA performed confirmed nonobstructive disease.    Cataract     Colon polyps     Disorder of kidney and ureter     Frequent urination     General anesthetics causing adverse effect in therapeutic use     Hemorrhoid     High cholesterol     Poor circulation     Postoperative abdominal pain     Psoriasis     Varicose vein      Past Surgical History:   Procedure Laterality Date    CARDIAC CATHETERIZATION  06/2017     iFR of LAD and RCA performed confirmed nonobstructive disease.    CATARACT EXTRACTION W/  INTRAOCULAR LENS IMPLANT Bilateral     COLONOSCOPY N/A 8/28/2017    Procedure: COLONOSCOPY;  Surgeon: Bertram Myers MD;  Location: [facility];  Service: Endoscopy;  Laterality: N/A;    COLONOSCOPY N/A 10/18/2019    Procedure: COLONOSCOPY;  Surgeon: Wilfred Barber MD;  Location: [facility] ([address] [place] [address]);  Service: Endoscopy;  Laterality: N/A;   needed-BB  Cardiology Clearance received from Dr. REBECA Escalona, see telephone encounter 10/1/19-BB    COLONOSCOPY N/A 6/17/2021    Procedure: COLONOSCOPY suprep Past positive;  Surgeon: Lele Barillas MD;  Location: [facility];  Service: Endoscopy;  Laterality: N/A;  past positive    COLONOSCOPY W/ BIOPSIES  06/17/2021    ESOPHAGOGASTRODUODENOSCOPY N/A 6/23/2023    Procedure: EGD (ESOPHAGOGASTRODUODENOSCOPY);  Surgeon: Seun Burt MD;  Location: [facility];  Service: Endoscopy;  Laterality: N/A;    EXCISION OF BURSA  3/5/2021    Procedure: BURSECTOMY;  Surgeon: Jason Alicea Jr., MD;  Location: [facility] OR;  Service: Orthopedics;;    LEFT HEART CATHETERIZATION N/A 4/8/2019    Procedure: Left heart cath;  Surgeon: Renato  MD Pola;  Location: [facility] CATH LAB/EP;  Service: Cardiology;  Laterality: N/A;    POLYPECTOMY      SHOULDER ARTHROSCOPY Right 3/5/2021    Procedure: ARTHROSCOPY, SHOULDER;  Surgeon: Jason Alicea Jr., MD;  Location: [facility] OR;  Service: Orthopedics;  Laterality: Right;  need opus system [employer]   video  ([employer] notified 3/1/21, CC)  [place] confirmed 3/4/21 [place]    YAG Laser Capsulotomy Right 04/17/2017    Dr. Chavez     Family History   Problem Relation Age of Onset    Kidney disease Mother     Glaucoma Neg Hx     Macular degeneration Neg Hx     Strabismus Neg Hx     Retinal detachment Neg Hx     Amblyopia Neg Hx     Blindness Neg Hx     Cataracts Neg Hx     Prostate cancer Neg Hx     Anesthesia problems Neg Hx      Social History     Tobacco Use    Smoking status: Never    Smokeless tobacco: Never    Tobacco comments:     Never smoked   Substance Use Topics    Alcohol use: Never    Drug use: Never        Review of Systems:  Review of Systems   Constitutional:  Negative for chills, fatigue, fever and unexpected weight change.   Respiratory:  Negative for cough, shortness of breath, wheezing and stridor.    Cardiovascular:  Negative for chest pain, palpitations and leg swelling.   Gastrointestinal:  Negative for abdominal distention, abdominal pain, constipation, diarrhea, nausea and vomiting.   Genitourinary:  Negative for difficulty urinating, dysuria, frequency, hematuria and urgency.   Skin:  Negative for color change, pallor, rash and wound.   Hematological:  Does not bruise/bleed easily.     OBJECTIVE:     Vital Signs (Most Recent)  Temp: 97.8 °F (36.6 °C) (07/26/23 1045)  Pulse: 84 (07/26/23 1045)  BP: 126/67 (07/26/23 1045)  5' (1.524 m)  54.9 kg (121 lb 0.5 oz)     Physical Exam:  Physical Exam  Vitals reviewed.   Constitutional:       Appearance: She is well-developed.   HENT:      Head: Normocephalic and atraumatic.   Cardiovascular:      Rate and Rhythm: Normal rate and regular rhythm.    Pulmonary:      Effort: Pulmonary effort is normal.      Breath sounds: Normal breath sounds.   Abdominal:      General: Bowel sounds are normal. There is no distension.      Palpations: Abdomen is soft.      Tenderness: There is no abdominal tenderness.   Musculoskeletal:      Cervical back: Neck supple.   Skin:     General: Skin is warm and dry.   Neurological:      Mental Status: She is alert and oriented to person, place, and time.         Diagnostic Results:  CT 2022   Large hiatal hernia and possibly some component of gastric volvulus    EGD:   Impression:            - Gastric volvulus.                          - Large hiatal hernia.                          - Normal esophagus.                          - Biopsies were taken with a cold forceps for                          evaluation of eosinophilic esophagitis.      Upper GI:   Impression:  Large hiatal hernia without delayed gastric emptying to suggest volvulus.  Gastroesophageal reflux to the level of the midesophagus with stress maneuvers.  Laryngeal penetration without subglottic tracheal aspiration.  Duodenal diverticulum.    MBSS reviewed        ASSESSMENT/PLAN:     87-year-old female with large hiatal hernia, dysphagia, reflux    PLAN:Plan     Reviewed EGD and imaging studies with patient.  She is symptomatic from her large hiatal hernia.  Robotic hiatal hernia repair with Nissen fundoplication 08/01/2023  Preop:  CBC, CMP, EKG  Risks and benefits discussed with patient including but not limited to:  Scarring, pain, bleeding, infection, injury to underlying abdominal organs, gas bloat, inability to vomit or belch, recurrent hiatal hernia, recurrent reflux, difficulty swallowing, continued symptoms, possible open surgery, possible esophageal lengthening, need for further procedure      used for visit    45 minutes of total time spent on the encounter, which includes face to face time and non-face to face time preparing to see the patient (eg,  review of tests), Obtaining and/or reviewing separately obtained history, Documenting clinical information in the electronic or other health record, Independently interpreting results (not separately reported) and communicating results to the patient/family/caregiver, or Care coordination (not separately reported).

## 2023-07-31 ENCOUNTER — TELEPHONE (OUTPATIENT)
Dept: PREADMISSION TESTING | Facility: HOSPITAL | Age: 88
End: 2023-07-31
Payer: MEDICARE

## 2023-07-31 ENCOUNTER — PES CALL (OUTPATIENT)
Dept: ADMINISTRATIVE | Facility: CLINIC | Age: 88
End: 2023-07-31
Payer: MEDICARE

## 2023-07-31 NOTE — TELEPHONE ENCOUNTER
Called and spoke with pt son about the following:     Please arrive to [facility] ([facility] at 0730am on 8/1/23 for your scheduled procedure.  Address: [address] [place] [address] [place] [address] (2nd Building on left, 1st Floor Lobby)  >>>NO eating or drinking after midnight unless instructed otherwise by your Surgeon<<<    Thank you,  -[facility].  Mon-Fri 7:30 am - 4 pm [phone]

## 2023-07-31 NOTE — TELEPHONE ENCOUNTER
Called and spoke with Pt's son about the following:     Please arrive to [facility] ([facility] at 9:00 am on 8/01/23 for your scheduled procedure.  Address: [address] [place] [address] [place] [address] (2nd Building on left, 1st Floor Lobby)  >>>NO eating or drinking after midnight unless instructed otherwise by your Surgeon<<<

## 2023-08-01 ENCOUNTER — ANESTHESIA (OUTPATIENT)
Dept: SURGERY | Facility: HOSPITAL | Age: 88
End: 2023-08-01
Payer: MEDICARE

## 2023-08-01 ENCOUNTER — ANESTHESIA EVENT (OUTPATIENT)
Dept: SURGERY | Facility: HOSPITAL | Age: 88
End: 2023-08-01
Payer: MEDICARE

## 2023-08-01 ENCOUNTER — HOSPITAL ENCOUNTER (OUTPATIENT)
Facility: HOSPITAL | Age: 88
Discharge: HOME | End: 2023-08-02
Attending: SURGERY | Admitting: SURGERY
Payer: MEDICARE

## 2023-08-01 DIAGNOSIS — K44.9 HIATAL HERNIA WITH GERD: ICD-10-CM

## 2023-08-01 DIAGNOSIS — K21.9 HIATAL HERNIA WITH GERD: ICD-10-CM

## 2023-08-01 LAB — POCT GLUCOSE: 97 MG/DL (ref 70–110)

## 2023-08-01 PROCEDURE — 36000712 HC OR TIME LEV V 1ST 15 MIN: Mod: HCNC | Performed by: SURGERY

## 2023-08-01 PROCEDURE — 63600175 PHARM REV CODE 636 W HCPCS: Mod: HCNC | Performed by: NURSE ANESTHETIST, CERTIFIED REGISTERED

## 2023-08-01 PROCEDURE — 43281 PR LAP, REPAIR PARAESOPHAGEAL HERNIA, INCL FUNDOPLASTY W/O MESH: ICD-10-PCS | Mod: HCNC,,, | Performed by: SURGERY

## 2023-08-01 PROCEDURE — 71000033 HC RECOVERY, INTIAL HOUR: Mod: HCNC | Performed by: SURGERY

## 2023-08-01 PROCEDURE — 25000003 PHARM REV CODE 250: Mod: HCNC | Performed by: NURSE ANESTHETIST, CERTIFIED REGISTERED

## 2023-08-01 PROCEDURE — 63600175 PHARM REV CODE 636 W HCPCS: Mod: HCNC | Performed by: STUDENT IN AN ORGANIZED HEALTH CARE EDUCATION/TRAINING PROGRAM

## 2023-08-01 PROCEDURE — 36000713 HC OR TIME LEV V EA ADD 15 MIN: Mod: HCNC | Performed by: SURGERY

## 2023-08-01 PROCEDURE — 63600175 PHARM REV CODE 636 W HCPCS: Mod: HCNC | Performed by: SURGERY

## 2023-08-01 PROCEDURE — 43281 LAP PARAESOPHAG HERN REPAIR: CPT | Mod: HCNC,,, | Performed by: SURGERY

## 2023-08-01 PROCEDURE — 37000009 HC ANESTHESIA EA ADD 15 MINS: Mod: HCNC | Performed by: SURGERY

## 2023-08-01 PROCEDURE — 37000008 HC ANESTHESIA 1ST 15 MINUTES: Mod: HCNC | Performed by: SURGERY

## 2023-08-01 PROCEDURE — 99900035 HC TECH TIME PER 15 MIN (STAT): Mod: HCNC

## 2023-08-01 PROCEDURE — 94799 UNLISTED PULMONARY SVC/PX: CPT | Mod: HCNC

## 2023-08-01 PROCEDURE — 94761 N-INVAS EAR/PLS OXIMETRY MLT: CPT | Mod: HCNC

## 2023-08-01 PROCEDURE — 27201423 OPTIME MED/SURG SUP & DEVICES STERILE SUPPLY: Mod: HCNC | Performed by: SURGERY

## 2023-08-01 PROCEDURE — 71000039 HC RECOVERY, EACH ADD'L HOUR: Mod: HCNC | Performed by: SURGERY

## 2023-08-01 PROCEDURE — 25000003 PHARM REV CODE 250: Mod: HCNC | Performed by: SURGERY

## 2023-08-01 RX ORDER — BUPIVACAINE HYDROCHLORIDE 2.5 MG/ML
INJECTION, SOLUTION EPIDURAL; INFILTRATION; INTRACAUDAL
Status: DISCONTINUED | OUTPATIENT
Start: 2023-08-01 | End: 2023-08-01 | Stop reason: HOSPADM

## 2023-08-01 RX ORDER — MORPHINE SULFATE 4 MG/ML
4 INJECTION, SOLUTION INTRAMUSCULAR; INTRAVENOUS
Status: DISCONTINUED | OUTPATIENT
Start: 2023-08-01 | End: 2023-08-01

## 2023-08-01 RX ORDER — DEXAMETHASONE SODIUM PHOSPHATE 4 MG/ML
INJECTION, SOLUTION INTRA-ARTICULAR; INTRALESIONAL; INTRAMUSCULAR; INTRAVENOUS; SOFT TISSUE
Status: DISCONTINUED | OUTPATIENT
Start: 2023-08-01 | End: 2023-08-01

## 2023-08-01 RX ORDER — LIDOCAINE HYDROCHLORIDE 10 MG/ML
INJECTION, SOLUTION EPIDURAL; INFILTRATION; INTRACAUDAL; PERINEURAL
Status: DISCONTINUED | OUTPATIENT
Start: 2023-08-01 | End: 2023-08-01 | Stop reason: HOSPADM

## 2023-08-01 RX ORDER — EPHEDRINE SULFATE 50 MG/ML
INJECTION, SOLUTION INTRAVENOUS
Status: DISCONTINUED | OUTPATIENT
Start: 2023-08-01 | End: 2023-08-01

## 2023-08-01 RX ORDER — ONDANSETRON 2 MG/ML
4 INJECTION INTRAMUSCULAR; INTRAVENOUS DAILY PRN
Status: DISCONTINUED | OUTPATIENT
Start: 2023-08-01 | End: 2023-08-01 | Stop reason: HOSPADM

## 2023-08-01 RX ORDER — SODIUM CHLORIDE 9 MG/ML
INJECTION, SOLUTION INTRAVENOUS CONTINUOUS
Status: DISCONTINUED | OUTPATIENT
Start: 2023-08-01 | End: 2023-08-02 | Stop reason: HOSPADM

## 2023-08-01 RX ORDER — SODIUM CHLORIDE 9 MG/ML
INJECTION, SOLUTION INTRAVENOUS CONTINUOUS
Status: DISCONTINUED | OUTPATIENT
Start: 2023-08-01 | End: 2023-08-01

## 2023-08-01 RX ORDER — CLINDAMYCIN PHOSPHATE 900 MG/50ML
900 INJECTION, SOLUTION INTRAVENOUS
Status: COMPLETED | OUTPATIENT
Start: 2023-08-01 | End: 2023-08-01

## 2023-08-01 RX ORDER — CHLORHEXIDINE GLUCONATE ORAL RINSE 1.2 MG/ML
10 SOLUTION DENTAL 2 TIMES DAILY
Status: DISCONTINUED | OUTPATIENT
Start: 2023-08-01 | End: 2023-08-02 | Stop reason: HOSPADM

## 2023-08-01 RX ORDER — ONDANSETRON 2 MG/ML
INJECTION INTRAMUSCULAR; INTRAVENOUS
Status: DISCONTINUED | OUTPATIENT
Start: 2023-08-01 | End: 2023-08-01

## 2023-08-01 RX ORDER — HYDROMORPHONE HYDROCHLORIDE 2 MG/ML
0.2 INJECTION, SOLUTION INTRAMUSCULAR; INTRAVENOUS; SUBCUTANEOUS EVERY 5 MIN PRN
Status: COMPLETED | OUTPATIENT
Start: 2023-08-01 | End: 2023-08-01

## 2023-08-01 RX ORDER — PROPOFOL 10 MG/ML
VIAL (ML) INTRAVENOUS
Status: DISCONTINUED | OUTPATIENT
Start: 2023-08-01 | End: 2023-08-01

## 2023-08-01 RX ORDER — ROCURONIUM BROMIDE 10 MG/ML
INJECTION, SOLUTION INTRAVENOUS
Status: DISCONTINUED | OUTPATIENT
Start: 2023-08-01 | End: 2023-08-01

## 2023-08-01 RX ORDER — LIDOCAINE HYDROCHLORIDE 20 MG/ML
INJECTION, SOLUTION EPIDURAL; INFILTRATION; INTRACAUDAL; PERINEURAL
Status: DISCONTINUED | OUTPATIENT
Start: 2023-08-01 | End: 2023-08-01

## 2023-08-01 RX ORDER — OXYCODONE AND ACETAMINOPHEN 5; 325 MG/1; MG/1
1 TABLET ORAL
Status: DISCONTINUED | OUTPATIENT
Start: 2023-08-01 | End: 2023-08-01 | Stop reason: HOSPADM

## 2023-08-01 RX ORDER — MORPHINE SULFATE 4 MG/ML
2 INJECTION, SOLUTION INTRAMUSCULAR; INTRAVENOUS
Status: DISCONTINUED | OUTPATIENT
Start: 2023-08-01 | End: 2023-08-02

## 2023-08-01 RX ORDER — SIMETHICONE 80 MG
1 TABLET,CHEWABLE ORAL 3 TIMES DAILY PRN
Status: DISCONTINUED | OUTPATIENT
Start: 2023-08-01 | End: 2023-08-02 | Stop reason: HOSPADM

## 2023-08-01 RX ORDER — FENTANYL CITRATE 50 UG/ML
INJECTION, SOLUTION INTRAMUSCULAR; INTRAVENOUS
Status: DISCONTINUED | OUTPATIENT
Start: 2023-08-01 | End: 2023-08-01

## 2023-08-01 RX ORDER — PHENYLEPHRINE HYDROCHLORIDE 10 MG/ML
INJECTION INTRAVENOUS
Status: DISCONTINUED | OUTPATIENT
Start: 2023-08-01 | End: 2023-08-01

## 2023-08-01 RX ORDER — MORPHINE SULFATE 4 MG/ML
4 INJECTION, SOLUTION INTRAMUSCULAR; INTRAVENOUS
Status: DISCONTINUED | OUTPATIENT
Start: 2023-08-01 | End: 2023-08-02

## 2023-08-01 RX ORDER — LIDOCAINE HYDROCHLORIDE 10 MG/ML
1 INJECTION, SOLUTION EPIDURAL; INFILTRATION; INTRACAUDAL; PERINEURAL ONCE
Status: DISCONTINUED | OUTPATIENT
Start: 2023-08-01 | End: 2023-08-01 | Stop reason: HOSPADM

## 2023-08-01 RX ORDER — ONDANSETRON 2 MG/ML
4 INJECTION INTRAMUSCULAR; INTRAVENOUS EVERY 8 HOURS PRN
Status: DISCONTINUED | OUTPATIENT
Start: 2023-08-01 | End: 2023-08-02 | Stop reason: HOSPADM

## 2023-08-01 RX ORDER — MORPHINE SULFATE 4 MG/ML
2 INJECTION, SOLUTION INTRAMUSCULAR; INTRAVENOUS
Status: DISCONTINUED | OUTPATIENT
Start: 2023-08-01 | End: 2023-08-01

## 2023-08-01 RX ORDER — NALOXONE HCL 0.4 MG/ML
0.02 VIAL (ML) INJECTION
Status: DISCONTINUED | OUTPATIENT
Start: 2023-08-01 | End: 2023-08-02 | Stop reason: HOSPADM

## 2023-08-01 RX ADMIN — HYDROMORPHONE HYDROCHLORIDE 0.2 MG: 2 INJECTION INTRAMUSCULAR; INTRAVENOUS; SUBCUTANEOUS at 01:08

## 2023-08-01 RX ADMIN — SODIUM CHLORIDE, SODIUM LACTATE, POTASSIUM CHLORIDE, AND CALCIUM CHLORIDE: .6; .31; .03; .02 INJECTION, SOLUTION INTRAVENOUS at 11:08

## 2023-08-01 RX ADMIN — HYDROMORPHONE HYDROCHLORIDE 0.2 MG: 2 INJECTION INTRAMUSCULAR; INTRAVENOUS; SUBCUTANEOUS at 12:08

## 2023-08-01 RX ADMIN — PHENYLEPHRINE HYDROCHLORIDE 200 MCG: 10 INJECTION INTRAVENOUS at 09:08

## 2023-08-01 RX ADMIN — DEXAMETHASONE SODIUM PHOSPHATE 8 MG: 4 INJECTION, SOLUTION INTRA-ARTICULAR; INTRALESIONAL; INTRAMUSCULAR; INTRAVENOUS; SOFT TISSUE at 08:08

## 2023-08-01 RX ADMIN — FENTANYL CITRATE 25 MCG: 50 INJECTION, SOLUTION INTRAMUSCULAR; INTRAVENOUS at 09:08

## 2023-08-01 RX ADMIN — SODIUM CHLORIDE, SODIUM LACTATE, POTASSIUM CHLORIDE, AND CALCIUM CHLORIDE: .6; .31; .03; .02 INJECTION, SOLUTION INTRAVENOUS at 08:08

## 2023-08-01 RX ADMIN — SIMETHICONE 80 MG: 80 TABLET, CHEWABLE ORAL at 06:08

## 2023-08-01 RX ADMIN — CLINDAMYCIN IN 5 PERCENT DEXTROSE 900 MG: 18 INJECTION, SOLUTION INTRAVENOUS at 09:08

## 2023-08-01 RX ADMIN — PHENYLEPHRINE HYDROCHLORIDE 100 MCG: 10 INJECTION INTRAVENOUS at 09:08

## 2023-08-01 RX ADMIN — HYDROMORPHONE HYDROCHLORIDE 0.2 MG: 2 INJECTION INTRAMUSCULAR; INTRAVENOUS; SUBCUTANEOUS at 11:08

## 2023-08-01 RX ADMIN — PHENYLEPHRINE HYDROCHLORIDE 100 MCG: 10 INJECTION INTRAVENOUS at 08:08

## 2023-08-01 RX ADMIN — ROCURONIUM BROMIDE 20 MG: 10 SOLUTION INTRAVENOUS at 09:08

## 2023-08-01 RX ADMIN — SODIUM CHLORIDE, SODIUM LACTATE, POTASSIUM CHLORIDE, AND CALCIUM CHLORIDE: .6; .31; .03; .02 INJECTION, SOLUTION INTRAVENOUS at 09:08

## 2023-08-01 RX ADMIN — MORPHINE SULFATE 2 MG: 4 INJECTION INTRAVENOUS at 02:08

## 2023-08-01 RX ADMIN — MORPHINE SULFATE 2 MG: 4 INJECTION INTRAVENOUS at 08:08

## 2023-08-01 RX ADMIN — ROCURONIUM BROMIDE 10 MG: 10 SOLUTION INTRAVENOUS at 10:08

## 2023-08-01 RX ADMIN — SUGAMMADEX 150 MG: 100 INJECTION, SOLUTION INTRAVENOUS at 11:08

## 2023-08-01 RX ADMIN — ONDANSETRON 4 MG: 2 INJECTION INTRAMUSCULAR; INTRAVENOUS at 11:08

## 2023-08-01 RX ADMIN — ROCURONIUM BROMIDE 30 MG: 10 SOLUTION INTRAVENOUS at 08:08

## 2023-08-01 RX ADMIN — FENTANYL CITRATE 50 MCG: 50 INJECTION, SOLUTION INTRAMUSCULAR; INTRAVENOUS at 08:08

## 2023-08-01 RX ADMIN — CHLORHEXIDINE GLUCONATE 0.12% ORAL RINSE 10 ML: 1.2 LIQUID ORAL at 09:08

## 2023-08-01 RX ADMIN — SODIUM CHLORIDE: 9 INJECTION, SOLUTION INTRAVENOUS at 02:08

## 2023-08-01 RX ADMIN — ONDANSETRON 4 MG: 2 INJECTION INTRAMUSCULAR; INTRAVENOUS at 06:08

## 2023-08-01 RX ADMIN — EPHEDRINE SULFATE 10 MG: 50 INJECTION INTRAVENOUS at 09:08

## 2023-08-01 RX ADMIN — SUGAMMADEX 50 MG: 100 INJECTION, SOLUTION INTRAVENOUS at 11:08

## 2023-08-01 RX ADMIN — LIDOCAINE HYDROCHLORIDE 100 MG: 20 INJECTION, SOLUTION EPIDURAL; INFILTRATION; INTRACAUDAL; PERINEURAL at 08:08

## 2023-08-01 RX ADMIN — GLYCOPYRROLATE 0.2 MG: 0.2 INJECTION, SOLUTION INTRAMUSCULAR; INTRAVENOUS at 08:08

## 2023-08-01 RX ADMIN — PROPOFOL 130 MG: 10 INJECTION, EMULSION INTRAVENOUS at 08:08

## 2023-08-01 NOTE — OP NOTE
[facility] ([facility])  Surgery Department  Operative Note    SUMMARY     Date of Procedure: 8/1/2023     Procedure:   Robotic hiatal hernia repair with Nissen fundoplication    Surgeon(s) and Role:     * Elvis Malcolm MD - Primary    Assistant: DOROTEO Dolan     Pre-Operative Diagnosis: Hiatal hernia with GERD [K44.9, K21.9]    Post-Operative Diagnosis: Post-Op Diagnosis Codes:     * Hiatal hernia with GERD [K44.9, K21.9]    Anesthesia: General    Operative Findings (including complications, if any): Robotic hiatal hernia repair with Nissen fundoplication    Description of Technical Procedures:   Massive hiatal hernia containing the entire stomach    Significant Surgical Tasks Conducted by the Assistant(s), if Applicable:  Assistance with laparoscopy    Estimated Blood Loss (EBL): 10cc           Implants: * No implants in log *    Specimens:   Specimen (24h ago, onward)      None                    Condition: Good    Disposition: PACU - hemodynamically stable.    Procedure in Detail:  The patient was brought to the OR and underwent general anesthesia.  The abdomen was prepped and draped in the usual sterile fashion.  Incision was made just above the umbilicus and fascia was grasped.  The veres needle was inserted and placement confirmed using the meniscus test.  Insufflation was then achieved up to 15 mmHg. An 8 mm Optiview robotic port was placed just above the umbilical area.  2 8mm robotic ports were then placed to the left of midline and one to the right.  A 5 mm port was placed in the right upper quadrant and  liver retractor placed.  The robot was then docked.      A large hiatal hernia was noted containing the entire stomach. The pars flaccida was then opened using the vessel sealer.  The phrenoesophageal ligament was dissected away from the nichelle.  We then turned our attention to the short gastrics which were divided using the vessel sealer.  The hernia sac was reduced from the chest into the  abdomen along with the stomach. Once we had adequate mobilization of the fundus and are nichelle were cleared off. Next a window was created posterior to the stomach just inferior to the diaphragm so the nissen wrap could be brought around posteriorly.     A 38 Guyanese bougie was placed.     The hernia defect was closed by placing four 0 Ethibond sutures interrupted fashion reapproximating the nichelle posteriorly.       The fundus was then brought behind the esophagus and a Shoe shine maneuver was performed noting adequate mobilization.  We then performed a nissen fundoplication with 0 ethibond sutures  suturing between the muscular wall of the esophagus and the fundus of the stomach.  The fundoplication was completed using 4 interrupted sutures.  We then sutured the fundoplication to the diaphragm. Fundoplication was measured at 3.5 cm in length. The robot was undocked.  The area was inspected and hemostasis was noted.The liver retractor was removed. The port sites were removed under direct visualization. Local anesthetic was injected at the incision sites.  The incisions were closed using a 4-0 Monocryl in a subcuticular fashion. Dermaflex was applied.  The patient tolerated the procedure in stable and satisfactory condition was transferred to recovery postoperatively

## 2023-08-01 NOTE — ANESTHESIA PROCEDURE NOTES
Intubation    Date/Time: 8/1/2023 8:47 AM    Performed by: Kira Santa CRNA  Authorized by: Jarret Anderson MD    Intubation:     Induction:  Intravenous    Intubated:  Postinduction    Mask Ventilation:  Easy mask    Attempts:  2    Attempted By:  CRNA    Method of Intubation:  Direct    Blade:   2    Laryngeal View Grade: Grade III - only epiglottis visible      Attempted By (2nd Attempt):  CRNA    Method of Intubation (2nd Attempt):  Video laryngoscopy    Blade (2nd Attempt):  Edge 3    Laryngeal View Grade (2nd Attempt): Grade I - full view of cords      Difficult Airway Encountered?: No      Complications:  None    Airway Device:  Oral endotracheal tube    Airway Device Size:  7.5    Style/Cuff Inflation:  Cuffed (inflated to minimal occlusive pressure)    Tube secured:  22    Secured at:  The lips    Placement Verified By:  Capnometry    Complicating Factors:  Large prominent central incisors    Findings Post-Intubation:  BS equal bilateral and atraumatic/condition of teeth unchanged

## 2023-08-01 NOTE — PLAN OF CARE
Discussed poc with pt, pt verbalized understanding   use of , Citizen of the [place] speaking only    Purposeful rounding every 2hours    VS wnl    Fall precautions in place, remains injury free  Pt denies c/o nausea  Pain under control with PRN meds    IVFs  Accurate I&Os    Bed locked at lowest position  Call light within reach    Chart check complete  Will cont with POC

## 2023-08-01 NOTE — ANESTHESIA PREPROCEDURE EVALUATION
08/01/2023  Ghada Dave is a 87 y.o., female     Patient Active Problem List   Diagnosis    Urge incontinence    Rectal polyp    Ureteral stone    Diverticulosis    Heartburn    Post herpetic neuralgia    Leukopenia    Personal history of colonic polyps    Left lower quadrant pain    Pseudophakia    Right posterior capsular opacification    Anal or rectal pain    Non-cardiac chest pain    Abnormal stress test    Non-occlusive coronary artery disease    Psoriasis    Chronic midline low back pain without sciatica    Scoliosis of thoracolumbar spine    Right subscapular pain    Insomnia    Overflow incontinence    Urinary retention with incomplete bladder emptying    Lichen simplex chronicus of the heel    CAD (coronary artery disease)    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), histologically confirmed    Cerumen debris on tympanic membrane of both ears    Stenosis of carotid artery    Nausea    Essential (primary) hypertension    Dizziness    Anal dysplasia    SOB (shortness of breath)    Anal polyp    Prediabetes    Chronic right shoulder pain    Arthropathies in other specified diseases classified elsewhere, vertebrae     Age-related osteoporosis without fracture    Osteoarthritis of multiple joints    Gastroesophageal reflux disease without esophagitis    Nontraumatic complete tear of right rotator cuff    Decreased strength    Decreased ROM of right shoulder    Hx of colonoscopy    Hypercholesterolemia     Past Medical History:   Diagnosis Date    Abnormal Pap smear 2013    Acidosis     CAD (coronary artery disease) 06/2017     iFR of LAD and RCA performed confirmed nonobstructive disease.    Cataract     Colon polyps     Disorder of kidney and ureter     Frequent urination     General anesthetics causing adverse effect in  therapeutic use     Hemorrhoid     High cholesterol     Poor circulation     Postoperative abdominal pain     Psoriasis     Varicose vein      Past Surgical History:   Procedure Laterality Date    CARDIAC CATHETERIZATION  06/2017     iFR of LAD and RCA performed confirmed nonobstructive disease.    CATARACT EXTRACTION W/  INTRAOCULAR LENS IMPLANT Bilateral     COLONOSCOPY N/A 8/28/2017    Procedure: COLONOSCOPY;  Surgeon: Bertram Myers MD;  Location: [facility] ENDO;  Service: Endoscopy;  Laterality: N/A;    COLONOSCOPY N/A 10/18/2019    Procedure: COLONOSCOPY;  Surgeon: Wilfred Barber MD;  Location: [facility] ENDO (4TH FLR);  Service: Endoscopy;  Laterality: N/A;   needed-BB  Cardiology Clearance received from Dr. REBECA Escalona, see telephone encounter 10/1/19-BB    COLONOSCOPY N/A 6/17/2021    Procedure: COLONOSCOPY suprep Past positive;  Surgeon: Lele Barillas MD;  Location: [facility] ENDO;  Service: Endoscopy;  Laterality: N/A;  past positive    COLONOSCOPY W/ BIOPSIES  06/17/2021    ESOPHAGOGASTRODUODENOSCOPY N/A 6/23/2023    Procedure: EGD (ESOPHAGOGASTRODUODENOSCOPY);  Surgeon: Seun Burt MD;  Location: [facility];  Service: Endoscopy;  Laterality: N/A;    EXCISION OF BURSA  3/5/2021    Procedure: BURSECTOMY;  Surgeon: Jason Alicea Jr., MD;  Location: [facility] OR;  Service: Orthopedics;;    LEFT HEART CATHETERIZATION N/A 4/8/2019    Procedure: Left heart cath;  Surgeon: Renato Escalona MD;  Location: [facility] CATH LAB/EP;  Service: Cardiology;  Laterality: N/A;    POLYPECTOMY      SHOULDER ARTHROSCOPY Right 3/5/2021    Procedure: ARTHROSCOPY, SHOULDER;  Surgeon: Jason Alicea Jr., MD;  Location: [facility] OR;  Service: Orthopedics;  Laterality: Right;  need opus system Hinduism   video  (Hinduism notified 3/1/21, CC)  Kulwinder confirmed 3/4/21 [place]    YAG Laser Capsulotomy Right 04/17/2017    Dr. Chavez     EKG: (7/26/23)  Normal sinus rhythm   Cannot exclude  Septal infarct ,age undetermined   Abnormal ECG   Confirmed by KYRSTAL CHISHOLM MD ([id number]) on 7/26/2023 4:09:46 PM     Cardiac cath (2019):  Summary        Non-obstructive CAD.   The ejection fraction is calculated to be 60%.     I certify that I was present for catheter insertion, catheter manipulation, angiography, and angiographic interpretation of this patient.     Procedure Log documented by No documenter listed and verified by Renato Escalona.     Date: 4/8/2019  Time: 10:08 AM      ECHO: (2018)  CONCLUSIONS     1 - Normal left ventricular systolic function (EF 55-60%).     2 - Impaired LV relaxation, elevated LAP (grade 2 diastolic dysfunction).     3 - Normal right ventricular systolic function .     4 - The estimated PA systolic pressure is 17 mmHg.     5 - Moderate aortic regurgitation.       Pre-op Assessment    I have reviewed the Patient Summary Reports.    I have reviewed the NPO Status.   I have reviewed the Medications.     Review of Systems  Anesthesia Hx:  Patient reports severe sore throat and hoarseness for a week after previous GETA (@ [facility]; see previous intubation note in plan) History of prior surgery of interest to airway management or planning: Previous anesthesia: General, MAC Personal Hx of Anesthesia complications  Severe Sore Throat after Anesthesia, Hoarseness after General Anesthesia, temporary hoarseness  Unintended Unpleasent Intraoperative Awareness and during MAC / sedation only   Social:  Non-Smoker, No Alcohol Use    Hematology/Oncology:  Hematology Normal   Oncology Normal     Cardiovascular:   Hypertension Valvular problems/Murmurs, AI CAD   ECG has been reviewed.    Pulmonary:   Shortness of breath    Renal/:   Chronic Renal Disease    Hepatic/GI:   GERD    Musculoskeletal:   Arthritis     Neurological:  Neurology Normal    Endocrine:  Endocrine Normal BMI 24       Physical Exam  General: Cooperative, Alert and Oriented    Airway:  Mallampati: III   Mouth Opening:  Normal  TM Distance: Normal  Tongue: Normal  Neck ROM: Normal ROM    Dental:  Partial Dentures, Intact  Partials removed; denies any loose or chipped teeth       Chemistry        Component Value Date/Time     07/26/2023 1152    K 4.3 07/26/2023 1152     07/26/2023 1152    CO2 22 (L) 07/26/2023 1152    BUN 23 07/26/2023 1152    CREATININE 0.9 07/26/2023 1152    GLU 90 07/26/2023 1152        Component Value Date/Time    CALCIUM 9.0 07/26/2023 1152    ALKPHOS 70 07/26/2023 1152    AST 18 07/26/2023 1152    ALT 8 (L) 07/26/2023 1152    BILITOT 0.4 07/26/2023 1152    ESTGFRAFRICA >60 07/22/2022 0847    EGFRNONAA >60 07/22/2022 0847        Lab Results   Component Value Date    WBC 3.13 (L) 07/26/2023    HGB 12.2 07/26/2023    HCT 35.9 (L) 07/26/2023    MCV 89 07/26/2023     07/26/2023         Anesthesia Plan  Type of Anesthesia, risks & benefits discussed:    Anesthesia Type: Gen ETT  Intra-op Monitoring Plan: Standard ASA Monitors  Post Op Pain Control Plan: multimodal analgesia and IV/PO Opioids PRN  Induction:  IV  Airway Plan: Video, Post-Induction  Informed Consent: Informed consent signed with the Patient and all parties understand the risks and agree with anesthesia plan.  All questions answered.   ASA Score: 3  Day of Surgery Review of History & Physical: H&P Update referred to the surgeon/provider.  Anesthesia Plan Notes: Patient's son at bedside to interpret at patient's request; consent signed by patient with son at bedside - all questions answered    Moderate AI - avoid bradycardia during procedure     Intubation:     Induction:  Intravenous    Intubated:  Postinduction    Mask Ventilation:  Easy mask    Attempts:  2    Attempted By:  Student (kartik méndez)    Method of Intubation:  Direct    Blade:  Chun 3    Laryngeal View Grade: Grade III - only epiglottis visible      Attempted By (2nd Attempt):  Student (kartik méndez)    Method of Intubation (2nd Attempt):  Video  laryngoscopy    Blade (2nd Attempt):  Edge 3    Laryngeal View Grade (2nd Attempt): Grade I - full view of cords      Difficult Airway Encountered?: No      Complications:  None    Airway Device:  Oral endotracheal tube    Airway Device Size:  7.0    Style/Cuff Inflation:  Cuffed (inflated to minimal occlusive pressure)    Inflation Amount (mL):  8    Tube secured:  22    Secured at:  The lips    Placement Verified By:  Capnometry    Complicating Factors:  None    Findings Post-Intubation:  BS equal bilateral and atraumatic/condition of teeth unchanged    Ready For Surgery From Anesthesia Perspective.     .

## 2023-08-01 NOTE — TRANSFER OF CARE
Anesthesia Transfer of Care Note    Patient: Ghada Dave    Procedure(s) Performed: Procedure(s) (LRB):  XI ROBOTIC REPAIR, HERNIA, HIATAL (N/A)  XI ROBOTIC FUNDOPLICATION, NISSEN (N/A)    Patient location: PACU    Anesthesia Type: general    Transport from OR: Transported from OR on room air with adequate spontaneous ventilation    Post pain: adequate analgesia    Post assessment: no apparent anesthetic complications    Post vital signs: stable    Level of consciousness: sedated    Nausea/Vomiting: no nausea/vomiting    Complications: none    Transfer of care protocol was followed      Last vitals:   Visit Vitals  BP (!) 190/87   Pulse 70   Temp 36.2 °C (97.2 °F) (Temporal)   Resp 16   Ht 5' (1.524 m)   Wt 53.8 kg (118 lb 11.5 oz)   SpO2 97%   Breastfeeding No   BMI 23.19 kg/m²

## 2023-08-02 VITALS
OXYGEN SATURATION: 94 % | WEIGHT: 118.63 LBS | TEMPERATURE: 98 F | HEART RATE: 60 BPM | RESPIRATION RATE: 18 BRPM | HEIGHT: 60 IN | DIASTOLIC BLOOD PRESSURE: 61 MMHG | SYSTOLIC BLOOD PRESSURE: 122 MMHG | BODY MASS INDEX: 23.29 KG/M2

## 2023-08-02 LAB
ANION GAP SERPL CALC-SCNC: 6 MMOL/L (ref 8–16)
BASOPHILS # BLD AUTO: 0.02 K/UL (ref 0–0.2)
BASOPHILS NFR BLD: 0.4 % (ref 0–1.9)
BUN SERPL-MCNC: 13 MG/DL (ref 8–23)
CALCIUM SERPL-MCNC: 8.5 MG/DL (ref 8.7–10.5)
CHLORIDE SERPL-SCNC: 111 MMOL/L (ref 95–110)
CO2 SERPL-SCNC: 24 MMOL/L (ref 23–29)
CREAT SERPL-MCNC: 0.8 MG/DL (ref 0.5–1.4)
DIFFERENTIAL METHOD: ABNORMAL
EOSINOPHIL # BLD AUTO: 0 K/UL (ref 0–0.5)
EOSINOPHIL NFR BLD: 0.2 % (ref 0–8)
ERYTHROCYTE [DISTWIDTH] IN BLOOD BY AUTOMATED COUNT: 14 % (ref 11.5–14.5)
EST. GFR  (NO RACE VARIABLE): >60 ML/MIN/1.73 M^2
GLUCOSE SERPL-MCNC: 86 MG/DL (ref 70–110)
HCT VFR BLD AUTO: 31.2 % (ref 37–48.5)
HGB BLD-MCNC: 10.4 G/DL (ref 12–16)
IMM GRANULOCYTES # BLD AUTO: 0.02 K/UL (ref 0–0.04)
IMM GRANULOCYTES NFR BLD AUTO: 0.4 % (ref 0–0.5)
LYMPHOCYTES # BLD AUTO: 1 K/UL (ref 1–4.8)
LYMPHOCYTES NFR BLD: 18 % (ref 18–48)
MCH RBC QN AUTO: 29.8 PG (ref 27–31)
MCHC RBC AUTO-ENTMCNC: 33.3 G/DL (ref 32–36)
MCV RBC AUTO: 89 FL (ref 82–98)
MONOCYTES # BLD AUTO: 0.4 K/UL (ref 0.3–1)
MONOCYTES NFR BLD: 8.3 % (ref 4–15)
NEUTROPHILS # BLD AUTO: 3.9 K/UL (ref 1.8–7.7)
NEUTROPHILS NFR BLD: 72.7 % (ref 38–73)
NRBC BLD-RTO: 0 /100 WBC
PLATELET # BLD AUTO: 204 K/UL (ref 150–450)
PMV BLD AUTO: 8.9 FL (ref 9.2–12.9)
POTASSIUM SERPL-SCNC: 4.1 MMOL/L (ref 3.5–5.1)
RBC # BLD AUTO: 3.49 M/UL (ref 4–5.4)
SODIUM SERPL-SCNC: 141 MMOL/L (ref 136–145)
WBC # BLD AUTO: 5.33 K/UL (ref 3.9–12.7)

## 2023-08-02 PROCEDURE — 99900035 HC TECH TIME PER 15 MIN (STAT): Mod: HCNC

## 2023-08-02 PROCEDURE — 94799 UNLISTED PULMONARY SVC/PX: CPT | Mod: HCNC

## 2023-08-02 PROCEDURE — 85025 COMPLETE CBC W/AUTO DIFF WBC: CPT | Mod: HCNC | Performed by: SURGERY

## 2023-08-02 PROCEDURE — 63600175 PHARM REV CODE 636 W HCPCS: Mod: HCNC | Performed by: SURGERY

## 2023-08-02 PROCEDURE — 36415 COLL VENOUS BLD VENIPUNCTURE: CPT | Mod: HCNC | Performed by: SURGERY

## 2023-08-02 PROCEDURE — 80048 BASIC METABOLIC PNL TOTAL CA: CPT | Mod: HCNC | Performed by: SURGERY

## 2023-08-02 PROCEDURE — 25000003 PHARM REV CODE 250: Mod: HCNC | Performed by: SURGERY

## 2023-08-02 RX ORDER — MORPHINE SULFATE 4 MG/ML
2 INJECTION, SOLUTION INTRAMUSCULAR; INTRAVENOUS
Status: DISCONTINUED | OUTPATIENT
Start: 2023-08-02 | End: 2023-08-02 | Stop reason: HOSPADM

## 2023-08-02 RX ORDER — HYDROCODONE BITARTRATE AND ACETAMINOPHEN 5; 325 MG/1; MG/1
1 TABLET ORAL EVERY 6 HOURS PRN
Status: DISCONTINUED | OUTPATIENT
Start: 2023-08-02 | End: 2023-08-02 | Stop reason: HOSPADM

## 2023-08-02 RX ORDER — HYDROCODONE BITARTRATE AND ACETAMINOPHEN 5; 325 MG/1; MG/1
1 TABLET ORAL EVERY 6 HOURS PRN
Qty: 10 TABLET | Refills: 0 | Status: SHIPPED | OUTPATIENT
Start: 2023-08-02 | End: 2023-08-25

## 2023-08-02 RX ADMIN — CHLORHEXIDINE GLUCONATE 0.12% ORAL RINSE 10 ML: 1.2 LIQUID ORAL at 09:08

## 2023-08-02 RX ADMIN — MORPHINE SULFATE 4 MG: 4 INJECTION INTRAVENOUS at 06:08

## 2023-08-02 NOTE — PLAN OF CARE
Pt resting in bed , no distress . Lap sites are intact . Son at the bedside . NS at 75 ml/hr  . No nausea / vomiting . Pt tolerated clear liquids . No complaints . All orders acknowledged . Chart check completed . Will continue to monitor .

## 2023-08-02 NOTE — HOSPITAL COURSE
Status post robotic hiatal hernia repair with fundoplication     Postop day 1 tolerating diet, pain controlled, ambulating, stable and ready for discharge

## 2023-08-02 NOTE — PLAN OF CARE
[facility]  Discharge Assessment    Primary Care Provider: Javed Basilio MD     Discharge Assessment (most recent)       BRIEF DISCHARGE ASSESSMENT - 08/02/23 1112          Discharge Planning    Assessment Type Discharge Planning Brief Assessment     Resource/Environmental Concerns none     Support Systems Children     Assistance Needed Independent     Equipment Currently Used at Home none     Current Living Arrangements home     Patient/Family Anticipates Transition to home with family     Patient/Family Anticipated Services at Transition none     DME Needed Upon Discharge  bedside commode     Discharge Plan A Home with family                   Patient has no d/c needs at this time. Sw to follow up, as needed, for d/c planning purposes.

## 2023-08-02 NOTE — ANESTHESIA POSTPROCEDURE EVALUATION
Anesthesia Post Evaluation    Patient: Ghada Dave    Procedure(s) Performed: Procedure(s) (LRB):  XI ROBOTIC REPAIR, HERNIA, HIATAL (N/A)  XI ROBOTIC FUNDOPLICATION, NISSEN (N/A)    Final Anesthesia Type: general      Patient location during evaluation: PACU  Patient participation: Yes- Able to Participate  Level of consciousness: awake and alert and oriented  Post-procedure vital signs: reviewed and stable  Pain management: adequate  Airway patency: patent  LAURIE mitigation strategies: Verification of full reversal of neuromuscular block  PONV status at discharge: No PONV  Anesthetic complications: no      Cardiovascular status: blood pressure returned to baseline and hemodynamically stable  Respiratory status: unassisted  Hydration status: euvolemic  Follow-up not needed.          Vitals Value Taken Time   /57 08/02/23 0742   Temp 36.8 °C (98.2 °F) 08/02/23 0742   Pulse 56 08/02/23 0742   Resp 17 08/02/23 0742   SpO2 92 % 08/02/23 0742         Event Time   Out of Recovery 13:55:56         Pain/Erin Score: Pain Rating Prior to Med Admin: 9 (8/2/2023  6:56 AM)  Pain Rating Post Med Admin: 0 (8/2/2023  7:26 AM)  Erin Score: 6 (8/1/2023 12:30 PM)

## 2023-08-02 NOTE — PLAN OF CARE
Patient remained free from injury. NPO status. PRN meds managed care. No s/s of acute distress. 12hr chart check complete.

## 2023-08-02 NOTE — DISCHARGE SUMMARY
[facility]  General Surgery  Discharge Summary      Patient Name: Ghada Dave  MRN: [id number]  Admission Date: 8/1/2023  Hospital Length of Stay: 0 days  Discharge Date and Time:  08/02/2023 4:56 PM  Attending Physician: Elvis Malcolm MD   Discharging Provider: Elvis Malcolm MD  Primary Care Provider: Javed Basilio MD    HPI:   No notes on file    Procedure(s) (LRB):  XI ROBOTIC REPAIR, HERNIA, HIATAL (N/A)  XI ROBOTIC FUNDOPLICATION, NISSEN (N/A)      Indwelling Lines/Drains at time of discharge:   Lines/Drains/Airways     None               Hospital Course: Status post robotic hiatal hernia repair with fundoplication     Postop day 1 tolerating diet, pain controlled, ambulating, stable and ready for discharge      Goals of Care Treatment Preferences:  Code Status: Full Code    Living Will: Yes              Consults:     Significant Diagnostic Studies: N/A    Pending Diagnostic Studies:     None        Final Active Diagnoses:    Diagnosis Date Noted POA    PRINCIPAL PROBLEM:  Hiatal hernia with GERD [K44.9, K21.9] 08/01/2023 Yes      Problems Resolved During this Admission:      Discharged Condition: good    Disposition: Home or Self Care    Follow Up:   Follow-up Information     Elvis Malcolm MD Follow up in 2 week(s).    Specialties: General Surgery, Bariatrics  Contact information:  [address] [address]  [address]  [facility] [address] [phone]                       Patient Instructions:      Ambulatory referral/consult to Outpatient Case Management   Referral Priority: Routine Referral Type: Consultation   Referral Reason: Specialty Services Required   Number of Visits Requested: 1     Medications:  Reconciled Home Medications:      Medication List      START taking these medications    HYDROcodone-acetaminophen 5-325 mg per tablet  Commonly known as: NORCO  Lydia trinh tableta por vía oral cada 6 horas según sea necesario para el dolor.  (Take 1 tablet by mouth every 6 (six)  hours as needed for Pain.)        CONTINUE taking these medications    b complex vitamins capsule  Take 1 capsule by mouth once daily.     esomeprazole 40 MG capsule  Commonly known as: NEXIUM  Take 1 capsule (40 mg total) by mouth before breakfast.     LORazepam 0.5 MG tablet  Commonly known as: ATIVAN  Take 0.5 tablets (0.25 mg total) by mouth every 12 (twelve) hours as needed for Anxiety.     meloxicam 7.5 MG tablet  Commonly known as: MOBIC  Take 1 tablet (7.5 mg total) by mouth once daily.     metFORMIN 500 MG ER 24hr tablet  Commonly known as: GLUCOPHAGE-XR  Take 1 tablet (500 mg total) by mouth daily with breakfast.     nitrofurantoin (macrocrystal-monohydrate) 100 MG capsule  Commonly known as: MACROBID  Take 1 capsule (100 mg total) by mouth once daily.     rOPINIRole 0.5 MG tablet  Commonly known as: REQUIP  Take 1 tablet (0.5 mg total) by mouth every evening. For leg cramps.     triamcinolone 0.5 % ointment  Commonly known as: KENALOG  Apply topically 2 (two) times daily as needed (rash).        STOP taking these medications    aspirin 81 MG EC tablet  Commonly known as: ECOTRIN          Time spent on the discharge of patient: 30 minutes    Elvis Malcolm MD  General Surgery  [facility]

## 2023-08-07 ENCOUNTER — OUTPATIENT CASE MANAGEMENT (OUTPATIENT)
Dept: ADMINISTRATIVE | Facility: OTHER | Age: 88
End: 2023-08-07
Payer: MEDICARE

## 2023-08-07 NOTE — LETTER
Ghada Flanagan  [address] [place] [address]    Querido/Querida Ghada Flanagan,    Trabajo en el programa de [facility]. Recibimos trinh referencia para discutir con usted paco condiciones medicas. Darya programa es gratis por pacientes de [facility] con trinh hospitalizacion recientemente o pacientes con condiciones medicas que necesitan ayuda con paoc condiciones medicas.     Soy trinh Enfermera (o) Registrada (o) que trabajo para promover hart maría, prevenir enfermedades, ayudarle lidiar con paco enfermedades.    Le llame a usted por telefono algunas veces sin exito. Cuando reciba esta carta or mis mensajes de telefono, por favor, me llama o a nuestro departamento cuando pueda.  El marc para el programa de [facility] Outpatient Care Management Department es [phone], giovany las horas 8:00 am -4:30 pm, Lunes a Viernes.    Tambien, [facility] tiene trinh programa con trinh enferema por telefono (24 horas/7 box), lista para contestar paco preguntas medicas. El marc es [phone].      Atentamente,      Chloé Tobin RN, [facility]  Sistema de maría de [facility]

## 2023-08-07 NOTE — LETTER
Ghada Flanagan  [address] [place] [address]    Querido/Querida Ghada Flanagan,    Bienviendo al programa de [facility]. Estamos listas para ayudar pacientes que tienen algunas condiciones medicos and quien necesitan attencion personal.    Fue un placer hablar con usted hoy. Mi nombre es Chloé Tobin RN. Espero con interes trabajar con usted. Voy a llamarle por telefono giovany las semanas que vienen para coordinar la atencion y resolver problemas que afectan hart estado de maría.    Mi meta es ayudarle a funcionar en hart nivel mas saludable. Se puede comunicar conmigo directamente al marc [phone].    Edie paciente de [facility], con seguro de [employer] aqui hay algunos de los servicios disponible para usted:    Desarrollar hart cuidado con trinh/un enfermera (o) registrada (o)  Entablar apoyo con hart trabajadora o trabajador social  Conectar con servicios y recursos  Coordinar comunicacion con hart equipo medico  Proveer educacion sobre hart estados medicos   Ayudarle a entender el plan de hart doctor sobre hart maría  Ayudarle a obtener informacion sobre hart cobertura de seguro    Todos los servicio que nuestro programa provee, es comunicado y coordinado con hart medico de cabecera. [place] parte de hart participacion en nuestro programa usted va recibir materiales educativos en hart cuenta con [facility], por telefono o por correo. Si no desea seguir participando en el randy programa nos puede contactar al marc de nuestra oficina [phone].    Atentamente,      Chloé Tobin RN  Sistema de maría de [facility]

## 2023-08-07 NOTE — LETTER
Ghada Flanagan  [address] [place] [address]    Querido/Querida Ghada Flanagan,    Bienviendo al programa de [facility]. Estamos listas para ayudar pacientes que tienen algunas condiciones medicos and quien necesitan attencion personal.    Fue un placer hablar con usted haydey. Mi nombre es Chloé Tobin RN. Espero con interes trabajar con usted. Voy a llamarle por telefono giovany las semanas que vienen para coordinar la atencion y resolver problemas que afectan hart estado de maría.    Mi meta es ayudarle a funcionar en hart nivel mas saludable. Se puede comunicar conmigo directamente al marc [phone].    Edie paciente de [facility], con seguro de [employer] aqui hay algunos de los servicios disponible para usted:    Desarrollar hart cuidado con trinh/un enfermera (o) registrada (o)  Entablar apoyo con hart trabajadora o trabajador social  Conectar con servicios y recursos  Coordinar comunicacion con hart equipo medico  Proveer educacion sobre hart estados medicos   Ayudarle a entender el plan de hart doctor sobre hart maría  Ayudarle a obtener informacion sobre hart cobertura de seguro    Todos los servicio que nuestro programa provee, es comunicado y coordinado con hart medico de cabecera. [place] parte de hart participacion en nuestro programa usted va recibir materiales educativos en hart cuenta con [facility], por telefono o por correo. Si no desea seguir participando en el randy programa nos puede contactar al marc de nuestra oficina [phone].    Atentamente,      Chloé Tobin RN, [facility]  Sistema de maría de [facility]

## 2023-08-10 NOTE — PROGRESS NOTES
"Outpatient Care Management  Initial Patient Assessment    Patient: Ghada Dave  MRN: [id number]  Date of Service: 08/07/2023  Completed by: Chloé Tobin RN  Referral Date: 08/01/2023  Program: High Risk  Status: Ongoing  Effective Dates: 8/10/2023 - present  Responsible Staff: Chloé Tobin RN        Reason for Visit   Patient presents with    Other     UTR letter    OPCM Enrollment Call    Nursing Assessment   've    Brief Summary:  Ghada Dave was referred by Dr. Elvis Malcolm for OPCM. Patient qualifies for program based on Risk Score of 72.9%.   Active problem list, medical, surgical and social history reviewed. Active comorbidities include Hypertension, DM and GERD. Areas of need identified by patient include education on importance of following physician orders post discharge of Hiatal Hernia Repair. Ghada lives with her son, Maximiliano who just moved to [place] from [facility] due to crime in old neighborhood. They're in the process of finding providers for other care that are closer to them. Ghada )is on a clear liquid diet and has follow up post surgery appointment on 8-16-23 @ 9:40 and CM stressed importance of her attending this office visit. Ghada is able to do her ADL's but son is off this week to help her post op weeks #1. Maxiimliano voices concerns of issues they've had with prior transportation service "[employer]" not picking her up and missing appointments. CM instructed on benefits of [employer] and discussed each one along with transportation services. Maximiliano also voiced he spoke with [employer] company in the past "and they were suppose to mail me a catalog and we never got it." CM assured him she'll mail one along with a couple of pill boxes to [address]O. Box he gave.   Next steps: CM will follow up on or around 8-21-23 for follow up.     Disability Status  Is the patient alert and oriented (person, place, time, and situation)?: Alert and oriented x 4  Hearing Difficulty or Deaf: no  Visual " "Difficulty or Blind: yes  Visual and Hearing Needs Conclusion: -- (Wears prescription spectacles for correct acuity and voiced it's time for her to have a check up.)  Difficulty Concentrating, Remembering or Making Decisions: no  Communication Difficulty: no  Eating/Swallowing Difficulty: no (At time of call her diet is "still soft and bland" due to procedure.)  Walking or Climbing Stairs Difficulty: no  Dressing/Bathing Difficulty: no  Toileting : Dependent  Continence : Continence - Not a problem  Difficulty Managing Errands Independently: yes  Errands Management: -- (Maximiliano (son) runs all errands she may have and occassionally friends help out.)  Equipment Currently Used at Home: none  ADL Conclusion Statement: -- (Eating soft foods and son offering protein drinks. Recently had dentures fixed and now no problems with them. Wears prescription spectacles routinely for better acuity and will make an appointment to check acuity. 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      Social Determinants of Health     Financial Resource Strain: Low Risk  (8/10/2023)    Overall Financial Resource Strain (CARDIA)     Difficulty of Paying Living Expenses: Not hard at all   Food Insecurity: No Food Insecurity (8/10/2023)    Hunger Vital Sign     Worried About Running Out of Food in the Last Year: Never true     Ran Out of Food in the Last " Year: Never true   Transportation Needs: Unmet Transportation Needs (8/10/2023)    PRAPARE - Transportation     Lack of Transportation (Medical): Yes     Lack of Transportation (Non-Medical): No   Physical Activity: Inactive (8/10/2023)    Exercise Vital Sign     Days of Exercise per Week: 0 days     Minutes of Exercise per Session: 0 min   Stress: No Stress Concern Present (8/10/2023)    [employer] [place] [employer] - Occupational Stress Questionnaire     Feeling of Stress : Not at all   Social Connections: Unknown (8/10/2023)    Social Connection and Isolation Panel [NHANES]     Frequency of Communication with Friends and Family: Twice a week     Frequency of Social Gatherings with Friends and Family: Once a week     Attends Orthodox Services: More than 4 times per year     Active Member of Clubs or Organizations: No     Attends Club or Organization Meetings: Never   Housing Stability: Low Risk  (8/10/2023)    Housing Stability Vital Sign     Unable to Pay for Housing in the Last Year: No     Number of Places Lived in the Last Year: 2     Unstable Housing in the Last Year: No       Roles and Relationships  Primary Source of Support/Comfort: child(uriel)  Name of Support/Comfort Primary Source: Maximiliano Smith  Primary Roles/Responsibilities: wage earner, full-time      Advance Directives (For Healthcare)  Advance Directive  (If Adv Dir status is received, view document under Adv Dir in header or Chart Review Media tab): Advance Directive currently in Epic.        Patient Reported Insurance  Verified current insurance plan:: [employer] Advantage; Medicaid  [employer] benefits discussed:: OTC Prescription Discounts; Well Dine; Transportation; Silver Sneakers; Mail Order Pharmacy; Other (See comments) (CM explained all benefits in detail to sonMaximiliano. He reqeusted catalog and one will be mailed to [address] he gave.)        Depression Patient Health Questionnaire 8/10/2023 5/11/2023 1/11/2023 3/18/2022  3/5/2018 12/14/2017   Over the last two weeks how often have you been bothered by little interest or pleasure in doing things Not at all Not at all Not at all Not at all Not at all Not at all   Over the last two weeks how often have you been bothered by feeling down, depressed or hopeless Not at all Not at all Not at all Not at all Not at all Nearly every day   PHQ-2 Total Score 0 0 0 0 0 3   Over the last two weeks how often have you been bothered by trouble falling or staying asleep, or sleeping too much - - - - - Nearly every day   Over the last two weeks how often have you been bothered by feeling tired or having little energy - - - - - Not at all   Over the last two weeks how often have you been bothered by a poor appetite or overeating - - - - - Not at all   Over the last two weeks how often have you been bothered by feeling bad about yourself - or that you are a failure or have let yourself or your family down - - - - - Not at all   Over the last two weeks how often have you been bothered by trouble concentrating on things, such as reading the newspaper or watching television - - - - - Not at all   Over the last two weeks how often have you been bothered by moving or speaking so slowly that other people could have noticed. Or the opposite - being so fidgety or restless that you have been moving around a lot more than usual. - - - - - Not at all   Over the last two weeks how often have you been bothered by thoughts that you would be better off dead, or of hurting yourself - - - - - Not at all   If you checked off any problems, how difficult have these problems made it for you to do your work, take care of things at home or get along with other people? - - - - - Very difficult   Total Score - - - - - 6       Learning Assessment       08/10/2023 1443 [facility] (8/7/2023 - Present)   Created by Chloé Tobin, RN -  (Nurse) Status: Complete                 PRIMARY LEARNER     Primary  Learner Name:  Maximiliano Smith  - 08/10/2023 1443    Does the primary learner have any barriers to learning?:  No Barriers  - 08/10/2023 1443    What is the preferred language of the primary learner?:  English LL - 08/10/2023 1443    Is an  required?:  No LL - 08/10/2023 1443    How does the primary learner prefer to learn new concepts?:  Listening LL - 08/10/2023 1443    How often do you need to have someone help you read instructions, pamphlets, or written material from your doctor or pharmacy?:  Never LL - 08/10/2023 1443        CO-LEARNER #1     No question answered        CO-LEARNER #2     No question answered        SPECIAL TOPICS     No question answered        ANSWERED BY:     No question answered        Chloé Casey, RN -  (Nurse)   08/10/2023 1443

## 2023-08-16 ENCOUNTER — OFFICE VISIT (OUTPATIENT)
Dept: SURGERY | Facility: CLINIC | Age: 88
End: 2023-08-16
Payer: MEDICAID

## 2023-08-16 VITALS
SYSTOLIC BLOOD PRESSURE: 101 MMHG | BODY MASS INDEX: 22.85 KG/M2 | WEIGHT: 116.38 LBS | HEIGHT: 60 IN | DIASTOLIC BLOOD PRESSURE: 56 MMHG | HEART RATE: 79 BPM

## 2023-08-16 DIAGNOSIS — K44.9 HIATAL HERNIA WITH GERD: Primary | ICD-10-CM

## 2023-08-16 DIAGNOSIS — K21.9 HIATAL HERNIA WITH GERD: Primary | ICD-10-CM

## 2023-08-16 PROCEDURE — 1159F MED LIST DOCD IN RCRD: CPT | Mod: CPTII,S$GLB,, | Performed by: SURGERY

## 2023-08-16 PROCEDURE — 1160F RVW MEDS BY RX/DR IN RCRD: CPT | Mod: CPTII,S$GLB,, | Performed by: SURGERY

## 2023-08-16 PROCEDURE — 99024 PR POST-OP FOLLOW-UP VISIT: ICD-10-PCS | Mod: S$GLB,,, | Performed by: SURGERY

## 2023-08-16 PROCEDURE — 1160F PR REVIEW ALL MEDS BY PRESCRIBER/CLIN PHARMACIST DOCUMENTED: ICD-10-PCS | Mod: CPTII,S$GLB,, | Performed by: SURGERY

## 2023-08-16 PROCEDURE — 99999 PR PBB SHADOW E&M-EST. PATIENT-LVL III: CPT | Mod: PBBFAC,,, | Performed by: SURGERY

## 2023-08-16 PROCEDURE — 1157F PR ADVANCE CARE PLAN OR EQUIV PRESENT IN MEDICAL RECORD: ICD-10-PCS | Mod: CPTII,S$GLB,, | Performed by: SURGERY

## 2023-08-16 PROCEDURE — 1126F AMNT PAIN NOTED NONE PRSNT: CPT | Mod: CPTII,S$GLB,, | Performed by: SURGERY

## 2023-08-16 PROCEDURE — 99999 PR PBB SHADOW E&M-EST. PATIENT-LVL III: ICD-10-PCS | Mod: PBBFAC,,, | Performed by: SURGERY

## 2023-08-16 PROCEDURE — 99024 POSTOP FOLLOW-UP VISIT: CPT | Mod: S$GLB,,, | Performed by: SURGERY

## 2023-08-16 PROCEDURE — 1126F PR PAIN SEVERITY QUANTIFIED, NO PAIN PRESENT: ICD-10-PCS | Mod: CPTII,S$GLB,, | Performed by: SURGERY

## 2023-08-16 PROCEDURE — 1159F PR MEDICATION LIST DOCUMENTED IN MEDICAL RECORD: ICD-10-PCS | Mod: CPTII,S$GLB,, | Performed by: SURGERY

## 2023-08-16 PROCEDURE — 1157F ADVNC CARE PLAN IN RCRD: CPT | Mod: CPTII,S$GLB,, | Performed by: SURGERY

## 2023-08-16 NOTE — PROGRESS NOTES
History & Physical    SUBJECTIVE:     History of Present Illness:  Patient is a 87 y.o. female status post robotic hiatal hernia repair with Nissen fundoplication 08/01/2023 presents for postop.  She is doing well tolerating liquids without issue.  She denies any reflux or regurgitation.    presents to discuss upper GI, modified barium swallow study and EGD.  Patient continues to have early satiety with food sticking feeling and regurgitation/reflux.    Initially referred for hiatal hernia with dysphagia and reflux.  She reports progressive food sticking and regurgitation of food as well as heartburn.  She originally underwent evaluation for hiatal hernia repair in [place] during [facility] however things were put on hold at that time.  Since this time she is been able to maintain her weight but mostly sticks with pureed foods.  She does report some upper chest neck swallowing dysphagia as well.  Denies any prior abdominal surgeries.  She stays very active and is independent.    No chief complaint on file.      Review of patient's allergies indicates:   Allergen Reactions    Penicillin Anaphylaxis    Lipitor [atorvastatin] Hives     Can take Simivastatin .     Keflex [cephalexin] Rash       Current Outpatient Medications   Medication Sig Dispense Refill    b complex vitamins capsule Take 1 capsule by mouth once daily.      esomeprazole (NEXIUM) 40 MG capsule Take 1 capsule (40 mg total) by mouth before breakfast. 90 capsule 3    HYDROcodone-acetaminophen (NORCO) 5-325 mg per tablet Take 1 tablet by mouth every 6 (six) hours as needed for Pain. 10 tablet 0    LORazepam (ATIVAN) 0.5 MG tablet Take 0.5 tablets (0.25 mg total) by mouth every 12 (twelve) hours as needed for Anxiety. 90 tablet 2    meloxicam (MOBIC) 7.5 MG tablet Take 1 tablet (7.5 mg total) by mouth once daily. 90 tablet 3    metFORMIN (GLUCOPHAGE-XR) 500 MG ER 24hr tablet Take 1 tablet (500 mg total) by mouth daily with breakfast. 90 tablet 3     nitrofurantoin, macrocrystal-monohydrate, (MACROBID) 100 MG capsule Take 1 capsule (100 mg total) by mouth once daily. 90 capsule 3    rOPINIRole (REQUIP) 0.5 MG tablet Take 1 tablet (0.5 mg total) by mouth every evening. For leg cramps. 90 tablet 3    triamcinolone (KENALOG) 0.5 % ointment Apply topically 2 (two) times daily as needed (rash). 60 g 3     No current facility-administered medications for this visit.       Past Medical History:   Diagnosis Date    Abnormal Pap smear 2013    Acidosis     CAD (coronary artery disease) 06/2017     iFR of LAD and RCA performed confirmed nonobstructive disease.    Cataract     Colon polyps     Disorder of kidney and ureter     Frequent urination     General anesthetics causing adverse effect in therapeutic use     Hemorrhoid     High cholesterol     Poor circulation     Postoperative abdominal pain     Psoriasis     Varicose vein      Past Surgical History:   Procedure Laterality Date    CARDIAC CATHETERIZATION  06/2017     iFR of LAD and RCA performed confirmed nonobstructive disease.    CATARACT EXTRACTION W/  INTRAOCULAR LENS IMPLANT Bilateral     COLONOSCOPY N/A 8/28/2017    Procedure: COLONOSCOPY;  Surgeon: Bertram Myers MD;  Location: [facility];  Service: Endoscopy;  Laterality: N/A;    COLONOSCOPY N/A 10/18/2019    Procedure: COLONOSCOPY;  Surgeon: Wilfred Barber MD;  Location: [facility] ([address] [place] [address]);  Service: Endoscopy;  Laterality: N/A;   needed-BB  Cardiology Clearance received from Dr. REBECA Escalona, see telephone encounter 10/1/19-BB    COLONOSCOPY N/A 6/17/2021    Procedure: COLONOSCOPY suprep Past positive;  Surgeon: Lele Barillas MD;  Location: [facility];  Service: Endoscopy;  Laterality: N/A;  past positive    COLONOSCOPY W/ BIOPSIES  06/17/2021    ESOPHAGOGASTRODUODENOSCOPY N/A 6/23/2023    Procedure: EGD (ESOPHAGOGASTRODUODENOSCOPY);  Surgeon: Seun Burt MD;  Location: [facility];  Service: Endoscopy;  Laterality:  N/A;    EXCISION OF BURSA  3/5/2021    Procedure: BURSECTOMY;  Surgeon: Jason Alicea Jr., MD;  Location: [facility] OR;  Service: Orthopedics;;    LEFT HEART CATHETERIZATION N/A 4/8/2019    Procedure: Left heart cath;  Surgeon: Renato Escalona MD;  Location: [facility] CATH LAB/EP;  Service: Cardiology;  Laterality: N/A;    POLYPECTOMY      ROBOT-ASSISTED NISSEN FUNDOPLICATION USING DA ANTOINE XI N/A 8/1/2023    Procedure: XI ROBOTIC FUNDOPLICATION, NISSEN;  Surgeon: Elvis Malcolm MD;  Location: [facility] OR;  Service: General;  Laterality: N/A;    ROBOT-ASSISTED REPAIR OF HIATAL HERNIA USING DA ANTOINE XI N/A 8/1/2023    Procedure: XI ROBOTIC REPAIR, HERNIA, HIATAL;  Surgeon: Elvis Malcolm MD;  Location: [facility] OR;  Service: General;  Laterality: N/A;    SHOULDER ARTHROSCOPY Right 3/5/2021    Procedure: ARTHROSCOPY, SHOULDER;  Surgeon: Jason Alicea Jr., MD;  Location: [facility] OR;  Service: Orthopedics;  Laterality: Right;  need opus system Mandaen   video  (Mandaen notified 3/1/21, CC)  P[place]han confirmed 3/4/21 [place]    YAG Laser Capsulotomy Right 04/17/2017    Dr. Chavez     Family History   Problem Relation Age of Onset    Kidney disease Mother     Glaucoma Neg Hx     Macular degeneration Neg Hx     Strabismus Neg Hx     Retinal detachment Neg Hx     Amblyopia Neg Hx     Blindness Neg Hx     Cataracts Neg Hx     Prostate cancer Neg Hx     Anesthesia problems Neg Hx      Social History     Tobacco Use    Smoking status: Never    Smokeless tobacco: Never    Tobacco comments:     Never smoked   Substance Use Topics    Alcohol use: Never    Drug use: Never        Review of Systems:  Review of Systems   Constitutional:  Negative for chills, fatigue, fever and unexpected weight change.   Respiratory:  Negative for cough, shortness of breath, wheezing and stridor.    Cardiovascular:  Negative for chest pain, palpitations and leg swelling.   Gastrointestinal:  Negative for abdominal distention, abdominal pain, constipation,  diarrhea, nausea and vomiting.   Genitourinary:  Negative for difficulty urinating, dysuria, frequency, hematuria and urgency.   Skin:  Negative for color change, pallor, rash and wound.   Hematological:  Does not bruise/bleed easily.       OBJECTIVE:     Vital Signs (Most Recent)  Pulse: 79 (08/16/23 0918)  BP: (!) 101/56 (08/16/23 0918)  5' (1.524 m)  52.8 kg (116 lb 6.5 oz)     Physical Exam:  Physical Exam  Vitals reviewed.   Constitutional:       Appearance: She is well-developed.   HENT:      Head: Normocephalic and atraumatic.   Cardiovascular:      Rate and Rhythm: Normal rate and regular rhythm.   Pulmonary:      Effort: Pulmonary effort is normal.      Breath sounds: Normal breath sounds.   Abdominal:      General: Bowel sounds are normal. There is no distension.      Palpations: Abdomen is soft.      Tenderness: There is no abdominal tenderness.      Comments: Incisions healing well   Musculoskeletal:      Cervical back: Neck supple.   Skin:     General: Skin is warm and dry.   Neurological:      Mental Status: She is alert and oriented to person, place, and time.           Diagnostic Results:  CT 2022   Large hiatal hernia and possibly some component of gastric volvulus    EGD:   Impression:            - Gastric volvulus.                          - Large hiatal hernia.                          - Normal esophagus.                          - Biopsies were taken with a cold forceps for                          evaluation of eosinophilic esophagitis.      Upper GI:   Impression:  Large hiatal hernia without delayed gastric emptying to suggest volvulus.  Gastroesophageal reflux to the level of the midesophagus with stress maneuvers.  Laryngeal penetration without subglottic tracheal aspiration.  Duodenal diverticulum.    MBSS reviewed        ASSESSMENT/PLAN:     87-year-old female status post robotic hiatal hernia repair with Nissen fundoplication    PLAN:Plan     Healing well  Tolerating liquids will advance  to Nissen soft diet and continue diet progression as recommended  Continue light duty x 1 month, then ok to resume regular activity  Follow up in 1 month

## 2023-08-21 ENCOUNTER — HOSPITAL ENCOUNTER (EMERGENCY)
Facility: HOSPITAL | Age: 88
Discharge: HOME | End: 2023-08-21
Attending: EMERGENCY MEDICINE
Payer: MEDICARE

## 2023-08-21 ENCOUNTER — PATIENT MESSAGE (OUTPATIENT)
Dept: PRIMARY CARE CLINIC | Facility: CLINIC | Age: 88
End: 2023-08-21
Payer: MEDICARE

## 2023-08-21 VITALS
RESPIRATION RATE: 18 BRPM | TEMPERATURE: 98 F | OXYGEN SATURATION: 95 % | HEART RATE: 74 BPM | SYSTOLIC BLOOD PRESSURE: 133 MMHG | DIASTOLIC BLOOD PRESSURE: 60 MMHG

## 2023-08-21 DIAGNOSIS — R73.9 HYPERGLYCEMIA: Primary | ICD-10-CM

## 2023-08-21 LAB
ALBUMIN SERPL BCP-MCNC: 4.1 G/DL (ref 3.5–5.2)
ALP SERPL-CCNC: 56 U/L (ref 55–135)
ALT SERPL W/O P-5'-P-CCNC: 7 U/L (ref 10–44)
ANION GAP SERPL CALC-SCNC: 8 MMOL/L (ref 8–16)
AST SERPL-CCNC: 19 U/L (ref 10–40)
B-OH-BUTYR BLD STRIP-SCNC: 0.4 MMOL/L (ref 0–0.5)
BASOPHILS # BLD AUTO: 0.08 K/UL (ref 0–0.2)
BASOPHILS NFR BLD: 2.1 % (ref 0–1.9)
BILIRUB SERPL-MCNC: 0.5 MG/DL (ref 0.1–1)
BUN SERPL-MCNC: 16 MG/DL (ref 8–23)
CALCIUM SERPL-MCNC: 9.6 MG/DL (ref 8.7–10.5)
CHLORIDE SERPL-SCNC: 107 MMOL/L (ref 95–110)
CO2 SERPL-SCNC: 25 MMOL/L (ref 23–29)
CREAT SERPL-MCNC: 0.9 MG/DL (ref 0.5–1.4)
DIFFERENTIAL METHOD: ABNORMAL
EOSINOPHIL # BLD AUTO: 0.2 K/UL (ref 0–0.5)
EOSINOPHIL NFR BLD: 5.2 % (ref 0–8)
ERYTHROCYTE [DISTWIDTH] IN BLOOD BY AUTOMATED COUNT: 14 % (ref 11.5–14.5)
EST. GFR  (NO RACE VARIABLE): >60 ML/MIN/1.73 M^2
GLUCOSE SERPL-MCNC: 90 MG/DL (ref 70–110)
HCT VFR BLD AUTO: 36.4 % (ref 37–48.5)
HGB BLD-MCNC: 12.2 G/DL (ref 12–16)
IMM GRANULOCYTES # BLD AUTO: 0.01 K/UL (ref 0–0.04)
IMM GRANULOCYTES NFR BLD AUTO: 0.3 % (ref 0–0.5)
LYMPHOCYTES # BLD AUTO: 1.5 K/UL (ref 1–4.8)
LYMPHOCYTES NFR BLD: 38.5 % (ref 18–48)
MCH RBC QN AUTO: 29.8 PG (ref 27–31)
MCHC RBC AUTO-ENTMCNC: 33.5 G/DL (ref 32–36)
MCV RBC AUTO: 89 FL (ref 82–98)
MONOCYTES # BLD AUTO: 0.3 K/UL (ref 0.3–1)
MONOCYTES NFR BLD: 8.8 % (ref 4–15)
NEUTROPHILS # BLD AUTO: 1.8 K/UL (ref 1.8–7.7)
NEUTROPHILS NFR BLD: 45.1 % (ref 38–73)
NRBC BLD-RTO: 0 /100 WBC
PLATELET # BLD AUTO: 290 K/UL (ref 150–450)
PMV BLD AUTO: 9.6 FL (ref 9.2–12.9)
POCT GLUCOSE: 84 MG/DL (ref 70–110)
POCT GLUCOSE: 84 MG/DL (ref 70–110)
POTASSIUM SERPL-SCNC: 4.6 MMOL/L (ref 3.5–5.1)
PROT SERPL-MCNC: 8 G/DL (ref 6–8.4)
RBC # BLD AUTO: 4.09 M/UL (ref 4–5.4)
SODIUM SERPL-SCNC: 140 MMOL/L (ref 136–145)
WBC # BLD AUTO: 3.87 K/UL (ref 3.9–12.7)

## 2023-08-21 PROCEDURE — 99284 EMERGENCY DEPT VISIT MOD MDM: CPT | Mod: HCNC

## 2023-08-21 PROCEDURE — 25000003 PHARM REV CODE 250: Mod: HCNC | Performed by: NURSE PRACTITIONER

## 2023-08-21 PROCEDURE — 80053 COMPREHEN METABOLIC PANEL: CPT | Mod: HCNC | Performed by: NURSE PRACTITIONER

## 2023-08-21 PROCEDURE — 96360 HYDRATION IV INFUSION INIT: CPT | Mod: HCNC

## 2023-08-21 PROCEDURE — 82962 GLUCOSE BLOOD TEST: CPT | Mod: HCNC,91

## 2023-08-21 PROCEDURE — 85025 COMPLETE CBC W/AUTO DIFF WBC: CPT | Mod: HCNC | Performed by: NURSE PRACTITIONER

## 2023-08-21 PROCEDURE — 82010 KETONE BODYS QUAN: CPT | Mod: HCNC | Performed by: NURSE PRACTITIONER

## 2023-08-21 RX ADMIN — SODIUM CHLORIDE 1000 ML: 9 INJECTION, SOLUTION INTRAVENOUS at 05:08

## 2023-08-21 NOTE — FIRST PROVIDER EVALUATION
Medical screening examination initiated.  I have conducted a focused provider triage encounter, findings are as follows:    Brief history of present illness:  Reports blood glucose at home greater than 500.  Patient was given metformin and Actos    Vitals:    08/21/23 1458 08/21/23 1500   BP: 133/60    Pulse: 74    Resp: 18    Temp:  98.3 °F (36.8 °C)   TempSrc:  Oral   SpO2: 95%        Pertinent physical exam:  No acute distress    Brief workup plan:  Labs, further eval    Preliminary workup initiated; this workup will be continued and followed by the physician or advanced practice provider that is assigned to the patient when roomed.

## 2023-08-21 NOTE — ED PROVIDER NOTES
"SCRIBE #1 NOTE: I, Marizol Juan M, am scribing for, and in the presence of, Savanah Olsen MD. I have scribed the entire note.      History      Chief Complaint   Patient presents with    Hyperglycemia     Elevated glucose at home x3 days. Recent surgery in the last month. Surgeon wants eval to rule out infection. Takes metformin 500mg per day. Son gave her 1000mg Metformin and 5mg actose for sugar. CBG 84 in triage.       Review of patient's allergies indicates:   Allergen Reactions    Penicillin Anaphylaxis    Lipitor [atorvastatin] Hives     Can take Simivastatin .     Keflex [cephalexin] Rash        HPI   HPI    8/21/2023, 6:07 PM   History obtained from the patient and the pt's son at bedside      History of Present Illness: Ghada Dave is a 88 y.o. female patient with a PMHx of CAD and pre-diabetes who presents to the Emergency Department because she has been hyperglycemic for 3 days. The pt's son reports that the pt had a surgery on 8/1/23 to repair a hiatal hernia and has been on a liquid diet since then. The pt's son has been giving the pt juice and broths to drink. Now, for the past 3 days, the pt's blood sugars have been elevated and her glucometer has read "HI". Pt has pre-diabetes and is prescribed 500 mg of Metformin. Last night and this morning, the pt's son gave the pt 1,000 mg of Metformin. The pt also took 5 mg of Actos this morning. Pt has no other complaints at this time. Patient denies any fever, chills, N/V/D, CP, SOB, weakness, and all other sxs at this time. No further complaints or concerns at this time.         Arrival mode: Personal vehicle    PCP: Javed Basilio MD       Past Medical History:  Past Medical History:   Diagnosis Date    Abnormal Pap smear 2013    Acidosis     CAD (coronary artery disease) 06/2017     iFR of LAD and RCA performed confirmed nonobstructive disease.    Cataract     Colon polyps     Disorder of kidney and ureter     Frequent urination     " General anesthetics causing adverse effect in therapeutic use     Hemorrhoid     High cholesterol     Poor circulation     Postoperative abdominal pain     Psoriasis     Varicose vein        Past Surgical History:  Past Surgical History:   Procedure Laterality Date    CARDIAC CATHETERIZATION  06/2017     iFR of LAD and RCA performed confirmed nonobstructive disease.    CATARACT EXTRACTION W/  INTRAOCULAR LENS IMPLANT Bilateral     COLONOSCOPY N/A 8/28/2017    Procedure: COLONOSCOPY;  Surgeon: Bertram Myers MD;  Location: [facility] ENDO;  Service: Endoscopy;  Laterality: N/A;    COLONOSCOPY N/A 10/18/2019    Procedure: COLONOSCOPY;  Surgeon: Wilfred Barber MD;  Location: [facility] (4TH FLR);  Service: Endoscopy;  Laterality: N/A;   needed-BB  Cardiology Clearance received from Dr. REBECA Escalona, see telephone encounter 10/1/19-BB    COLONOSCOPY N/A 6/17/2021    Procedure: COLONOSCOPY suprep Past positive;  Surgeon: Lele Barillas MD;  Location: [facility];  Service: Endoscopy;  Laterality: N/A;  past positive    COLONOSCOPY W/ BIOPSIES  06/17/2021    ESOPHAGOGASTRODUODENOSCOPY N/A 6/23/2023    Procedure: EGD (ESOPHAGOGASTRODUODENOSCOPY);  Surgeon: Seun Burt MD;  Location: [facility];  Service: Endoscopy;  Laterality: N/A;    EXCISION OF BURSA  3/5/2021    Procedure: BURSECTOMY;  Surgeon: Jason Alicea Jr., MD;  Location: [facility] OR;  Service: Orthopedics;;    LEFT HEART CATHETERIZATION N/A 4/8/2019    Procedure: Left heart cath;  Surgeon: Renato Escalona MD;  Location: [facility] CATH LAB/EP;  Service: Cardiology;  Laterality: N/A;    POLYPECTOMY      ROBOT-ASSISTED NISSEN FUNDOPLICATION USING DA ANTOINE XI N/A 8/1/2023    Procedure: XI ROBOTIC FUNDOPLICATION, NISSEN;  Surgeon: Elvis Malcolm MD;  Location: [facility] OR;  Service: General;  Laterality: N/A;    ROBOT-ASSISTED REPAIR OF HIATAL HERNIA USING DA ANTOINE XI N/A 8/1/2023    Procedure: XI ROBOTIC REPAIR, HERNIA, HIATAL;  Surgeon:  Elvis Malcolm MD;  Location: [facility] OR;  Service: General;  Laterality: N/A;    SHOULDER ARTHROSCOPY Right 3/5/2021    Procedure: ARTHROSCOPY, SHOULDER;  Surgeon: Jason Alicea Jr., MD;  Location: [facility] OR;  Service: Orthopedics;  Laterality: Right;  need opus system [facility]   video  ([facility] notified 3/1/21, CC)  Sundarmaxine confirmed 3/4/21 [place]    YAG Laser Capsulotomy Right 04/17/2017    Dr. Chavez         Family History:  Family History   Problem Relation Age of Onset    Kidney disease Mother     Glaucoma Neg Hx     Macular degeneration Neg Hx     Strabismus Neg Hx     Retinal detachment Neg Hx     Amblyopia Neg Hx     Blindness Neg Hx     Cataracts Neg Hx     Prostate cancer Neg Hx     Anesthesia problems Neg Hx        Social History:  Social History     Tobacco Use    Smoking status: Never    Smokeless tobacco: Never    Tobacco comments:     Never smoked   Substance and Sexual Activity    Alcohol use: Never    Drug use: Never    Sexual activity: Never       ROS   Review of Systems   Constitutional:  Negative for chills and fever.   HENT:  Negative for sore throat.    Respiratory:  Negative for shortness of breath.    Cardiovascular:  Negative for chest pain.   Gastrointestinal:  Negative for diarrhea, nausea and vomiting.   Genitourinary:  Negative for dysuria.   Musculoskeletal:  Negative for back pain.   Skin:  Negative for rash.   Neurological:  Negative for weakness.   Hematological:  Does not bruise/bleed easily.   All other systems reviewed and are negative.      Physical Exam      Initial Vitals   BP Pulse Resp Temp SpO2   08/21/23 1458 08/21/23 1458 08/21/23 1458 08/21/23 1500 08/21/23 1458   133/60 74 18 98.3 °F (36.8 °C) 95 %      MAP       --                 Physical Exam  Nursing Notes and Vital Signs Reviewed.  Constitutional: Patient is in no acute distress. Well-developed and well-nourished.  Head: Atraumatic. Normocephalic.  Eyes: PERRL. EOM intact. Conjunctivae are not pale. No scleral  icterus.  ENT: Mucous membranes are moist. Oropharynx is clear and symmetric.    Neck: Supple. Full ROM. No lymphadenopathy.  Cardiovascular: Regular rate. Regular rhythm. No murmurs, rubs, or gallops. Distal pulses are 2+ and symmetric.  Pulmonary/Chest: No respiratory distress. Clear to auscultation bilaterally. No wheezing or rales.  Abdominal: Soft and non-distended.  There is no tenderness.  No rebound, guarding, or rigidity.   Musculoskeletal: Moves all extremities. No obvious deformities. No edema.  Skin: Warm and dry.  Neurological:  Alert, awake, and appropriate.  Normal speech.  No acute focal neurological deficits are appreciated.  Psychiatric: Normal affect. Good eye contact. Appropriate in content.    ED Course    Procedures  ED Vital Signs:  Vitals:    08/21/23 1458 08/21/23 1500   BP: 133/60    Pulse: 74    Resp: 18    Temp:  98.3 °F (36.8 °C)   TempSrc:  Oral   SpO2: 95%        Abnormal Lab Results:  Labs Reviewed   CBC W/ AUTO DIFFERENTIAL - Abnormal; Notable for the following components:       Result Value    WBC 3.87 (*)     Hematocrit 36.4 (*)     Basophil % 2.1 (*)     All other components within normal limits   COMPREHENSIVE METABOLIC PANEL - Abnormal; Notable for the following components:    ALT 7 (*)     All other components within normal limits   BETA - HYDROXYBUTYRATE, SERUM   POCT GLUCOSE   POCT GLUCOSE        All Lab Results:  Results for orders placed or performed during the hospital encounter of 08/21/23   CBC auto differential   Result Value Ref Range    WBC 3.87 (L) 3.90 - 12.70 K/uL    RBC 4.09 4.00 - 5.40 M/uL    Hemoglobin 12.2 12.0 - 16.0 g/dL    Hematocrit 36.4 (L) 37.0 - 48.5 %    MCV 89 82 - 98 fL    MCH 29.8 27.0 - 31.0 pg    MCHC 33.5 32.0 - 36.0 g/dL    RDW 14.0 11.5 - 14.5 %    Platelets 290 150 - 450 K/uL    MPV 9.6 9.2 - 12.9 fL    Immature Granulocytes 0.3 0.0 - 0.5 %    Gran # (ANC) 1.8 1.8 - 7.7 K/uL    Immature Grans (Abs) 0.01 0.00 - 0.04 K/uL    Lymph # 1.5 1.0 -  4.8 K/uL    Mono # 0.3 0.3 - 1.0 K/uL    Eos # 0.2 0.0 - 0.5 K/uL    Baso # 0.08 0.00 - 0.20 K/uL    nRBC 0 0 /100 WBC    Gran % 45.1 38.0 - 73.0 %    Lymph % 38.5 18.0 - 48.0 %    Mono % 8.8 4.0 - 15.0 %    Eosinophil % 5.2 0.0 - 8.0 %    Basophil % 2.1 (H) 0.0 - 1.9 %    Differential Method Automated    Comprehensive metabolic panel   Result Value Ref Range    Sodium 140 136 - 145 mmol/L    Potassium 4.6 3.5 - 5.1 mmol/L    Chloride 107 95 - 110 mmol/L    CO2 25 23 - 29 mmol/L    Glucose 90 70 - 110 mg/dL    BUN 16 8 - 23 mg/dL    Creatinine 0.9 0.5 - 1.4 mg/dL    Calcium 9.6 8.7 - 10.5 mg/dL    Total Protein 8.0 6.0 - 8.4 g/dL    Albumin 4.1 3.5 - 5.2 g/dL    Total Bilirubin 0.5 0.1 - 1.0 mg/dL    Alkaline Phosphatase 56 55 - 135 U/L    AST 19 10 - 40 U/L    ALT 7 (L) 10 - 44 U/L    eGFR >60 >60 mL/min/1.73 m^2    Anion Gap 8 8 - 16 mmol/L   Beta - Hydroxybutyrate, Serum   Result Value Ref Range    Beta-Hydroxybutyrate 0.4 0.0 - 0.5 mmol/L   POCT glucose   Result Value Ref Range    POCT Glucose 84 70 - 110 mg/dL   POCT glucose   Result Value Ref Range    POCT Glucose 84 70 - 110 mg/dL         Imaging Results:  Imaging Results    None                 The Emergency Provider reviewed the vital signs and test results, which are outlined above.    ED Discussion     6:18 PM: Reassessed pt at this time. Discussed with pt all pertinent ED information and results. Discussed pt dx and plan of tx. Gave pt all f/u and return to the ED instructions. All questions and concerns were addressed at this time. Pt expresses understanding of information and instructions, and is comfortable with plan to discharge. Pt is stable for discharge.    I discussed with patient and/or family/caretaker that evaluation in the ED does not suggest any emergent or life threatening medical conditions requiring immediate intervention beyond what was provided in the ED, and I believe patient is safe for discharge.  Regardless, an unremarkable  evaluation in the ED does not preclude the development or presence of a serious of life threatening condition. As such, patient was instructed to return immediately for any worsening or change in current symptoms.           ED Medication(s):  Medications   sodium chloride 0.9% bolus 1,000 mL 1,000 mL (0 mLs Intravenous Stopped 8/21/23 1832)     Discharge Medication List as of 8/21/2023  6:10 PM           Follow-up Information       Javed Basilio MD. Schedule an appointment as soon as possible for a visit in 2 days.    Specialty: Family Medicine  Why: Return to the Emergency Room, If symptoms worsen  Contact information:  [address] [place] [id number]  [phone]                               Medical Decision Making    Medical Decision Making  Recent hiatal hernia repair, has diabetes, takes Metformin has been on a clear liquid diet, here with her son who reports uncontrolled BS at home over last several days, but patient has been having sugary drinks, given extra dose of metformin at home prior to arrival, BS in the ER is controlled, patient has no vomiting or other symptoms, no indication for further workup, lab work otherwise normal,     Amount and/or Complexity of Data Reviewed  Independent Historian: caregiver     Details: Son at bedside to provide history due to language barrier.  Labs: ordered. Decision-making details documented in ED Course.                Scribe Attestation:   Scribe #1: I performed the above scribed service and the documentation accurately describes the services I performed. I attest to the accuracy of the note.    Attending:   Physician Attestation Statement for Scribe #1: I, Savanah Olsen MD, personally performed the services described in this documentation, as scribed by Marizol Mcgowan, in my presence, and it is both accurate and complete.          Clinical Impression       ICD-10-CM ICD-9-CM   1. Hyperglycemia  R73.9 790.29       Disposition:   Disposition:  Discharged  Condition: Stable         Savanah Olsen MD  08/22/23 1955

## 2023-08-25 ENCOUNTER — LAB VISIT (OUTPATIENT)
Dept: LAB | Facility: HOSPITAL | Age: 88
End: 2023-08-25
Attending: FAMILY MEDICINE
Payer: MEDICARE

## 2023-08-25 ENCOUNTER — OFFICE VISIT (OUTPATIENT)
Dept: PRIMARY CARE CLINIC | Facility: CLINIC | Age: 88
End: 2023-08-25
Payer: MEDICARE

## 2023-08-25 VITALS
RESPIRATION RATE: 18 BRPM | SYSTOLIC BLOOD PRESSURE: 102 MMHG | OXYGEN SATURATION: 99 % | DIASTOLIC BLOOD PRESSURE: 60 MMHG | HEIGHT: 60 IN | HEART RATE: 90 BPM | BODY MASS INDEX: 22.98 KG/M2 | TEMPERATURE: 98 F | WEIGHT: 117.06 LBS

## 2023-08-25 DIAGNOSIS — R73.9 ELEVATED BLOOD SUGAR: Primary | ICD-10-CM

## 2023-08-25 DIAGNOSIS — R73.03 PREDIABETES: ICD-10-CM

## 2023-08-25 LAB
ESTIMATED AVG GLUCOSE: 117 MG/DL (ref 68–131)
GLUCOSE SERPL-MCNC: 101 MG/DL (ref 70–110)
HBA1C MFR BLD: 5.7 % (ref 4–5.6)

## 2023-08-25 PROCEDURE — 82962 GLUCOSE BLOOD TEST: CPT | Mod: HCNC,S$GLB,, | Performed by: FAMILY MEDICINE

## 2023-08-25 PROCEDURE — 1126F AMNT PAIN NOTED NONE PRSNT: CPT | Mod: HCNC,CPTII,S$GLB, | Performed by: FAMILY MEDICINE

## 2023-08-25 PROCEDURE — 99215 PR OFFICE/OUTPT VISIT, EST, LEVL V, 40-54 MIN: ICD-10-PCS | Mod: HCNC,S$GLB,, | Performed by: FAMILY MEDICINE

## 2023-08-25 PROCEDURE — 83036 HEMOGLOBIN GLYCOSYLATED A1C: CPT | Mod: HCNC | Performed by: FAMILY MEDICINE

## 2023-08-25 PROCEDURE — 36415 COLL VENOUS BLD VENIPUNCTURE: CPT | Mod: HCNC,PN | Performed by: FAMILY MEDICINE

## 2023-08-25 PROCEDURE — 3288F FALL RISK ASSESSMENT DOCD: CPT | Mod: HCNC,CPTII,S$GLB, | Performed by: FAMILY MEDICINE

## 2023-08-25 PROCEDURE — 1101F PT FALLS ASSESS-DOCD LE1/YR: CPT | Mod: HCNC,CPTII,S$GLB, | Performed by: FAMILY MEDICINE

## 2023-08-25 PROCEDURE — 1126F PR PAIN SEVERITY QUANTIFIED, NO PAIN PRESENT: ICD-10-PCS | Mod: HCNC,CPTII,S$GLB, | Performed by: FAMILY MEDICINE

## 2023-08-25 PROCEDURE — 3288F PR FALLS RISK ASSESSMENT DOCUMENTED: ICD-10-PCS | Mod: HCNC,CPTII,S$GLB, | Performed by: FAMILY MEDICINE

## 2023-08-25 PROCEDURE — 99999 PR PBB SHADOW E&M-EST. PATIENT-LVL IV: CPT | Mod: PBBFAC,HCNC,, | Performed by: FAMILY MEDICINE

## 2023-08-25 PROCEDURE — 82962 POCT GLUCOSE, HAND-HELD DEVICE: ICD-10-PCS | Mod: HCNC,S$GLB,, | Performed by: FAMILY MEDICINE

## 2023-08-25 PROCEDURE — 1159F PR MEDICATION LIST DOCUMENTED IN MEDICAL RECORD: ICD-10-PCS | Mod: HCNC,CPTII,S$GLB, | Performed by: FAMILY MEDICINE

## 2023-08-25 PROCEDURE — 1157F ADVNC CARE PLAN IN RCRD: CPT | Mod: HCNC,CPTII,S$GLB, | Performed by: FAMILY MEDICINE

## 2023-08-25 PROCEDURE — 1157F PR ADVANCE CARE PLAN OR EQUIV PRESENT IN MEDICAL RECORD: ICD-10-PCS | Mod: HCNC,CPTII,S$GLB, | Performed by: FAMILY MEDICINE

## 2023-08-25 PROCEDURE — 99215 OFFICE O/P EST HI 40 MIN: CPT | Mod: HCNC,S$GLB,, | Performed by: FAMILY MEDICINE

## 2023-08-25 PROCEDURE — 99999 PR PBB SHADOW E&M-EST. PATIENT-LVL IV: ICD-10-PCS | Mod: PBBFAC,HCNC,, | Performed by: FAMILY MEDICINE

## 2023-08-25 PROCEDURE — 1101F PR PT FALLS ASSESS DOC 0-1 FALLS W/OUT INJ PAST YR: ICD-10-PCS | Mod: HCNC,CPTII,S$GLB, | Performed by: FAMILY MEDICINE

## 2023-08-25 PROCEDURE — 1159F MED LIST DOCD IN RCRD: CPT | Mod: HCNC,CPTII,S$GLB, | Performed by: FAMILY MEDICINE

## 2023-08-25 NOTE — PROGRESS NOTES
A1c level looks great at 5.7%!     Definitely has not progressed to full diabetes.  Continue to watch sugar intake, advance diet as directed by the surgeon.    Continue with just one tablet of metformin daily.  We can recheck things in three months.

## 2023-08-25 NOTE — PATIENT INSTRUCTIONS
Physically, everything looks really good today.      Blood sugar, fasting, in clinic today was 101.  This is where we would expect her baseline to be.    Let us go ahead and check an A1c level, as well.  This will give us an idea of her average blood sugar over the last three months.    Continue with the postop diet, as you have been.  Just, be careful with sugary beverages, supplements, etc..  Try using Glucerna instead of boost.    Keep your follow-up appointment with the surgeon in September, as scheduled.      Come back and see me in about three or four months for recheck.  Let us also recheck the A1c level a day or two prior to the visit so we can discuss the results in person.

## 2023-08-25 NOTE — PROGRESS NOTES
"    [facility]       [address] [place] [address]      Phone: [phone]      Fax: [phone]    Javed Basilio MD                Office Visit  08/25/2023        Subjective      HPI:  Ghada Dave is a 88 y.o. female presents today in clinic for "Follow-up  ."     88-year-old female presents today to discuss multiple issues.      Her son, Mr. Viera, is present with her.  He provides portions of the history, translation services.    Son states that about six days ago, patient woke up dizzy, shaking.  Had some tingling in her legs.  He checked her blood sugar with his glucometer and a recorded high.  Because of this, he gave her metformin 1000 mg, Actos 5 mg.  Blood sugar remained elevated.  After about two days, when the blood sugar did not get under 500, he took her to the hospital.    In the ER, they checked her blood sugar, did labs.  There readings were all normal.  Overall, she has been feeling better since then.      He was concerned about her blood sugar because she has a history of prediabetes.  Generally takes metformin 500 mg daily.    Son reports that she recently had robotic surgery for her hiatal hernia.  Since then, she is been on liquid diet.  Has been drinking boost, eating small bits of fruit.  Skin incisions have healed.  They have follow-up appointment with the surgeon in a couple of weeks.  Still taking her Nexium.  Not having any heartburn symptoms anymore.    Has anxiety, sleep issues.  Takes Ativan twice daily for this.  Seems to be helping.      PMH: HTN.  HLD.  Pre DM.  CAD.  Hiatal hernia/GERD.  Anxiety.  Recurrent bladder issues.    PSH: Right shoulder x2.    Allergies:  Statins cause rash.  Penicillin causes rash.  Keflex causes rash.    Social:  Disabled.  Lives with her son.    T: Denies  A:  Denies   D:  Denies     Exercise:  Walks frequently.  Very active at home.      Colon:  06/17/2021    MMG:  Was told she " could discontinue these.      Podiatry: Dr. Nowak        The following were updated and reviewed by myself in the chart: medications, past medical history, past surgical history, family history, social history, and allergies.     Medications:  Current Outpatient Medications on File Prior to Visit   Medication Sig Dispense Refill    b complex vitamins capsule Take 1 capsule by mouth once daily.      LORazepam (ATIVAN) 0.5 MG tablet Take 0.5 tablets (0.25 mg total) by mouth every 12 (twelve) hours as needed for Anxiety. 90 tablet 2    metFORMIN (GLUCOPHAGE-XR) 500 MG ER 24hr tablet Take 1 tablet (500 mg total) by mouth daily with breakfast. 90 tablet 3    nitrofurantoin, macrocrystal-monohydrate, (MACROBID) 100 MG capsule Take 1 capsule (100 mg total) by mouth once daily. 90 capsule 3    [DISCONTINUED] rOPINIRole (REQUIP) 0.5 MG tablet Take 1 tablet (0.5 mg total) by mouth every evening. For leg cramps. 90 tablet 3    [DISCONTINUED] esomeprazole (NEXIUM) 40 MG capsule Take 1 capsule (40 mg total) by mouth before breakfast. (Patient not taking: Reported on 8/25/2023) 90 capsule 3    [DISCONTINUED] HYDROcodone-acetaminophen (NORCO) 5-325 mg per tablet Take 1 tablet by mouth every 6 (six) hours as needed for Pain. (Patient not taking: Reported on 8/25/2023) 10 tablet 0    [DISCONTINUED] meloxicam (MOBIC) 7.5 MG tablet Take 1 tablet (7.5 mg total) by mouth once daily. (Patient not taking: Reported on 8/25/2023) 90 tablet 3    [DISCONTINUED] triamcinolone (KENALOG) 0.5 % ointment Apply topically 2 (two) times daily as needed (rash). (Patient not taking: Reported on 8/25/2023) 60 g 3     No current facility-administered medications on file prior to visit.        PMHx:  Past Medical History:   Diagnosis Date    Abnormal Pap smear 2013    Acidosis     CAD (coronary artery disease) 06/2017     iFR of LAD and RCA performed confirmed nonobstructive disease.    Cataract     Colon polyps     Disorder of kidney and ureter      Frequent urination     General anesthetics causing adverse effect in therapeutic use     Hemorrhoid     High cholesterol     Poor circulation     Postoperative abdominal pain     Psoriasis     Unilateral inguinal hernia, without obstruction or gangrene, not specified as recurrent 08/01/2023    Varicose vein       Patient Active Problem List    Diagnosis Date Noted    Hiatal hernia with GERD 08/01/2023    Hypercholesterolemia 12/18/2021    Hx of colonoscopy 06/17/2021    Decreased strength 03/22/2021    Decreased ROM of right shoulder 03/22/2021    Nontraumatic complete tear of right rotator cuff 12/03/2020    Prediabetes 11/04/2020    Chronic right shoulder pain 11/04/2020    Arthropathies in other specified diseases classified elsewhere, vertebrae  11/04/2020    Age-related osteoporosis without fracture 11/04/2020    Osteoarthritis of multiple joints 11/04/2020    Gastroesophageal reflux disease without esophagitis 11/04/2020    Anal polyp 10/18/2019    SOB (shortness of breath) 03/21/2019    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    Cerumen debris on tympanic membrane of both ears 03/05/2018    Stenosis of carotid artery 03/05/2018    Nausea 03/05/2018    Essential (primary) hypertension 03/05/2018    Dizziness 03/05/2018    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), 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    Lichen simplex chronicus of the heel 07/12/2017    Non-occlusive coronary artery disease 07/06/2017    Psoriasis 07/06/2017    Chronic midline low back pain without sciatica 07/06/2017    Scoliosis of thoracolumbar spine 07/06/2017    Right subscapular pain 07/06/2017    Insomnia 07/06/2017    Overflow incontinence 07/06/2017    Urinary retention with incomplete bladder emptying 07/06/2017    Non-cardiac chest pain 06/22/2017    Abnormal stress test 06/22/2017    Anal or rectal pain 06/05/2017    CAD (coronary artery disease) 06/01/2017    Pseudophakia 04/17/2017    Right posterior capsular opacification  04/17/2017    Left lower quadrant pain 05/17/2016    Personal history of colonic polyps 05/12/2015    Leukopenia 04/15/2015    Post herpetic neuralgia 12/31/2014    Heartburn 11/18/2014    Diverticulosis 10/27/2014    Ureteral stone 06/03/2014    Rectal polyp 09/12/2013    Urge incontinence 07/08/2013        PSHx:  Past Surgical History:   Procedure Laterality Date    CARDIAC CATHETERIZATION  06/2017     iFR of LAD and RCA performed confirmed nonobstructive disease.    CATARACT EXTRACTION W/  INTRAOCULAR LENS IMPLANT Bilateral     COLONOSCOPY N/A 08/28/2017    Procedure: COLONOSCOPY;  Surgeon: Bertram Myers MD;  Location: [facility];  Service: Endoscopy;  Laterality: N/A;    COLONOSCOPY N/A 10/18/2019    Procedure: COLONOSCOPY;  Surgeon: Wilfred Barber MD;  [facility]  Service: Endoscopy;  Laterality: N/A;   needed-BB  Cardiology Clearance received from Dr. REBECA Escalona, see telephone encounter 10/1/19-BB    COLONOSCOPY N/A 06/17/2021    Procedure: COLONOSCOPY suprep Past positive;  Surgeon: Lele Barillas MD;  Location: [facility];  Service: Endoscopy;  Laterality: N/A;  past positive    COLONOSCOPY W/ BIOPSIES  06/17/2021    ESOPHAGOGASTRODUODENOSCOPY N/A 06/23/2023    Procedure: EGD (ESOPHAGOGASTRODUODENOSCOPY);  Surgeon: Seun Burt MD;  Location: [facility];  Service: Endoscopy;  Laterality: N/A;    EXCISION OF BURSA  03/05/2021    Procedure: BURSECTOMY;  Surgeon: Jason Alicea Jr., MD;  Location: [facility] OR;  Service: Orthopedics;;    LEFT HEART CATHETERIZATION N/A 04/08/2019    Procedure: Left heart cath;  Surgeon: Renato Escalona MD;  Location: [facility] CATH LAB/EP;  Service: Cardiology;  Laterality: N/A;    POLYPECTOMY      ROBOT-ASSISTED NISSEN FUNDOPLICATION USING DA ANTOINE XI N/A 08/01/2023    Procedure: XI ROBOTIC FUNDOPLICATION, NISSEN;  Surgeon: Elvis Malcolm MD;  Location: [facility] OR;  Service: General;  Laterality: N/A;    ROBOT-ASSISTED REPAIR  OF HIATAL HERNIA USING DA ANTOINE XI N/A 08/01/2023    Procedure: XI ROBOTIC REPAIR, HERNIA, HIATAL;  Surgeon: Elvis Malcolm MD;  Location: [facility] OR;  Service: General;  Laterality: N/A;    SHOULDER ARTHROSCOPY Right 03/05/2021    Procedure: ARTHROSCOPY, SHOULDER;  Surgeon: Jason Alicea Jr., MD;  Location: [facility] OR;  Service: Orthopedics;  Laterality: Right;  need opus system Yazidi   video  (Yazidi notified 3/1/21, CC)  Kulwinder confirmed 3/4/21 [place]    YAG Laser Capsulotomy Right 04/17/2017    Dr. Chavez        FHx:  Family History   Problem Relation Age of Onset    Kidney disease Mother     Diabetes Mother     Glaucoma Neg Hx     Macular degeneration Neg Hx     Strabismus Neg Hx     Retinal detachment Neg Hx     Amblyopia Neg Hx     Blindness Neg Hx     Cataracts Neg Hx     Prostate cancer Neg Hx     Anesthesia problems Neg Hx         Social:  Social History     Socioeconomic History    Marital status: Single   Tobacco Use    Smoking status: Never     Passive exposure: Never    Smokeless tobacco: Never    Tobacco comments:     Never smoked   Substance and Sexual Activity    Alcohol use: Never    Drug use: Never    Sexual activity: Never   Social History Narrative    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      Social Determinants of Health     Financial Resource Strain: Low Risk  (8/10/2023)    Overall Financial Resource Strain (CARDIA)     Difficulty of Paying Living Expenses: Not hard at all   Food Insecurity: No Food Insecurity (8/10/2023)    Hunger Vital Sign     Worried About Running Out of Food in the Last Year: Never true     Ran Out of Food in the Last Year: Never true   Transportation Needs: Unmet Transportation Needs (8/10/2023)    PRAPARE - Transportation     Lack of Transportation (Medical): Yes     Lack of Transportation (Non-Medical): No   Physical Activity: Inactive (8/10/2023)    Exercise Vital Sign     Days of Exercise per Week: 0 days     Minutes of Exercise per  Session: 0 min   Stress: No Stress Concern Present (8/10/2023)    [employer] - Occupational Stress Questionnaire     Feeling of Stress : Not at all   Social Connections: Unknown (8/10/2023)    Social Connection and Isolation Panel [NHANES]     Frequency of Communication with Friends and Family: Twice a week     Frequency of Social Gatherings with Friends and Family: Once a week     Attends Church Services: More than 4 times per year     Active Member of Clubs or Organizations: No     Attends Club or Organization Meetings: Never   Housing Stability: Low Risk  (8/10/2023)    Housing Stability Vital Sign     Unable to Pay for Housing in the Last Year: No     Number of Places Lived in the Last Year: 2     Unstable Housing in the Last Year: No        Allergies:  Review of patient's allergies indicates:   Allergen Reactions    Penicillin Anaphylaxis    Lipitor [atorvastatin] Hives     Can take Simivastatin .     Keflex [cephalexin] Rash        ROS:  Review of Systems   Constitutional:  Negative for activity change, appetite change, chills and fever.   HENT:  Negative for congestion, postnasal drip, rhinorrhea, sore throat and trouble swallowing.    Respiratory:  Negative for cough, shortness of breath and wheezing.    Cardiovascular:  Negative for chest pain and palpitations.   Gastrointestinal:  Negative for abdominal pain, constipation, diarrhea, nausea and vomiting.   Genitourinary:  Negative for difficulty urinating.   Musculoskeletal:  Negative for arthralgias and myalgias.   Skin:  Negative for color change and rash.   Neurological:  Negative for speech difficulty and headaches.   All other systems reviewed and are negative.         Objective      /60   Pulse 90   Temp 97.8 °F (36.6 °C)   Resp 18   Ht 5' (1.524 m)   Wt 53.1 kg (117 lb 1 oz)   SpO2 99%   BMI 22.86 kg/m²   Ht Readings from Last 3 Encounters:   08/25/23 5' (1.524 m)   08/16/23 5' (1.524 m)   08/01/23 5'  (1.524 m)     Wt Readings from Last 3 Encounters:   08/25/23 53.1 kg (117 lb 1 oz)   08/16/23 52.8 kg (116 lb 6.5 oz)   08/01/23 53.8 kg (118 lb 9.7 oz)       PHYSICAL EXAM:  Physical Exam  Vitals and nursing note reviewed.   Constitutional:       General: She is not in acute distress.     Appearance: Normal appearance.   HENT:      Head: Normocephalic and atraumatic.      Right Ear: Tympanic membrane, ear canal and external ear normal.      Left Ear: Tympanic membrane, ear canal and external ear normal.      Nose: Nose normal. No congestion or rhinorrhea.      Mouth/Throat:      Mouth: Mucous membranes are moist.      Pharynx: Oropharynx is clear. No oropharyngeal exudate or posterior oropharyngeal erythema.   Eyes:      Extraocular Movements: Extraocular movements intact.      Conjunctiva/sclera: Conjunctivae normal.      Pupils: Pupils are equal, round, and reactive to light.   Cardiovascular:      Rate and Rhythm: Normal rate and regular rhythm.   Pulmonary:      Effort: Pulmonary effort is normal. No respiratory distress.      Breath sounds: No wheezing, rhonchi or rales.   Musculoskeletal:         General: Normal range of motion.      Cervical back: Normal range of motion.   Lymphadenopathy:      Cervical: No cervical adenopathy.   Skin:     General: Skin is warm and dry.      Findings: No rash.   Neurological:      Mental Status: She is alert.              LABS / IMAGING:  Recent Results (from the past 4368 hour(s))   POCT glucose    Collection Time: 06/23/23  9:20 AM   Result Value Ref Range    POCT Glucose 101 70 - 110 mg/dL   Specimen to Pathology, Surgery Gastrointestinal tract    Collection Time: 06/23/23 11:10 AM   Result Value Ref Range    Final Pathologic Diagnosis       RELIAPATH DIAGNOSIS:    A.  ESOPHAGUS, DISTAL, BIOPSIES:  - Squamous mucosa with mild reactive changes.  - No eosinophils identified.  - No intestinal metaplasia identified.    B.  ESOPHAGUS, PROXIMAL, BIOPSIES:  - Squamous mucosa  with mild reactive changes.  - No eosinophils identified.  - No intestinal metaplasia identified.    BLANKA WEBBER M.D.         Report attached.    Performing site:  [address] [address]    &quot;Disclaimer:  This case diagnosis was rendered completely by the outside consultation pathologist and the case is electronically signed by an [facility] pathologist listed below solely to release the report into the medical record.&quot;      Disclaimer       Unless the case is a 'gross only' or additional testing only, the final diagnosis for each specimen is based on a microscopic examination of appropriate tissue sections.   Comprehensive metabolic panel    Collection Time: 07/26/23 11:52 AM   Result Value Ref Range    Sodium 142 136 - 145 mmol/L    Potassium 4.3 3.5 - 5.1 mmol/L    Chloride 110 95 - 110 mmol/L    CO2 22 (L) 23 - 29 mmol/L    Glucose 90 70 - 110 mg/dL    BUN 23 8 - 23 mg/dL    Creatinine 0.9 0.5 - 1.4 mg/dL    Calcium 9.0 8.7 - 10.5 mg/dL    Total Protein 7.4 6.0 - 8.4 g/dL    Albumin 3.9 3.5 - 5.2 g/dL    Total Bilirubin 0.4 0.1 - 1.0 mg/dL    Alkaline Phosphatase 70 55 - 135 U/L    AST 18 10 - 40 U/L    ALT 8 (L) 10 - 44 U/L    eGFR >60.0 >60 mL/min/1.73 m^2    Anion Gap 10 8 - 16 mmol/L   CBC auto differential    Collection Time: 07/26/23 11:52 AM   Result Value Ref Range    WBC 3.13 (L) 3.90 - 12.70 K/uL    RBC 4.03 4.00 - 5.40 M/uL    Hemoglobin 12.2 12.0 - 16.0 g/dL    Hematocrit 35.9 (L) 37.0 - 48.5 %    MCV 89 82 - 98 fL    MCH 30.3 27.0 - 31.0 pg    MCHC 34.0 32.0 - 36.0 g/dL    RDW 14.0 11.5 - 14.5 %    Platelets 274 150 - 450 K/uL    MPV 10.4 9.2 - 12.9 fL    Immature Granulocytes 0.0 0.0 - 0.5 %    Gran # (ANC) 1.4 (L) 1.8 - 7.7 K/uL    Immature Grans (Abs) 0.00 0.00 - 0.04 K/uL    Lymph # 1.2 1.0 - 4.8 K/uL    Mono # 0.4 0.3 - 1.0 K/uL    Eos # 0.2 0.0 - 0.5 K/uL    Baso # 0.07 0.00 - 0.20 K/uL    nRBC 0 0 /100 WBC    Gran % 43.1 38.0 - 73.0 %    Lymph % 37.7  18.0 - 48.0 %    Mono % 11.2 4.0 - 15.0 %    Eosinophil % 5.8 0.0 - 8.0 %    Basophil % 2.2 (H) 0.0 - 1.9 %    Differential Method Automated    POCT glucose    Collection Time: 08/01/23  7:27 AM   Result Value Ref Range    POCT Glucose 97 70 - 110 mg/dL   CBC auto differential    Collection Time: 08/02/23  7:20 AM   Result Value Ref Range    WBC 5.33 3.90 - 12.70 K/uL    RBC 3.49 (L) 4.00 - 5.40 M/uL    Hemoglobin 10.4 (L) 12.0 - 16.0 g/dL    Hematocrit 31.2 (L) 37.0 - 48.5 %    MCV 89 82 - 98 fL    MCH 29.8 27.0 - 31.0 pg    MCHC 33.3 32.0 - 36.0 g/dL    RDW 14.0 11.5 - 14.5 %    Platelets 204 150 - 450 K/uL    MPV 8.9 (L) 9.2 - 12.9 fL    Immature Granulocytes 0.4 0.0 - 0.5 %    Gran # (ANC) 3.9 1.8 - 7.7 K/uL    Immature Grans (Abs) 0.02 0.00 - 0.04 K/uL    Lymph # 1.0 1.0 - 4.8 K/uL    Mono # 0.4 0.3 - 1.0 K/uL    Eos # 0.0 0.0 - 0.5 K/uL    Baso # 0.02 0.00 - 0.20 K/uL    nRBC 0 0 /100 WBC    Gran % 72.7 38.0 - 73.0 %    Lymph % 18.0 18.0 - 48.0 %    Mono % 8.3 4.0 - 15.0 %    Eosinophil % 0.2 0.0 - 8.0 %    Basophil % 0.4 0.0 - 1.9 %    Differential Method Automated    Basic metabolic panel    Collection Time: 08/02/23  7:20 AM   Result Value Ref Range    Sodium 141 136 - 145 mmol/L    Potassium 4.1 3.5 - 5.1 mmol/L    Chloride 111 (H) 95 - 110 mmol/L    CO2 24 23 - 29 mmol/L    Glucose 86 70 - 110 mg/dL    BUN 13 8 - 23 mg/dL    Creatinine 0.8 0.5 - 1.4 mg/dL    Calcium 8.5 (L) 8.7 - 10.5 mg/dL    Anion Gap 6 (L) 8 - 16 mmol/L    eGFR >60 >60 mL/min/1.73 m^2   POCT glucose    Collection Time: 08/21/23  2:56 PM   Result Value Ref Range    POCT Glucose 84 70 - 110 mg/dL   POCT glucose    Collection Time: 08/21/23  2:58 PM   Result Value Ref Range    POCT Glucose 84 70 - 110 mg/dL   CBC auto differential    Collection Time: 08/21/23  5:11 PM   Result Value Ref Range    WBC 3.87 (L) 3.90 - 12.70 K/uL    RBC 4.09 4.00 - 5.40 M/uL    Hemoglobin 12.2 12.0 - 16.0 g/dL    Hematocrit 36.4 (L) 37.0 - 48.5 %    MCV 89 82  - 98 fL    MCH 29.8 27.0 - 31.0 pg    MCHC 33.5 32.0 - 36.0 g/dL    RDW 14.0 11.5 - 14.5 %    Platelets 290 150 - 450 K/uL    MPV 9.6 9.2 - 12.9 fL    Immature Granulocytes 0.3 0.0 - 0.5 %    Gran # (ANC) 1.8 1.8 - 7.7 K/uL    Immature Grans (Abs) 0.01 0.00 - 0.04 K/uL    Lymph # 1.5 1.0 - 4.8 K/uL    Mono # 0.3 0.3 - 1.0 K/uL    Eos # 0.2 0.0 - 0.5 K/uL    Baso # 0.08 0.00 - 0.20 K/uL    nRBC 0 0 /100 WBC    Gran % 45.1 38.0 - 73.0 %    Lymph % 38.5 18.0 - 48.0 %    Mono % 8.8 4.0 - 15.0 %    Eosinophil % 5.2 0.0 - 8.0 %    Basophil % 2.1 (H) 0.0 - 1.9 %    Differential Method Automated    Comprehensive metabolic panel    Collection Time: 08/21/23  5:11 PM   Result Value Ref Range    Sodium 140 136 - 145 mmol/L    Potassium 4.6 3.5 - 5.1 mmol/L    Chloride 107 95 - 110 mmol/L    CO2 25 23 - 29 mmol/L    Glucose 90 70 - 110 mg/dL    BUN 16 8 - 23 mg/dL    Creatinine 0.9 0.5 - 1.4 mg/dL    Calcium 9.6 8.7 - 10.5 mg/dL    Total Protein 8.0 6.0 - 8.4 g/dL    Albumin 4.1 3.5 - 5.2 g/dL    Total Bilirubin 0.5 0.1 - 1.0 mg/dL    Alkaline Phosphatase 56 55 - 135 U/L    AST 19 10 - 40 U/L    ALT 7 (L) 10 - 44 U/L    eGFR >60 >60 mL/min/1.73 m^2    Anion Gap 8 8 - 16 mmol/L   Beta - Hydroxybutyrate, Serum    Collection Time: 08/21/23  5:11 PM   Result Value Ref Range    Beta-Hydroxybutyrate 0.4 0.0 - 0.5 mmol/L   POCT Glucose, Hand-Held Device    Collection Time: 08/25/23  8:55 AM   Result Value Ref Range    POC Glucose 101 70 - 110 MG/DL   HEMOGLOBIN A1C    Collection Time: 08/25/23  9:22 AM   Result Value Ref Range    Hemoglobin A1C 5.7 (H) 4.0 - 5.6 %    Estimated Avg Glucose 117 68 - 131 mg/dL         Assessment    1. Elevated blood sugar    2. Prediabetes          Plan    Ghada was seen today for follow-up.    Diagnoses and all orders for this visit:    Elevated blood sugar  -     POCT Glucose, Hand-Held Device    Prediabetes  -     HEMOGLOBIN A1C; Future    Physically, everything looks really good today.      She seems  like she is feeling fine.      Accu-Chek done in clinic was 101.  We repeated her A1c in clinic, came back 5.7%.    Suspect that the acute elevation in blood sugar was due to the postop healing, liquid diet.  As she transitions to a regular diet, blood sugar should stay under good control.  She can continue the metformin 500 mg daily.  No other interventions should be needed.      FOLLOW-UP:  Follow up in about 3 months (around 11/25/2023) for recheck.    I spent a total of 45 minutes face to face and non-face to face on the date of this visit.This includes time preparing to see the patient (eg, review of tests, notes), obtaining and/or reviewing additional history from an independent historian and/or outside medical records, documenting clinical information in the electronic health record, independently interpreting results and/or communicating results to the patient/family/caregiver, or care coordinator.    Signed by:  Javed Basilio MD

## 2023-09-08 ENCOUNTER — OUTPATIENT CASE MANAGEMENT (OUTPATIENT)
Dept: ADMINISTRATIVE | Facility: OTHER | Age: 88
End: 2023-09-08
Payer: MEDICARE

## 2023-09-08 NOTE — PROGRESS NOTES
9-8-23 OPCM reached out and Maximiliano (son) answered and said he was at work and he'll call back around 11:30 AM. CM will await call back.

## 2023-09-13 ENCOUNTER — OFFICE VISIT (OUTPATIENT)
Dept: SURGERY | Facility: CLINIC | Age: 88
End: 2023-09-13
Payer: MEDICARE

## 2023-09-13 VITALS
DIASTOLIC BLOOD PRESSURE: 56 MMHG | HEIGHT: 60 IN | WEIGHT: 117.06 LBS | BODY MASS INDEX: 22.98 KG/M2 | TEMPERATURE: 98 F | SYSTOLIC BLOOD PRESSURE: 124 MMHG | HEART RATE: 72 BPM

## 2023-09-13 DIAGNOSIS — K44.9 HIATAL HERNIA WITH GERD: ICD-10-CM

## 2023-09-13 DIAGNOSIS — R13.14 PHARYNGOESOPHAGEAL DYSPHAGIA: Primary | ICD-10-CM

## 2023-09-13 DIAGNOSIS — K21.9 HIATAL HERNIA WITH GERD: ICD-10-CM

## 2023-09-13 PROCEDURE — 99999 PR PBB SHADOW E&M-EST. PATIENT-LVL III: ICD-10-PCS | Mod: PBBFAC,,, | Performed by: SURGERY

## 2023-09-13 PROCEDURE — 1159F MED LIST DOCD IN RCRD: CPT | Mod: CPTII,S$GLB,, | Performed by: SURGERY

## 2023-09-13 PROCEDURE — 1157F ADVNC CARE PLAN IN RCRD: CPT | Mod: CPTII,S$GLB,, | Performed by: SURGERY

## 2023-09-13 PROCEDURE — 99024 PR POST-OP FOLLOW-UP VISIT: ICD-10-PCS | Mod: S$GLB,,, | Performed by: SURGERY

## 2023-09-13 PROCEDURE — 99024 POSTOP FOLLOW-UP VISIT: CPT | Mod: S$GLB,,, | Performed by: SURGERY

## 2023-09-13 PROCEDURE — 1157F PR ADVANCE CARE PLAN OR EQUIV PRESENT IN MEDICAL RECORD: ICD-10-PCS | Mod: CPTII,S$GLB,, | Performed by: SURGERY

## 2023-09-13 PROCEDURE — 1160F PR REVIEW ALL MEDS BY PRESCRIBER/CLIN PHARMACIST DOCUMENTED: ICD-10-PCS | Mod: CPTII,S$GLB,, | Performed by: SURGERY

## 2023-09-13 PROCEDURE — 1159F PR MEDICATION LIST DOCUMENTED IN MEDICAL RECORD: ICD-10-PCS | Mod: CPTII,S$GLB,, | Performed by: SURGERY

## 2023-09-13 PROCEDURE — 1126F AMNT PAIN NOTED NONE PRSNT: CPT | Mod: CPTII,S$GLB,, | Performed by: SURGERY

## 2023-09-13 PROCEDURE — 1160F RVW MEDS BY RX/DR IN RCRD: CPT | Mod: CPTII,S$GLB,, | Performed by: SURGERY

## 2023-09-13 PROCEDURE — 99999 PR PBB SHADOW E&M-EST. PATIENT-LVL III: CPT | Mod: PBBFAC,,, | Performed by: SURGERY

## 2023-09-13 PROCEDURE — 1126F PR PAIN SEVERITY QUANTIFIED, NO PAIN PRESENT: ICD-10-PCS | Mod: CPTII,S$GLB,, | Performed by: SURGERY

## 2023-09-13 NOTE — PROGRESS NOTES
History & Physical    SUBJECTIVE:     History of Present Illness:  Patient is a 88 y.o. female status post robotic hiatal hernia repair with Nissen fundoplication 08/01/2023 presents for follow-up.  Since last visit she is been can having regurgitation difficulties keeping solids and liquids down.    presents for postop.  She is doing well tolerating liquids without issue.  She denies any reflux or regurgitation.    presents to discuss upper GI, modified barium swallow study and EGD.  Patient continues to have early satiety with food sticking feeling and regurgitation/reflux.    Initially referred for hiatal hernia with dysphagia and reflux.  She reports progressive food sticking and regurgitation of food as well as heartburn.  She originally underwent evaluation for hiatal hernia repair in [place] during [facility] however things were put on hold at that time.  Since this time she is been able to maintain her weight but mostly sticks with pureed foods.  She does report some upper chest neck swallowing dysphagia as well.  Denies any prior abdominal surgeries.  She stays very active and is independent.    Chief Complaint   Patient presents with    Post-op Evaluation         Review of patient's allergies indicates:   Allergen Reactions    Penicillin Anaphylaxis    Lipitor [atorvastatin] Hives     Can take Simivastatin .     Keflex [cephalexin] Rash       Current Outpatient Medications   Medication Sig Dispense Refill    b complex vitamins capsule Take 1 capsule by mouth once daily.      LORazepam (ATIVAN) 0.5 MG tablet Take 0.5 tablets (0.25 mg total) by mouth every 12 (twelve) hours as needed for Anxiety. 90 tablet 2    metFORMIN (GLUCOPHAGE-XR) 500 MG ER 24hr tablet Take 1 tablet (500 mg total) by mouth daily with breakfast. 90 tablet 3    nitrofurantoin, macrocrystal-monohydrate, (MACROBID) 100 MG capsule Take 1 capsule (100 mg total) by mouth once daily. 90 capsule 3     No current facility-administered medications  for this visit.       Past Medical History:   Diagnosis Date    Abnormal Pap smear 2013    Acidosis     CAD (coronary artery disease) 06/2017     iFR of LAD and RCA performed confirmed nonobstructive disease.    Cataract     Colon polyps     Disorder of kidney and ureter     Frequent urination     General anesthetics causing adverse effect in therapeutic use     Hemorrhoid     High cholesterol     Poor circulation     Postoperative abdominal pain     Psoriasis     Unilateral inguinal hernia, without obstruction or gangrene, not specified as recurrent 08/01/2023    Varicose vein      Past Surgical History:   Procedure Laterality Date    CARDIAC CATHETERIZATION  06/2017     iFR of LAD and RCA performed confirmed nonobstructive disease.    CATARACT EXTRACTION W/  INTRAOCULAR LENS IMPLANT Bilateral     COLONOSCOPY N/A 08/28/2017    Procedure: COLONOSCOPY;  Surgeon: Bertram Myers MD;  Location: [facility];  Service: Endoscopy;  Laterality: N/A;    COLONOSCOPY N/A 10/18/2019    Procedure: COLONOSCOPY;  Surgeon: Wilfred Barber MD;  Location: [facility] ([address] [place] [address]);  Service: Endoscopy;  Laterality: N/A;   needed-BB  Cardiology Clearance received from Dr. REBECA Escalona, see telephone encounter 10/1/19-BB    COLONOSCOPY N/A 06/17/2021    Procedure: COLONOSCOPY suprep Past positive;  Surgeon: Lele Barillas MD;  Location: [facility];  Service: Endoscopy;  Laterality: N/A;  past positive    COLONOSCOPY W/ BIOPSIES  06/17/2021    ESOPHAGOGASTRODUODENOSCOPY N/A 06/23/2023    Procedure: EGD (ESOPHAGOGASTRODUODENOSCOPY);  Surgeon: Seun Burt MD;  Location: [facility] ENDO;  Service: Endoscopy;  Laterality: N/A;    EXCISION OF BURSA  03/05/2021    Procedure: BURSECTOMY;  Surgeon: Jason Alicea Jr., MD;  Location: [facility] OR;  Service: Orthopedics;;    LEFT HEART CATHETERIZATION N/A 04/08/2019    Procedure: Left heart cath;  Surgeon: Renato Escalona MD;  Location: [facility] CATH LAB/EP;  Service:  Cardiology;  Laterality: N/A;    POLYPECTOMY      ROBOT-ASSISTED NISSEN FUNDOPLICATION USING DA ANTOINE XI N/A 08/01/2023    Procedure: XI ROBOTIC FUNDOPLICATION, NISSEN;  Surgeon: Elvis Malcolm MD;  Location: [facility] OR;  Service: General;  Laterality: N/A;    ROBOT-ASSISTED REPAIR OF HIATAL HERNIA USING DA ANTOINE XI N/A 08/01/2023    Procedure: XI ROBOTIC REPAIR, HERNIA, HIATAL;  Surgeon: Elvis Malcolm MD;  Location: [facility] OR;  Service: General;  Laterality: N/A;    SHOULDER ARTHROSCOPY Right 03/05/2021    Procedure: ARTHROSCOPY, SHOULDER;  Surgeon: Jason Alicea Jr., MD;  Location: [facility] OR;  Service: Orthopedics;  Laterality: Right;  need opus system Jew   video  (Jew notified 3/1/21, CC)  Kulwinder confirmed 3/4/21 [place]    YAG Laser Capsulotomy Right 04/17/2017    Dr. Chavez     Family History   Problem Relation Age of Onset    Kidney disease Mother     Diabetes Mother     Glaucoma Neg Hx     Macular degeneration Neg Hx     Strabismus Neg Hx     Retinal detachment Neg Hx     Amblyopia Neg Hx     Blindness Neg Hx     Cataracts Neg Hx     Prostate cancer Neg Hx     Anesthesia problems Neg Hx      Social History     Tobacco Use    Smoking status: Never     Passive exposure: Never    Smokeless tobacco: Never    Tobacco comments:     Never smoked   Substance Use Topics    Alcohol use: Never    Drug use: Never        Review of Systems:  Review of Systems   Constitutional:  Negative for chills, fatigue, fever and unexpected weight change.   Respiratory:  Negative for cough, shortness of breath, wheezing and stridor.    Cardiovascular:  Negative for chest pain, palpitations and leg swelling.   Gastrointestinal:  Negative for abdominal distention, abdominal pain, constipation, diarrhea, nausea and vomiting.   Genitourinary:  Negative for difficulty urinating, dysuria, frequency, hematuria and urgency.   Skin:  Negative for color change, pallor, rash and wound.   Hematological:  Does not bruise/bleed easily.        OBJECTIVE:     Vital Signs (Most Recent)  Temp: 98.3 °F (36.8 °C) (09/13/23 1025)  Pulse: 72 (09/13/23 1025)  BP: (!) 124/56 (09/13/23 1025)  5' (1.524 m)  53.1 kg (117 lb 1 oz)     Physical Exam:  Physical Exam  Vitals reviewed.   Constitutional:       Appearance: She is well-developed.   HENT:      Head: Normocephalic and atraumatic.   Cardiovascular:      Rate and Rhythm: Normal rate and regular rhythm.   Pulmonary:      Effort: Pulmonary effort is normal.      Breath sounds: Normal breath sounds.   Abdominal:      General: Bowel sounds are normal. There is no distension.      Palpations: Abdomen is soft.      Tenderness: There is no abdominal tenderness.      Comments: Incisions healing well   Musculoskeletal:      Cervical back: Neck supple.   Skin:     General: Skin is warm and dry.   Neurological:      Mental Status: She is alert and oriented to person, place, and time.           Diagnostic Results:  CT 2022   Large hiatal hernia and possibly some component of gastric volvulus    EGD:   Impression:            - Gastric volvulus.                          - Large hiatal hernia.                          - Normal esophagus.                          - Biopsies were taken with a cold forceps for                          evaluation of eosinophilic esophagitis.      Upper GI:   Impression:  Large hiatal hernia without delayed gastric emptying to suggest volvulus.  Gastroesophageal reflux to the level of the midesophagus with stress maneuvers.  Laryngeal penetration without subglottic tracheal aspiration.  Duodenal diverticulum.    MBSS reviewed        ASSESSMENT/PLAN:     87-year-old female status post robotic hiatal hernia repair with Nissen fundoplication    PLAN:Plan     Patient now experiencing symptoms of dysphagia and regurgitation again when initially doing well after surgery tolerating diet without any symptoms   Upper GI for further evaluation   Will follow-up to discuss results and further  recommendations following completion of testing

## 2023-09-14 ENCOUNTER — OUTPATIENT CASE MANAGEMENT (OUTPATIENT)
Dept: ADMINISTRATIVE | Facility: OTHER | Age: 88
End: 2023-09-14
Payer: MEDICARE

## 2023-09-14 NOTE — LETTER
Ghada Flanagan  [address]  [place] [address]      Dear Ghada Flanagan,     I am your nurse with [facility]s Outpatient Care Management Department. I have been unsuccessful at reaching you since we last spoke.  At your earliest convenience, I would like to discuss your healthcare progress.      Please contact me at [phone] from 8:00AM to 4:30 PM on Monday thru Friday.     As you know, [facility] is a program offered to you through [facility] where a nurse is available 24/7 to answer questions or provide medical advice, their number is [phone].    Thanks,      Chloé Tobin, RN,[facility]  Outpatient Care Management

## 2023-09-14 NOTE — PROGRESS NOTES
9-14-23 Mailed UTR letter after unable to speak with Maximiliano as he was at work and unavailable.

## 2023-09-15 ENCOUNTER — HOSPITAL ENCOUNTER (OUTPATIENT)
Dept: RADIOLOGY | Facility: HOSPITAL | Age: 88
Discharge: HOME | End: 2023-09-15
Attending: SURGERY
Payer: MEDICARE

## 2023-09-15 DIAGNOSIS — R13.14 PHARYNGOESOPHAGEAL DYSPHAGIA: ICD-10-CM

## 2023-09-15 PROCEDURE — A9698 NON-RAD CONTRAST MATERIALNOC: HCPCS | Mod: HCNC | Performed by: SURGERY

## 2023-09-15 PROCEDURE — 25500020 PHARM REV CODE 255: Mod: HCNC | Performed by: SURGERY

## 2023-09-15 PROCEDURE — 74240 X-RAY XM UPR GI TRC 1CNTRST: CPT | Mod: 26,HCNC,, | Performed by: RADIOLOGY

## 2023-09-15 PROCEDURE — 74240 FL UPPER GI: ICD-10-PCS | Mod: 26,HCNC,, | Performed by: RADIOLOGY

## 2023-09-15 PROCEDURE — 74240 X-RAY XM UPR GI TRC 1CNTRST: CPT | Mod: TC,HCNC

## 2023-09-15 RX ADMIN — BARIUM SULFATE 135 ML: 980 POWDER, FOR SUSPENSION ORAL at 09:09

## 2023-09-15 RX ADMIN — BARIUM SULFATE 60 G: 960 POWDER, FOR SUSPENSION ORAL at 09:09

## 2023-09-18 DIAGNOSIS — R13.14 PHARYNGOESOPHAGEAL DYSPHAGIA: Primary | ICD-10-CM

## 2023-09-19 ENCOUNTER — HOSPITAL ENCOUNTER (OUTPATIENT)
Dept: PREADMISSION TESTING | Facility: HOSPITAL | Age: 88
Discharge: HOME | End: 2023-09-19
Attending: SURGERY
Payer: MEDICARE

## 2023-09-19 DIAGNOSIS — R13.14 PHARYNGOESOPHAGEAL DYSPHAGIA: Primary | ICD-10-CM

## 2023-09-20 ENCOUNTER — OUTPATIENT CASE MANAGEMENT (OUTPATIENT)
Dept: ADMINISTRATIVE | Facility: OTHER | Age: 88
End: 2023-09-20
Payer: MEDICARE

## 2023-09-20 NOTE — PROGRESS NOTES
Neurology Outpatient Care Management  Plan of Care Follow Up Visit    Patient: Ghada Dave  MRN: [id number]  Date of Service: 09/20/2023  Completed by: Chloé Tobin RN  Referral Date: 08/01/2023    Reason for Visit   Patient presents with    OPCM RN Follow Up Call       Brief Summary: OPCM follow up call completed with Maximiliano.  Continued Education on Importance of obtaining new providers to area closer to their home.  Next Steps: Maximiliano agrees to follow up care on or around 10-3-23.

## 2023-09-27 ENCOUNTER — TELEPHONE (OUTPATIENT)
Dept: PREADMISSION TESTING | Facility: HOSPITAL | Age: 88
End: 2023-09-27
Payer: MEDICARE

## 2023-09-27 NOTE — TELEPHONE ENCOUNTER
I spoke to patient's son Lucas and he states she will not have anyone available in town on the day of her procedure. He needs to figure out when someone will be available and then call us back to reschedule.

## 2023-09-27 NOTE — TELEPHONE ENCOUNTER
----- Message from Pau Pang sent at 9/27/2023  8:11 AM CDT -----  Contact: Lucas/  Patient is calling to cancel/reschedule 10/2 colonoscopy, please call back at [phone].           Thanks

## 2023-10-12 ENCOUNTER — PATIENT MESSAGE (OUTPATIENT)
Dept: RESPIRATORY THERAPY | Facility: HOSPITAL | Age: 88
End: 2023-10-12
Payer: MEDICARE

## 2023-10-23 ENCOUNTER — PATIENT MESSAGE (OUTPATIENT)
Dept: PRIMARY CARE CLINIC | Facility: CLINIC | Age: 88
End: 2023-10-23
Payer: MEDICARE

## 2023-10-23 DIAGNOSIS — R30.0 DYSURIA: ICD-10-CM

## 2023-10-23 DIAGNOSIS — F41.9 ANXIETY: ICD-10-CM

## 2023-10-23 DIAGNOSIS — K44.9 HIATAL HERNIA WITH GERD: ICD-10-CM

## 2023-10-23 DIAGNOSIS — N39.0 RECURRENT UTI: ICD-10-CM

## 2023-10-23 DIAGNOSIS — K21.9 HIATAL HERNIA WITH GERD: ICD-10-CM

## 2023-10-24 RX ORDER — NITROFURANTOIN 25; 75 MG/1; MG/1
100 CAPSULE ORAL DAILY
Qty: 90 CAPSULE | Refills: 3 | Status: SHIPPED | OUTPATIENT
Start: 2023-10-24 | End: 2023-11-27 | Stop reason: SDUPTHER

## 2023-10-24 RX ORDER — LORAZEPAM 0.5 MG/1
0.25 TABLET ORAL EVERY 12 HOURS PRN
Qty: 90 TABLET | Refills: 2 | Status: SHIPPED | OUTPATIENT
Start: 2023-10-24 | End: 2023-11-27 | Stop reason: ALTCHOICE

## 2023-10-24 RX ORDER — ESOMEPRAZOLE MAGNESIUM 40 MG/1
40 CAPSULE, DELAYED RELEASE ORAL
Qty: 90 CAPSULE | Refills: 3 | Status: SHIPPED | OUTPATIENT
Start: 2023-10-24 | End: 2023-11-27 | Stop reason: SDUPTHER

## 2023-10-24 NOTE — TELEPHONE ENCOUNTER
No care due was identified.  [facility] Embedded Care Due Messages. Reference number: [id number].   10/23/2023 7:01:59 PM CDT

## 2023-11-02 ENCOUNTER — OUTPATIENT CASE MANAGEMENT (OUTPATIENT)
Dept: ADMINISTRATIVE | Facility: OTHER | Age: 88
End: 2023-11-02
Payer: MEDICARE

## 2023-11-02 NOTE — PROGRESS NOTES
11-2-23 Case Closed after multiple unsuccessful attempts.  10-17-23  LVM #3  10-12-23  LVM #2, unsuccessful reach out  10-3-23   LVM #1

## 2023-11-27 ENCOUNTER — HOSPITAL ENCOUNTER (OUTPATIENT)
Dept: RADIOLOGY | Facility: HOSPITAL | Age: 88
Discharge: HOME | End: 2023-11-27
Attending: FAMILY MEDICINE
Payer: MEDICARE

## 2023-11-27 ENCOUNTER — OFFICE VISIT (OUTPATIENT)
Dept: PRIMARY CARE CLINIC | Facility: CLINIC | Age: 88
End: 2023-11-27
Payer: MEDICARE

## 2023-11-27 VITALS
SYSTOLIC BLOOD PRESSURE: 128 MMHG | BODY MASS INDEX: 22.9 KG/M2 | TEMPERATURE: 98 F | OXYGEN SATURATION: 99 % | HEART RATE: 99 BPM | DIASTOLIC BLOOD PRESSURE: 60 MMHG | HEIGHT: 60 IN | WEIGHT: 116.63 LBS

## 2023-11-27 DIAGNOSIS — K21.9 HIATAL HERNIA WITH GERD: ICD-10-CM

## 2023-11-27 DIAGNOSIS — R73.03 PREDIABETES: ICD-10-CM

## 2023-11-27 DIAGNOSIS — F41.9 ANXIETY: ICD-10-CM

## 2023-11-27 DIAGNOSIS — R46.89 COGNITIVE AND BEHAVIORAL CHANGES: ICD-10-CM

## 2023-11-27 DIAGNOSIS — M25.512 ACUTE PAIN OF LEFT SHOULDER: ICD-10-CM

## 2023-11-27 DIAGNOSIS — R41.3 MEMORY CHANGES: Primary | ICD-10-CM

## 2023-11-27 DIAGNOSIS — R30.0 DYSURIA: ICD-10-CM

## 2023-11-27 DIAGNOSIS — K44.9 HIATAL HERNIA WITH GERD: ICD-10-CM

## 2023-11-27 DIAGNOSIS — N39.0 RECURRENT UTI: ICD-10-CM

## 2023-11-27 DIAGNOSIS — R41.89 COGNITIVE AND BEHAVIORAL CHANGES: ICD-10-CM

## 2023-11-27 PROCEDURE — 73030 X-RAY EXAM OF SHOULDER: CPT | Mod: TC,HCNC,PN,LT

## 2023-11-27 PROCEDURE — 1126F PR PAIN SEVERITY QUANTIFIED, NO PAIN PRESENT: ICD-10-PCS | Mod: HCNC,CPTII,S$GLB, | Performed by: FAMILY MEDICINE

## 2023-11-27 PROCEDURE — 99215 OFFICE O/P EST HI 40 MIN: CPT | Mod: HCNC,S$GLB,, | Performed by: FAMILY MEDICINE

## 2023-11-27 PROCEDURE — 73030 X-RAY EXAM OF SHOULDER: CPT | Mod: 26,HCNC,LT, | Performed by: RADIOLOGY

## 2023-11-27 PROCEDURE — 99215 PR OFFICE/OUTPT VISIT, EST, LEVL V, 40-54 MIN: ICD-10-PCS | Mod: HCNC,S$GLB,, | Performed by: FAMILY MEDICINE

## 2023-11-27 PROCEDURE — 1101F PR PT FALLS ASSESS DOC 0-1 FALLS W/OUT INJ PAST YR: ICD-10-PCS | Mod: HCNC,CPTII,S$GLB, | Performed by: FAMILY MEDICINE

## 2023-11-27 PROCEDURE — 3288F FALL RISK ASSESSMENT DOCD: CPT | Mod: HCNC,CPTII,S$GLB, | Performed by: FAMILY MEDICINE

## 2023-11-27 PROCEDURE — 1159F MED LIST DOCD IN RCRD: CPT | Mod: HCNC,CPTII,S$GLB, | Performed by: FAMILY MEDICINE

## 2023-11-27 PROCEDURE — 1159F PR MEDICATION LIST DOCUMENTED IN MEDICAL RECORD: ICD-10-PCS | Mod: HCNC,CPTII,S$GLB, | Performed by: FAMILY MEDICINE

## 2023-11-27 PROCEDURE — 1157F PR ADVANCE CARE PLAN OR EQUIV PRESENT IN MEDICAL RECORD: ICD-10-PCS | Mod: HCNC,CPTII,S$GLB, | Performed by: FAMILY MEDICINE

## 2023-11-27 PROCEDURE — 1157F ADVNC CARE PLAN IN RCRD: CPT | Mod: HCNC,CPTII,S$GLB, | Performed by: FAMILY MEDICINE

## 2023-11-27 PROCEDURE — 1101F PT FALLS ASSESS-DOCD LE1/YR: CPT | Mod: HCNC,CPTII,S$GLB, | Performed by: FAMILY MEDICINE

## 2023-11-27 PROCEDURE — 73030 XR SHOULDER COMPLETE 2 OR MORE VIEWS LEFT: ICD-10-PCS | Mod: 26,HCNC,LT, | Performed by: RADIOLOGY

## 2023-11-27 PROCEDURE — 99999 PR PBB SHADOW E&M-EST. PATIENT-LVL IV: CPT | Mod: PBBFAC,HCNC,, | Performed by: FAMILY MEDICINE

## 2023-11-27 PROCEDURE — 3288F PR FALLS RISK ASSESSMENT DOCUMENTED: ICD-10-PCS | Mod: HCNC,CPTII,S$GLB, | Performed by: FAMILY MEDICINE

## 2023-11-27 PROCEDURE — 99999 PR PBB SHADOW E&M-EST. PATIENT-LVL IV: ICD-10-PCS | Mod: PBBFAC,HCNC,, | Performed by: FAMILY MEDICINE

## 2023-11-27 PROCEDURE — 1126F AMNT PAIN NOTED NONE PRSNT: CPT | Mod: HCNC,CPTII,S$GLB, | Performed by: FAMILY MEDICINE

## 2023-11-27 RX ORDER — ESOMEPRAZOLE MAGNESIUM 40 MG/1
40 CAPSULE, DELAYED RELEASE ORAL
Qty: 90 CAPSULE | Refills: 3 | Status: SHIPPED | OUTPATIENT
Start: 2023-11-27 | End: 2024-03-27 | Stop reason: SDUPTHER

## 2023-11-27 RX ORDER — NITROFURANTOIN 25; 75 MG/1; MG/1
100 CAPSULE ORAL DAILY
Qty: 90 CAPSULE | Refills: 3 | Status: SHIPPED | OUTPATIENT
Start: 2023-11-27 | End: 2024-03-27 | Stop reason: SDUPTHER

## 2023-11-27 RX ORDER — QUETIAPINE FUMARATE 25 MG/1
25 TABLET, FILM COATED ORAL NIGHTLY
Qty: 90 TABLET | Refills: 3 | Status: SHIPPED | OUTPATIENT
Start: 2023-11-27 | End: 2024-03-27

## 2023-11-27 RX ORDER — METFORMIN HYDROCHLORIDE 500 MG/1
500 TABLET, EXTENDED RELEASE ORAL
Qty: 90 TABLET | Refills: 3 | Status: SHIPPED | OUTPATIENT
Start: 2023-11-27 | End: 2024-03-27 | Stop reason: SDUPTHER

## 2023-11-27 RX ORDER — TRIAMCINOLONE ACETONIDE 1 MG/G
OINTMENT TOPICAL 2 TIMES DAILY PRN
Qty: 30 G | Refills: 3 | Status: SHIPPED | OUTPATIENT
Start: 2023-11-27

## 2023-11-27 NOTE — PROGRESS NOTES
Shoulder x-ray looks good.  Everything looks normal.    Likely what we are experiencing is some mild arthritis.  Try the Tylenol/Aleve, like we discussed.  If the pains worsen, or become constant, please let me know and we can get you in with our shoulder specialist, Dr. Mares.

## 2023-11-27 NOTE — PATIENT INSTRUCTIONS
Physically, everything looks pretty good today.      Let us get an x-ray of the left shoulder to look inside.  We will contact you with results as soon as they are available.    When it hurts, start with:              OTC Tylenol - two, 500mg tablets every 8 hours  You can also try using:              OTC Aleve - two tablets every 12 hours              Alternate these throughout the day     If we get no relief from these, please let me know.  We can always have you see our shoulder specialist, Dr. Mares, for another opinion and alternative treatment options.      Or, if the pain in the legs gets worse, please let me know that, as well.  We can have you see our back/spine specialist, Dr. Garcia.    For sleep issues, instead of lorazepam or alprazolam, let us try using Seroquel (quetiapine).  This can relax the brain, minimize some of the hallucinations, and help you fall asleep.  This may work better than those other medicines.      Let us also have you see the neurologist about getting a full evaluation.  I am faxing a referral to Dr. Haynes, Neurology, at [facility].  Feel free to call his office at [phone] to schedule an appointment.    Físicamente todo pinta bastante alex hoy.    Hagamos trinh radiografía del hombro adri para skyler el interior. Nos comunicaremos con usted con los resultados ramirez pronto rm estén disponibles.    Cuando te duela, empieza con:              Tylenol de venta noe: dos tabletas de 500 mg cada 8 horas  También puedes intentar usar:              OTC Aleve: dos tabletas cada 12 horas              Alternelos a lo dipti del día.     Si no obtenemos alivio de esto, hágamelo saber. Siempre podemos pedirle que consulte a nuestro especialista en hombros, el Dr. Mares, para obtener otra opinión y opciones de tratamiento alternativas.    O, si el dolor en las piernas empeora, hágamelo saber también. Podemos hacer que malik a nuestro especialista en espalda y columna vertebral, el   Radha.    Para problemas de sueño, en lugar de lorazepam o alprazolam, intentemos usar Seroquel (quetiapina). [place] puede relajar el cerebro, minimizar algunas de las alucinaciones y ayudarle a conciliar el sueño. [place] puede funcionar mejor que esos otros medicamentos.    También permítanos consultar al neurólogo para obtener trinh evaluación completa. Le envío por fax trinh derivación al Dr. Haynes, Neurología, de [facility]. No dude en llamar a hart oficina al [phone] para programar trinh amaury.    Continúe con trinh dieta saludable. Tenga cuidado con el tamaño de las porciones. Incluye muchas frutas frescas, verduras, cereales integrales y proteínas magras. Valeria información a continuación.    Manténgase hidratado. Asegúrese de beber al menos de 8 a 10 vasos de agua de 8 onzas todos los días.    Mantenerse activo. Intenta realizar algún tipo de actividad física todos los zoey. Nada escandaloso, simplemente intenta caminar entre 10 y 15 minutos cada día.    Continue to eat a healthy diet.  Be careful with portion sizes.  Includes lots of fresh fruits, vegetables, whole grains, lean proteins.  See info below.    Keep hydrated.  Be sure to drink at least 8-10, 8 oz, glasses of water every day.    Stay active.  Try to do some sort of physical activity every day.  Nothing outrageous, just try walking for 10-15 minutes each day.

## 2023-11-27 NOTE — PROGRESS NOTES
"    [facility]       [address] [place] [address]      Phone: [phone]      Fax: [phone]    Javed Basilio MD                Office Visit  11/27/2023        Subjective      HPI:  Ghada Dave is a 88 y.o. female presents today in clinic for "Follow-up, Memory Loss (Would like to be tested ), and Varicose Veins (On the lower legs )  ."     88-year-old female presents today to discuss multiple issues.      Her son, Mr. Viera, is present with her.  He provides portions of the history, translation services.    Overall, she is been doing okay.      She reports some left shoulder pains that started about four days ago.  Years ago, she fell and landed on her left hand.  Says sometimes her thumb gets stiff.  Pains come and go.  Cold weather seems to make them worse.  They also seem to be worse at night.  She does not recall any recent injury to the shoulder.  States that she is not hurting today.  When she hurts, though, the pain will radiate down to her elbow, hand.    She also gets pains in her legs, feet.  Describes them as pins and needles sensations.  These are also worse at night.    They also state that she is been having some cognitive changes lately.  Sometimes it manifests as a mild paranoia.  Sometimes seems like she has delusions, hallucinations.  They are interested in seeing Neurology to get tested.    Also has anxiety, sleep issues.  Has been taking Ativan, but states it does not help.  Took Xanax in the past, thought that helped better.    She has prediabetes.  Takes metformin 500 mg daily.  No issues with this medication.    Has hypertension.  Blood pressure has been under great control without medication.    Has GERD/reflux.  Had surgery for hiatal hernia and initially, symptoms improved.  Then, a few weeks later, they came back.  Has followed up with the surgeon who has recommended repeating GI/EGD.  Has not been able to " schedule these because of her son's work schedule.  Getting good relief with Nexium.    PMH: HTN.  HLD.  Pre DM.  CAD.  Hiatal hernia/GERD.  Anxiety.  Recurrent bladder issues.    PSH: Right shoulder x2.    Allergies:  Statins cause rash.  Penicillin causes rash.  Keflex causes rash.    Social:  Disabled.  Lives with her son.    T: Denies  A:  Denies   D:  Denies     Exercise:  Walks frequently.  Very active at home.      Colon:  06/17/2021    MMG:  Was told she could discontinue these.      Podiatry: Dr. Nowak        The following were updated and reviewed by myself in the chart: medications, past medical history, past surgical history, family history, social history, and allergies.     Medications:  Current Outpatient Medications on File Prior to Visit   Medication Sig Dispense Refill    b complex vitamins capsule Take 1 capsule by mouth once daily.      [DISCONTINUED] esomeprazole (NEXIUM) 40 MG capsule Take 1 capsule (40 mg total) by mouth before breakfast. 90 capsule 3    [DISCONTINUED] LORazepam (ATIVAN) 0.5 MG tablet Take 0.5 tablets (0.25 mg total) by mouth every 12 (twelve) hours as needed for Anxiety. 90 tablet 2    [DISCONTINUED] metFORMIN (GLUCOPHAGE-XR) 500 MG ER 24hr tablet Take 1 tablet (500 mg total) by mouth daily with breakfast. 90 tablet 3    [DISCONTINUED] nitrofurantoin, macrocrystal-monohydrate, (MACROBID) 100 MG capsule Take 1 capsule (100 mg total) by mouth once daily. 90 capsule 3     No current facility-administered medications on file prior to visit.        PMHx:  Past Medical History:   Diagnosis Date    Abnormal Pap smear 2013    Acidosis     CAD (coronary artery disease) 06/2017     iFR of LAD and RCA performed confirmed nonobstructive disease.    Cataract     Colon polyps     Disorder of kidney and ureter     Frequent urination     General anesthetics causing adverse effect in therapeutic use     Hemorrhoid     High cholesterol     Poor circulation     Postoperative abdominal pain      Psoriasis     Unilateral inguinal hernia, without obstruction or gangrene, not specified as recurrent 08/01/2023    Varicose vein       Patient Active Problem List    Diagnosis Date Noted    Hiatal hernia with GERD 08/01/2023    Hypercholesterolemia 12/18/2021    Hx of colonoscopy 06/17/2021    Decreased strength 03/22/2021    Decreased ROM of right shoulder 03/22/2021    Nontraumatic complete tear of right rotator cuff 12/03/2020    Prediabetes 11/04/2020    Chronic right shoulder pain 11/04/2020    Arthropathies in other specified diseases classified elsewhere, vertebrae  11/04/2020    Age-related osteoporosis without fracture 11/04/2020    Osteoarthritis of multiple joints 11/04/2020    Gastroesophageal reflux disease without esophagitis 11/04/2020    Anal polyp 10/18/2019    SOB (shortness of breath) 03/21/2019    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    Cerumen debris on tympanic membrane of both ears 03/05/2018    Stenosis of carotid artery 03/05/2018    Nausea 03/05/2018    Essential (primary) hypertension 03/05/2018    Dizziness 03/05/2018    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), 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    Lichen simplex chronicus of the heel 07/12/2017    Non-occlusive coronary artery disease 07/06/2017    Psoriasis 07/06/2017    Chronic midline low back pain without sciatica 07/06/2017    Scoliosis of thoracolumbar spine 07/06/2017    Right subscapular pain 07/06/2017    Insomnia 07/06/2017    Overflow incontinence 07/06/2017    Urinary retention with incomplete bladder emptying 07/06/2017    Non-cardiac chest pain 06/22/2017    Abnormal stress test 06/22/2017    Anal or rectal pain 06/05/2017    CAD (coronary artery disease) 06/01/2017    Pseudophakia 04/17/2017    Right posterior capsular opacification 04/17/2017    Left lower quadrant pain 05/17/2016    Personal history of colonic polyps 05/12/2015    Leukopenia 04/15/2015    Post herpetic neuralgia 12/31/2014    Heartburn  11/18/2014    Diverticulosis 10/27/2014    Ureteral stone 06/03/2014    Rectal polyp 09/12/2013    Urge incontinence 07/08/2013        PSHx:  Past Surgical History:   Procedure Laterality Date    CARDIAC CATHETERIZATION  06/2017     iFR of LAD and RCA performed confirmed nonobstructive disease.    CATARACT EXTRACTION W/  INTRAOCULAR LENS IMPLANT Bilateral     COLONOSCOPY N/A 08/28/2017    Procedure: COLONOSCOPY;  Surgeon: Bertram Myers MD;  Location: [facility] ENDO;  Service: Endoscopy;  Laterality: N/A;    COLONOSCOPY N/A 10/18/2019    Procedure: COLONOSCOPY;  Surgeon: Wilfred Barber MD;  Location: [facility] (4TH FLR);  Service: Endoscopy;  Laterality: N/A;   needed-BB  Cardiology Clearance received from Dr. REBECA Escalona, see telephone encounter 10/1/19-BB    COLONOSCOPY N/A 06/17/2021    Procedure: COLONOSCOPY suprep Past positive;  Surgeon: Lele Barillas MD;  Location: [facility];  Service: Endoscopy;  Laterality: N/A;  past positive    COLONOSCOPY W/ BIOPSIES  06/17/2021    ESOPHAGOGASTRODUODENOSCOPY N/A 06/23/2023    Procedure: EGD (ESOPHAGOGASTRODUODENOSCOPY);  Surgeon: Seun Burt MD;  Location: [facility];  Service: Endoscopy;  Laterality: N/A;    EXCISION OF BURSA  03/05/2021    Procedure: BURSECTOMY;  Surgeon: Jason Alicea Jr., MD;  Location: [facility] OR;  Service: Orthopedics;;    HIATAL HERNIA REPAIR N/A 08/01/2023    LEFT HEART CATHETERIZATION N/A 04/08/2019    Procedure: Left heart cath;  Surgeon: Renato Escalona MD;  Location: [facility] CATH LAB/EP;  Service: Cardiology;  Laterality: N/A;    POLYPECTOMY      ROBOT-ASSISTED NISSEN FUNDOPLICATION USING DA ANTOINE XI N/A 08/01/2023    Procedure: XI ROBOTIC FUNDOPLICATION, NISSEN;  Surgeon: Elvis Malcolm MD;  Location: [facility] OR;  Service: General;  Laterality: N/A;    ROBOT-ASSISTED REPAIR OF HIATAL HERNIA USING DA ANTOINE XI N/A 08/01/2023    Procedure: XI ROBOTIC REPAIR, HERNIA, HIATAL;  Surgeon: Elvis Malcolm MD;   Location: [facility] OR;  Service: General;  Laterality: N/A;    SHOULDER ARTHROSCOPY Right 03/05/2021    Procedure: ARTHROSCOPY, SHOULDER;  Surgeon: Jason Alicea Jr., MD;  Location: [facility] OR;  Service: Orthopedics;  Laterality: Right;  need opus system Buddhist   video  (Buddhist notified 3/1/21, CC)  Kulwinder confirmed 3/4/21 [place]    YAG Laser Capsulotomy Right 04/17/2017    Dr. Chavez        FHx:  Family History   Problem Relation Age of Onset    Kidney disease Mother     Diabetes Mother     Glaucoma Neg Hx     Macular degeneration Neg Hx     Strabismus Neg Hx     Retinal detachment Neg Hx     Amblyopia Neg Hx     Blindness Neg Hx     Cataracts Neg Hx     Prostate cancer Neg Hx     Anesthesia problems Neg Hx         Social:  Social History     Socioeconomic History    Marital status: Single   Tobacco Use    Smoking status: Never     Passive exposure: Never    Smokeless tobacco: Never    Tobacco comments:     Never smoked   Substance and Sexual Activity    Alcohol use: Never    Drug use: Never    Sexual activity: Never   Social History Narrative    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      Social Determinants of Health     Financial Resource Strain: Low Risk  (8/10/2023)    Overall Financial Resource Strain (CARDIA)     Difficulty of Paying Living Expenses: Not hard at all   Food Insecurity: No Food Insecurity (8/10/2023)    Hunger Vital Sign     Worried About Running Out of Food in the Last Year: Never true     Ran Out of Food in the Last Year: Never true   Transportation Needs: Unmet Transportation Needs (8/10/2023)    PRAPARE - Transportation     Lack of Transportation (Medical): Yes     Lack of Transportation (Non-Medical): No   Physical Activity: Inactive (8/10/2023)    Exercise Vital Sign     Days of Exercise per Week: 0 days     Minutes of Exercise per Session: 0 min   Stress: No Stress Concern Present (8/10/2023)    [employer] [place] [employer] - Occupational Stress  Questionnaire     Feeling of Stress : Not at all   Social Connections: Unknown (8/10/2023)    Social Connection and Isolation Panel [NHANES]     Frequency of Communication with Friends and Family: Twice a week     Frequency of Social Gatherings with Friends and Family: Once a week     Attends [employer] Services: More than 4 times per year     Active Member of Clubs or Organizations: No     Attends Club or Organization Meetings: Never   Housing Stability: Low Risk  (8/10/2023)    Housing Stability Vital Sign     Unable to Pay for Housing in the Last Year: No     Number of Places Lived in the Last Year: 2     Unstable Housing in the Last Year: No        Allergies:  Review of patient's allergies indicates:   Allergen Reactions    Penicillin Anaphylaxis    Lipitor [atorvastatin] Hives     Can take Simivastatin .     Keflex [cephalexin] Rash        ROS:  Review of Systems   Constitutional:  Negative for activity change, appetite change, chills and fever.   HENT:  Negative for congestion, postnasal drip, rhinorrhea, sore throat and trouble swallowing.    Respiratory:  Negative for cough, shortness of breath and wheezing.    Cardiovascular:  Negative for chest pain and palpitations.   Gastrointestinal:  Negative for abdominal pain, constipation, diarrhea, nausea and vomiting.   Genitourinary:  Negative for difficulty urinating.   Musculoskeletal:  Positive for arthralgias.   Skin:  Negative for color change and rash.   Neurological:  Negative for speech difficulty and headaches.   Psychiatric/Behavioral:  Positive for agitation, confusion and sleep disturbance.    All other systems reviewed and are negative.         Objective      /60   Pulse 99   Temp 97.5 °F (36.4 °C)   Ht 5' (1.524 m)   Wt 52.9 kg (116 lb 10 oz)   SpO2 99%   BMI 22.78 kg/m²   Ht Readings from Last 3 Encounters:   11/27/23 5' (1.524 m)   09/13/23 5' (1.524 m)   08/25/23 5' (1.524 m)     Wt Readings from Last 3 Encounters:   11/27/23 52.9 kg  (116 lb 10 oz)   09/13/23 53.1 kg (117 lb 1 oz)   08/25/23 53.1 kg (117 lb 1 oz)       PHYSICAL EXAM:  Physical Exam  Vitals and nursing note reviewed.   Constitutional:       General: She is not in acute distress.     Appearance: Normal appearance.   HENT:      Head: Normocephalic and atraumatic.      Right Ear: Tympanic membrane, ear canal and external ear normal.      Left Ear: Tympanic membrane, ear canal and external ear normal.      Nose: Nose normal. No congestion or rhinorrhea.      Mouth/Throat:      Mouth: Mucous membranes are moist.      Pharynx: Oropharynx is clear. No oropharyngeal exudate or posterior oropharyngeal erythema.   Eyes:      Extraocular Movements: Extraocular movements intact.      Conjunctiva/sclera: Conjunctivae normal.      Pupils: Pupils are equal, round, and reactive to light.   Cardiovascular:      Rate and Rhythm: Normal rate and regular rhythm.   Pulmonary:      Effort: Pulmonary effort is normal. No respiratory distress.      Breath sounds: No wheezing, rhonchi or rales.   Musculoskeletal:         General: Normal range of motion.      Cervical back: Normal range of motion.   Lymphadenopathy:      Cervical: No cervical adenopathy.   Skin:     General: Skin is warm and dry.      Findings: No rash.   Neurological:      Mental Status: She is alert.              LABS / IMAGING:  Recent Results (from the past 4368 hour(s))   POCT glucose    Collection Time: 06/23/23  9:20 AM   Result Value Ref Range    POCT Glucose 101 70 - 110 mg/dL   Specimen to Pathology, Surgery Gastrointestinal tract    Collection Time: 06/23/23 11:10 AM   Result Value Ref Range    Final Pathologic Diagnosis       RELIAPATH DIAGNOSIS:    A.  ESOPHAGUS, DISTAL, BIOPSIES:  - Squamous mucosa with mild reactive changes.  - No eosinophils identified.  - No intestinal metaplasia identified.    B.  ESOPHAGUS, PROXIMAL, BIOPSIES:  - Squamous mucosa with mild reactive changes.  - No eosinophils identified.  - No intestinal  metaplasia identified.    BLANKA WEBBER M.D.         Report attached.    Performing site:  [address] [address]    &quot;Disclaimer:  This case diagnosis was rendered completely by the outside consultation pathologist and the case is electronically signed by an [facility] pathologist listed below solely to release the report into the medical record.&quot;      Disclaimer       Unless the case is a 'gross only' or additional testing only, the final diagnosis for each specimen is based on a microscopic examination of appropriate tissue sections.   Comprehensive metabolic panel    Collection Time: 07/26/23 11:52 AM   Result Value Ref Range    Sodium 142 136 - 145 mmol/L    Potassium 4.3 3.5 - 5.1 mmol/L    Chloride 110 95 - 110 mmol/L    CO2 22 (L) 23 - 29 mmol/L    Glucose 90 70 - 110 mg/dL    BUN 23 8 - 23 mg/dL    Creatinine 0.9 0.5 - 1.4 mg/dL    Calcium 9.0 8.7 - 10.5 mg/dL    Total Protein 7.4 6.0 - 8.4 g/dL    Albumin 3.9 3.5 - 5.2 g/dL    Total Bilirubin 0.4 0.1 - 1.0 mg/dL    Alkaline Phosphatase 70 55 - 135 U/L    AST 18 10 - 40 U/L    ALT 8 (L) 10 - 44 U/L    eGFR >60.0 >60 mL/min/1.73 m^2    Anion Gap 10 8 - 16 mmol/L   CBC auto differential    Collection Time: 07/26/23 11:52 AM   Result Value Ref Range    WBC 3.13 (L) 3.90 - 12.70 K/uL    RBC 4.03 4.00 - 5.40 M/uL    Hemoglobin 12.2 12.0 - 16.0 g/dL    Hematocrit 35.9 (L) 37.0 - 48.5 %    MCV 89 82 - 98 fL    MCH 30.3 27.0 - 31.0 pg    MCHC 34.0 32.0 - 36.0 g/dL    RDW 14.0 11.5 - 14.5 %    Platelets 274 150 - 450 K/uL    MPV 10.4 9.2 - 12.9 fL    Immature Granulocytes 0.0 0.0 - 0.5 %    Gran # (ANC) 1.4 (L) 1.8 - 7.7 K/uL    Immature Grans (Abs) 0.00 0.00 - 0.04 K/uL    Lymph # 1.2 1.0 - 4.8 K/uL    Mono # 0.4 0.3 - 1.0 K/uL    Eos # 0.2 0.0 - 0.5 K/uL    Baso # 0.07 0.00 - 0.20 K/uL    nRBC 0 0 /100 WBC    Gran % 43.1 38.0 - 73.0 %    Lymph % 37.7 18.0 - 48.0 %    Mono % 11.2 4.0 - 15.0 %    Eosinophil % 5.8 0.0 - 8.0 %     Basophil % 2.2 (H) 0.0 - 1.9 %    Differential Method Automated    POCT glucose    Collection Time: 08/01/23  7:27 AM   Result Value Ref Range    POCT Glucose 97 70 - 110 mg/dL   CBC auto differential    Collection Time: 08/02/23  7:20 AM   Result Value Ref Range    WBC 5.33 3.90 - 12.70 K/uL    RBC 3.49 (L) 4.00 - 5.40 M/uL    Hemoglobin 10.4 (L) 12.0 - 16.0 g/dL    Hematocrit 31.2 (L) 37.0 - 48.5 %    MCV 89 82 - 98 fL    MCH 29.8 27.0 - 31.0 pg    MCHC 33.3 32.0 - 36.0 g/dL    RDW 14.0 11.5 - 14.5 %    Platelets 204 150 - 450 K/uL    MPV 8.9 (L) 9.2 - 12.9 fL    Immature Granulocytes 0.4 0.0 - 0.5 %    Gran # (ANC) 3.9 1.8 - 7.7 K/uL    Immature Grans (Abs) 0.02 0.00 - 0.04 K/uL    Lymph # 1.0 1.0 - 4.8 K/uL    Mono # 0.4 0.3 - 1.0 K/uL    Eos # 0.0 0.0 - 0.5 K/uL    Baso # 0.02 0.00 - 0.20 K/uL    nRBC 0 0 /100 WBC    Gran % 72.7 38.0 - 73.0 %    Lymph % 18.0 18.0 - 48.0 %    Mono % 8.3 4.0 - 15.0 %    Eosinophil % 0.2 0.0 - 8.0 %    Basophil % 0.4 0.0 - 1.9 %    Differential Method Automated    Basic metabolic panel    Collection Time: 08/02/23  7:20 AM   Result Value Ref Range    Sodium 141 136 - 145 mmol/L    Potassium 4.1 3.5 - 5.1 mmol/L    Chloride 111 (H) 95 - 110 mmol/L    CO2 24 23 - 29 mmol/L    Glucose 86 70 - 110 mg/dL    BUN 13 8 - 23 mg/dL    Creatinine 0.8 0.5 - 1.4 mg/dL    Calcium 8.5 (L) 8.7 - 10.5 mg/dL    Anion Gap 6 (L) 8 - 16 mmol/L    eGFR >60 >60 mL/min/1.73 m^2   POCT glucose    Collection Time: 08/21/23  2:56 PM   Result Value Ref Range    POCT Glucose 84 70 - 110 mg/dL   POCT glucose    Collection Time: 08/21/23  2:58 PM   Result Value Ref Range    POCT Glucose 84 70 - 110 mg/dL   CBC auto differential    Collection Time: 08/21/23  5:11 PM   Result Value Ref Range    WBC 3.87 (L) 3.90 - 12.70 K/uL    RBC 4.09 4.00 - 5.40 M/uL    Hemoglobin 12.2 12.0 - 16.0 g/dL    Hematocrit 36.4 (L) 37.0 - 48.5 %    MCV 89 82 - 98 fL    MCH 29.8 27.0 - 31.0 pg    MCHC 33.5 32.0 - 36.0 g/dL    RDW 14.0  11.5 - 14.5 %    Platelets 290 150 - 450 K/uL    MPV 9.6 9.2 - 12.9 fL    Immature Granulocytes 0.3 0.0 - 0.5 %    Gran # (ANC) 1.8 1.8 - 7.7 K/uL    Immature Grans (Abs) 0.01 0.00 - 0.04 K/uL    Lymph # 1.5 1.0 - 4.8 K/uL    Mono # 0.3 0.3 - 1.0 K/uL    Eos # 0.2 0.0 - 0.5 K/uL    Baso # 0.08 0.00 - 0.20 K/uL    nRBC 0 0 /100 WBC    Gran % 45.1 38.0 - 73.0 %    Lymph % 38.5 18.0 - 48.0 %    Mono % 8.8 4.0 - 15.0 %    Eosinophil % 5.2 0.0 - 8.0 %    Basophil % 2.1 (H) 0.0 - 1.9 %    Differential Method Automated    Comprehensive metabolic panel    Collection Time: 08/21/23  5:11 PM   Result Value Ref Range    Sodium 140 136 - 145 mmol/L    Potassium 4.6 3.5 - 5.1 mmol/L    Chloride 107 95 - 110 mmol/L    CO2 25 23 - 29 mmol/L    Glucose 90 70 - 110 mg/dL    BUN 16 8 - 23 mg/dL    Creatinine 0.9 0.5 - 1.4 mg/dL    Calcium 9.6 8.7 - 10.5 mg/dL    Total Protein 8.0 6.0 - 8.4 g/dL    Albumin 4.1 3.5 - 5.2 g/dL    Total Bilirubin 0.5 0.1 - 1.0 mg/dL    Alkaline Phosphatase 56 55 - 135 U/L    AST 19 10 - 40 U/L    ALT 7 (L) 10 - 44 U/L    eGFR >60 >60 mL/min/1.73 m^2    Anion Gap 8 8 - 16 mmol/L   Beta - Hydroxybutyrate, Serum    Collection Time: 08/21/23  5:11 PM   Result Value Ref Range    Beta-Hydroxybutyrate 0.4 0.0 - 0.5 mmol/L   POCT Glucose, Hand-Held Device    Collection Time: 08/25/23  8:55 AM   Result Value Ref Range    POC Glucose 101 70 - 110 MG/DL   HEMOGLOBIN A1C    Collection Time: 08/25/23  9:22 AM   Result Value Ref Range    Hemoglobin A1C 5.7 (H) 4.0 - 5.6 %    Estimated Avg Glucose 117 68 - 131 mg/dL         Assessment    1. Memory changes    2. Cognitive and behavioral changes    3. Anxiety    4. Hiatal hernia with GERD    5. Prediabetes    6. Dysuria    7. Recurrent UTI    8. Acute pain of left shoulder          Plan    Ghada was seen today for follow-up, memory loss and varicose veins.    Diagnoses and all orders for this visit:    Memory changes  -     Ambulatory referral/consult to Neurology;  Future    Cognitive and behavioral changes  -     Ambulatory referral/consult to Neurology; Future    Anxiety  -     QUEtiapine (SEROQUEL) 25 MG Tab; Take 1 tablet (25 mg total) by mouth every evening.    Hiatal hernia with GERD  -     esomeprazole (NEXIUM) 40 MG capsule; Take 1 capsule (40 mg total) by mouth before breakfast.    Prediabetes  -     metFORMIN (GLUCOPHAGE-XR) 500 MG ER 24hr tablet; Take 1 tablet (500 mg total) by mouth daily with breakfast.    Dysuria  -     nitrofurantoin, macrocrystal-monohydrate, (MACROBID) 100 MG capsule; Take 1 capsule (100 mg total) by mouth once daily.    Recurrent UTI  -     nitrofurantoin, macrocrystal-monohydrate, (MACROBID) 100 MG capsule; Take 1 capsule (100 mg total) by mouth once daily.    Acute pain of left shoulder  -     X-Ray Shoulder 2 or More Views Left; Future    Other orders  -     triamcinolone acetonide 0.1% (KENALOG) 0.1 % ointment; Apply topically 2 (two) times daily as needed (itching).    Physically, she actually looks pretty good today.      Med refills, as above.    Will get x-ray of the left shoulder today.  Will have her try Tylenol/Aleve, as needed.  If shoulder pains continue or worsen, will have her see Dr. Mares.    Leg pains sound more like a lumbar radiculopathy/neuropathy.  If these get worse, we can have her see Dr. GRIGGS      Referral to Dr. Haynes, neurology, placed today.  They would like to stay in town, if possible.  Recommended that son get a copy of the 36 hour day to get better understanding of cognitive changes.      In the meantime, rather than using Ativan or Xanax, will try Seroquel at bedtime to see if that helps with both sleep and some of the cognitive issues.      FOLLOW-UP:  Follow up in about 4 months (around 3/27/2024) for check up.    I spent a total of 45 minutes face to face and non-face to face on the date of this visit.This includes time preparing to see the patient (eg, review of tests, notes), obtaining and/or reviewing  additional history from an independent historian and/or outside medical records, documenting clinical information in the electronic health record, independently interpreting results and/or communicating results to the patient/family/caregiver, or care coordinator.    Signed by:  Javed Basilio MD

## 2024-02-06 ENCOUNTER — HOSPITAL ENCOUNTER (EMERGENCY)
Facility: HOSPITAL | Age: 89
Discharge: HOME | End: 2024-02-06
Attending: EMERGENCY MEDICINE
Payer: MEDICARE

## 2024-02-06 ENCOUNTER — TELEPHONE (OUTPATIENT)
Dept: INTERNAL MEDICINE | Facility: CLINIC | Age: 89
End: 2024-02-06
Payer: MEDICARE

## 2024-02-06 VITALS
BODY MASS INDEX: 22.86 KG/M2 | WEIGHT: 117.06 LBS | RESPIRATION RATE: 20 BRPM | TEMPERATURE: 99 F | HEART RATE: 96 BPM | SYSTOLIC BLOOD PRESSURE: 133 MMHG | OXYGEN SATURATION: 100 % | DIASTOLIC BLOOD PRESSURE: 63 MMHG

## 2024-02-06 DIAGNOSIS — R52 GENERALIZED BODY ACHES: ICD-10-CM

## 2024-02-06 DIAGNOSIS — U07.1 COVID-19 VIRUS INFECTION: Primary | ICD-10-CM

## 2024-02-06 DIAGNOSIS — B34.9 VIRAL SYNDROME: ICD-10-CM

## 2024-02-06 DIAGNOSIS — U07.1 COVID-19 VIRUS DETECTED: ICD-10-CM

## 2024-02-06 DIAGNOSIS — R49.0 HOARSENESS: ICD-10-CM

## 2024-02-06 LAB
GROUP A STREP, MOLECULAR: NEGATIVE
HCV AB SERPL QL IA: NEGATIVE
HEP C VIRUS HOLD SPECIMEN: NORMAL
HIV 1+2 AB+HIV1 P24 AG SERPL QL IA: NEGATIVE
INFLUENZA A, MOLECULAR: NEGATIVE
INFLUENZA B, MOLECULAR: NEGATIVE
SARS-COV-2 RDRP RESP QL NAA+PROBE: POSITIVE
SPECIMEN SOURCE: NORMAL

## 2024-02-06 PROCEDURE — 99284 EMERGENCY DEPT VISIT MOD MDM: CPT | Mod: 25,HCNC

## 2024-02-06 PROCEDURE — 25000003 PHARM REV CODE 250: Mod: HCNC | Performed by: NURSE PRACTITIONER

## 2024-02-06 PROCEDURE — 87502 INFLUENZA DNA AMP PROBE: CPT | Mod: HCNC | Performed by: FAMILY MEDICINE

## 2024-02-06 PROCEDURE — 87651 STREP A DNA AMP PROBE: CPT | Mod: HCNC | Performed by: FAMILY MEDICINE

## 2024-02-06 PROCEDURE — 87389 HIV-1 AG W/HIV-1&-2 AB AG IA: CPT | Mod: HCNC | Performed by: FAMILY MEDICINE

## 2024-02-06 PROCEDURE — U0002 COVID-19 LAB TEST NON-CDC: HCPCS | Mod: HCNC | Performed by: FAMILY MEDICINE

## 2024-02-06 PROCEDURE — 86803 HEPATITIS C AB TEST: CPT | Mod: HCNC | Performed by: FAMILY MEDICINE

## 2024-02-06 RX ORDER — PROMETHAZINE HYDROCHLORIDE AND DEXTROMETHORPHAN HYDROBROMIDE 6.25; 15 MG/5ML; MG/5ML
5 SYRUP ORAL 3 TIMES DAILY PRN
Qty: 180 ML | Refills: 0 | Status: SHIPPED | OUTPATIENT
Start: 2024-02-06 | End: 2024-02-16

## 2024-02-06 RX ORDER — PROMETHAZINE HYDROCHLORIDE AND CODEINE PHOSPHATE 6.25; 1 MG/5ML; MG/5ML
10 SOLUTION ORAL
Status: COMPLETED | OUTPATIENT
Start: 2024-02-06 | End: 2024-02-06

## 2024-02-06 RX ADMIN — PROMETHAZINE HYDROCHLORIDE AND CODEINE PHOSPHATE 10 ML: 6.25; 1 SOLUTION ORAL at 10:02

## 2024-02-06 NOTE — TELEPHONE ENCOUNTER
Received a message from phone staff that the caller was unable to do a virtual appt and was requesting a audio visit instead     Call placed to pt using   No answer  Message left advising that the provider does not do audio calls  And the appt would be cancelled and that she could go to urgent care to be seen or follow up with hher PCP

## 2024-02-06 NOTE — TELEPHONE ENCOUNTER
----- Message from Jennifer Contreras sent at 2/6/2024 12:19 PM CST -----  Contact: CATARINO SINGH [[id number]]  ..Type:  Patient Requesting Call    Who Called: CATARINO SINGH [[id number]]  Does the patient know what this is regarding?: pt scheduled for SDA at 140 and  on line wants to know if the call can be audio only and not virtual. Pt can't come into office and can't operate portal possibly, pt elderly and no one is home to assist the patient.   Would the patient rather a call back or a response via [facility]? call  Best Call Back Number: .[phone] (home)       Additional Information:  Pt needs

## 2024-02-07 NOTE — ED PROVIDER NOTES
"     HISTORY     Chief Complaint   Patient presents with    Generalized Body Aches     Son states patient has "swollen glands and trouble swallowing" with generalized body aches, cough, subjective fevers. Patient is hoarse for about 2 days.      Review of patient's allergies indicates:   Allergen Reactions    Penicillin Anaphylaxis    Lipitor [atorvastatin] Hives     Can take Simivastatin .     Keflex [cephalexin] Rash        HPI   The history is provided by the patient and a relative. The history is limited by a language barrier.   General Illness   The current episode started two days ago. The problem occurs rarely. The problem has been gradually worsening. The pain is at a severity of 4/10. Nothing relieves the symptoms. Nothing aggravates the symptoms. Associated symptoms include a fever, congestion, sore throat and muscle aches. Pertinent negatives include no nausea, no shortness of breath and no rash. She has received no recent medical care.        PCP: Javed Basilio MD     Past Medical History:  Past Medical History:   Diagnosis Date    Abnormal Pap smear 2013    Acidosis     CAD (coronary artery disease) 06/2017     iFR of LAD and RCA performed confirmed nonobstructive disease.    Cataract     Colon polyps     Disorder of kidney and ureter     Frequent urination     General anesthetics causing adverse effect in therapeutic use     Hemorrhoid     High cholesterol     Poor circulation     Postoperative abdominal pain     Psoriasis     Unilateral inguinal hernia, without obstruction or gangrene, not specified as recurrent 08/01/2023    Varicose vein         Past Surgical History:  Past Surgical History:   Procedure Laterality Date    CARDIAC CATHETERIZATION  06/2017     iFR of LAD and RCA performed confirmed nonobstructive disease.    CATARACT EXTRACTION W/  INTRAOCULAR LENS IMPLANT Bilateral     COLONOSCOPY N/A 08/28/2017    Procedure: COLONOSCOPY;  Surgeon: Bertram Myers MD;  Location: [facility] ENDO;  " Service: Endoscopy;  Laterality: N/A;    COLONOSCOPY N/A 10/18/2019    Procedure: COLONOSCOPY;  Surgeon: Wilfred Barber MD;  Location: [facility] ([address] [place] [address]);  Service: Endoscopy;  Laterality: N/A;   needed-BB  Cardiology Clearance received from Dr. REBECA Escalona, see telephone encounter 10/1/19-BB    COLONOSCOPY N/A 06/17/2021    Procedure: COLONOSCOPY suprep Past positive;  Surgeon: Lele Barillas MD;  Location: [facility];  Service: Endoscopy;  Laterality: N/A;  past positive    COLONOSCOPY W/ BIOPSIES  06/17/2021    ESOPHAGOGASTRODUODENOSCOPY N/A 06/23/2023    Procedure: EGD (ESOPHAGOGASTRODUODENOSCOPY);  Surgeon: Seun Burt MD;  Location: [facility];  Service: Endoscopy;  Laterality: N/A;    EXCISION OF BURSA  03/05/2021    Procedure: BURSECTOMY;  Surgeon: Jason Alicea Jr., MD;  Location: [facility] OR;  Service: Orthopedics;;    HIATAL HERNIA REPAIR N/A 08/01/2023    LEFT HEART CATHETERIZATION N/A 04/08/2019    Procedure: Left heart cath;  Surgeon: Renato Escalona MD;  Location: [facility] CATH LAB/EP;  Service: Cardiology;  Laterality: N/A;    POLYPECTOMY      ROBOT-ASSISTED NISSEN FUNDOPLICATION USING DA ANTOINE XI N/A 08/01/2023    Procedure: XI ROBOTIC FUNDOPLICATION, NISSEN;  Surgeon: Elvis Malcolm MD;  Location: [facility] OR;  Service: General;  Laterality: N/A;    ROBOT-ASSISTED REPAIR OF HIATAL HERNIA USING DA ANTOINE XI N/A 08/01/2023    Procedure: XI ROBOTIC REPAIR, HERNIA, HIATAL;  Surgeon: Elvis Malcolm MD;  Location: [facility] OR;  Service: General;  Laterality: N/A;    SHOULDER ARTHROSCOPY Right 03/05/2021    Procedure: ARTHROSCOPY, SHOULDER;  Surgeon: Jason Alicea Jr., MD;  Location: [facility] OR;  Service: Orthopedics;  Laterality: Right;  need opus system [place]   video  (Kendell notified 3/1/21, CC)  Kulwinder confirmed 3/4/21 [place]    YAG Laser Capsulotomy Right 04/17/2017    Dr. Chavez        Family History:  Family History   Problem Relation Age of Onset    Kidney  disease Mother     Diabetes Mother     Glaucoma Neg Hx     Macular degeneration Neg Hx     Strabismus Neg Hx     Retinal detachment Neg Hx     Amblyopia Neg Hx     Blindness Neg Hx     Cataracts Neg Hx     Prostate cancer Neg Hx     Anesthesia problems Neg Hx         Social History:  Social History     Tobacco Use    Smoking status: Never     Passive exposure: Never    Smokeless tobacco: Never    Tobacco comments:     Never smoked   Substance and Sexual Activity    Alcohol use: Never    Drug use: Never    Sexual activity: Never         ROS   Review of Systems   Constitutional:  Positive for chills and fever.   HENT:  Positive for congestion, sore throat and voice change.    Respiratory:  Negative for shortness of breath.    Cardiovascular:  Negative for chest pain.   Gastrointestinal:  Negative for nausea.   Genitourinary:  Negative for dysuria.   Musculoskeletal:  Positive for myalgias. Negative for back pain.   Skin:  Negative for rash.   Neurological:  Negative for weakness.   Hematological:  Does not bruise/bleed easily.       PHYSICAL EXAM     Initial Vitals [02/06/24 1952]   BP Pulse Resp Temp SpO2   133/63 96 20 98.9 °F (37.2 °C) 100 %      MAP       --           Physical Exam    Constitutional: She appears well-developed and well-nourished. No distress.   HENT:   Head: Normocephalic and atraumatic.   Eyes: Conjunctivae are normal. Pupils are equal, round, and reactive to light.   Neck: Neck supple.   Normal range of motion.  Cardiovascular:  Normal rate, regular rhythm and normal heart sounds.           Pulmonary/Chest: Breath sounds normal.   Abdominal: Abdomen is soft. Bowel sounds are normal. She exhibits no distension. There is no abdominal tenderness. There is no rebound.   Musculoskeletal:         General: No edema. Normal range of motion.      Cervical back: Normal range of motion and neck supple.     Neurological: She is alert and oriented to person, place, and time. She has normal strength.   Skin:  Skin is warm and dry.   Psychiatric: She has a normal mood and affect.          ED COURSE   Procedures  ED ONGOING VITALS:  Vitals:    02/06/24 1952 02/06/24 2205   BP: 133/63    Pulse: 96    Resp: 20 20   Temp: 98.9 °F (37.2 °C)    TempSrc: Oral    SpO2: 100%    Weight: 53.1 kg (117 lb 1 oz)          ABNORMAL LAB VALUES:  Labs Reviewed   SARS-COV-2 RNA AMPLIFICATION, QUAL - Abnormal; Notable for the following components:       Result Value    SARS-CoV-2 RNA, Amplification, Qual Positive (*)     All other components within normal limits   INFLUENZA A & B BY MOLECULAR   GROUP A STREP, MOLECULAR   HIV 1 / 2 ANTIBODY    Narrative:     Release to patient->Immediate   HEPATITIS C ANTIBODY    Narrative:     Release to patient->Immediate   HEP C VIRUS HOLD SPECIMEN    Narrative:     Release to patient->Immediate         ALL LAB VALUES:  Results for orders placed or performed during the hospital encounter of 02/06/24   Influenza A & B by Molecular    Specimen: Nasopharyngeal Swab   Result Value Ref Range    Influenza A, Molecular Negative Negative    Influenza B, Molecular Negative Negative    Flu A & B Source Nasal swab    Group A Strep, Molecular    Specimen: Throat   Result Value Ref Range    Group A Strep, Molecular Negative Negative   HIV 1/2 Ag/Ab (4th Gen)   Result Value Ref Range    HIV 1/2 Ag/Ab Negative Negative   Hepatitis C Antibody   Result Value Ref Range    Hepatitis C Ab Negative Negative   HCV Virus Hold Specimen   Result Value Ref Range    HEP C Virus Hold Specimen Hold for HCV sendout    COVID-19 Rapid Screening   Result Value Ref Range    SARS-CoV-2 RNA, Amplification, Qual Positive (A) Negative           RADIOLOGY STUDIES:  Imaging Results              X-Ray Neck Soft Tissue (Final result)  Result time 02/06/24 21:22:32      Final result by Mark Gastelum MD (02/06/24 21:22:32)                   Impression:      No definite acute abnormality.  Correlation and further evaluation as  warranted.      Electronically signed by: Mark Gastelum  Date:    02/06/2024  Time:    21:22               Narrative:    EXAMINATION:  XR NECK SOFT TISSUE    CLINICAL HISTORY:  Dysphonia    TECHNIQUE:  AP and lateral soft tissue views the neck were performed.    COMPARISON:  None.    FINDINGS:  Multifocal cervical spine degenerative changes.    Small calcific focus likely related to cartilages on lateral view.  Nonspecific appearance of the epiglottis.  No definite prevertebral soft tissue swelling.                                                The above vital signs and test results have been reviewed by the emergency provider.     ED Medications:  Discharge Medication List as of 2/6/2024 10:05 PM        START taking these medications    Details   promethazine-dextromethorphan (PROMETHAZINE-DM) 6.25-15 mg/5 mL Syrp Take 5 mLs by mouth 3 (three) times daily as needed., Starting Tue 2/6/2024, Until Fri 2/16/2024 at 2359, Print           Discharge Medications:  Discharge Medication List as of 2/6/2024 10:05 PM        START taking these medications    Details   promethazine-dextromethorphan (PROMETHAZINE-DM) 6.25-15 mg/5 mL Syrp Take 5 mLs by mouth 3 (three) times daily as needed., Starting Tue 2/6/2024, Until Fri 2/16/2024 at 2359, Print             Follow-up Information       Javed Basilio MD.    Specialty: Family Medicine  Contact information:  [address] [place] [address]  [phone]               [facility] - Emergency Dept..    Specialty: Emergency Medicine  Contact information:  [address] [facility] [address] [phone]                          I discussed with patient and/or family/caretaker that evaluation in the ED does not suggest any emergent or life threatening medical conditions requiring immediate intervention beyond what was provided in the ED, and I believe patient is safe for discharge. Regardless, an unremarkable evaluation in the ED does not preclude  the development or presence of a serious or life threatening condition. As such, patient was instructed to return immediately for any worsening or change in current symptoms.    Your test was POSITIVE for COVID-19 (coronavirus).       Por favor, aíslese en hart casa. Podrá salir de casa y/o regresar al trabajo cuando se cumplan las siguientes condiciones:    Si no ha sido hospitalizado/a y no está inmunodeprimido/a de forma moderada a grave:  Más de 5 días desde la aparición de los síntomas Y  Más de 24 horas sin fiebre sin medicamentos Y  Los síntomas hamilton zulay  Continúe usando trinh mascarilla cerca de otros giovany 5 días adicionales     Si ha sido hospitalizado/a O está inmunocomprometido/a de forma moderada a grave:  Más de 20 días desde la aparición de los síntomas  Más de 24 horas sin fiebre sin medicamentos  Los síntomas hamilton zulay     Si no tiene síntomas olivier obtiene un resultado positivo:  Más de 5 zoey desde la fecha de la primera prueba positiva (20 días si está inmunocomprometido/a de forma moderada a grave). Si desarrolla síntomas, utilice los lineamientos anteriores.  Continúe usando trinh mascarilla cerca de otros giovany 5 días adicionales.      Contact Tracing    As one of the next steps, you will receive a call or text from the [place] Department of Health ([facility]) COVID-19 Tracing Team. See the contact information below so you know not to ignore the health departments call. It is important that you contact them back immediately so they can help.      Contact Tracer Number:  [phone]  Caller ID for most carriers: [place] Dept Health     What is contact tracing?  Contact tracing is a process that helps identify everyone who has been in close contact with an infected person. Contact tracers let those people know they may have been exposed and guide them on next steps. Confidentiality is important for everyone; no one will be told who may have exposed them to the virus.  Your involvement is  important. The more we know about where and how this virus is spreading, the better chance we have at stopping it from spreading further.  What does exposure mean?  Exposure means you have been within 6 feet for more than 15 minutes with a person who has or had COVID-19.  What kind of questions do the contact tracers ask?  A contact tracer will confirm your basic contact information including name, address, phone number, and next of kin, as well as asking about any symptoms you may have had. Theyll also ask you how you think you may have gotten sick, such as places where you may have been exposed to the virus, and people you were with. Those names will never be shared with anyone outside of that call, and will only be used to help trace and stop the spread of the virus.   I have privacy concerns. How will the state use my information?  Your privacy about your health is important. All calls are completed using call centers that use the appropriate health privacy protection measures (HIPAA compliance), meaning that your patient information is safe. No one will ever ask you any questions related to immigration status. Your health comes first.   Do I have to participate?  You do not have to participate, but we strongly encourage you to. Contact tracing can help us catch and control new outbreaks as theyre developing to keep your friends and family safe.   What if I dont hear from anyone?  If you dont receive a call within 24 hours, you can call the number above right away to inquire about your status. That line is open from 8:00 am - 8:00 p.m., 7 days a week.  Contact tracing saves lives! Together, we have the power to beat this virus and keep our loved ones and neighbors safe.    For more information see CDC link below.      https://www.cdc.gov/coronavirus/2019-ncov/hcp/guidance-prevent-spread.html#precautions        Sources:  [facility], [facility] and [facility]           Sincerely,     Navid Walter  NP      MEDICAL DECISION MAKING                 CLINICAL IMPRESSION       ICD-10-CM ICD-9-CM   1. COVID-19 virus infection  U07.1 079.89   2. Hoarseness  R49.0 784.42   3. Generalized body aches  R52 780.96   4. Viral syndrome  B34.9 079.99       Disposition:   Disposition: Discharged  Condition: Stable         Navid Walter NP  02/07/24 0157

## 2024-02-16 ENCOUNTER — OUTPATIENT CASE MANAGEMENT (OUTPATIENT)
Dept: ADMINISTRATIVE | Facility: OTHER | Age: 89
End: 2024-02-16
Payer: MEDICARE

## 2024-02-22 ENCOUNTER — OUTPATIENT CASE MANAGEMENT (OUTPATIENT)
Dept: ADMINISTRATIVE | Facility: OTHER | Age: 89
End: 2024-02-22
Payer: MEDICARE

## 2024-02-29 NOTE — TELEPHONE ENCOUNTER
Patient Seen in: [facility] Emergency Department      History     Chief Complaint   Patient presents with    Allergic Rxn Allergies     Stated Complaint: allergic reaction, swelling of eyes, rash from neck to face    Subjective:   HPI    4-year-old female here for possible allergic reaction the patient has had some eczema in the past but mother noted rash throughout her body yesterday was somewhat itchy seem to fluctuated with worsened somewhat overnight about her face now is improved somewhat no respiratory difficulty no exposure to any possible allergens no previous allergic reactions.    Objective:   Past Medical History:   Diagnosis Date    Eczema               History reviewed. No pertinent surgical history.             Social History     Socioeconomic History    Marital status: Single   Tobacco Use    Smoking status: Never     Passive exposure: Yes   Substance and Sexual Activity    Alcohol use: Never    Drug use: Never   Social History Narrative    ** Merged History Encounter **                   Review of Systems    Positive for stated complaint: allergic reaction, swelling of eyes, rash from neck to face  Other systems are as noted in HPI.  Constitutional and vital signs reviewed.      All other systems reviewed and negative except as noted above.    Physical Exam     ED Triage Vitals [02/29/24 0804]   BP 91/62   Pulse 111   Resp 32   Temp 98.4 °F (36.9 °C)   Temp src Temporal   SpO2 100 %   O2 Device None (Room air)       Current:BP 91/62   Pulse 111   Temp 98.4 °F (36.9 °C) (Temporal)   Resp 32   Wt 14.2 kg   SpO2 100%         Physical Exam    Patient is alert no acute distress HEENT exam the eyes are normal the left tympanic membrane not well-visualized right tympanic membrane is normal neck is supple is no nuchal rigidity lymphadenopathy lungs are clear cardiovascular Deng shows regular rate and rhythm without murmurs abdomen is soft and nontender skin there is some scattered macular slightly  ----- Message from Jacob Sims MD sent at 10/30/2017 11:14 AM CDT -----  We discussed her case at tumor board. She doesn't need further surgery. Just needs repeat proctoscopy or high resolution anoscopy in 3 months. I'm seeing this week, will talk to her as well.  Thanks, Jacob    ----- Message -----  From: Lele King MD  Sent: 10/24/2017  12:09 PM  To: Jacob Sims MD    Patient follows in primary care clinic with me.  She had rectal polypectomy revealing neoplastic changes.  Not sure if the patient needs more urgent follow-up.  Pathology report indicates that margin may not have been entirely clear.  Wondering if you can review pathology and determine what might be the best next step or if you need to see the patient sooner than what was scheduled today.     raised blanching lesions consistent with urticaria about the legs slightly on the abdomen and scattered on the face.    ED Course   Labs Reviewed - No data to display                   MDM      Initial differential diagnosis includes urticaria nonspecific rash viral exanthem anaphylaxis    Patient has urticaria no clear etiology the patient was given Orapred and Benadryl and a prescription for Orapred for home advised to return if worse.                               Medical Decision Making      Disposition and Plan     Clinical Impression:  1. Allergic urticaria         Disposition:  Discharge  2/29/2024  8:55 am    Follow-up:  Mkai Coe MD  [address] [place] [address]  [phone]    Follow up in 3 day(s)            Medications Prescribed:  Current Discharge Medication List        START taking these medications    Details   prednisoLONE 3 MG/ML Oral Solution Take 4.7 mL (14.1 mg total) by mouth daily for 4 days.  Qty: 20 mL, Refills: 0

## 2024-03-27 ENCOUNTER — OFFICE VISIT (OUTPATIENT)
Dept: PRIMARY CARE CLINIC | Facility: CLINIC | Age: 89
End: 2024-03-27
Payer: MEDICARE

## 2024-03-27 VITALS
HEART RATE: 85 BPM | HEIGHT: 60 IN | TEMPERATURE: 98 F | DIASTOLIC BLOOD PRESSURE: 58 MMHG | OXYGEN SATURATION: 96 % | BODY MASS INDEX: 22.95 KG/M2 | WEIGHT: 116.88 LBS | SYSTOLIC BLOOD PRESSURE: 132 MMHG

## 2024-03-27 DIAGNOSIS — G25.81 RLS (RESTLESS LEGS SYNDROME): ICD-10-CM

## 2024-03-27 DIAGNOSIS — R73.03 PREDIABETES: ICD-10-CM

## 2024-03-27 DIAGNOSIS — N39.0 RECURRENT UTI: ICD-10-CM

## 2024-03-27 DIAGNOSIS — R30.0 DYSURIA: ICD-10-CM

## 2024-03-27 DIAGNOSIS — K44.9 HIATAL HERNIA WITH GERD: ICD-10-CM

## 2024-03-27 DIAGNOSIS — M25.512 ACUTE PAIN OF LEFT SHOULDER: Primary | ICD-10-CM

## 2024-03-27 DIAGNOSIS — I10 PRIMARY HYPERTENSION: ICD-10-CM

## 2024-03-27 DIAGNOSIS — E78.5 HYPERLIPIDEMIA, UNSPECIFIED HYPERLIPIDEMIA TYPE: ICD-10-CM

## 2024-03-27 DIAGNOSIS — K21.9 HIATAL HERNIA WITH GERD: ICD-10-CM

## 2024-03-27 PROCEDURE — 1101F PT FALLS ASSESS-DOCD LE1/YR: CPT | Mod: HCNC,CPTII,S$GLB, | Performed by: FAMILY MEDICINE

## 2024-03-27 PROCEDURE — 1159F MED LIST DOCD IN RCRD: CPT | Mod: HCNC,CPTII,S$GLB, | Performed by: FAMILY MEDICINE

## 2024-03-27 PROCEDURE — 1125F AMNT PAIN NOTED PAIN PRSNT: CPT | Mod: HCNC,CPTII,S$GLB, | Performed by: FAMILY MEDICINE

## 2024-03-27 PROCEDURE — 99999 PR PBB SHADOW E&M-EST. PATIENT-LVL IV: CPT | Mod: PBBFAC,HCNC,, | Performed by: FAMILY MEDICINE

## 2024-03-27 PROCEDURE — 99215 OFFICE O/P EST HI 40 MIN: CPT | Mod: HCNC,S$GLB,, | Performed by: FAMILY MEDICINE

## 2024-03-27 PROCEDURE — 3288F FALL RISK ASSESSMENT DOCD: CPT | Mod: HCNC,CPTII,S$GLB, | Performed by: FAMILY MEDICINE

## 2024-03-27 PROCEDURE — G2211 COMPLEX E/M VISIT ADD ON: HCPCS | Mod: HCNC,S$GLB,, | Performed by: FAMILY MEDICINE

## 2024-03-27 PROCEDURE — 1157F ADVNC CARE PLAN IN RCRD: CPT | Mod: HCNC,CPTII,S$GLB, | Performed by: FAMILY MEDICINE

## 2024-03-27 RX ORDER — METFORMIN HYDROCHLORIDE 500 MG/1
500 TABLET, EXTENDED RELEASE ORAL
Qty: 90 TABLET | Refills: 3 | Status: SHIPPED | OUTPATIENT
Start: 2024-03-27 | End: 2025-03-27

## 2024-03-27 RX ORDER — NITROFURANTOIN 25; 75 MG/1; MG/1
100 CAPSULE ORAL DAILY
Qty: 90 CAPSULE | Refills: 3 | Status: SHIPPED | OUTPATIENT
Start: 2024-03-27

## 2024-03-27 RX ORDER — ESOMEPRAZOLE MAGNESIUM 40 MG/1
40 CAPSULE, DELAYED RELEASE ORAL 2 TIMES DAILY
Qty: 180 CAPSULE | Refills: 3 | Status: SHIPPED | OUTPATIENT
Start: 2024-03-27

## 2024-03-27 RX ORDER — GABAPENTIN 300 MG/1
300 CAPSULE ORAL NIGHTLY
Qty: 90 CAPSULE | Refills: 3 | Status: SHIPPED | OUTPATIENT
Start: 2024-03-27 | End: 2025-03-27

## 2024-03-27 NOTE — PATIENT INSTRUCTIONS
Overall, everything looks pretty good today.      Orders placed for some screening blood work.  Let us do this before our next visit.  This way we can discuss the results together, in person.    For the heartburn, let us try increasing the omeprazole to twice a day.  New prescription sent to pharmacy.      For the shoulder, let us try seeing our sports medicine specialist, Dr. Mares.  Was able to schedule an appointment on Friday, April 12th at the [place] location.    For the legs, let us try using gabapentin at bedtime.  This should help calm them down and help you sleep.    Refills of other medicines have been sent to the pharmacy.  Please continue taking them, as directed.      Continue to eat a healthy diet.  Be careful with portion sizes.  Includes lots of fresh fruits, vegetables, whole grains, lean proteins.  See info below.    Keep hydrated.  Be sure to drink at least 8-10, 8 oz, glasses of water every day.    Stay active.  Try to do some sort of physical activity every day.  Nothing outrageous, just try walking for 10-15 minutes each day.

## 2024-03-27 NOTE — PROGRESS NOTES
"    [facility]       [address] [place] [address]      Phone: [phone]      Fax: [phone]    Javed Basilio MD                Office Visit  03/27/2024        Subjective      HPI:  Ghada Dave is a 88 y.o. female presents today in clinic for "Follow-up, Shoulder Pain, and Leg Pain (Cramping)  ."     88-year-old female presents today to discuss multiple issues.      Her son, Mr. Viera, is present with her.  He provides portions of the history, translation services.    Still having pains in her left shoulder.  We did x-ray of the shoulder last time that showed some arthritic changes.  Was recommended that she see Dr. Mares for possible joint injection.  Now, they are interested in seeing him for it.      Still getting pains in her legs, feet.  Describes them as pins and needles sensations, sometimes crampy.  They make her toes curl up.  These are worse at night.    Also has anxiety, sleep issues.  Has been taking Ativan, but states it does not help.  Took Xanax in the past, thought that helped better.  Tried Seroquel but it made her feel funny.    She has prediabetes.  Takes metformin 500 mg daily.  No issues with this medication.    Has hypertension.  Blood pressure has been under great control without medication.    Has GERD/reflux.  Had surgery for hiatal hernia and initially, symptoms improved.  Then, a few weeks later, they came back.  Has followed up with the surgeon who has recommended repeating GI/EGD.  Has not been able to schedule these because of her son's work schedule.  Getting good relief with Nexium.    PMH: HTN.  HLD.  Pre DM.  CAD.  Hiatal hernia/GERD.  Anxiety.  Recurrent bladder issues.    PSH: Right shoulder x2.    Allergies:  Statins cause rash.  Penicillin causes rash.  Keflex causes rash.    Social:  Disabled.  Lives with her son.    T: Denies  A:  Denies   D:  Denies     Exercise:  Walks frequently.  Very active at " home.      Colon:  06/17/2021    MMG:  Was told she could discontinue these.      Podiatry: Dr. Nowak        The following were updated and reviewed by myself in the chart: medications, past medical history, past surgical history, family history, social history, and allergies.     Medications:  Current Outpatient Medications on File Prior to Visit   Medication Sig Dispense Refill    b complex vitamins capsule Take 1 capsule by mouth once daily.      triamcinolone acetonide 0.1% (KENALOG) 0.1 % ointment Apply topically 2 (two) times daily as needed (itching). 30 g 3    [DISCONTINUED] esomeprazole (NEXIUM) 40 MG capsule Take 1 capsule (40 mg total) by mouth before breakfast. 90 capsule 3    [DISCONTINUED] metFORMIN (GLUCOPHAGE-XR) 500 MG ER 24hr tablet Take 1 tablet (500 mg total) by mouth daily with breakfast. 90 tablet 3    [DISCONTINUED] nitrofurantoin, macrocrystal-monohydrate, (MACROBID) 100 MG capsule Take 1 capsule (100 mg total) by mouth once daily. 90 capsule 3    [DISCONTINUED] QUEtiapine (SEROQUEL) 25 MG Tab Take 1 tablet (25 mg total) by mouth every evening. 90 tablet 3     No current facility-administered medications on file prior to visit.        PMHx:  Past Medical History:   Diagnosis Date    Abnormal Pap smear 2013    Acidosis     CAD (coronary artery disease) 06/2017     iFR of LAD and RCA performed confirmed nonobstructive disease.    Cataract     Colon polyps     Disorder of kidney and ureter     Frequent urination     General anesthetics causing adverse effect in therapeutic use     Hemorrhoid     High cholesterol     Poor circulation     Postoperative abdominal pain     Psoriasis     Unilateral inguinal hernia, without obstruction or gangrene, not specified as recurrent 08/01/2023    Varicose vein       Patient Active Problem List    Diagnosis Date Noted    Hiatal hernia with GERD 08/01/2023    Hypercholesterolemia 12/18/2021    Hx of colonoscopy 06/17/2021    Decreased strength 03/22/2021     Decreased ROM of right shoulder 03/22/2021    Nontraumatic complete tear of right rotator cuff 12/03/2020    Prediabetes 11/04/2020    Chronic right shoulder pain 11/04/2020    Arthropathies in other specified diseases classified elsewhere, vertebrae  11/04/2020    Age-related osteoporosis without fracture 11/04/2020    Osteoarthritis of multiple joints 11/04/2020    Gastroesophageal reflux disease without esophagitis 11/04/2020    Anal polyp 10/18/2019    SOB (shortness of breath) 03/21/2019    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    Cerumen debris on tympanic membrane of both ears 03/05/2018    Stenosis of carotid artery 03/05/2018    Nausea 03/05/2018    Essential (primary) hypertension 03/05/2018    Dizziness 03/05/2018    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), 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    Lichen simplex chronicus of the heel 07/12/2017    Non-occlusive coronary artery disease 07/06/2017    Psoriasis 07/06/2017    Chronic midline low back pain without sciatica 07/06/2017    Scoliosis of thoracolumbar spine 07/06/2017    Right subscapular pain 07/06/2017    Insomnia 07/06/2017    Overflow incontinence 07/06/2017    Urinary retention with incomplete bladder emptying 07/06/2017    Non-cardiac chest pain 06/22/2017    Abnormal stress test 06/22/2017    Anal or rectal pain 06/05/2017    CAD (coronary artery disease) 06/01/2017    Pseudophakia 04/17/2017    Right posterior capsular opacification 04/17/2017    Left lower quadrant pain 05/17/2016    Personal history of colonic polyps 05/12/2015    Leukopenia 04/15/2015    Post herpetic neuralgia 12/31/2014    Heartburn 11/18/2014    Diverticulosis 10/27/2014    Ureteral stone 06/03/2014    Rectal polyp 09/12/2013    Urge incontinence 07/08/2013        PSHx:  Past Surgical History:   Procedure Laterality Date    CARDIAC CATHETERIZATION  06/2017     iFR of LAD and RCA performed confirmed nonobstructive disease.    CATARACT EXTRACTION W/  INTRAOCULAR LENS  IMPLANT Bilateral     COLONOSCOPY N/A 08/28/2017    Procedure: COLONOSCOPY;  Surgeon: Bertram Myers MD;  Location: [facility] ENDO;  Service: Endoscopy;  Laterality: N/A;    COLONOSCOPY N/A 10/18/2019    Procedure: COLONOSCOPY;  Surgeon: Wilfred Barber MD;  Location: [facility] ENDO (4TH FLR);  Service: Endoscopy;  Laterality: N/A;   needed-BB  Cardiology Clearance received from Dr. REBECA Escalona, see telephone encounter 10/1/19-BB    COLONOSCOPY N/A 06/17/2021    Procedure: COLONOSCOPY suprep Past positive;  Surgeon: Lele Barillas MD;  Location: [facility] ENDO;  Service: Endoscopy;  Laterality: N/A;  past positive    COLONOSCOPY W/ BIOPSIES  06/17/2021    ESOPHAGOGASTRODUODENOSCOPY N/A 06/23/2023    Procedure: EGD (ESOPHAGOGASTRODUODENOSCOPY);  Surgeon: Seun Burt MD;  Location: [facility];  Service: Endoscopy;  Laterality: N/A;    EXCISION OF BURSA  03/05/2021    Procedure: BURSECTOMY;  Surgeon: Jason Alicea Jr., MD;  Location: [facility] OR;  Service: Orthopedics;;    HIATAL HERNIA REPAIR N/A 08/01/2023    LEFT HEART CATHETERIZATION N/A 04/08/2019    Procedure: Left heart cath;  Surgeon: Renato Escalona MD;  Location: [facility] CATH LAB/EP;  Service: Cardiology;  Laterality: N/A;    POLYPECTOMY      ROBOT-ASSISTED NISSEN FUNDOPLICATION USING DA ANTOINE XI N/A 08/01/2023    Procedure: XI ROBOTIC FUNDOPLICATION, NISSEN;  Surgeon: Elvis Malcolm MD;  Location: [facility] OR;  Service: General;  Laterality: N/A;    ROBOT-ASSISTED REPAIR OF HIATAL HERNIA USING DA ANTOINE XI N/A 08/01/2023    Procedure: XI ROBOTIC REPAIR, HERNIA, HIATAL;  Surgeon: Elvis Malcolm MD;  Location: [facility] OR;  Service: General;  Laterality: N/A;    SHOULDER ARTHROSCOPY Right 03/05/2021    Procedure: ARTHROSCOPY, SHOULDER;  Surgeon: Jason Alicea Jr., MD;  Location: [facility] OR;  Service: Orthopedics;  Laterality: Right;  need opus system Confucianism   video  (Confucianism notified 3/1/21, CC)  [place] confirmed 3/4/21 [place]    YAG Laser  Capsulotomy Right 04/17/2017    Dr. Chavez        FHx:  Family History   Problem Relation Age of Onset    Kidney disease Mother     Diabetes Mother     Glaucoma Neg Hx     Macular degeneration Neg Hx     Strabismus Neg Hx     Retinal detachment Neg Hx     Amblyopia Neg Hx     Blindness Neg Hx     Cataracts Neg Hx     Prostate cancer Neg Hx     Anesthesia problems Neg Hx         Social:  Social History     Socioeconomic History    Marital status: Single   Tobacco Use    Smoking status: Never     Passive exposure: Never    Smokeless tobacco: Never    Tobacco comments:     Never smoked   Substance and Sexual Activity    Alcohol use: Never    Drug use: Never    Sexual activity: Never   Social History Narrative    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      Social Determinants of Health     Financial Resource Strain: Low Risk  (8/10/2023)    Overall Financial Resource Strain (CARDIA)     Difficulty of Paying Living Expenses: Not hard at all   Food Insecurity: No Food Insecurity (8/10/2023)    Hunger Vital Sign     Worried About Running Out of Food in the Last Year: Never true     Ran Out of Food in the Last Year: Never true   Transportation Needs: Unmet Transportation Needs (8/10/2023)    PRAPARE - Transportation     Lack of Transportation (Medical): Yes     Lack of Transportation (Non-Medical): No   Physical Activity: Inactive (8/10/2023)    Exercise Vital Sign     Days of Exercise per Week: 0 days     Minutes of Exercise per Session: 0 min   Stress: No Stress Concern Present (8/10/2023)    [employer] - Occupational Stress Questionnaire     Feeling of Stress : Not at all   Social Connections: Unknown (8/10/2023)    Social Connection and Isolation Panel [NHANES]     Frequency of Communication with Friends and Family: Twice a week     Frequency of Social Gatherings with Friends and Family: Once a week     Attends Taoist Services: More than 4 times per year      Active Member of Clubs or Organizations: No     Attends Club or Organization Meetings: Never   Housing Stability: Low Risk  (8/10/2023)    Housing Stability Vital Sign     Unable to Pay for Housing in the Last Year: No     Number of Places Lived in the Last Year: 2     Unstable Housing in the Last Year: No        Allergies:  Review of patient's allergies indicates:   Allergen Reactions    Penicillin Anaphylaxis    Lipitor [atorvastatin] Hives     Can take Simivastatin .     Keflex [cephalexin] Rash        ROS:  Review of Systems   Constitutional:  Negative for activity change, appetite change, chills and fever.   HENT:  Negative for congestion, postnasal drip, rhinorrhea, sore throat and trouble swallowing.    Respiratory:  Negative for cough, shortness of breath and wheezing.    Cardiovascular:  Negative for chest pain and palpitations.   Gastrointestinal:  Negative for abdominal pain, constipation, diarrhea, nausea and vomiting.   Genitourinary:  Negative for difficulty urinating.   Musculoskeletal:  Positive for arthralgias and myalgias.   Skin:  Negative for color change and rash.   Neurological:  Negative for speech difficulty and headaches.   All other systems reviewed and are negative.         Objective      BP (!) 132/58   Pulse 85   Temp 97.5 °F (36.4 °C)   Ht 5' (1.524 m)   Wt 53 kg (116 lb 13.5 oz)   SpO2 96%   BMI 22.82 kg/m²   Ht Readings from Last 3 Encounters:   03/27/24 5' (1.524 m)   11/27/23 5' (1.524 m)   09/13/23 5' (1.524 m)     Wt Readings from Last 3 Encounters:   03/27/24 53 kg (116 lb 13.5 oz)   02/06/24 53.1 kg (117 lb 1 oz)   11/27/23 52.9 kg (116 lb 10 oz)       PHYSICAL EXAM:  Physical Exam  Vitals and nursing note reviewed.   Constitutional:       General: She is not in acute distress.     Appearance: Normal appearance.   HENT:      Head: Normocephalic and atraumatic.      Right Ear: Tympanic membrane, ear canal and external ear normal.      Left Ear: Tympanic membrane, ear canal  and external ear normal.      Nose: Nose normal. No congestion or rhinorrhea.      Mouth/Throat:      Mouth: Mucous membranes are moist.      Pharynx: Oropharynx is clear. No oropharyngeal exudate or posterior oropharyngeal erythema.   Eyes:      Extraocular Movements: Extraocular movements intact.      Conjunctiva/sclera: Conjunctivae normal.      Pupils: Pupils are equal, round, and reactive to light.   Cardiovascular:      Rate and Rhythm: Normal rate and regular rhythm.   Pulmonary:      Effort: Pulmonary effort is normal. No respiratory distress.      Breath sounds: No wheezing, rhonchi or rales.   Musculoskeletal:         General: Normal range of motion.      Cervical back: Normal range of motion.   Lymphadenopathy:      Cervical: No cervical adenopathy.   Skin:     General: Skin is warm and dry.      Findings: No rash.   Neurological:      Mental Status: She is alert.              LABS / IMAGING:  Recent Results (from the past 4368 hour(s))   COVID-19 Rapid Screening    Collection Time: 02/06/24  7:53 PM   Result Value Ref Range    SARS-CoV-2 RNA, Amplification, Qual Positive (A) Negative   Influenza A & B by Molecular    Collection Time: 02/06/24  7:53 PM    Specimen: Nasopharyngeal Swab   Result Value Ref Range    Influenza A, Molecular Negative Negative    Influenza B, Molecular Negative Negative    Flu A & B Source Nasal swab    Group A Strep, Molecular    Collection Time: 02/06/24  7:53 PM    Specimen: Throat   Result Value Ref Range    Group A Strep, Molecular Negative Negative   HIV 1/2 Ag/Ab (4th Gen)    Collection Time: 02/06/24  8:20 PM   Result Value Ref Range    HIV 1/2 Ag/Ab Negative Negative   Hepatitis C Antibody    Collection Time: 02/06/24  8:20 PM   Result Value Ref Range    Hepatitis C Ab Negative Negative   HCV Virus Hold Specimen    Collection Time: 02/06/24  8:20 PM   Result Value Ref Range    HEP C Virus Hold Specimen Hold for HCV sendout          Assessment    1. Acute pain of left  shoulder    2. Hiatal hernia with GERD    3. Prediabetes    4. Dysuria    5. Recurrent UTI    6. RLS (restless legs syndrome)    7. Hyperlipidemia, unspecified hyperlipidemia type    8. Primary hypertension          Plan    Ghada was seen today for follow-up, shoulder pain and leg pain.    Diagnoses and all orders for this visit:    Acute pain of left shoulder    Hiatal hernia with GERD  -     esomeprazole (NEXIUM) 40 MG capsule; Take 1 capsule (40 mg total) by mouth 2 (two) times daily.    Prediabetes  -     metFORMIN (GLUCOPHAGE-XR) 500 MG ER 24hr tablet; Take 1 tablet (500 mg total) by mouth daily with breakfast.  -     Hemoglobin A1C; Future    Dysuria  -     nitrofurantoin, macrocrystal-monohydrate, (MACROBID) 100 MG capsule; Take 1 capsule (100 mg total) by mouth once daily.    Recurrent UTI  -     nitrofurantoin, macrocrystal-monohydrate, (MACROBID) 100 MG capsule; Take 1 capsule (100 mg total) by mouth once daily.    RLS (restless legs syndrome)  -     gabapentin (NEURONTIN) 300 MG capsule; Take 1 capsule (300 mg total) by mouth every evening.    Hyperlipidemia, unspecified hyperlipidemia type  -     Lipid Panel; Future    Primary hypertension  -     Lipid Panel; Future  -     TSH; Future  -     Comprehensive Metabolic Panel; Future  -     CBC Auto Differential; Future    Physically, everything looks great.      Orders for screening blood work to be done prior to next visit.    Schedule her an appointment to see Dr. Mares in April to look at the shoulder.  If he thinks the shoulder looks okay, we can then have her see Dr. LUNA to look at the neck.      For the leg pains, will have her try gabapentin at bedtime.  This may help with the restless leg type symptoms and/or the cramps.  She could also try some magnesium supplements.      FOLLOW-UP:  Follow up in about 6 months (around 9/27/2024) for check up, labs 1 week prior.    I spent a total of 45 minutes face to face and non-face to face on the date of this  visit.This includes time preparing to see the patient (eg, review of tests, notes), obtaining and/or reviewing additional history from an independent historian and/or outside medical records, documenting clinical information in the electronic health record, independently interpreting results and/or communicating results to the patient/family/caregiver, or care coordinator.  Visit today included increased complexity associated with the care of the episodic problem addressed and managing the longitudinal care of the patient due to the serious and/or complex managed problem(s).    Signed by:  Javed Basilio MD

## 2024-06-22 ENCOUNTER — HOSPITAL ENCOUNTER (EMERGENCY)
Facility: HOSPITAL | Age: 89
Discharge: HOME | End: 2024-06-22
Attending: EMERGENCY MEDICINE
Payer: MEDICARE

## 2024-06-22 VITALS
SYSTOLIC BLOOD PRESSURE: 160 MMHG | TEMPERATURE: 98 F | OXYGEN SATURATION: 100 % | RESPIRATION RATE: 18 BRPM | DIASTOLIC BLOOD PRESSURE: 55 MMHG | WEIGHT: 116.38 LBS | HEART RATE: 70 BPM | BODY MASS INDEX: 22.73 KG/M2

## 2024-06-22 DIAGNOSIS — R10.30 LOWER ABDOMINAL PAIN: ICD-10-CM

## 2024-06-22 DIAGNOSIS — K59.00 CONSTIPATION, UNSPECIFIED CONSTIPATION TYPE: Primary | ICD-10-CM

## 2024-06-22 LAB
ALBUMIN SERPL BCP-MCNC: 3.7 G/DL (ref 3.5–5.2)
ALP SERPL-CCNC: 68 U/L (ref 55–135)
ALT SERPL W/O P-5'-P-CCNC: 12 U/L (ref 10–44)
ANION GAP SERPL CALC-SCNC: 10 MMOL/L (ref 8–16)
AST SERPL-CCNC: 18 U/L (ref 10–40)
BASOPHILS # BLD AUTO: 0.07 K/UL (ref 0–0.2)
BASOPHILS NFR BLD: 1.9 % (ref 0–1.9)
BILIRUB SERPL-MCNC: 0.4 MG/DL (ref 0.1–1)
BILIRUB UR QL STRIP: NEGATIVE
BUN SERPL-MCNC: 15 MG/DL (ref 8–23)
CALCIUM SERPL-MCNC: 9.4 MG/DL (ref 8.7–10.5)
CHLORIDE SERPL-SCNC: 108 MMOL/L (ref 95–110)
CLARITY UR: CLEAR
CO2 SERPL-SCNC: 22 MMOL/L (ref 23–29)
COLOR UR: YELLOW
CREAT SERPL-MCNC: 0.8 MG/DL (ref 0.5–1.4)
DIFFERENTIAL METHOD BLD: ABNORMAL
EOSINOPHIL # BLD AUTO: 0.1 K/UL (ref 0–0.5)
EOSINOPHIL NFR BLD: 2.9 % (ref 0–8)
ERYTHROCYTE [DISTWIDTH] IN BLOOD BY AUTOMATED COUNT: 13.5 % (ref 11.5–14.5)
EST. GFR  (NO RACE VARIABLE): >60 ML/MIN/1.73 M^2
GLUCOSE SERPL-MCNC: 103 MG/DL (ref 70–110)
GLUCOSE UR QL STRIP: NEGATIVE
HCT VFR BLD AUTO: 35.4 % (ref 37–48.5)
HGB BLD-MCNC: 12.2 G/DL (ref 12–16)
HGB UR QL STRIP: ABNORMAL
IMM GRANULOCYTES # BLD AUTO: 0.01 K/UL (ref 0–0.04)
IMM GRANULOCYTES NFR BLD AUTO: 0.3 % (ref 0–0.5)
KETONES UR QL STRIP: NEGATIVE
LEUKOCYTE ESTERASE UR QL STRIP: NEGATIVE
LIPASE SERPL-CCNC: 14 U/L (ref 4–60)
LYMPHOCYTES # BLD AUTO: 1.1 K/UL (ref 1–4.8)
LYMPHOCYTES NFR BLD: 30.2 % (ref 18–48)
MCH RBC QN AUTO: 30.3 PG (ref 27–31)
MCHC RBC AUTO-ENTMCNC: 34.5 G/DL (ref 32–36)
MCV RBC AUTO: 88 FL (ref 82–98)
MONOCYTES # BLD AUTO: 0.4 K/UL (ref 0.3–1)
MONOCYTES NFR BLD: 10.6 % (ref 4–15)
NEUTROPHILS # BLD AUTO: 2.1 K/UL (ref 1.8–7.7)
NEUTROPHILS NFR BLD: 54.1 % (ref 38–73)
NITRITE UR QL STRIP: NEGATIVE
NRBC BLD-RTO: 0 /100 WBC
PH UR STRIP: 6 [PH] (ref 5–8)
PLATELET # BLD AUTO: 260 K/UL (ref 150–450)
PMV BLD AUTO: 8.8 FL (ref 9.2–12.9)
POTASSIUM SERPL-SCNC: 4.3 MMOL/L (ref 3.5–5.1)
PROT SERPL-MCNC: 7.4 G/DL (ref 6–8.4)
PROT UR QL STRIP: NEGATIVE
RBC # BLD AUTO: 4.02 M/UL (ref 4–5.4)
SODIUM SERPL-SCNC: 140 MMOL/L (ref 136–145)
SP GR UR STRIP: 1.01 (ref 1–1.03)
URN SPEC COLLECT METH UR: ABNORMAL
UROBILINOGEN UR STRIP-ACNC: NEGATIVE EU/DL
WBC # BLD AUTO: 3.78 K/UL (ref 3.9–12.7)

## 2024-06-22 PROCEDURE — 80053 COMPREHEN METABOLIC PANEL: CPT | Mod: HCNC | Performed by: EMERGENCY MEDICINE

## 2024-06-22 PROCEDURE — 83690 ASSAY OF LIPASE: CPT | Mod: HCNC | Performed by: EMERGENCY MEDICINE

## 2024-06-22 PROCEDURE — 81003 URINALYSIS AUTO W/O SCOPE: CPT | Mod: HCNC | Performed by: EMERGENCY MEDICINE

## 2024-06-22 PROCEDURE — 99285 EMERGENCY DEPT VISIT HI MDM: CPT | Mod: 25,HCNC

## 2024-06-22 PROCEDURE — 25500020 PHARM REV CODE 255: Mod: HCNC | Performed by: EMERGENCY MEDICINE

## 2024-06-22 PROCEDURE — 85025 COMPLETE CBC W/AUTO DIFF WBC: CPT | Mod: HCNC | Performed by: EMERGENCY MEDICINE

## 2024-06-22 RX ORDER — QUETIAPINE FUMARATE 25 MG/1
25 TABLET, FILM COATED ORAL NIGHTLY
COMMUNITY
Start: 2024-05-25

## 2024-06-22 RX ORDER — POLYETHYLENE GLYCOL 3350 17 G/17G
17 POWDER, FOR SOLUTION ORAL DAILY
Qty: 14 EACH | Refills: 0 | Status: SHIPPED | OUTPATIENT
Start: 2024-06-22

## 2024-06-22 RX ADMIN — IOHEXOL 100 ML: 350 INJECTION, SOLUTION INTRAVENOUS at 12:06

## 2024-06-22 NOTE — ED PROVIDER NOTES
"SCRIBE #1 NOTE: I, Bertrand Yolanda, am scribing for, and in the presence of, Savanah Olsen MD. I have scribed the entire note.       History     Chief Complaint   Patient presents with    Abdominal Pain     Points to left lower quadrant, states pain for weeks. States always has blood in urine. Denies dysuria. Denies medical problems. LBM yesterday, denies blood in stool.  Postmenopausal. Denies N/V/D.      Review of patient's allergies indicates:   Allergen Reactions    Penicillin Anaphylaxis    Lipitor [atorvastatin] Hives     Can take Simivastatin .     Keflex [cephalexin] Rash         History of Present Illness     HPI    6/22/2024, 11:44 AM  History obtained from the patient and son      History of Present Illness: Ghada Dave is a 88 y.o. female patient with a PMHx of  who presents to the Emergency Department for evaluation of left abd pain which onset 3 weeks ago. Pt's son reports pt experiences left abdominal pain when laying down and a "lump" to the left abdomen when standing. Pt is on microbid prophylaxis for UTI prevention. Pt denies any constipation or diarrhea. Pt's son reports no hx of HTN but a hx of mild spinal rotation. No further complaints or concerns at this time.       Arrival mode: Personal vehicle    PCP: Javed Basilio MD        Past Medical History:  Past Medical History:   Diagnosis Date    Abnormal Pap smear 2013    Acidosis     CAD (coronary artery disease) 06/2017     iFR of LAD and RCA performed confirmed nonobstructive disease.    Cataract     Colon polyps     Disorder of kidney and ureter     Frequent urination     General anesthetics causing adverse effect in therapeutic use     Hemorrhoid     High cholesterol     Poor circulation     Postoperative abdominal pain     Psoriasis     Unilateral inguinal hernia, without obstruction or gangrene, not specified as recurrent 08/01/2023    Varicose vein        Past Surgical History:  Past Surgical History: "   Procedure Laterality Date    CARDIAC CATHETERIZATION  06/2017     iFR of LAD and RCA performed confirmed nonobstructive disease.    CATARACT EXTRACTION W/  INTRAOCULAR LENS IMPLANT Bilateral     COLONOSCOPY N/A 08/28/2017    Procedure: COLONOSCOPY;  Surgeon: Bertram Myers MD;  Location: [facility] ENDO;  Service: Endoscopy;  Laterality: N/A;    COLONOSCOPY N/A 10/18/2019    Procedure: COLONOSCOPY;  Surgeon: Wilfred Barber MD;  Location: [facility] ENDO (4TH FLR);  Service: Endoscopy;  Laterality: N/A;   needed-BB  Cardiology Clearance received from Dr. REBECA Escalona, see telephone encounter 10/1/19-BB    COLONOSCOPY N/A 06/17/2021    Procedure: COLONOSCOPY suprep Past positive;  Surgeon: Lele Barillas MD;  Location: [facility];  Service: Endoscopy;  Laterality: N/A;  past positive    COLONOSCOPY W/ BIOPSIES  06/17/2021    ESOPHAGOGASTRODUODENOSCOPY N/A 06/23/2023    Procedure: EGD (ESOPHAGOGASTRODUODENOSCOPY);  Surgeon: Seun Burt MD;  Location: [facility];  Service: Endoscopy;  Laterality: N/A;    EXCISION OF BURSA  03/05/2021    Procedure: BURSECTOMY;  Surgeon: Jason Alicea Jr., MD;  Location: [facility] OR;  Service: Orthopedics;;    HIATAL HERNIA REPAIR N/A 08/01/2023    LEFT HEART CATHETERIZATION N/A 04/08/2019    Procedure: Left heart cath;  Surgeon: Renato Escalona MD;  Location: [facility] CATH LAB/EP;  Service: Cardiology;  Laterality: N/A;    POLYPECTOMY      ROBOT-ASSISTED NISSEN FUNDOPLICATION USING DA ANTOINE XI N/A 08/01/2023    Procedure: XI ROBOTIC FUNDOPLICATION, NISSEN;  Surgeon: Elvis Malcolm MD;  Location: [facility];  Service: General;  Laterality: N/A;    ROBOT-ASSISTED REPAIR OF HIATAL HERNIA USING DA ANTOINE XI N/A 08/01/2023    Procedure: XI ROBOTIC REPAIR, HERNIA, HIATAL;  Surgeon: Elvis Malcolm MD;  Location: [facility] OR;  Service: General;  Laterality: N/A;    SHOULDER ARTHROSCOPY Right 03/05/2021    Procedure: ARTHROSCOPY, SHOULDER;  Surgeon: Jason Alicea Jr., MD;   Location: [facility] OR;  Service: Orthopedics;  Laterality: Right;  need opus system Jew   video  (Jew notified 3/1/21, CC)  Kulwinder confirmed 3/4/21 [place]    YAG Laser Capsulotomy Right 04/17/2017    Dr. Chavez         Family History:  Family History   Problem Relation Name Age of Onset    Kidney disease Mother Ghada Flanagan     Diabetes Mother Ghada Flanagan     Glaucoma Neg Hx      Macular degeneration Neg Hx      Strabismus Neg Hx      Retinal detachment Neg Hx      Amblyopia Neg Hx      Blindness Neg Hx      Cataracts Neg Hx      Prostate cancer Neg Hx      Anesthesia problems Neg Hx         Social History:  Social History     Tobacco Use    Smoking status: Never     Passive exposure: Never    Smokeless tobacco: Never    Tobacco comments:     Never smoked   Substance and Sexual Activity    Alcohol use: Never    Drug use: Never    Sexual activity: Never        Review of Systems     Review of Systems   Constitutional:  Negative for fever.   HENT:  Negative for sore throat.    Respiratory:  Negative for shortness of breath.    Cardiovascular:  Negative for chest pain.   Gastrointestinal:  Positive for abdominal pain (left sided). Negative for constipation, diarrhea and nausea.   Musculoskeletal:  Negative for back pain.   Skin:  Negative for rash.   Neurological:  Negative for weakness.   Hematological:  Does not bruise/bleed easily.   All other systems reviewed and are negative.       Physical Exam     Initial Vitals [06/22/24 1034]   BP Pulse Resp Temp SpO2   (!) 144/89 80 16 98.4 °F (36.9 °C) 96 %      MAP       --          Physical Exam  Nursing Notes and Vital Signs Reviewed.  Constitutional: Patient is in no acute distress. Elderly and frail.  Head: Atraumatic. Normocephalic.  Eyes: EOM intact. Conjunctivae are not pale. No scleral icterus.  ENT: Mucous membranes are moist.    Neck: Supple. Full ROM.   Cardiovascular: Regular rate. Regular rhythm. No murmurs, rubs, or gallops.    Pulmonary/Chest: No respiratory distress. Clear to auscultation bilaterally. No wheezing or rales.  Abdominal: Soft and non-distended.  There is no tenderness.  No rebound, guarding, or rigidity. No obvious inguinal hernia.No palpable masses.  Musculoskeletal: Moves all extremities. No obvious deformities. No edema.   Skin: Warm and dry.  Neurological:  Alert, awake, and appropriate. No acute focal neurological deficits are appreciated.  Psychiatric: Good eye contact. Appropriate in content.     ED Course   Procedures  ED Vital Signs:  Vitals:    06/22/24 1034 06/22/24 1130 06/22/24 1332   BP: (!) 144/89  (!) 160/55   Pulse: 80 72 70   Resp: 16 18 18   Temp: 98.4 °F (36.9 °C)  98.2 °F (36.8 °C)   TempSrc: Oral  Oral   SpO2: 96% 98% 100%   Weight: 52.8 kg (116 lb 6.5 oz)         Abnormal Lab Results:  Labs Reviewed   CBC W/ AUTO DIFFERENTIAL - Abnormal; Notable for the following components:       Result Value    WBC 3.78 (*)     Hematocrit 35.4 (*)     MPV 8.8 (*)     All other components within normal limits   COMPREHENSIVE METABOLIC PANEL - Abnormal; Notable for the following components:    CO2 22 (*)     All other components within normal limits   URINALYSIS, REFLEX TO URINE CULTURE - Abnormal; Notable for the following components:    Occult Blood UA Trace (*)     All other components within normal limits    Narrative:     Specimen Source->Urine   LIPASE        All Lab Results:  Results for orders placed or performed during the hospital encounter of 06/22/24   CBC W/ AUTO DIFFERENTIAL   Result Value Ref Range    WBC 3.78 (L) 3.90 - 12.70 K/uL    RBC 4.02 4.00 - 5.40 M/uL    Hemoglobin 12.2 12.0 - 16.0 g/dL    Hematocrit 35.4 (L) 37.0 - 48.5 %    MCV 88 82 - 98 fL    MCH 30.3 27.0 - 31.0 pg    MCHC 34.5 32.0 - 36.0 g/dL    RDW 13.5 11.5 - 14.5 %    Platelets 260 150 - 450 K/uL    MPV 8.8 (L) 9.2 - 12.9 fL    Immature Granulocytes 0.3 0.0 - 0.5 %    Gran # (ANC) 2.1 1.8 - 7.7 K/uL    Immature Grans (Abs) 0.01 0.00  - 0.04 K/uL    Lymph # 1.1 1.0 - 4.8 K/uL    Mono # 0.4 0.3 - 1.0 K/uL    Eos # 0.1 0.0 - 0.5 K/uL    Baso # 0.07 0.00 - 0.20 K/uL    nRBC 0 0 /100 WBC    Gran % 54.1 38.0 - 73.0 %    Lymph % 30.2 18.0 - 48.0 %    Mono % 10.6 4.0 - 15.0 %    Eosinophil % 2.9 0.0 - 8.0 %    Basophil % 1.9 0.0 - 1.9 %    Differential Method Automated    Comp. Metabolic Panel   Result Value Ref Range    Sodium 140 136 - 145 mmol/L    Potassium 4.3 3.5 - 5.1 mmol/L    Chloride 108 95 - 110 mmol/L    CO2 22 (L) 23 - 29 mmol/L    Glucose 103 70 - 110 mg/dL    BUN 15 8 - 23 mg/dL    Creatinine 0.8 0.5 - 1.4 mg/dL    Calcium 9.4 8.7 - 10.5 mg/dL    Total Protein 7.4 6.0 - 8.4 g/dL    Albumin 3.7 3.5 - 5.2 g/dL    Total Bilirubin 0.4 0.1 - 1.0 mg/dL    Alkaline Phosphatase 68 55 - 135 U/L    AST 18 10 - 40 U/L    ALT 12 10 - 44 U/L    eGFR >60 >60 mL/min/1.73 m^2    Anion Gap 10 8 - 16 mmol/L   Lipase   Result Value Ref Range    Lipase 14 4 - 60 U/L   Urinalysis, Reflex to Urine Culture Urine, Clean Catch    Specimen: Urine   Result Value Ref Range    Specimen UA Urine, Clean Catch     Color, UA Yellow Yellow, Straw, Lucretia    Appearance, UA Clear Clear    pH, UA 6.0 5.0 - 8.0    Specific Gravity, UA 1.010 1.005 - 1.030    Protein, UA Negative Negative    Glucose, UA Negative Negative    Ketones, UA Negative Negative    Bilirubin (UA) Negative Negative    Occult Blood UA Trace (A) Negative    Nitrite, UA Negative Negative    Urobilinogen, UA Negative <2.0 EU/dL    Leukocytes, UA Negative Negative         Imaging Results:  Imaging Results              CT Abdomen Pelvis With IV Contrast NO Oral Contrast (Final result)  Result time 06/22/24 13:05:32      Final result by Olga Mon MD (Timothy) (06/22/24 13:05:32)                   Impression:      No acute findings.  Moderate amount of stool throughout the colon which could reflect constipation.    Large right lobe liver cyst.  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    All CT  scans at this facility use dose modulation, iterative reconstructions, and/or weight base dosing when appropriate to reduce radiation dose to as low as reasonably achievable      Electronically signed by: Olga Mon MD  Date:    06/22/2024  Time:    13:05               Narrative:    EXAMINATION:  CT ABDOMEN PELVIS WITH IV CONTRAST    CLINICAL HISTORY:  LLQ pain;    TECHNIQUE:  Postcontrast images were obtained    COMPARISON:  None    FINDINGS:  Lung bases are clear    No liver masses.  4.7 x 3.3 cm right lobe liver cyst.  No focal pancreatic lesions.    The gallbladder is unremarkable.  There is no bile duct dilatation.    No renal masses.  No evidence of obstruction.  The right kidney appears to be ectopically positioned in the right pelvis.    The aorta and inferior vena cava are unremarkable.  Atherosclerosis of the abdominal aorta.  No evidence of aneurysm.    No evidence of bowel obstruction.  Normal appendix.  No evidence of diverticulitis.  Moderate stool throughout the colon which could reflect constipation.  A    Bladder is normal. No abnormal masses or fluid collections in the pelvis.    Scoliosis.  Multilevel moderate degenerative changes of the thoracolumbar spine.  No acute skeletal findings.                                       X-Ray Abdomen Flat And Erect (Final result)  Result time 06/22/24 11:13:09      Final result by Olga Mon ([facility]), MD (06/22/24 11:13:09)                   Impression:      Nonobstructive bowel gas pattern.      Electronically signed by: Olga Mon MD  Date:    06/22/2024  Time:    11:13               Narrative:    EXAMINATION:  XR ABDOMEN FLAT AND ERECT    CLINICAL HISTORY:  Abdominal Pain;    COMPARISON:  None.    FINDINGS:  Two views of the abdomen. The bowel gas pattern is unremarkable. No obstruction, ileus or free air.    Scoliosis and degenerative changes of the lower thoracic and lumbar spine.  Mild cardiomegaly.  No lung infiltrates.                                                 The Emergency Provider reviewed the vital signs and test results, which are outlined above.     ED Discussion     1:15 PM: Reassessed pt at this time. Discussed with pt all pertinent ED information and results. Discussed pt dx and plan of tx. Gave pt all f/u and return to the ED instructions. All questions and concerns were addressed at this time. Pt expresses understanding of information and instructions, and is comfortable with plan to discharge. Pt is stable for discharge.    I discussed with patient and/or family/caretaker that evaluation in the ED does not suggest any emergent or life threatening medical conditions requiring immediate intervention beyond what was provided in the ED, and I believe patient is safe for discharge.  Regardless, an unremarkable evaluation in the ED does not preclude the development or presence of a serious of life threatening condition. As such, patient was instructed to return immediately for any worsening or change in current symptoms.         Medical Decision Making  DDX: 1.kidney stone 2. Diverticulitis 3. Constipation 4.Hernia    No tenderness on exam, no masses, wbc normal, lipase and cmp normal, UA otherwise normal, CT reviewed and no obvious source of pain, ? Constipation, discussed with son and patient, overall do not feel further workup warranted, stable for discharge with outpatient followup    Amount and/or Complexity of Data Reviewed  Independent Historian: caregiver     Details: Son translated for pt when necessary  Labs: ordered. Decision-making details documented in ED Course.  Radiology: ordered. Decision-making details documented in ED Course.    Risk  OTC drugs.  Prescription drug management.                ED Medication(s):  Medications   iohexoL (OMNIPAQUE 350) injection 100 mL (100 mLs Intravenous Given 6/22/24 1238)       Discharge Medication List as of 6/22/2024  1:20 PM        START taking these medications    Details    polyethylene glycol (GLYCOLAX) 17 gram PwPk Take 17 g by mouth once daily., Starting Sat 6/22/2024, Normal              Follow-up Information       Javed Basilio MD. Schedule an appointment as soon as possible for a visit in 2 days.    Specialty: Family Medicine  Contact information:  [address] [place] [address]  [phone]                                 Scribe Attestation:   Scribe #1: I performed the above scribed service and the documentation accurately describes the services I performed. I attest to the accuracy of the note.     Attending:   Physician Attestation Statement for Scribe #1: I, Savanah Olsen MD, personally performed the services described in this documentation, as scribed by Bertrand Guerrero, in my presence, and it is both accurate and complete.           Clinical Impression       ICD-10-CM ICD-9-CM   1. Constipation, unspecified constipation type  K59.00 564.00   2. Lower abdominal pain  R10.30 789.09       Disposition:   Disposition: Discharged  Condition: Stable         Savanah Olsen MD  06/24/24 5208

## 2024-07-29 ENCOUNTER — OFFICE VISIT (OUTPATIENT)
Dept: ORTHOPEDICS | Facility: CLINIC | Age: 89
End: 2024-07-29
Payer: MEDICARE

## 2024-07-29 ENCOUNTER — HOSPITAL ENCOUNTER (OUTPATIENT)
Dept: RADIOLOGY | Facility: HOSPITAL | Age: 89
Discharge: HOME | End: 2024-07-29
Attending: ORTHOPAEDIC SURGERY
Payer: MEDICARE

## 2024-07-29 VITALS — BODY MASS INDEX: 22.85 KG/M2 | WEIGHT: 116.38 LBS | HEIGHT: 60 IN

## 2024-07-29 DIAGNOSIS — M25.512 LEFT SHOULDER PAIN, UNSPECIFIED CHRONICITY: Primary | ICD-10-CM

## 2024-07-29 DIAGNOSIS — G89.29 CHRONIC LEFT SHOULDER PAIN: ICD-10-CM

## 2024-07-29 DIAGNOSIS — M25.512 CHRONIC LEFT SHOULDER PAIN: ICD-10-CM

## 2024-07-29 DIAGNOSIS — M25.512 LEFT SHOULDER PAIN, UNSPECIFIED CHRONICITY: ICD-10-CM

## 2024-07-29 PROCEDURE — 1101F PT FALLS ASSESS-DOCD LE1/YR: CPT | Mod: HCNC,CPTII,S$GLB, | Performed by: ORTHOPAEDIC SURGERY

## 2024-07-29 PROCEDURE — 99203 OFFICE O/P NEW LOW 30 MIN: CPT | Mod: HCNC,25,S$GLB, | Performed by: ORTHOPAEDIC SURGERY

## 2024-07-29 PROCEDURE — 20610 DRAIN/INJ JOINT/BURSA W/O US: CPT | Mod: HCNC,LT,S$GLB, | Performed by: ORTHOPAEDIC SURGERY

## 2024-07-29 PROCEDURE — 73030 X-RAY EXAM OF SHOULDER: CPT | Mod: 26,HCNC,LT, | Performed by: RADIOLOGY

## 2024-07-29 PROCEDURE — 1125F AMNT PAIN NOTED PAIN PRSNT: CPT | Mod: HCNC,CPTII,S$GLB, | Performed by: ORTHOPAEDIC SURGERY

## 2024-07-29 PROCEDURE — 1157F ADVNC CARE PLAN IN RCRD: CPT | Mod: HCNC,CPTII,S$GLB, | Performed by: ORTHOPAEDIC SURGERY

## 2024-07-29 PROCEDURE — 99999 PR PBB SHADOW E&M-EST. PATIENT-LVL III: CPT | Mod: PBBFAC,HCNC,, | Performed by: ORTHOPAEDIC SURGERY

## 2024-07-29 PROCEDURE — 1159F MED LIST DOCD IN RCRD: CPT | Mod: HCNC,CPTII,S$GLB, | Performed by: ORTHOPAEDIC SURGERY

## 2024-07-29 PROCEDURE — 73030 X-RAY EXAM OF SHOULDER: CPT | Mod: TC,HCNC,PN,LT

## 2024-07-29 PROCEDURE — 3288F FALL RISK ASSESSMENT DOCD: CPT | Mod: HCNC,CPTII,S$GLB, | Performed by: ORTHOPAEDIC SURGERY

## 2024-07-29 RX ORDER — TRIAMCINOLONE ACETONIDE 40 MG/ML
40 INJECTION, SUSPENSION INTRA-ARTICULAR; INTRAMUSCULAR
Status: COMPLETED | OUTPATIENT
Start: 2024-07-29 | End: 2024-07-29

## 2024-07-29 RX ADMIN — TRIAMCINOLONE ACETONIDE 40 MG: 40 INJECTION, SUSPENSION INTRA-ARTICULAR; INTRAMUSCULAR at 02:07

## 2024-07-29 NOTE — PROGRESS NOTES
Subjective:      Patient ID: Ghada Dave is a 88 y.o. female.    Chief Complaint: Pain of the Left Shoulder      HPI  Ghada Dave is a  88 y.o. female presenting today for left shoulder pain.  There was not a history of trauma.  Onset of symptoms began several months ago   No history of trauma   Symptoms worse at night   .      Review of patient's allergies indicates:   Allergen Reactions    Penicillin Anaphylaxis    Lipitor [atorvastatin] Hives     Can take Simivastatin .     Keflex [cephalexin] Rash         Current Outpatient Medications   Medication Sig Dispense Refill    b complex vitamins capsule Take 1 capsule by mouth once daily.      esomeprazole (NEXIUM) 40 MG capsule Take 1 capsule (40 mg total) by mouth 2 (two) times daily. 180 capsule 3    metFORMIN (GLUCOPHAGE-XR) 500 MG ER 24hr tablet Take 1 tablet (500 mg total) by mouth daily with breakfast. 90 tablet 3    nitrofurantoin, macrocrystal-monohydrate, (MACROBID) 100 MG capsule Take 1 capsule (100 mg total) by mouth once daily. 90 capsule 3    polyethylene glycol (GLYCOLAX) 17 gram PwPk Take 17 g by mouth once daily. 14 each 0    triamcinolone acetonide 0.1% (KENALOG) 0.1 % ointment Apply topically 2 (two) times daily as needed (itching). 30 g 3    gabapentin (NEURONTIN) 300 MG capsule Take 1 capsule (300 mg total) by mouth every evening. 90 capsule 3    QUEtiapine (SEROQUEL) 25 MG Tab Take 25 mg by mouth every evening.       No current facility-administered medications for this visit.       Past Medical History:   Diagnosis Date    Abnormal Pap smear 2013    Acidosis     CAD (coronary artery disease) 06/2017     iFR of LAD and RCA performed confirmed nonobstructive disease.    Cataract     Colon polyps     Disorder of kidney and ureter     Frequent urination     General anesthetics causing adverse effect in therapeutic use     Hemorrhoid     High cholesterol     Poor circulation     Postoperative abdominal pain      Psoriasis     Unilateral inguinal hernia, without obstruction or gangrene, not specified as recurrent 08/01/2023    Varicose vein        Past Surgical History:   Procedure Laterality Date    CARDIAC CATHETERIZATION  06/2017     iFR of LAD and RCA performed confirmed nonobstructive disease.    CATARACT EXTRACTION W/  INTRAOCULAR LENS IMPLANT Bilateral     COLONOSCOPY N/A 08/28/2017    Procedure: COLONOSCOPY;  Surgeon: Bertram Myers MD;  Location: [facility] ENDO;  Service: Endoscopy;  Laterality: N/A;    COLONOSCOPY N/A 10/18/2019    Procedure: COLONOSCOPY;  Surgeon: Wilfred Barber MD;  Location: [facility] (4TH FLR);  Service: Endoscopy;  Laterality: N/A;   needed-BB  Cardiology Clearance received from Dr. REBECA Escalona, see telephone encounter 10/1/19-BB    COLONOSCOPY N/A 06/17/2021    Procedure: COLONOSCOPY suprep Past positive;  Surgeon: Lele Barillas MD;  Location: [facility];  Service: Endoscopy;  Laterality: N/A;  past positive    COLONOSCOPY W/ BIOPSIES  06/17/2021    ESOPHAGOGASTRODUODENOSCOPY N/A 06/23/2023    Procedure: EGD (ESOPHAGOGASTRODUODENOSCOPY);  Surgeon: Seun Burt MD;  [facility]: [facility];  Service: Endoscopy;  Laterality: N/A;    EXCISION OF BURSA  03/05/2021    Procedure: BURSECTOMY;  Surgeon: Jason Alicea Jr., MD;  Location: [facility] OR;  Service: Orthopedics;;    HIATAL HERNIA REPAIR N/A 08/01/2023    LEFT HEART CATHETERIZATION N/A 04/08/2019    Procedure: Left heart cath;  Surgeon: Renato Escalona MD;  Location: [facility] CATH LAB/EP;  Service: Cardiology;  Laterality: N/A;    POLYPECTOMY      ROBOT-ASSISTED NISSEN FUNDOPLICATION USING DA ANTOINE XI N/A 08/01/2023    Procedure: XI ROBOTIC FUNDOPLICATION, NISSEN;  Surgeon: Elvis Malcolm MD;  Location: [facility] OR;  Service: General;  Laterality: N/A;    ROBOT-ASSISTED REPAIR OF HIATAL HERNIA USING DA ANTOINE XI N/A 08/01/2023    Procedure: XI ROBOTIC REPAIR, HERNIA, HIATAL;  Surgeon: Elvis Malcolm MD;  Location:  [facility] OR;  Service: General;  Laterality: N/A;    SHOULDER ARTHROSCOPY Right 03/05/2021    Procedure: ARTHROSCOPY, SHOULDER;  Surgeon: Jason Alicea Jr., MD;  Location: [facility] OR;  Service: Orthopedics;  Laterality: Right;  need opus system Jehovah's witness   video  (Jehovah's witness notified 3/1/21, CC)  Kulwinder confirmed 3/4/21 [place]    YAG Laser Capsulotomy Right 04/17/2017    Dr. Chavez       Review of Systems:  ROS    OBJECTIVE:     PHYSICAL EXAM:  Height: 5' (152.4 cm) Weight: 52.8 kg (116 lb 6.5 oz)  Vitals:    07/29/24 1407   Weight: 52.8 kg (116 lb 6.5 oz)   Height: 5' (1.524 m)   PainSc:   9     Well developed, well nourished female in no acute distress  Alert and oriented x 3  HEENT- Normal exam  Lungs- Clear to auscultation  Heart- Regular rate and rhythm  Abdomen- Soft nontender  Extremity exam- examination left shoulder no tenderness no swelling no bruising   Range of motion slightly decreased due to pain   Positive impingement sign  No instability   Slight weakness       RADIOGRAPHS:  AP lateral x-ray left shoulder show some degenerative changes of the AC joint  Comments: I have personally reviewed the imaging and I agree with the above radiologist's report.    ASSESSMENT/PLAN:     IMPRESSION:  1. Left shoulder impingement tendinosis.      2. Possible rotator cuff tear left shoulder    PLAN:  I explained the nature of the problem to the patient   Recommended injection   After pause for time-out identified the left shoulder injected with Kenalog 40 mg 2 cc xylocaine sterile technique   Tolerated the procedure well without complication   I have also recommended some exercises for the left shoulder Advil or Motrin by mouth  Follow-up 3-4 weeks   Consider MRI if symptoms persist       - We talked at length about the anatomy and pathophysiology of   Encounter Diagnoses   Name Primary?    Left shoulder pain, unspecified chronicity Yes    Chronic left shoulder pain            Disclaimer: This note has been generated  using voice-recognition software. There may be typographical errors that have been missed during proof-reading.

## 2024-08-30 ENCOUNTER — PATIENT MESSAGE (OUTPATIENT)
Dept: PRIMARY CARE CLINIC | Facility: CLINIC | Age: 89
End: 2024-08-30
Payer: MEDICARE

## 2024-09-09 DIAGNOSIS — F41.9 ANXIETY DISORDER, UNSPECIFIED: ICD-10-CM

## 2024-09-09 RX ORDER — QUETIAPINE FUMARATE 25 MG/1
TABLET, FILM COATED ORAL
Qty: 90 TABLET | Refills: 3 | Status: SHIPPED | OUTPATIENT
Start: 2024-09-09

## 2024-09-09 NOTE — TELEPHONE ENCOUNTER
Care Due:                  Date            Visit Type   Department     Provider  --------------------------------------------------------------------------------                                EP -                              PRIMARY      [facility] PRIMARY  Last Visit: 03-      CARE ([facility])   UMER Basilio                              EP -                              PRIMARY      [facility] PRIMARY  Next Visit: 09-      CARE ([facility])   UMER Basilio                                                            Last  Test          Frequency    Reason                     Performed    Due Date  --------------------------------------------------------------------------------    HBA1C.......  6 months...  metFORMIN................  08- 02-    [facility] Embedded Care Due Messages. Reference number: [id number].   9/09/2024 10:09:37 AM CDT

## 2024-09-09 NOTE — TELEPHONE ENCOUNTER
ANTICOAGULATION     Selvin Rockwell is overdue for INR check.      Spoke with Kat and scheduled lab appointment on 6/14    Chitra Morrison RN     Refill Routing Note   Medication(s) are not appropriate for processing by [facility] for the following reason(s):        Outside of protocol    [facility] action(s):  Route      Medication Therapy Plan: FLOS      Appointments  past 12m or future 3m with PCP    Date Provider   Last Visit   3/27/2024 Javed Basilio MD   Next Visit   9/30/2024 Javed Basilio MD   ED visits in past 90 days: 1        Note composed:12:45 PM 09/09/2024

## 2024-11-07 ENCOUNTER — HOSPITAL ENCOUNTER (EMERGENCY)
Facility: HOSPITAL | Age: 89
Discharge: HOME | End: 2024-11-08
Attending: EMERGENCY MEDICINE
Payer: MEDICARE

## 2024-11-07 DIAGNOSIS — R19.7 DIARRHEA, UNSPECIFIED TYPE: ICD-10-CM

## 2024-11-07 DIAGNOSIS — R10.2 SUPRAPUBIC ABDOMINAL PAIN: ICD-10-CM

## 2024-11-07 DIAGNOSIS — R10.13 EPIGASTRIC ABDOMINAL PAIN: ICD-10-CM

## 2024-11-07 DIAGNOSIS — R10.9 ABDOMINAL PAIN, UNSPECIFIED ABDOMINAL LOCATION: ICD-10-CM

## 2024-11-07 DIAGNOSIS — K52.9 GASTROENTERITIS: Primary | ICD-10-CM

## 2024-11-07 LAB
ALBUMIN SERPL BCP-MCNC: 4 G/DL (ref 3.5–5.2)
ALP SERPL-CCNC: 59 U/L (ref 40–150)
ALT SERPL W/O P-5'-P-CCNC: 8 U/L (ref 10–44)
ANION GAP SERPL CALC-SCNC: 9 MMOL/L (ref 8–16)
AST SERPL-CCNC: 18 U/L (ref 10–40)
BASOPHILS # BLD AUTO: 0.07 K/UL (ref 0–0.2)
BASOPHILS NFR BLD: 2.2 % (ref 0–1.9)
BILIRUB SERPL-MCNC: 0.5 MG/DL (ref 0.1–1)
BILIRUB UR QL STRIP: NEGATIVE
BUN SERPL-MCNC: 19 MG/DL (ref 8–23)
CALCIUM SERPL-MCNC: 9.2 MG/DL (ref 8.7–10.5)
CHLORIDE SERPL-SCNC: 111 MMOL/L (ref 95–110)
CLARITY UR: CLEAR
CO2 SERPL-SCNC: 20 MMOL/L (ref 23–29)
COLOR UR: COLORLESS
CREAT SERPL-MCNC: 1.1 MG/DL (ref 0.5–1.4)
DIFFERENTIAL METHOD BLD: ABNORMAL
EOSINOPHIL # BLD AUTO: 0.2 K/UL (ref 0–0.5)
EOSINOPHIL NFR BLD: 5.7 % (ref 0–8)
ERYTHROCYTE [DISTWIDTH] IN BLOOD BY AUTOMATED COUNT: 13.8 % (ref 11.5–14.5)
EST. GFR  (NO RACE VARIABLE): 48 ML/MIN/1.73 M^2
GLUCOSE SERPL-MCNC: 90 MG/DL (ref 70–110)
GLUCOSE UR QL STRIP: NEGATIVE
HCT VFR BLD AUTO: 33 % (ref 37–48.5)
HGB BLD-MCNC: 11.2 G/DL (ref 12–16)
HGB UR QL STRIP: ABNORMAL
IMM GRANULOCYTES # BLD AUTO: 0.02 K/UL (ref 0–0.04)
IMM GRANULOCYTES NFR BLD AUTO: 0.6 % (ref 0–0.5)
KETONES UR QL STRIP: NEGATIVE
LEUKOCYTE ESTERASE UR QL STRIP: NEGATIVE
LIPASE SERPL-CCNC: 13 U/L (ref 4–60)
LYMPHOCYTES # BLD AUTO: 1.4 K/UL (ref 1–4.8)
LYMPHOCYTES NFR BLD: 43.5 % (ref 18–48)
MCH RBC QN AUTO: 30.1 PG (ref 27–31)
MCHC RBC AUTO-ENTMCNC: 33.9 G/DL (ref 32–36)
MCV RBC AUTO: 89 FL (ref 82–98)
MONOCYTES # BLD AUTO: 0.4 K/UL (ref 0.3–1)
MONOCYTES NFR BLD: 13 % (ref 4–15)
NEUTROPHILS # BLD AUTO: 1.1 K/UL (ref 1.8–7.7)
NEUTROPHILS NFR BLD: 35 % (ref 38–73)
NITRITE UR QL STRIP: NEGATIVE
NRBC BLD-RTO: 0 /100 WBC
PH UR STRIP: 6 [PH] (ref 5–8)
PLATELET # BLD AUTO: 242 K/UL (ref 150–450)
PMV BLD AUTO: 9.9 FL (ref 9.2–12.9)
POTASSIUM SERPL-SCNC: 4.1 MMOL/L (ref 3.5–5.1)
PROT SERPL-MCNC: 7.2 G/DL (ref 6–8.4)
PROT UR QL STRIP: NEGATIVE
RBC # BLD AUTO: 3.72 M/UL (ref 4–5.4)
SODIUM SERPL-SCNC: 140 MMOL/L (ref 136–145)
SP GR UR STRIP: <1.005 (ref 1–1.03)
TROPONIN I SERPL DL<=0.01 NG/ML-MCNC: 0.02 NG/ML (ref 0–0.03)
URN SPEC COLLECT METH UR: ABNORMAL
UROBILINOGEN UR STRIP-ACNC: NEGATIVE EU/DL
WBC # BLD AUTO: 3.15 K/UL (ref 3.9–12.7)

## 2024-11-07 PROCEDURE — 84484 ASSAY OF TROPONIN QUANT: CPT | Mod: HCNC | Performed by: EMERGENCY MEDICINE

## 2024-11-07 PROCEDURE — 63600175 PHARM REV CODE 636 W HCPCS: Mod: HCNC | Performed by: EMERGENCY MEDICINE

## 2024-11-07 PROCEDURE — 83690 ASSAY OF LIPASE: CPT | Mod: HCNC | Performed by: EMERGENCY MEDICINE

## 2024-11-07 PROCEDURE — 85025 COMPLETE CBC W/AUTO DIFF WBC: CPT | Mod: HCNC | Performed by: EMERGENCY MEDICINE

## 2024-11-07 PROCEDURE — 25000003 PHARM REV CODE 250: Mod: HCNC | Performed by: EMERGENCY MEDICINE

## 2024-11-07 PROCEDURE — 93010 ELECTROCARDIOGRAM REPORT: CPT | Mod: HCNC,,, | Performed by: INTERNAL MEDICINE

## 2024-11-07 PROCEDURE — 99284 EMERGENCY DEPT VISIT MOD MDM: CPT | Mod: 25,HCNC

## 2024-11-07 PROCEDURE — 93005 ELECTROCARDIOGRAM TRACING: CPT | Mod: HCNC

## 2024-11-07 PROCEDURE — 81003 URINALYSIS AUTO W/O SCOPE: CPT | Mod: HCNC | Performed by: EMERGENCY MEDICINE

## 2024-11-07 PROCEDURE — 80053 COMPREHEN METABOLIC PANEL: CPT | Mod: HCNC | Performed by: EMERGENCY MEDICINE

## 2024-11-07 PROCEDURE — 96374 THER/PROPH/DIAG INJ IV PUSH: CPT | Mod: HCNC

## 2024-11-07 RX ORDER — GLUCOSAM/CHON-MSM1/C/MANG/BOSW 500-416.6
TABLET ORAL
COMMUNITY
Start: 2024-10-04

## 2024-11-07 RX ORDER — ACETAMINOPHEN 500 MG
1000 TABLET ORAL
Status: COMPLETED | OUTPATIENT
Start: 2024-11-07 | End: 2024-11-07

## 2024-11-07 RX ORDER — LISINOPRIL 20 MG/1
20 TABLET ORAL
COMMUNITY
Start: 2024-10-04

## 2024-11-07 RX ORDER — DOCUSATE SODIUM 100 MG
600 CAPSULE ORAL
Status: DISCONTINUED | OUTPATIENT
Start: 2024-11-07 | End: 2024-11-08 | Stop reason: HOSPADM

## 2024-11-07 RX ORDER — ONDANSETRON HYDROCHLORIDE 2 MG/ML
4 INJECTION, SOLUTION INTRAVENOUS
Status: COMPLETED | OUTPATIENT
Start: 2024-11-07 | End: 2024-11-07

## 2024-11-07 RX ORDER — ONDANSETRON 4 MG/1
4 TABLET, ORALLY DISINTEGRATING ORAL EVERY 6 HOURS PRN
Qty: 30 TABLET | Refills: 0 | Status: SHIPPED | OUTPATIENT
Start: 2024-11-07

## 2024-11-07 RX ADMIN — Medication 600 ML: at 09:11

## 2024-11-07 RX ADMIN — ACETAMINOPHEN 1000 MG: 500 TABLET ORAL at 09:11

## 2024-11-07 RX ADMIN — ONDANSETRON 4 MG: 2 INJECTION INTRAMUSCULAR; INTRAVENOUS at 09:11

## 2024-11-08 VITALS
OXYGEN SATURATION: 98 % | WEIGHT: 112 LBS | HEIGHT: 60 IN | RESPIRATION RATE: 16 BRPM | TEMPERATURE: 98 F | SYSTOLIC BLOOD PRESSURE: 122 MMHG | DIASTOLIC BLOOD PRESSURE: 64 MMHG | HEART RATE: 56 BPM | BODY MASS INDEX: 21.99 KG/M2

## 2024-11-08 LAB
OHS QRS DURATION: 98 MS
OHS QTC CALCULATION: 422 MS

## 2024-11-08 NOTE — ED PROVIDER NOTES
SCRIBE #1 NOTE: I, Tiara Cerda, am scribing for, and in the presence of, Dylon Colon Jr., MD. I have scribed the entire note.       History     Chief Complaint   Patient presents with    Gastroesophageal Reflux    Abdominal Pain     Upper abdominal pain, GERD, diarrhea and bloating x 3 weeks.      Review of patient's allergies indicates:   Allergen Reactions    Penicillin Anaphylaxis    Lipitor [atorvastatin] Hives     Can take Simivastatin .     Keflex [cephalexin] Rash         History of Present Illness     HPI    11/7/2024, 8:34 PM  Limited history obtained from the patient translated by son present at bedside.      History of Present Illness: Ghada Dave is a 89 y.o. female patient with a PMHx of high cholesterol, HTN, CAD, and kidney disease who presents to the Emergency Department for evaluation of generalized abdominal pain worse in suprapubic region which onset a week ago. Pt reports anything she eats causes an upset stomach: cramping and burping. After using restroom, there is no relief. Symptoms are constant and moderate in severity. No mitigating or exacerbating factors reported. Associated sxs include diarrhea, vomiting, and dizziness. Patient denies any dysuria, back pain, nausea, and all other sxs at this time. Prior Tx includes saurabh seltzer and Pepto bismol with no relief. Pt has had no stroke or heart attack. No further complaints or concerns at this time.       Arrival mode: Personal vehicle      PCP: Javed Basilio MD        Past Medical History:  Past Medical History:   Diagnosis Date    Abnormal Pap smear 2013    Acidosis     CAD (coronary artery disease) 06/2017     iFR of LAD and RCA performed confirmed nonobstructive disease.    Cataract     Colon polyps     Disorder of kidney and ureter     Frequent urination     General anesthetics causing adverse effect in therapeutic use     Hemorrhoid     High cholesterol     Poor circulation     Postoperative abdominal pain      Psoriasis     Unilateral inguinal hernia, without obstruction or gangrene, not specified as recurrent 08/01/2023    Varicose vein        Past Surgical History:  Past Surgical History:   Procedure Laterality Date    CARDIAC CATHETERIZATION  06/2017     iFR of LAD and RCA performed confirmed nonobstructive disease.    CATARACT EXTRACTION W/  INTRAOCULAR LENS IMPLANT Bilateral     COLONOSCOPY N/A 08/28/2017    Procedure: COLONOSCOPY;  Surgeon: Bertram Myers MD;  Location: [facility] ENDO;  Service: Endoscopy;  Laterality: N/A;    COLONOSCOPY N/A 10/18/2019    Procedure: COLONOSCOPY;  Surgeon: Wilfred Barber MD;  Location: [facility] (4TH FLR);  Service: Endoscopy;  Laterality: N/A;   needed-BB  Cardiology Clearance received from Dr. REBECA Escalona, see telephone encounter 10/1/19-BB    COLONOSCOPY N/A 06/17/2021    Procedure: COLONOSCOPY suprep Past positive;  Surgeon: Lele Barillas MD;  Location: [facility];  Service: Endoscopy;  Laterality: N/A;  past positive    COLONOSCOPY W/ BIOPSIES  06/17/2021    ESOPHAGOGASTRODUODENOSCOPY N/A 06/23/2023    Procedure: EGD (ESOPHAGOGASTRODUODENOSCOPY);  Surgeon: Seun Burt MD;  Location: [facility];  Service: Endoscopy;  Laterality: N/A;    EXCISION OF BURSA  03/05/2021    Procedure: BURSECTOMY;  Surgeon: Jason Alicea Jr., MD;  Location: [facility] OR;  Service: Orthopedics;;    HIATAL HERNIA REPAIR N/A 08/01/2023    LEFT HEART CATHETERIZATION N/A 04/08/2019    Procedure: Left heart cath;  Surgeon: Renato Escalona MD;  Location: [facility] CATH LAB/EP;  Service: Cardiology;  Laterality: N/A;    POLYPECTOMY      ROBOT-ASSISTED NISSEN FUNDOPLICATION USING DA ANTOINE XI N/A 08/01/2023    Procedure: XI ROBOTIC FUNDOPLICATION, NISSEN;  Surgeon: Elvis Malcolm MD;  Location: [facility] OR;  Service: General;  Laterality: N/A;    ROBOT-ASSISTED REPAIR OF HIATAL HERNIA USING DA ANTOINE XI N/A 08/01/2023    Procedure: ANNIKA ROBOTIC REPAIR, HERNIA, HIATAL;  Surgeon: Elvis GALLEGOS  MD Gold;  Location: [facility] OR;  Service: General;  Laterality: N/A;    SHOULDER ARTHROSCOPY Right 03/05/2021    Procedure: ARTHROSCOPY, SHOULDER;  Surgeon: Jason Alicea Jr., MD;  Location: [facility] OR;  Service: Orthopedics;  Laterality: Right;  need opus system Druze   video  (Druze notified 3/1/21, CC)  Kulwinder confirmed 3/4/21 [place]    YAG Laser Capsulotomy Right 04/17/2017    Dr. Chavez         Family History:  Family History   Problem Relation Name Age of Onset    Kidney disease Mother Ghada Flanagan     Diabetes Mother Ghada Flanagan     Glaucoma Neg Hx      Macular degeneration Neg Hx      Strabismus Neg Hx      Retinal detachment Neg Hx      Amblyopia Neg Hx      Blindness Neg Hx      Cataracts Neg Hx      Prostate cancer Neg Hx      Anesthesia problems Neg Hx         Social History:  Social History     Tobacco Use    Smoking status: Never     Passive exposure: Never    Smokeless tobacco: Never    Tobacco comments:     Never smoked   Substance and Sexual Activity    Alcohol use: Never    Drug use: Never    Sexual activity: Never        Review of Systems     Review of Systems   Constitutional:  Negative for fever.   HENT:  Negative for sore throat.    Respiratory:  Negative for shortness of breath.    Cardiovascular:  Negative for chest pain.   Gastrointestinal:  Positive for abdominal pain (Generalized, worse a suprapubic region), diarrhea and vomiting. Negative for nausea.        (+) Cramps     Genitourinary:  Negative for dysuria.   Musculoskeletal:  Negative for back pain.   Skin:  Negative for rash.   Neurological:  Positive for dizziness. Negative for weakness.   Hematological:  Does not bruise/bleed easily.   All other systems reviewed and are negative.     Physical Exam     Initial Vitals [11/07/24 1853]   BP Pulse Resp Temp SpO2   (!) 169/74 61 20 97.5 °F (36.4 °C) 96 %      MAP       --          Physical Exam  Nursing Notes and Vital Signs Reviewed.  Constitutional: Patient is in no  acute distress. Well-developed and well-nourished.  Head: Atraumatic. Normocephalic.  Eyes: PERRL. EOM intact. Conjunctivae are not pale. No scleral icterus.  ENT: Mucous membranes are moist. Oropharynx is clear and symmetric.    Neck: Supple. Full ROM. No lymphadenopathy.  Cardiovascular: Bradycardic. Regular rhythm. No murmurs, rubs, or gallops. Distal pulses are 2+ and symmetric.  Pulmonary/Chest: No respiratory distress. Clear to auscultation bilaterally. No wheezing or rales.  Abdominal: Soft and non-distended. No rebound, guarding, or rigidity. Good bowel sounds. Generalized abdominal tenderness worse in suprapubic region.  Genitourinary: No CVA tenderness  Musculoskeletal: Moves all extremities. No obvious deformities. No edema. No calf tenderness.  Skin: Warm and dry.  Neurological:  Alert, awake, and appropriate.  Normal speech.  No acute focal neurological deficits are appreciated.  Psychiatric: Normal affect. Good eye contact. Appropriate in content.     ED Course   Procedures  ED Vital Signs:  Vitals:    11/07/24 1853 11/07/24 2049 11/07/24 2103 11/07/24 2143   BP: (!) 169/74 (!) 177/74 (!) 158/65 126/62   Pulse: 61 (!) 54 (!) 57 64   Resp: 20      Temp: 97.5 °F (36.4 °C)      TempSrc: Oral      SpO2: 96% 100% 100% 99%   Weight: 50.8 kg (112 lb)      Height: 5' (1.524 m)       11/07/24 2203 11/07/24 2302 11/08/24 0001   BP: (!) 145/66 (!) 126/58 122/64   Pulse: (!) 55 (!) 51 (!) 56   Resp:   16   Temp:   98 °F (36.7 °C)   TempSrc:   Oral   SpO2: 100% 97% 98%   Weight:      Height:          Abnormal Lab Results:  Labs Reviewed   CBC W/ AUTO DIFFERENTIAL - Abnormal       Result Value    WBC 3.15 (*)     RBC 3.72 (*)     Hemoglobin 11.2 (*)     Hematocrit 33.0 (*)     MCV 89      MCH 30.1      MCHC 33.9      RDW 13.8      Platelets 242      MPV 9.9      Immature Granulocytes 0.6 (*)     Gran # (ANC) 1.1 (*)     Immature Grans (Abs) 0.02      Lymph # 1.4      Mono # 0.4      Eos # 0.2      Baso # 0.07       nRBC 0      Gran % 35.0 (*)     Lymph % 43.5      Mono % 13.0      Eosinophil % 5.7      Basophil % 2.2 (*)     Differential Method Automated     COMPREHENSIVE METABOLIC PANEL - Abnormal    Sodium 140      Potassium 4.1      Chloride 111 (*)     CO2 20 (*)     Glucose 90      BUN 19      Creatinine 1.1      Calcium 9.2      Total Protein 7.2      Albumin 4.0      Total Bilirubin 0.5      Alkaline Phosphatase 59      AST 18      ALT 8 (*)     eGFR 48 (*)     Anion Gap 9     URINALYSIS, REFLEX TO URINE CULTURE - Abnormal    Specimen UA Urine, Clean Catch      Color, UA Colorless (*)     Appearance, UA Clear      pH, UA 6.0      Specific Gravity, UA <1.005 (*)     Protein, UA Negative      Glucose, UA Negative      Ketones, UA Negative      Bilirubin (UA) Negative      Occult Blood UA Trace (*)     Nitrite, UA Negative      Urobilinogen, UA Negative      Leukocytes, UA Negative      Narrative:     Specimen Source->Urine   LIPASE    Lipase 13     TROPONIN I    Troponin I 0.021          All Lab Results:  Results for orders placed or performed during the hospital encounter of 11/07/24   CBC W/ AUTO DIFFERENTIAL    Collection Time: 11/07/24  9:08 PM   Result Value Ref Range    WBC 3.15 (L) 3.90 - 12.70 K/uL    RBC 3.72 (L) 4.00 - 5.40 M/uL    Hemoglobin 11.2 (L) 12.0 - 16.0 g/dL    Hematocrit 33.0 (L) 37.0 - 48.5 %    MCV 89 82 - 98 fL    MCH 30.1 27.0 - 31.0 pg    MCHC 33.9 32.0 - 36.0 g/dL    RDW 13.8 11.5 - 14.5 %    Platelets 242 150 - 450 K/uL    MPV 9.9 9.2 - 12.9 fL    Immature Granulocytes 0.6 (H) 0.0 - 0.5 %    Gran # (ANC) 1.1 (L) 1.8 - 7.7 K/uL    Immature Grans (Abs) 0.02 0.00 - 0.04 K/uL    Lymph # 1.4 1.0 - 4.8 K/uL    Mono # 0.4 0.3 - 1.0 K/uL    Eos # 0.2 0.0 - 0.5 K/uL    Baso # 0.07 0.00 - 0.20 K/uL    nRBC 0 0 /100 WBC    Gran % 35.0 (L) 38.0 - 73.0 %    Lymph % 43.5 18.0 - 48.0 %    Mono % 13.0 4.0 - 15.0 %    Eosinophil % 5.7 0.0 - 8.0 %    Basophil % 2.2 (H) 0.0 - 1.9 %    Differential Method  Automated    Comp. Metabolic Panel    Collection Time: 11/07/24  9:08 PM   Result Value Ref Range    Sodium 140 136 - 145 mmol/L    Potassium 4.1 3.5 - 5.1 mmol/L    Chloride 111 (H) 95 - 110 mmol/L    CO2 20 (L) 23 - 29 mmol/L    Glucose 90 70 - 110 mg/dL    BUN 19 8 - 23 mg/dL    Creatinine 1.1 0.5 - 1.4 mg/dL    Calcium 9.2 8.7 - 10.5 mg/dL    Total Protein 7.2 6.0 - 8.4 g/dL    Albumin 4.0 3.5 - 5.2 g/dL    Total Bilirubin 0.5 0.1 - 1.0 mg/dL    Alkaline Phosphatase 59 40 - 150 U/L    AST 18 10 - 40 U/L    ALT 8 (L) 10 - 44 U/L    eGFR 48 (A) >60 mL/min/1.73 m^2    Anion Gap 9 8 - 16 mmol/L   Lipase    Collection Time: 11/07/24  9:08 PM   Result Value Ref Range    Lipase 13 4 - 60 U/L   Troponin I    Collection Time: 11/07/24  9:08 PM   Result Value Ref Range    Troponin I 0.021 0.000 - 0.026 ng/mL   EKG 12-lead    Collection Time: 11/07/24  9:08 PM   Result Value Ref Range    QRS Duration 98 ms    [facility] QTC Calculation 422 ms   Urinalysis, Reflex to Urine Culture Urine, Clean Catch    Collection Time: 11/07/24 10:30 PM    Specimen: Urine   Result Value Ref Range    Specimen UA Urine, Clean Catch     Color, UA Colorless (A) Yellow, Straw, Lucretia    Appearance, UA Clear Clear    pH, UA 6.0 5.0 - 8.0    Specific Gravity, UA <1.005 (A) 1.005 - 1.030    Protein, UA Negative Negative    Glucose, UA Negative Negative    Ketones, UA Negative Negative    Bilirubin (UA) Negative Negative    Occult Blood UA Trace (A) Negative    Nitrite, UA Negative Negative    Urobilinogen, UA Negative <2.0 EU/dL    Leukocytes, UA Negative Negative     *Note: Due to a large number of results and/or encounters for the requested time period, some results have not been displayed. A complete set of results can be found in Results Review.       Imaging Results:  Imaging Results              X-Ray Abdomen Flat And Erect (Final result)  Result time 11/07/24 20:40:10      Final result by Gurvinder Aguayo MD (11/07/24 20:40:10)                    Impression:      1.  Negative for acute process.    2.  Stable findings as noted above.      Electronically signed by: Gurvinder Aguayo MD  Date:    11/07/2024  Time:    20:40               Narrative:    EXAMINATION:  XR ABDOMEN FLAT AND ERECT    CLINICAL HISTORY:  Abdominal Pain;    TECHNIQUE:  Flat and erect AP views of the abdomen were performed.    COMPARISON:  June 22, 2024    FINDINGS:  Appropriate stool burden.  Normal bowel gas pattern.  Negative for free air.    Stable S-shaped curvature of the spine with marked degenerative changes of the thoracic and lumbar spine.    Lung bases are clear.  Vascular calcifications noted.  There are phleboliths versus ureteroliths some overlying the pelvis.  Clothing artifact present.                                       The EKG was ordered, reviewed, and independently interpreted by the ED provider.  Interpretation time: 21:08  Rate: 57 BPM  Rhythm: sinus bradycardia  Interpretation: No acute ST changes. No STEMI.           The Emergency Provider reviewed the vital signs and test results, which are outlined above.     ED Discussion     Patient is in need of Pedialyte  for the treatment of dehydration and diarrhea.  Due to a shortage of IV fluids, patient to receive Pedialyte.  Patient must receive this treatment in a hospital location due to the risk of adverse effects, insufficient response to treatment, or other potential complications.    11:42 PM: Reassessed pt at this time. Pt states she is feeling better. Pt had no diarrhea in ED. Abdominal pain has resolved. Pt is able to tolerate PO. Discussed with pt all pertinent ED information and results. Discussed pt dx and plan of tx. Gave pt all f/u and return to the ED instructions. All questions and concerns were addressed at this time. Pt expresses understanding of information and instructions, and is comfortable with plan to discharge. Pt is stable for discharge.    I discussed with patient and/or family/caretaker that  evaluation in the ED does not suggest any emergent or life threatening medical conditions requiring immediate intervention beyond what was provided in the ED, and I believe patient is safe for discharge.  Regardless, an unremarkable evaluation in the ED does not preclude the development or presence of a serious of life threatening condition. As such, patient was instructed to return immediately for any worsening or change in current symptoms.    Discussed the signs and symptoms of gastroenteritis with patient including: abdominal pain; nausea, vomiting, and diarrhea; chills; clammy skin; excessive sweating; fever; joint stiffness; fecal incontinence; muscle pain; and weight loss. Advised patient to drink water or sports beverages such as Gatorade for electrolyte replacement; do NOT use fruit juice (including apple juice), sodas or cola (flat or bubbly), Jell-O, or broth due to high sugar content; drink small amounts of fluid (2-4 oz.) every 30-60 minutes; and eat small amounts of food, when tolerable such as cereals, bread, potatoes, apples, bananas, and vegetables.  I also instructed on disease transmission and need for frequent hand washing.    Regarding ABDOMINAL PAIN, I recommended that the patient: Sip water or other clear fluids; avoid solid food for the first few hours after vomiting or diarrhea; if vomiting, wait 6 hours, and then eat small amounts of mild foods such as rice, applesauce, or crackers; avoid dairy products; avoid citrus, high-fat foods, fried or greasy foods, tomato products, caffeine, alcohol, and carbonated beverages;  avoid aspirin, ibuprofen or other anti-inflammatory medications, and narcotic pain medications unless prescribed.  In regards to prevention, I encouraged patient to:  Avoid fatty or greasy foods; drink plenty of water each day; eat small meals more frequently; exercise regularly; limit foods that produce gas; make sure meals are well-balanced and high in fiber and include  plenty of fruits and vegetables.    Advised patient to: drink 8 to 10 glasses of clear fluids every day; drink at least 1 cup of liquid after every loose bowel movement; eat small meals throughout the day;  consume foods and beverages containing sodium, such as pretzels, soup, and sports drinks; eat high potassium foods, such as bananas, potatoes without the skin, and watered-down fruit juices; and to get plenty of rest. Patient advised to follow up with primary care provider or return to the ER if they experience: blood or pus in stools; black stools; stomach pain that does not go away after a bowel movement; symptoms of dehydration (thirst, dizziness, lightheadedness);  diarrhea with a fever above 101°F (100.4 °F in children); and if the diarrhea gets worse or does not improve in 2 days. Patient was also encouraged to utilize Pepto-Bismol after each bowel movement.       ED Course as of 11/11/24 2217   Thu Nov 07, 2024   2208 Rhythm strip reviewed. Sinus bradycardia. No stemi. 57bpm [LV]      ED Course User Index  [LV] Dylon Colon Jr., MD     Medical Decision Making  Amount and/or Complexity of Data Reviewed  Independent Historian: caregiver     Details: Son is present at bedside  Labs: ordered. Decision-making details documented in ED Course.  Radiology: ordered. Decision-making details documented in ED Course.  ECG/medicine tests: ordered and independent interpretation performed. Decision-making details documented in ED Course.    Risk  OTC drugs.  Prescription drug management.  Parenteral controlled substances.  Risk Details: OTC drugs, prescription drugs and controlled substances considered.  Due to patient's symptoms improving and pain controlled pain medications ordered appropriately.  Differential diagnosis;  Gastroenteritis, Bowel obstruction, Colitis, Diverticulitis, Cholecystitis, Appendicitis, Perforated bowel, Herniation, Infectious etiology, UTI, Pyelonephritis,  Biliary obstruction, kidney stone                    ED Medication(s):  Medications   ondansetron injection 4 mg (4 mg Intravenous Given 11/7/24 2107)   acetaminophen tablet 1,000 mg (1,000 mg Oral Given 11/7/24 2108)       Discharge Medication List as of 11/8/2024 12:01 AM        START taking these medications    Details   ondansetron (ZOFRAN-ODT) 4 MG TbDL Take 1 tablet (4 mg total) by mouth every 6 (six) hours as needed., Starting Thu 11/7/2024, Print              Follow-up Information       Javed Basilio MD. Schedule an appointment as soon as possible for a visit in 1 week.    Specialty: Family Medicine  Contact information:  [address] [place] [address]  [phone]               [facility]    Specialty: Emergency Medicine  Why: As needed, If symptoms worsen  Contact information:  [address]  [facility] [address] [phone]                               Scribe Attestation:   Scribe #1: I performed the above scribed service and the documentation accurately describes the services I performed. I attest to the accuracy of the note.     Attending:   Physician Attestation Statement for Scribe #1: I, Dylon Colon Jr., MD, personally performed the services described in this documentation, as scribed by Tiara Cerda, in my presence, and it is both accurate and complete.           Clinical Impression       ICD-10-CM ICD-9-CM   1. Gastroenteritis  K52.9 558.9   2. Epigastric abdominal pain  R10.13 789.06   3. Abdominal pain, unspecified abdominal location  R10.9 789.00   4. Suprapubic abdominal pain  R10.2 789.09   5. Diarrhea, unspecified type  R19.7 787.91       Disposition:   Disposition: Discharged  Condition: Stable       Dylon Colon Jr., MD  11/11/24 1069

## 2024-11-08 NOTE — DISCHARGE INSTRUCTIONS
Discussed the signs and symptoms of gastroenteritis with patient including: abdominal pain; nausea, vomiting, and diarrhea; chills; clammy skin; excessive sweating; fever; joint stiffness; fecal incontinence; muscle pain; and weight loss. Advised patient to drink water or sports beverages such as Gatorade for electrolyte replacement; do NOT use fruit juice (including apple juice), sodas or cola (flat or bubbly), Jell-O, or broth due to high sugar content; drink small amounts of fluid (2-4 oz.) every 30-60 minutes; and eat small amounts of food, when tolerable such as cereals, bread, potatoes, apples, bananas, and vegetables.  I also instructed on disease transmission and need for frequent hand washing.    Regarding ABDOMINAL PAIN, I recommended that the patient: Sip water or other clear fluids; avoid solid food for the first few hours after vomiting or diarrhea; if vomiting, wait 6 hours, and then eat small amounts of mild foods such as rice, applesauce, or crackers; avoid dairy products; avoid citrus, high-fat foods, fried or greasy foods, tomato products, caffeine, alcohol, and carbonated beverages;  avoid aspirin, ibuprofen or other anti-inflammatory medications, and narcotic pain medications unless prescribed.  In regards to prevention, I encouraged patient to:  Avoid fatty or greasy foods; drink plenty of water each day; eat small meals more frequently; exercise regularly; limit foods that produce gas; make sure meals are well-balanced and high in fiber and include plenty of fruits and vegetables.    Advised patient to: drink 8 to 10 glasses of clear fluids every day; drink at least 1 cup of liquid after every loose bowel movement; eat small meals throughout the day;  consume foods and beverages containing sodium, such as pretzels, soup, and sports drinks; eat high potassium foods, such as bananas, potatoes without the skin, and watered-down fruit juices; and to get plenty of rest. Patient advised to follow up  with primary care provider or return to the ER if they experience: blood or pus in stools; black stools; stomach pain that does not go away after a bowel movement; symptoms of dehydration (thirst, dizziness, lightheadedness);  diarrhea with a fever above 101°F (100.4 °F in children); and if the diarrhea gets worse or does not improve in 2 days. Patient was also encouraged to utilize Pepto-Bismol after each bowel movement.

## 2025-01-30 DIAGNOSIS — Z00.00 ENCOUNTER FOR MEDICARE ANNUAL WELLNESS EXAM: ICD-10-CM

## 2025-04-25 ENCOUNTER — TELEPHONE (OUTPATIENT)
Dept: PRIMARY CARE CLINIC | Facility: CLINIC | Age: 89
End: 2025-04-25
Payer: MEDICARE

## 2025-04-25 NOTE — TELEPHONE ENCOUNTER
----- Message from Moira sent at 4/25/2025  1:44 PM CDT -----  Good afternoonPatient needs refill for her BP medicine as well as  Metformin and Nexium.  Please refill all her prescriptionsAlso, I would like to speak to you about this patient.  Can you please send me a number  through teams so I can speak to you briefly.Brianne

## 2025-05-29 ENCOUNTER — TELEPHONE (OUTPATIENT)
Dept: PHARMACY | Facility: CLINIC | Age: 89
End: 2025-05-29
Payer: MEDICARE

## 2025-05-30 NOTE — TELEPHONE ENCOUNTER
[facility]/Population Health Chart Review & Patient Outreach Details For Medication Adherence Project    Reason for Outreach Encounter: 3rd Party payor non-compliance report ([employer], [employer], C, etc)  2.  Patient Outreach Method: Reviewed patient chart   3.   Medication in question:    Diabetes Medications              metFORMIN (GLUCOPHAGE-XR) 500 MG ER 24hr tablet Take 1 tablet (500 mg total) by mouth daily with breakfast.                 metformin  last filled  5/2 for 90 day supply      4.  Reviewed and or Updates Made To: Patient Chart  5. Outreach Outcomes and/or actions taken: Patient filled medication and is on track to be adherent  Additional Notes:        David Hart MD

## 2025-06-11 ENCOUNTER — PATIENT OUTREACH (OUTPATIENT)
Dept: ADMINISTRATIVE | Facility: OTHER | Age: 89
End: 2025-06-11
Payer: MEDICARE

## 2025-06-20 NOTE — PROGRESS NOTES
CHW - Case Closure    This Community Health Worker spoke to patient via telephone today.   Pt/Caregiver reported: Pt declined needing any assistance at this time  Pt/Caregiver denied any additional needs at this time and agrees with episode closure at this time.  Provided patient with Community Health Worker's contact information and encouraged him/her to contact this Community Health Worker if additional needs arise.

## 2025-07-14 ENCOUNTER — HOSPITAL ENCOUNTER (EMERGENCY)
Facility: HOSPITAL | Age: 89
Discharge: HOME | End: 2025-07-14
Attending: EMERGENCY MEDICINE
Payer: MEDICARE

## 2025-07-14 VITALS
WEIGHT: 103.38 LBS | DIASTOLIC BLOOD PRESSURE: 73 MMHG | BODY MASS INDEX: 20.3 KG/M2 | HEIGHT: 60 IN | TEMPERATURE: 98 F | HEART RATE: 70 BPM | RESPIRATION RATE: 20 BRPM | SYSTOLIC BLOOD PRESSURE: 164 MMHG | OXYGEN SATURATION: 100 %

## 2025-07-14 DIAGNOSIS — R10.9 ABDOMINAL PAIN: ICD-10-CM

## 2025-07-14 DIAGNOSIS — N39.0 URINARY TRACT INFECTION WITHOUT HEMATURIA, SITE UNSPECIFIED: Primary | ICD-10-CM

## 2025-07-14 LAB
ABSOLUTE EOSINOPHIL (OHS): 0.22 K/UL
ABSOLUTE MONOCYTE (OHS): 0.53 K/UL (ref 0.3–1)
ABSOLUTE NEUTROPHIL COUNT (OHS): 3.18 K/UL (ref 1.8–7.7)
ALBUMIN SERPL BCP-MCNC: 4 G/DL (ref 3.5–5.2)
ALP SERPL-CCNC: 60 UNIT/L (ref 40–150)
ALT SERPL W/O P-5'-P-CCNC: 9 UNIT/L (ref 10–44)
ANION GAP (OHS): 7 MMOL/L (ref 8–16)
AST SERPL-CCNC: 19 UNIT/L (ref 11–45)
BACTERIA #/AREA URNS AUTO: ABNORMAL /HPF
BASOPHILS # BLD AUTO: 0.08 K/UL
BASOPHILS NFR BLD AUTO: 1.5 %
BILIRUB SERPL-MCNC: 0.3 MG/DL (ref 0.1–1)
BILIRUB UR QL STRIP.AUTO: NEGATIVE
BUN SERPL-MCNC: 23 MG/DL (ref 8–23)
CALCIUM SERPL-MCNC: 9.5 MG/DL (ref 8.7–10.5)
CHLORIDE SERPL-SCNC: 106 MMOL/L (ref 95–110)
CLARITY UR: ABNORMAL
CO2 SERPL-SCNC: 25 MMOL/L (ref 23–29)
COLOR UR AUTO: YELLOW
CREAT SERPL-MCNC: 1.5 MG/DL (ref 0.5–1.4)
ERYTHROCYTE [DISTWIDTH] IN BLOOD BY AUTOMATED COUNT: 14.3 % (ref 11.5–14.5)
GFR SERPLBLD CREATININE-BSD FMLA CKD-EPI: 33 ML/MIN/1.73/M2
GLUCOSE SERPL-MCNC: 95 MG/DL (ref 70–110)
GLUCOSE UR QL STRIP: NEGATIVE
HCT VFR BLD AUTO: 32.2 % (ref 37–48.5)
HCV AB SERPL QL IA: NEGATIVE
HGB BLD-MCNC: 11 GM/DL (ref 12–16)
HGB UR QL STRIP: NEGATIVE
HIV 1+2 AB+HIV1 P24 AG SERPL QL IA: NEGATIVE
HYALINE CASTS UR QL AUTO: 205 /LPF (ref 0–1)
IMM GRANULOCYTES # BLD AUTO: 0.02 K/UL (ref 0–0.04)
IMM GRANULOCYTES NFR BLD AUTO: 0.4 % (ref 0–0.5)
KETONES UR QL STRIP: NEGATIVE
LEUKOCYTE ESTERASE UR QL STRIP: ABNORMAL
LIPASE SERPL-CCNC: 19 U/L (ref 4–60)
LYMPHOCYTES # BLD AUTO: 1.31 K/UL (ref 1–4.8)
MCH RBC QN AUTO: 30.5 PG (ref 27–31)
MCHC RBC AUTO-ENTMCNC: 34.2 G/DL (ref 32–36)
MCV RBC AUTO: 89 FL (ref 82–98)
MICROSCOPIC COMMENT: ABNORMAL
NITRITE UR QL STRIP: NEGATIVE
NUCLEATED RBC (/100WBC) (OHS): 0 /100 WBC
PH UR STRIP: 6 [PH]
PLATELET # BLD AUTO: 253 K/UL (ref 150–450)
PMV BLD AUTO: 10.2 FL (ref 9.2–12.9)
POTASSIUM SERPL-SCNC: 4.5 MMOL/L (ref 3.5–5.1)
PROT SERPL-MCNC: 7.8 GM/DL (ref 6–8.4)
PROT UR QL STRIP: ABNORMAL
RBC # BLD AUTO: 3.61 M/UL (ref 4–5.4)
RBC #/AREA URNS AUTO: 6 /HPF (ref 0–4)
RELATIVE EOSINOPHIL (OHS): 4.1 %
RELATIVE LYMPHOCYTE (OHS): 24.5 % (ref 18–48)
RELATIVE MONOCYTE (OHS): 9.9 % (ref 4–15)
RELATIVE NEUTROPHIL (OHS): 59.6 % (ref 38–73)
SODIUM SERPL-SCNC: 138 MMOL/L (ref 136–145)
SP GR UR STRIP: 1.02
SQUAMOUS #/AREA URNS AUTO: 1 /HPF
UROBILINOGEN UR STRIP-ACNC: NEGATIVE EU/DL
WBC # BLD AUTO: 5.34 K/UL (ref 3.9–12.7)
WBC #/AREA URNS AUTO: 49 /HPF (ref 0–5)

## 2025-07-14 PROCEDURE — 96361 HYDRATE IV INFUSION ADD-ON: CPT | Mod: HCNC

## 2025-07-14 PROCEDURE — 80053 COMPREHEN METABOLIC PANEL: CPT | Mod: HCNC | Performed by: NURSE PRACTITIONER

## 2025-07-14 PROCEDURE — 83690 ASSAY OF LIPASE: CPT | Mod: HCNC | Performed by: NURSE PRACTITIONER

## 2025-07-14 PROCEDURE — 25000003 PHARM REV CODE 250: Mod: HCNC | Performed by: EMERGENCY MEDICINE

## 2025-07-14 PROCEDURE — 87086 URINE CULTURE/COLONY COUNT: CPT | Mod: HCNC | Performed by: NURSE PRACTITIONER

## 2025-07-14 PROCEDURE — 99284 EMERGENCY DEPT VISIT MOD MDM: CPT | Mod: 25,HCNC

## 2025-07-14 PROCEDURE — 85025 COMPLETE CBC W/AUTO DIFF WBC: CPT | Mod: HCNC | Performed by: NURSE PRACTITIONER

## 2025-07-14 PROCEDURE — 63600175 PHARM REV CODE 636 W HCPCS: Mod: JZ,TB,HCNC | Performed by: EMERGENCY MEDICINE

## 2025-07-14 PROCEDURE — 96374 THER/PROPH/DIAG INJ IV PUSH: CPT | Mod: HCNC

## 2025-07-14 PROCEDURE — 81003 URINALYSIS AUTO W/O SCOPE: CPT | Mod: HCNC | Performed by: NURSE PRACTITIONER

## 2025-07-14 PROCEDURE — 87389 HIV-1 AG W/HIV-1&-2 AB AG IA: CPT | Mod: HCNC | Performed by: EMERGENCY MEDICINE

## 2025-07-14 PROCEDURE — 86803 HEPATITIS C AB TEST: CPT | Mod: HCNC | Performed by: EMERGENCY MEDICINE

## 2025-07-14 RX ORDER — KETOROLAC TROMETHAMINE 30 MG/ML
15 INJECTION, SOLUTION INTRAMUSCULAR; INTRAVENOUS
Status: COMPLETED | OUTPATIENT
Start: 2025-07-14 | End: 2025-07-14

## 2025-07-14 RX ORDER — CIPROFLOXACIN 500 MG/1
500 TABLET, FILM COATED ORAL
Status: COMPLETED | OUTPATIENT
Start: 2025-07-14 | End: 2025-07-14

## 2025-07-14 RX ORDER — CIPROFLOXACIN 500 MG/1
500 TABLET, FILM COATED ORAL 2 TIMES DAILY
Qty: 14 TABLET | Refills: 0 | Status: SHIPPED | OUTPATIENT
Start: 2025-07-14 | End: 2025-07-21

## 2025-07-14 RX ORDER — NITROFURANTOIN MACROCRYSTALS 50 MG/1
50 CAPSULE ORAL DAILY
COMMUNITY
Start: 2025-05-02 | End: 2025-07-14 | Stop reason: ALTCHOICE

## 2025-07-14 RX ADMIN — KETOROLAC TROMETHAMINE 15 MG: 30 INJECTION, SOLUTION INTRAMUSCULAR at 09:07

## 2025-07-14 RX ADMIN — SODIUM CHLORIDE 1000 ML: 9 INJECTION, SOLUTION INTRAVENOUS at 09:07

## 2025-07-14 RX ADMIN — CIPROFLOXACIN 500 MG: 500 TABLET ORAL at 09:07

## 2025-07-15 ENCOUNTER — PATIENT OUTREACH (OUTPATIENT)
Facility: OTHER | Age: 89
End: 2025-07-15
Payer: MEDICARE

## 2025-07-15 LAB — HOLD SPECIMEN: NORMAL

## 2025-07-15 NOTE — FIRST PROVIDER EVALUATION
Medical screening examination initiated.  I have conducted a focused provider triage encounter, findings are as follows:    Brief history of present illness:  pt presents to the er for abdominal pain    Vitals:    07/14/25 1837   BP: 138/63   BP Location: Right arm   Pulse: 84   Resp: 20   Temp: 98.1 °F (36.7 °C)   TempSrc: Oral   SpO2: 97%   Weight: 46.9 kg (103 lb 6.4 oz)       Pertinent physical exam:  nad    Brief workup plan:  labs, further eval     Preliminary workup initiated; this workup will be continued and followed by the physician or advanced practice provider that is assigned to the patient when roomed.   Met with pt and Aunt during office visit to provide follow up. Pt appeared to be doing well today. Pt reported that she will be in the 9th grade when school starts and reports being a little anxious. Provided pt with supportive counseling and was receptive.  No needs were expressed at that time by pt or Aunt. Will continue to follow pt and provide psychosocial support, assistance and resources as needed.         Adenike Roque LMSW    Ext [phone]/Pager [phone]

## 2025-07-15 NOTE — ED PROVIDER NOTES
SCRIBE #1 NOTE: I, Padma Thorpe, am scribing for, and in the presence of, Dylon Colon Jr., MD. I have scribed the entire note.       History     Chief Complaint   Patient presents with    Abdominal Pain     Abdominal cramping, gas, bloating x 3 days, and diarrhea. Pt was seen 3 days ago for same complaints. Denies vomiting.      Review of patient's allergies indicates:   Allergen Reactions    Penicillin Anaphylaxis    Lipitor [atorvastatin] Hives     Can take Simivastatin .     Keflex [cephalexin] Rash         History of Present Illness     HPI    7/14/2025, 8:32 PM  History obtained from the patient, medical records, and son      History of Present Illness: Ghada Dave is a 89 y.o. female patient with a PMHx of an abnormal pap smear, acidosis, CAD, cataract, disorder of the kidney and ureter, frequent urination, hemorrhoids, high cholesterol, poor circulation, psoriasis, and a unilateral inguinal hernia who presents to the Emergency Department for evaluation of abdominal distention and pain which began three days ago. Pt reports that at 02:00 yesterday, she began to have diarrhea. She had around five episodes yesterday and none today. She also notes increased belching. Her sxs seem to be exacerbated by lying down. Pt has not been staying hydrated and has only been drinking soda. Prior tx includes gas-x and pepto bismol with no relief. Pt's son is at bedside providing translation services and reports that she has taken milk of magnesia and has been taking macrobid for a UTI. Patient denies any bloody stools, nausea, vomiting, fever, or chills. She denies any known sick contacts. No further complaints or concerns at this time.       Arrival mode: Personal Transportation    PCP: Javed Basilio MD        Past Medical History:  Past Medical History:   Diagnosis Date    Abnormal Pap smear 2013    Acidosis     CAD (coronary artery disease) 06/2017     iFR of LAD and RCA performed confirmed  nonobstructive disease.    Cataract     Colon polyps     Disorder of kidney and ureter     Frequent urination     General anesthetics causing adverse effect in therapeutic use     Hemorrhoid     High cholesterol     Poor circulation     Postoperative abdominal pain     Psoriasis     Unilateral inguinal hernia, without obstruction or gangrene, not specified as recurrent 08/01/2023    Varicose vein        Past Surgical History:  Past Surgical History:   Procedure Laterality Date    CARDIAC CATHETERIZATION  06/2017     iFR of LAD and RCA performed confirmed nonobstructive disease.    CATARACT EXTRACTION W/  INTRAOCULAR LENS IMPLANT Bilateral     COLONOSCOPY N/A 08/28/2017    Procedure: COLONOSCOPY;  Surgeon: Bertram Myers MD;  Location: [facility];  Service: Endoscopy;  Laterality: N/A;    COLONOSCOPY N/A 10/18/2019    Procedure: COLONOSCOPY;  Surgeon: Wilfred Barber MD;  [facility]  Service: Endoscopy;  Laterality: N/A;   needed-BB  Cardiology Clearance received from Dr. REBECA Escalona, see telephone encounter 10/1/19-BB    COLONOSCOPY N/A 06/17/2021    Procedure: COLONOSCOPY suprep Past positive;  Surgeon: Lele Barillas MD;  Location: [facility];  Service: Endoscopy;  Laterality: N/A;  past positive    COLONOSCOPY W/ BIOPSIES  06/17/2021    ESOPHAGOGASTRODUODENOSCOPY N/A 06/23/2023    Procedure: EGD (ESOPHAGOGASTRODUODENOSCOPY);  Surgeon: Seun Burt MD;  Location: [facility];  Service: Endoscopy;  Laterality: N/A;    EXCISION OF BURSA  03/05/2021    Procedure: BURSECTOMY;  Surgeon: Jason Alicea Jr., MD;  Location: [facility] OR;  Service: Orthopedics;;    HIATAL HERNIA REPAIR N/A 08/01/2023    LEFT HEART CATHETERIZATION N/A 04/08/2019    Procedure: Left heart cath;  Surgeon: Renato Escalona MD;  Location: [facility] CATH LAB/EP;  Service: Cardiology;  Laterality: N/A;    POLYPECTOMY      ROBOT-ASSISTED NISSEN FUNDOPLICATION USING DA ANTOINE XI N/A 08/01/2023     Procedure: XI ROBOTIC FUNDOPLICATION, NISSEN;  Surgeon: Elvis Malcolm MD;  Location: [facility] OR;  Service: General;  Laterality: N/A;    ROBOT-ASSISTED REPAIR OF HIATAL HERNIA USING DA ANTOINE XI N/A 08/01/2023    Procedure: XI ROBOTIC REPAIR, HERNIA, HIATAL;  Surgeon: Elvis Malcolm MD;  Location: [facility] OR;  Service: General;  Laterality: N/A;    SHOULDER ARTHROSCOPY Right 03/05/2021    Procedure: ARTHROSCOPY, SHOULDER;  Surgeon: Jason Alicea Jr., MD;  Location: [facility] OR;  Service: Orthopedics;  Laterality: Right;  need opus system Gnosticist   video  (Gnosticist notified 3/1/21, CC)  Kulwinder confirmed 3/4/21 [place]    YAG Laser Capsulotomy Right 04/17/2017    Dr. Chavez         Family History:  Family History   Problem Relation Name Age of Onset    Kidney disease Mother Ghada Flanagan     Diabetes Mother Ghada Flanagan     Glaucoma Neg Hx      Macular degeneration Neg Hx      Strabismus Neg Hx      Retinal detachment Neg Hx      Amblyopia Neg Hx      Blindness Neg Hx      Cataracts Neg Hx      Prostate cancer Neg Hx      Anesthesia problems Neg Hx         Social History:  Social History     Tobacco Use    Smoking status: Never     Passive exposure: Never    Smokeless tobacco: Never    Tobacco comments:     Never smoked   Substance and Sexual Activity    Alcohol use: Never    Drug use: Never    Sexual activity: Never        Review of Systems     Review of Systems   Constitutional:  Negative for chills and fever.   HENT:  Negative for sore throat.    Respiratory:  Negative for shortness of breath.    Cardiovascular:  Negative for chest pain.   Gastrointestinal:  Positive for abdominal distention, abdominal pain and diarrhea. Negative for blood in stool, nausea and vomiting.        (+) belching   Genitourinary:  Negative for dysuria.   Musculoskeletal:  Negative for back pain.   Skin:  Negative for rash.   Neurological:  Negative for weakness.   Hematological:  Does not bruise/bleed easily.   All other systems  reviewed and are negative.     Physical Exam     Initial Vitals [07/14/25 1837]   BP Pulse Resp Temp SpO2   138/63 84 20 98.1 °F (36.7 °C) 97 %      MAP       --          Physical Exam  Nursing Notes and Vital Signs Reviewed.  Constitutional: Patient is in no acute distress. Well-developed and well-nourished.  Head: Atraumatic. Normocephalic.  Eyes: PERRL. EOM intact. Conjunctivae are not pale. No scleral icterus.  ENT: Mucous membranes are moist. Oropharynx is clear and symmetric.    Neck: Supple. Full ROM. No lymphadenopathy.  Cardiovascular: Regular rate. Regular rhythm. No murmurs, rubs, or gallops. Distal pulses are 2+ and symmetric.  Pulmonary/Chest: No respiratory distress. Clear to auscultation bilaterally. No wheezing or rales.  Abdominal: Soft and non-distended. Mild suprapubic tenderness.  No rebound, guarding, or rigidity. Good bowel sounds.  Genitourinary: No CVA tenderness.  Musculoskeletal: Moves all extremities. No obvious deformities. No edema. No calf tenderness.  Skin: Warm and dry.  Neurological:  Alert, awake, and appropriate.  Normal speech.  No acute focal neurological deficits are appreciated.  Psychiatric: Normal affect. Good eye contact. Appropriate in content.     ED Course   Procedures  ED Vital Signs:  Vitals:    07/14/25 1837 07/14/25 2050 07/14/25 2101 07/14/25 2102   BP: 138/63   (!) 161/69   Pulse: 84 64 63    Resp: 20  19    Temp: 98.1 °F (36.7 °C)      TempSrc: Oral      SpO2: 97%  99%    Weight: 46.9 kg (103 lb 6.4 oz)   46.9 kg (103 lb 6.3 oz)   Height:    5' (1.524 m)    07/14/25 2205   BP: (!) 164/73   Pulse: 70   Resp: 20   Temp:    TempSrc:    SpO2: 100%   Weight:    Height:        Abnormal Lab Results:  Labs Reviewed   COMPREHENSIVE METABOLIC PANEL - Abnormal       Result Value    Sodium 138      Potassium 4.5      Chloride 106      CO2 25      Glucose 95      BUN 23      Creatinine 1.5 (*)     Calcium 9.5      Protein Total 7.8      Albumin 4.0      Bilirubin Total 0.3       ALP 60      AST 19      ALT 9 (*)     Anion Gap 7 (*)     eGFR 33 (*)    URINALYSIS, REFLEX TO URINE CULTURE - Abnormal    Color, UA Yellow      Appearance, UA Hazy (*)     pH, UA 6.0      Spec Grav UA 1.020      Protein, UA Trace (*)     Glucose, UA Negative      Ketones, UA Negative      Bilirubin, UA Negative      Blood, UA Negative      Nitrites, UA Negative      Urobilinogen, UA Negative      Leukocyte Esterase, UA 3+ (*)    CBC WITH DIFFERENTIAL - Abnormal    WBC 5.34      RBC 3.61 (*)     HGB 11.0 (*)     HCT 32.2 (*)     MCV 89      MCH 30.5      MCHC 34.2      RDW 14.3      Platelet Count 253      MPV 10.2      Nucleated RBC 0      Neut % 59.6      Lymph % 24.5      Mono % 9.9      Eos % 4.1      Basophil % 1.5      Imm Grans % 0.4      Neut # 3.18      Lymph # 1.31      Mono # 0.53      Eos # 0.22      Baso # 0.08      Imm Grans # 0.02     URINALYSIS MICROSCOPIC - Abnormal    RBC, UA 6 (*)     WBC, UA 49 (*)     Bacteria, UA Rare      Squamous Epithelial Cells, UA 1      Hyaline Casts,  (*)     Microscopic Comment       HEPATITIS C ANTIBODY - Normal    Hep C Ab Interp Negative     HIV 1 / 2 ANTIBODY - Normal    HIV 1/2 Ag/Ab Negative     LIPASE - Normal    Lipase Level 19     CULTURE, URINE   CBC W/ AUTO DIFFERENTIAL    Narrative:     The following orders were created for panel order CBC auto differential.  Procedure                               Abnormality         Status                     ---------                               -----------         ------                     CBC with Differential[[id number]]       Abnormal            Final result                 Please view results for these tests on the individual orders.   HEP C VIRUS HOLD SPECIMEN   GREY TOP URINE HOLD        All Lab Results:  Results for orders placed or performed during the hospital encounter of 07/14/25   Hepatitis C Antibody    Collection Time: 07/14/25  7:54 PM   Result Value Ref Range    Hep C Ab Interp Negative Negative    HIV 1/2 Ag/Ab (4th Gen)    Collection Time: 07/14/25  7:54 PM   Result Value Ref Range    HIV 1/2 Ag/Ab Negative Negative   Comprehensive metabolic panel    Collection Time: 07/14/25  7:54 PM   Result Value Ref Range    Sodium 138 136 - 145 mmol/L    Potassium 4.5 3.5 - 5.1 mmol/L    Chloride 106 95 - 110 mmol/L    CO2 25 23 - 29 mmol/L    Glucose 95 70 - 110 mg/dL    BUN 23 8 - 23 mg/dL    Creatinine 1.5 (H) 0.5 - 1.4 mg/dL    Calcium 9.5 8.7 - 10.5 mg/dL    Protein Total 7.8 6.0 - 8.4 gm/dL    Albumin 4.0 3.5 - 5.2 g/dL    Bilirubin Total 0.3 0.1 - 1.0 mg/dL    ALP 60 40 - 150 unit/L    AST 19 11 - 45 unit/L    ALT 9 (L) 10 - 44 unit/L    Anion Gap 7 (L) 8 - 16 mmol/L    eGFR 33 (L) >60 mL/min/1.73/m2   Lipase    Collection Time: 07/14/25  7:54 PM   Result Value Ref Range    Lipase Level 19 4 - 60 U/L   CBC with Differential    Collection Time: 07/14/25  7:54 PM   Result Value Ref Range    WBC 5.34 3.90 - 12.70 K/uL    RBC 3.61 (L) 4.00 - 5.40 M/uL    HGB 11.0 (L) 12.0 - 16.0 gm/dL    HCT 32.2 (L) 37.0 - 48.5 %    MCV 89 82 - 98 fL    MCH 30.5 27.0 - 31.0 pg    MCHC 34.2 32.0 - 36.0 g/dL    RDW 14.3 11.5 - 14.5 %    Platelet Count 253 150 - 450 K/uL    MPV 10.2 9.2 - 12.9 fL    Nucleated RBC 0 <=0 /100 WBC    Neut % 59.6 38 - 73 %    Lymph % 24.5 18 - 48 %    Mono % 9.9 4 - 15 %    Eos % 4.1 <=8 %    Basophil % 1.5 <=1.9 %    Imm Grans % 0.4 0.0 - 0.5 %    Neut # 3.18 1.8 - 7.7 K/uL    Lymph # 1.31 1 - 4.8 K/uL    Mono # 0.53 0.3 - 1 K/uL    Eos # 0.22 <=0.5 K/uL    Baso # 0.08 <=0.2 K/uL    Imm Grans # 0.02 0.00 - 0.04 K/uL   Urinalysis, Reflex to Urine Culture Urine, Clean Catch    Collection Time: 07/14/25  8:12 PM    Specimen: Urine   Result Value Ref Range    Color, UA Yellow Straw, Lucretia, Yellow, Light-Orange    Appearance, UA Hazy (A) Clear    pH, UA 6.0 5.0 - 8.0    Spec Grav UA 1.020 1.005 - 1.030    Protein, UA Trace (A) Negative    Glucose, UA Negative Negative    Ketones, UA Negative Negative     Bilirubin, UA Negative Negative    Blood, UA Negative Negative    Nitrites, UA Negative Negative    Urobilinogen, UA Negative <2.0 EU/dL    Leukocyte Esterase, UA 3+ (A) Negative   Urinalysis Microscopic    Collection Time: 07/14/25  8:12 PM   Result Value Ref Range    RBC, UA 6 (H) 0 - 4 /HPF    WBC, UA 49 (H) 0 - 5 /HPF    Bacteria, UA Rare None, Rare, Occasional /HPF    Squamous Epithelial Cells, UA 1 <=5 /HPF    Hyaline Casts,  (H) 0 - 1 /LPF    Microscopic Comment         Imaging Results:  Imaging Results              X-Ray Abdomen AP 1 View (KUB) (Final result)  Result time 07/14/25 20:08:37      Final result by Donal Rivers DO (07/14/25 20:08:37)                   Impression:     Nonobstructive bowel gas pattern.    Finalized on: 7/14/2025 8:08 PM By:  Donal Rivers  [facility]# [id number]      2025-07-14 20:10:41.572     [facility]               Narrative:    Exam: XR ABDOMEN AP 1 VIEW    Comparison: 11/07/2024    Clinical Indication:  Abdominal pain    Findings: No obvious intra-abdominal free air on the supine image.  No dilated loops of large or small bowel.  Moderate amount of stool throughout the descending and sigmoid colon.  No obvious renal or ureteral stones.    Left common iliac artery stent.  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                                             The Emergency Provider reviewed the vital signs and test results, which are outlined above.     ED Discussion     9:43 PM: Reassessed pt at this time. Advised her to discontinue Macrobid and take Cipro as prescribed. Recommended that she stay hydrated and f/u with her PCP within one week. Gave the patient and family all f/u and return to the ED instructions. All questions and concerns were addressed at this time. Patient and/or family/caretaker expresses understanding of information and instructions, and is comfortable with plan to discharge. Pt is stable for discharge.     I discussed with patient and/or  family/caretaker that evaluation in the ED does not suggest any emergent or life threatening medical conditions requiring immediate intervention beyond what was provided in the ED, and I believe patient is safe for discharge. Regardless, an unremarkable evaluation in the ED does not preclude the development or presence of a serious or life threatening condition. As such, I instructed that the patient is to return immediately for any worsening or change in current symptoms.    For URINARY TRACT INFECTION, I instructed patient to avoid holding in urine and recommended that she urinate when she feels the urge.  Also advised patient to drink plenty of liquids and reminded patient that she may need to drink more fluids than usual to help flush out the bacteria. Instructed patient to avoid alcohol, caffeine, and citrus juices as these substances can irritate your bladder and increase your symptoms.  Also recommended that patient apply heat to lower abdomen for 20 to 30 minutes every two hours to help decrease discomfort and pressure in the bladder.       Medical Decision Making  Amount and/or Complexity of Data Reviewed  Labs: ordered. Decision-making details documented in ED Course.  Radiology: ordered. Decision-making details documented in ED Course.    Risk  OTC drugs.  Prescription drug management.  Parenteral controlled substances.  Risk Details: OTC drugs, prescription drugs and controlled substances considered.  Due to patient's symptoms improving and pain controlled pain medications ordered appropriately.  Differential diagnosis;  Gastroenteritis, Bowel obstruction, Colitis, Diverticulitis, Cholecystitis, Appendicitis, Perforated bowel, Herniation, Infectious etiology, UTI, Pyelonephritis,  Biliary obstruction, kidney stone among others.                  ED Medication(s):  Medications   sodium chloride 0.9% bolus 1,000 mL 1,000 mL (0 mLs Intravenous Stopped 7/14/25 1843)   ketorolac injection 15 mg (15 mg  Intravenous Given 7/14/25 2113)   ciprofloxacin HCl tablet 500 mg (500 mg Oral Given 7/14/25 2113)       Discharge Medication List as of 7/14/2025  9:43 PM        START taking these medications    Details   ciprofloxacin HCl (CIPRO) 500 MG tablet Take 1 tablet (500 mg total) by mouth 2 (two) times daily. for 7 days, Starting Mon 7/14/2025, Until Mon 7/21/2025, Print              Follow-up Information       Javed Basilio MD. Schedule an appointment as soon as possible for a visit in 1 week.    Specialty: Family Medicine  Contact information:  [address] [place] [address]  [phone]               [facility] - Emergency Dept..    Specialty: Emergency Medicine  Why: As needed, If symptoms worsen  Contact information:  [address] [facility] [address] [phone]                               Scribe Attestation:   Scribe #1: I performed the above scribed service and the documentation accurately describes the services I performed. I attest to the accuracy of the note.     Attending:   Physician Attestation Statement for Scribe #1: I, Dylon Colon Jr., MD, personally performed the services described in this documentation, as scribed by Padma Thorpe, in my presence, and it is both accurate and complete.           Clinical Impression       ICD-10-CM ICD-9-CM   1. Urinary tract infection without hematuria, site unspecified  N39.0 599.0   2. Abdominal pain  R10.9 789.00       Disposition:   Disposition: Discharged  Condition: Stable         Dylon Colon Jr., MD  07/14/25 2727

## 2025-07-16 LAB — BACTERIA UR CULT: NO GROWTH

## 2025-07-17 LAB — HOLD SPECIMEN: NORMAL

## (undated) DEVICE — PACK BASIC

## (undated) DEVICE — BLADE SHAVER 4.5 6/BX

## (undated) DEVICE — TRAY CATH FOL SIL URIMTR 16FR

## (undated) DEVICE — SEAL UNIVERSAL 5MM-8MM XI

## (undated) DEVICE — SEE MEDLINE ITEM 146313

## (undated) DEVICE — ALCOHOL 70% ISOP W/GREEN 16OZ

## (undated) DEVICE — KIT ANTIFOG W/SPONG & FLUID

## (undated) DEVICE — SEE MEDLINE ITEM 146417

## (undated) DEVICE — SEE MEDLINE ITEM 152487

## (undated) DEVICE — PAD PREP 50/CA

## (undated) DEVICE — SEE MEDLINE ITEM 157116

## (undated) DEVICE — DRAPE ARM DAVINCI XI

## (undated) DEVICE — DRESSING TELFA STRL 4X3 LF

## (undated) DEVICE — Device

## (undated) DEVICE — INSTRAMOD SHOULDER TRACTION

## (undated) DEVICE — MAT SUCTION PUDDLEVAC ORANGE

## (undated) DEVICE — SEE MEDLINE ITEM 156955

## (undated) DEVICE — SEE MEDLINE ITEM 157125

## (undated) DEVICE — APPLICATOR CHLORAPREP ORN 26ML

## (undated) DEVICE — TROCAR ENDOPATH XCEL 5X100MM

## (undated) DEVICE — PACK BASIC SETUP SC BR

## (undated) DEVICE — NDL 22GA X1 1/2 REG BEVEL

## (undated) DEVICE — COVER OVERHEAD SURG LT BLUE

## (undated) DEVICE — DRAPE ABDOMINAL TIBURON 14X11

## (undated) DEVICE — SEE MEDLINE ITEM 157187

## (undated) DEVICE — WAND COBLATION TURBOVAC 90

## (undated) DEVICE — TRAY MINOR GEN SURG

## (undated) DEVICE — SET PNEUMOCLEAR HEAT HUM SE HF

## (undated) DEVICE — SYR B-D DISP CONTROL 10CC100/C

## (undated) DEVICE — COVER LIGHT HANDLE 80/CA

## (undated) DEVICE — SUT MONOCRYL 4.0 PS2 CP496G

## (undated) DEVICE — ADHESIVE DERMABOND ADVANCED

## (undated) DEVICE — SEE MEDLINE ITEM 157117

## (undated) DEVICE — SUT ETHILON 3/0 18IN PS-1

## (undated) DEVICE — GLOVE BIOGEL ECLIPSE SZ 7.5

## (undated) DEVICE — LUBRICANT SURGILUBE 2 OZ

## (undated) DEVICE — NDL PNEUMO INSUFFLATI 120MM

## (undated) DEVICE — GOWN POLY REINF BRTH SLV XL

## (undated) DEVICE — SWABSTICK BENZOIN 4 IN

## (undated) DEVICE — SUPPORT ULNA NERVE PROTECTOR

## (undated) DEVICE — TAPE ADH MEDIPORE 4 X 10YDS

## (undated) DEVICE — PAD ABD 8X10 STERILE

## (undated) DEVICE — ELECTRODE REM PLYHSV RETURN 9

## (undated) DEVICE — SEE MEDLINE ITEM 152622

## (undated) DEVICE — TUBE SET INFLOW/OUTFLOW

## (undated) DEVICE — MANIFOLD 4 PORT

## (undated) DEVICE — GAUZE SPONGE 4X4 12PLY

## (undated) DEVICE — SUT CHROMIC 2-0 SH 27IN BRN

## (undated) DEVICE — CATH JACKY RADIAL 5FR 100CM

## (undated) DEVICE — DRESSING XEROFORM FOIL PK 1X8

## (undated) DEVICE — SEE MEDLINE ITEM 157131

## (undated) DEVICE — HEMOSTAT VASC BAND SHORT 21CM

## (undated) DEVICE — NDL SPINAL 18GX3.5 SPINOCAN

## (undated) DEVICE — SLING ARM COMFT NAVY BLU LG

## (undated) DEVICE — PAD PINK TRENDELENBURG POS XL

## (undated) DEVICE — SYR ONLY LUER LOCK 20CC

## (undated) DEVICE — TUBING MEDI-VAC 20FT .25IN

## (undated) DEVICE — SPONGE GAUZE 16PLY 4X4

## (undated) DEVICE — DRAPE COLUMN DAVINCI XI

## (undated) DEVICE — SEE MEDLINE ITEM 157148

## (undated) DEVICE — SEALER VESSEL EXTEND

## (undated) DEVICE — SOL IRR NACL .9% 3000ML

## (undated) DEVICE — KIT LEFT HEART MANIFOLD CUSTOM

## (undated) DEVICE — OBTURATOR BLADELESS 8MM XI CLR

## (undated) DEVICE — DEFIBRILLATOR STAT PADZ II

## (undated) DEVICE — DRESSING AQUACEL SACRAL 8 X 7

## (undated) DEVICE — COVER PROBE US 5.5X58L NON LTX

## (undated) DEVICE — SEE MEDLINE ITEM 154981

## (undated) DEVICE — PAD ABDOMINAL 5X9 STERILE

## (undated) DEVICE — SUT SILK 0 SH 30IN BLK BR

## (undated) DEVICE — DECANTER 6 VIAL

## (undated) DEVICE — SUT STRATAFIX 2-0 30CM

## (undated) DEVICE — TOWEL OR DISP STRL BLUE 4/PK

## (undated) DEVICE — JELLY LUBRICANT STERILE 4 OZ

## (undated) DEVICE — NDL HYPO A BEVEL 22X1 1/2

## (undated) DEVICE — GAUZE PETROLATUM 1/2 X 72

## (undated) DEVICE — GLIDESHEATH SLENDER SS 5FR10CM

## (undated) DEVICE — GAUZE VASELINE PETRO 3X9

## (undated) DEVICE — SUT SILK 2-0 SH 18IN BLACK

## (undated) DEVICE — TIP GRASPER FENESTRATED DISP

## (undated) DEVICE — TUBING HPCIL ROT M/F ADPT 48IN

## (undated) DEVICE — CONTRAST VISIPAQUE 150ML

## (undated) DEVICE — DRESSING N ADH OIL EMUL 3X3

## (undated) DEVICE — GLOVE SURG BIOGEL LATEX SZ 7.5

## (undated) DEVICE — DRAPE SHOULDER 160X102IN 10/CA

## (undated) DEVICE — NDL SAFETY 22G X 1.5 ECLIPSE

## (undated) DEVICE — ELECTRODE BLADE INSULATED 1 IN

## (undated) DEVICE — SOL NORMAL USPCA 0.9%

## (undated) DEVICE — SEE MEDLINE ITEM 146420

## (undated) DEVICE — GLOVE SURGICAL LATEX SZ 7

## (undated) DEVICE — SYR 3CC LUER LOC

## (undated) DEVICE — SEE L#120831

## (undated) DEVICE — CATH IMPULSE PIGTAIL 5FR 125CM

## (undated) DEVICE — DRAPE MICROSCOPE 4 OCLR 54X150

## (undated) DEVICE — SEE MEDLINE ITEM 146292

## (undated) DEVICE — DRAPE PLASTIC U 60X72

## (undated) DEVICE — DRAPE OPMI STERILE

## (undated) DEVICE — NDL 20GX1-1/2IN IB

## (undated) DEVICE — DRAPE LAP T SHT W/ INSTR PAD